# Patient Record
Sex: FEMALE | Race: WHITE | Employment: OTHER | URBAN - METROPOLITAN AREA
[De-identification: names, ages, dates, MRNs, and addresses within clinical notes are randomized per-mention and may not be internally consistent; named-entity substitution may affect disease eponyms.]

---

## 2018-07-03 ENCOUNTER — HOSPITAL ENCOUNTER (OUTPATIENT)
Dept: OTHER | Age: 73
Discharge: HOME | End: 2018-07-03
Attending: FAMILY MEDICINE | Admitting: FAMILY MEDICINE

## 2018-07-03 DIAGNOSIS — M25.511 RIGHT SHOULDER PAIN, UNSPECIFIED CHRONICITY: ICD-10-CM

## 2018-11-08 ENCOUNTER — HOSPITAL ENCOUNTER (EMERGENCY)
Age: 73
Discharge: HOME | End: 2018-11-08
Payer: MEDICARE

## 2018-11-08 VITALS
SYSTOLIC BLOOD PRESSURE: 148 MMHG | OXYGEN SATURATION: 91 % | TEMPERATURE: 99.1 F | BODY MASS INDEX: 50.61 KG/M2 | WEIGHT: 275 LBS | DIASTOLIC BLOOD PRESSURE: 108 MMHG | HEIGHT: 62 IN | HEART RATE: 74 BPM | RESPIRATION RATE: 14 BRPM

## 2018-11-08 DIAGNOSIS — N76.0 VAGINITIS AND VULVOVAGINITIS: ICD-10-CM

## 2018-11-08 DIAGNOSIS — N30.00 ACUTE CYSTITIS WITHOUT HEMATURIA: Primary | ICD-10-CM

## 2018-11-08 LAB
AMORPHOUS: ABNORMAL /HPF
BACTERIA: ABNORMAL /HPF
BILIRUBIN URINE: NEGATIVE
BLOOD, URINE: NEGATIVE
CASTS: ABNORMAL /LPF
CLARITY: CLEAR
COLOR: YELLOW
EPITHELIAL CELLS, UA: ABNORMAL /HPF
GLUCOSE URINE: NEGATIVE MG/DL
KETONES, URINE: NEGATIVE MG/DL
LEUKOCYTE ESTERASE, URINE: NEGATIVE
MICROSCOPIC EXAMINATION: YES
MUCUS: ABNORMAL /LPF
NITRITE, URINE: POSITIVE
PH UA: 7
PROTEIN UA: 30 MG/DL
RBC UA: ABNORMAL /HPF (ref 0–2)
RENAL EPITHELIAL, UA: ABNORMAL /HPF
SPECIFIC GRAVITY UA: 1.01
URINE REFLEX TO CULTURE: YES
URINE TYPE: ABNORMAL
UROBILINOGEN, URINE: 1 E.U./DL
WBC UA: ABNORMAL /HPF (ref 0–5)

## 2018-11-08 PROCEDURE — 99283 EMERGENCY DEPT VISIT LOW MDM: CPT

## 2018-11-08 PROCEDURE — 81001 URINALYSIS AUTO W/SCOPE: CPT

## 2018-11-08 PROCEDURE — 87086 URINE CULTURE/COLONY COUNT: CPT

## 2018-11-08 PROCEDURE — 6370000000 HC RX 637 (ALT 250 FOR IP): Performed by: PHYSICIAN ASSISTANT

## 2018-11-08 RX ORDER — ACETAMINOPHEN 500 MG
1000 TABLET ORAL ONCE
Status: COMPLETED | OUTPATIENT
Start: 2018-11-08 | End: 2018-11-08

## 2018-11-08 RX ORDER — CEFUROXIME AXETIL 250 MG/1
250 TABLET ORAL 2 TIMES DAILY
Qty: 20 TABLET | Refills: 0 | Status: SHIPPED | OUTPATIENT
Start: 2018-11-08 | End: 2018-11-18

## 2018-11-08 RX ORDER — FLUCONAZOLE 100 MG/1
100 TABLET ORAL DAILY
Qty: 7 TABLET | Refills: 0 | Status: SHIPPED | OUTPATIENT
Start: 2018-11-08 | End: 2018-11-15

## 2018-11-08 RX ADMIN — ACETAMINOPHEN 1000 MG: 500 TABLET ORAL at 18:59

## 2018-11-08 ASSESSMENT — PAIN DESCRIPTION - PAIN TYPE: TYPE: ACUTE PAIN

## 2018-11-08 ASSESSMENT — PAIN SCALES - GENERAL: PAINLEVEL_OUTOF10: 10

## 2018-11-08 ASSESSMENT — PAIN DESCRIPTION - LOCATION: LOCATION: VAGINA

## 2018-11-08 NOTE — ED PROVIDER NOTES
**[facility]  eMERGENCY dEPARTMENT eNCOUnter      Pt Name: Rani Arvizu  FGN:[id number]  [place] 1945  Date of evaluation: 11/8/2018  Provider: Merline Boss PA-C      Chief Complaint:    Chief Complaint   Patient presents with    Urinary Tract Infection     Pt c/o of burning and pressure with urination       Nursing Notes, Past Medical Hx, Past Surgical Hx, Social Hx, Allergies, and Family Hx were all reviewed and agreed with or any disagreements were addressed in the HPI.    HPI:  (Location, Duration, Timing, Severity,Quality, Assoc Sx, Context, Modifying factors)  This is a  71 y.o. female patient presenting with her brother-in-law and in wheelchair. States she has back pain in legs will give out at times. This is why she is a wheelchair. This is long-standing. Patient presenting today with complaint of discomfort with urination describes as a burning hot sensation. States that his progress over the past week or less. She does not indicate any vaginal discharge. She does state prior GYN cancer with total hysterectomy. No associated nausea, vomiting, fevers or chills. No flank pain. Does not recall last UTI. She indicates no chest pain or shortness of breath. Patient has known COPD without worsening of this time. She is a former smoker. Patient is nondiabetic. Last A1c May, 2018 was 6.4. Chart review does indicate a pulmonary nodule 6 mm lateral aspect of right upper lobe stable since 2001.     PastMedical/Surgical History:      Diagnosis Date    Arthritis     Asthma     COPD (chronic obstructive pulmonary disease) ([address]     Diabetes mellitus ([address]     Hyperlipidemia     Hypertension     Other disorders of kidney and ureter in diseases classified elsewhere     Thyroid disease          Procedure Laterality Date    CHOLECYSTECTOMY      ROTATOR CUFF REPAIR Left 2015    THYROID LOBECTOMY      XR [employer]

## 2018-11-10 LAB — URINE CULTURE, ROUTINE: NORMAL

## 2018-12-21 ENCOUNTER — APPOINTMENT (OUTPATIENT)
Dept: CT IMAGING | Age: 73
DRG: 871 | End: 2018-12-21
Payer: MEDICARE

## 2018-12-21 ENCOUNTER — APPOINTMENT (OUTPATIENT)
Dept: GENERAL RADIOLOGY | Age: 73
DRG: 871 | End: 2018-12-21
Payer: MEDICARE

## 2018-12-21 ENCOUNTER — HOSPITAL ENCOUNTER (INPATIENT)
Age: 73
LOS: 6 days | Discharge: NURSING, REHAB OR LONG-TERM CARE | DRG: 871 | End: 2018-12-28
Attending: EMERGENCY MEDICINE | Admitting: INTERNAL MEDICINE
Payer: MEDICARE

## 2018-12-21 DIAGNOSIS — J96.02 ACUTE RESPIRATORY FAILURE WITH HYPOXIA AND HYPERCAPNIA (HCC): Primary | ICD-10-CM

## 2018-12-21 DIAGNOSIS — J96.01 ACUTE RESPIRATORY FAILURE WITH HYPOXIA AND HYPERCAPNIA (HCC): Primary | ICD-10-CM

## 2018-12-21 DIAGNOSIS — N28.89 RIGHT KIDNEY MASS: ICD-10-CM

## 2018-12-21 DIAGNOSIS — N17.9 AKI (ACUTE KIDNEY INJURY) (HCC): ICD-10-CM

## 2018-12-21 DIAGNOSIS — R41.0 DELIRIUM: ICD-10-CM

## 2018-12-21 LAB
A/G RATIO: 1.1 (ref 1.1–2.2)
ALBUMIN SERPL-MCNC: 3.4 G/DL (ref 3.4–5)
ALP BLD-CCNC: 85 U/L (ref 40–129)
ALT SERPL-CCNC: 41 U/L (ref 10–40)
AMORPHOUS: ABNORMAL /HPF
ANION GAP SERPL CALCULATED.3IONS-SCNC: 17 MMOL/L (ref 3–16)
APTT: 28.3 SEC (ref 26–36)
AST SERPL-CCNC: 69 U/L (ref 15–37)
BACTERIA: ABNORMAL /HPF
BASE EXCESS ARTERIAL: -2.9 MMOL/L (ref -3–3)
BASOPHILS ABSOLUTE: 0 K/UL (ref 0–0.2)
BASOPHILS RELATIVE PERCENT: 0.1 %
BILIRUB SERPL-MCNC: 0.5 MG/DL (ref 0–1)
BILIRUBIN URINE: ABNORMAL
BLOOD, URINE: ABNORMAL
BUN BLDV-MCNC: 25 MG/DL (ref 7–20)
CALCIUM SERPL-MCNC: 9 MG/DL (ref 8.3–10.6)
CARBOXYHEMOGLOBIN ARTERIAL: 2.8 % (ref 0–1.5)
CASTS 2: ABNORMAL /LPF
CHLORIDE BLD-SCNC: 98 MMOL/L (ref 99–110)
CLARITY: ABNORMAL
CO2: 25 MMOL/L (ref 21–32)
COLOR: ABNORMAL
CREAT SERPL-MCNC: 1.2 MG/DL (ref 0.6–1.2)
EOSINOPHILS ABSOLUTE: 0 K/UL (ref 0–0.6)
EOSINOPHILS RELATIVE PERCENT: 0 %
EPITHELIAL CELLS, UA: ABNORMAL /HPF
GFR AFRICAN AMERICAN: 53
GFR NON-AFRICAN AMERICAN: 44
GLOBULIN: 3.1 G/DL
GLUCOSE BLD-MCNC: 246 MG/DL (ref 70–99)
GLUCOSE URINE: NEGATIVE MG/DL
HCO3 ARTERIAL: 26.1 MMOL/L (ref 21–29)
HCT VFR BLD CALC: 40.4 % (ref 36–48)
HEMOGLOBIN, ART, EXTENDED: 13 G/DL (ref 12–16)
HEMOGLOBIN: 12.8 G/DL (ref 12–16)
INR BLD: 1.11 (ref 0.86–1.14)
KETONES, URINE: NEGATIVE MG/DL
LACTIC ACID, SEPSIS: 4.4 MMOL/L (ref 0.4–1.9)
LEUKOCYTE ESTERASE, URINE: NEGATIVE
LIPASE: 52 U/L (ref 13–60)
LYMPHOCYTES ABSOLUTE: 0.5 K/UL (ref 1–5.1)
LYMPHOCYTES RELATIVE PERCENT: 3.6 %
MCH RBC QN AUTO: 30.3 PG (ref 26–34)
MCHC RBC AUTO-ENTMCNC: 31.6 G/DL (ref 31–36)
MCV RBC AUTO: 95.9 FL (ref 80–100)
METHEMOGLOBIN ARTERIAL: 0.3 %
MICROSCOPIC EXAMINATION: YES
MONOCYTES ABSOLUTE: 0.2 K/UL (ref 0–1.3)
MONOCYTES RELATIVE PERCENT: 1.7 %
NEUTROPHILS ABSOLUTE: 13 K/UL (ref 1.7–7.7)
NEUTROPHILS RELATIVE PERCENT: 94.6 %
NITRITE, URINE: NEGATIVE
O2 CONTENT ARTERIAL: 18 ML/DL
O2 SAT, ARTERIAL: 97 %
O2 THERAPY: ABNORMAL
PCO2 ARTERIAL: 65.7 MMHG (ref 35–45)
PDW BLD-RTO: 17 % (ref 12.4–15.4)
PH ARTERIAL: 7.22 (ref 7.35–7.45)
PH UA: 5
PLATELET # BLD: 237 K/UL (ref 135–450)
PMV BLD AUTO: 8.1 FL (ref 5–10.5)
PO2 ARTERIAL: 111.4 MMHG (ref 75–108)
POTASSIUM REFLEX MAGNESIUM: 4.5 MMOL/L (ref 3.5–5.1)
PROCALCITONIN: 0.06 NG/ML (ref 0–0.15)
PROTEIN UA: >=300 MG/DL
PROTHROMBIN TIME: 12.7 SEC (ref 9.8–13)
RBC # BLD: 4.21 M/UL (ref 4–5.2)
RBC UA: ABNORMAL /HPF (ref 0–2)
SODIUM BLD-SCNC: 140 MMOL/L (ref 136–145)
SPECIFIC GRAVITY UA: >=1.03
TCO2 ARTERIAL: 28.1 MMOL/L
TOTAL PROTEIN: 6.5 G/DL (ref 6.4–8.2)
URINE TYPE: ABNORMAL
UROBILINOGEN, URINE: 1 E.U./DL
WBC # BLD: 13.8 K/UL (ref 4–11)
WBC UA: ABNORMAL /HPF (ref 0–5)

## 2018-12-21 PROCEDURE — 96361 HYDRATE IV INFUSION ADD-ON: CPT

## 2018-12-21 PROCEDURE — 81001 URINALYSIS AUTO W/SCOPE: CPT

## 2018-12-21 PROCEDURE — 74177 CT ABD & PELVIS W/CONTRAST: CPT

## 2018-12-21 PROCEDURE — 85610 PROTHROMBIN TIME: CPT

## 2018-12-21 PROCEDURE — 99152 MOD SED SAME PHYS/QHP 5/>YRS: CPT

## 2018-12-21 PROCEDURE — 83605 ASSAY OF LACTIC ACID: CPT

## 2018-12-21 PROCEDURE — 84145 PROCALCITONIN (PCT): CPT

## 2018-12-21 PROCEDURE — 2500000003 HC RX 250 WO HCPCS

## 2018-12-21 PROCEDURE — 80053 COMPREHEN METABOLIC PANEL: CPT

## 2018-12-21 PROCEDURE — 36600 WITHDRAWAL OF ARTERIAL BLOOD: CPT

## 2018-12-21 PROCEDURE — 6360000002 HC RX W HCPCS: Performed by: EMERGENCY MEDICINE

## 2018-12-21 PROCEDURE — 87086 URINE CULTURE/COLONY COUNT: CPT

## 2018-12-21 PROCEDURE — 2500000003 HC RX 250 WO HCPCS: Performed by: EMERGENCY MEDICINE

## 2018-12-21 PROCEDURE — 99291 CRITICAL CARE FIRST HOUR: CPT

## 2018-12-21 PROCEDURE — 74018 RADEX ABDOMEN 1 VIEW: CPT

## 2018-12-21 PROCEDURE — 96365 THER/PROPH/DIAG IV INF INIT: CPT

## 2018-12-21 PROCEDURE — 87040 BLOOD CULTURE FOR BACTERIA: CPT

## 2018-12-21 PROCEDURE — 6360000004 HC RX CONTRAST MEDICATION: Performed by: EMERGENCY MEDICINE

## 2018-12-21 PROCEDURE — 71045 X-RAY EXAM CHEST 1 VIEW: CPT

## 2018-12-21 PROCEDURE — 85730 THROMBOPLASTIN TIME PARTIAL: CPT

## 2018-12-21 PROCEDURE — 70450 CT HEAD/BRAIN W/O DYE: CPT

## 2018-12-21 PROCEDURE — 96367 TX/PROPH/DG ADDL SEQ IV INF: CPT

## 2018-12-21 PROCEDURE — 5A1945Z RESPIRATORY VENTILATION, 24-96 CONSECUTIVE HOURS: ICD-10-PCS | Performed by: EMERGENCY MEDICINE

## 2018-12-21 PROCEDURE — 0BH17EZ INSERTION OF ENDOTRACHEAL AIRWAY INTO TRACHEA, VIA NATURAL OR ARTIFICIAL OPENING: ICD-10-PCS | Performed by: EMERGENCY MEDICINE

## 2018-12-21 PROCEDURE — 85025 COMPLETE CBC W/AUTO DIFF WBC: CPT

## 2018-12-21 PROCEDURE — 2580000003 HC RX 258: Performed by: EMERGENCY MEDICINE

## 2018-12-21 PROCEDURE — 80307 DRUG TEST PRSMV CHEM ANLYZR: CPT

## 2018-12-21 PROCEDURE — 4500000026 HC ED CRITICAL CARE PROCEDURE

## 2018-12-21 PROCEDURE — 94002 VENT MGMT INPAT INIT DAY: CPT

## 2018-12-21 PROCEDURE — 82803 BLOOD GASES ANY COMBINATION: CPT

## 2018-12-21 PROCEDURE — 83690 ASSAY OF LIPASE: CPT

## 2018-12-21 PROCEDURE — 93005 ELECTROCARDIOGRAM TRACING: CPT | Performed by: EMERGENCY MEDICINE

## 2018-12-21 RX ORDER — ROCURONIUM BROMIDE 10 MG/ML
0.6 INJECTION, SOLUTION INTRAVENOUS ONCE
Status: COMPLETED | OUTPATIENT
Start: 2018-12-21 | End: 2018-12-21

## 2018-12-21 RX ORDER — ETOMIDATE 2 MG/ML
20 INJECTION INTRAVENOUS ONCE
Status: COMPLETED | OUTPATIENT
Start: 2018-12-21 | End: 2018-12-21

## 2018-12-21 RX ORDER — 0.9 % SODIUM CHLORIDE 0.9 %
30 INTRAVENOUS SOLUTION INTRAVENOUS ONCE
Status: COMPLETED | OUTPATIENT
Start: 2018-12-21 | End: 2018-12-21

## 2018-12-21 RX ORDER — NALOXONE HYDROCHLORIDE 1 MG/ML
2 INJECTION INTRAMUSCULAR; INTRAVENOUS; SUBCUTANEOUS ONCE
Status: DISCONTINUED | OUTPATIENT
Start: 2018-12-21 | End: 2018-12-22

## 2018-12-21 RX ORDER — PROPOFOL 10 MG/ML
20 INJECTION, EMULSION INTRAVENOUS
Status: DISCONTINUED | OUTPATIENT
Start: 2018-12-21 | End: 2018-12-22

## 2018-12-21 RX ADMIN — PIPERACILLIN SODIUM AND TAZOBACTAM SODIUM 3.38 G: 3; .375 INJECTION, POWDER, LYOPHILIZED, FOR SOLUTION INTRAVENOUS at 22:20

## 2018-12-21 RX ADMIN — ROCURONIUM BROMIDE 75 MG: 100 INJECTION, SOLUTION INTRAVENOUS at 21:34

## 2018-12-21 RX ADMIN — SODIUM CHLORIDE 3741 ML: 9 INJECTION, SOLUTION INTRAVENOUS at 21:35

## 2018-12-21 RX ADMIN — IOPAMIDOL 75 ML: 755 INJECTION, SOLUTION INTRAVENOUS at 22:50

## 2018-12-21 RX ADMIN — PROPOFOL 20 MCG/KG/MIN: 10 INJECTION, EMULSION INTRAVENOUS at 21:33

## 2018-12-21 RX ADMIN — VANCOMYCIN HYDROCHLORIDE 1750 MG: 1 INJECTION, POWDER, LYOPHILIZED, FOR SOLUTION INTRAVENOUS at 23:35

## 2018-12-21 RX ADMIN — ETOMIDATE 20 MG: 2 INJECTION INTRAVENOUS at 21:34

## 2018-12-21 ASSESSMENT — PAIN SCALES - GENERAL: PAINLEVEL_OUTOF10: 0

## 2018-12-21 ASSESSMENT — PULMONARY FUNCTION TESTS: PIF_VALUE: 25

## 2018-12-22 ENCOUNTER — APPOINTMENT (OUTPATIENT)
Dept: GENERAL RADIOLOGY | Age: 73
DRG: 871 | End: 2018-12-22
Payer: MEDICARE

## 2018-12-22 PROBLEM — J96.01 ACUTE RESPIRATORY FAILURE WITH HYPOXIA AND HYPERCAPNIA (HCC): Status: ACTIVE | Noted: 2018-12-22

## 2018-12-22 PROBLEM — G93.40 ACUTE ENCEPHALOPATHY: Status: ACTIVE | Noted: 2018-12-22

## 2018-12-22 PROBLEM — J18.9 PNEUMONIA DUE TO INFECTIOUS ORGANISM: Status: ACTIVE | Noted: 2018-12-22

## 2018-12-22 PROBLEM — J96.02 ACUTE RESPIRATORY FAILURE WITH HYPOXIA AND HYPERCAPNIA (HCC): Status: ACTIVE | Noted: 2018-12-22

## 2018-12-22 LAB
A/G RATIO: 1 (ref 1.1–2.2)
ALBUMIN SERPL-MCNC: 2.9 G/DL (ref 3.4–5)
ALP BLD-CCNC: 74 U/L (ref 40–129)
ALT SERPL-CCNC: 38 U/L (ref 10–40)
AMPHETAMINE SCREEN, URINE: ABNORMAL
ANION GAP SERPL CALCULATED.3IONS-SCNC: 11 MMOL/L (ref 3–16)
AST SERPL-CCNC: 49 U/L (ref 15–37)
BARBITURATE SCREEN URINE: ABNORMAL
BASE EXCESS ARTERIAL: -0.9 MMOL/L (ref -3–3)
BASOPHILS ABSOLUTE: 0 K/UL (ref 0–0.2)
BASOPHILS RELATIVE PERCENT: 0.1 %
BENZODIAZEPINE SCREEN, URINE: ABNORMAL
BILIRUB SERPL-MCNC: 0.6 MG/DL (ref 0–1)
BUN BLDV-MCNC: 25 MG/DL (ref 7–20)
CALCIUM SERPL-MCNC: 8.1 MG/DL (ref 8.3–10.6)
CANNABINOID SCREEN URINE: ABNORMAL
CARBOXYHEMOGLOBIN ARTERIAL: 2.3 % (ref 0–1.5)
CHLORIDE BLD-SCNC: 105 MMOL/L (ref 99–110)
CO2: 24 MMOL/L (ref 21–32)
COCAINE METABOLITE SCREEN URINE: ABNORMAL
CREAT SERPL-MCNC: 0.9 MG/DL (ref 0.6–1.2)
EKG ATRIAL RATE: 89 BPM
EKG DIAGNOSIS: NORMAL
EKG P AXIS: 37 DEGREES
EKG P-R INTERVAL: 172 MS
EKG Q-T INTERVAL: 380 MS
EKG QRS DURATION: 88 MS
EKG QTC CALCULATION (BAZETT): 462 MS
EKG R AXIS: 17 DEGREES
EKG T AXIS: -19 DEGREES
EKG VENTRICULAR RATE: 89 BPM
EOSINOPHILS ABSOLUTE: 0 K/UL (ref 0–0.6)
EOSINOPHILS RELATIVE PERCENT: 0 %
GFR AFRICAN AMERICAN: >60
GFR NON-AFRICAN AMERICAN: >60
GLOBULIN: 2.9 G/DL
GLUCOSE BLD-MCNC: 119 MG/DL (ref 70–99)
GLUCOSE BLD-MCNC: 128 MG/DL (ref 70–99)
GLUCOSE BLD-MCNC: 143 MG/DL (ref 70–99)
GLUCOSE BLD-MCNC: 151 MG/DL (ref 70–99)
GLUCOSE BLD-MCNC: 156 MG/DL (ref 70–99)
GLUCOSE BLD-MCNC: 165 MG/DL (ref 70–99)
GLUCOSE BLD-MCNC: 166 MG/DL (ref 70–99)
HCO3 ARTERIAL: 24.7 MMOL/L (ref 21–29)
HCT VFR BLD CALC: 37.1 % (ref 36–48)
HEMOGLOBIN, ART, EXTENDED: 12 G/DL (ref 12–16)
HEMOGLOBIN: 11.8 G/DL (ref 12–16)
LACTIC ACID: 1.4 MMOL/L (ref 0.4–2)
LACTIC ACID: 2.1 MMOL/L (ref 0.4–2)
LACTIC ACID: 2.1 MMOL/L (ref 0.4–2)
LYMPHOCYTES ABSOLUTE: 0.6 K/UL (ref 1–5.1)
LYMPHOCYTES RELATIVE PERCENT: 4.7 %
Lab: ABNORMAL
MCH RBC QN AUTO: 30.4 PG (ref 26–34)
MCHC RBC AUTO-ENTMCNC: 31.8 G/DL (ref 31–36)
MCV RBC AUTO: 95.7 FL (ref 80–100)
METHADONE SCREEN, URINE: ABNORMAL
METHEMOGLOBIN ARTERIAL: 0.3 %
MONOCYTES ABSOLUTE: 0.8 K/UL (ref 0–1.3)
MONOCYTES RELATIVE PERCENT: 6.5 %
NEUTROPHILS ABSOLUTE: 10.7 K/UL (ref 1.7–7.7)
NEUTROPHILS RELATIVE PERCENT: 88.7 %
O2 CONTENT ARTERIAL: 16 ML/DL
O2 SAT, ARTERIAL: 95.8 %
O2 THERAPY: ABNORMAL
OPIATE SCREEN URINE: POSITIVE
OXYCODONE URINE: POSITIVE
PCO2 ARTERIAL: 44.9 MMHG (ref 35–45)
PDW BLD-RTO: 17.4 % (ref 12.4–15.4)
PERFORMED ON: ABNORMAL
PH ARTERIAL: 7.36 (ref 7.35–7.45)
PH UA: 5
PHENCYCLIDINE SCREEN URINE: ABNORMAL
PLATELET # BLD: 192 K/UL (ref 135–450)
PMV BLD AUTO: 8.2 FL (ref 5–10.5)
PO2 ARTERIAL: 83.1 MMHG (ref 75–108)
POTASSIUM REFLEX MAGNESIUM: 4.2 MMOL/L (ref 3.5–5.1)
PROPOXYPHENE SCREEN: ABNORMAL
RBC # BLD: 3.88 M/UL (ref 4–5.2)
SODIUM BLD-SCNC: 140 MMOL/L (ref 136–145)
TCO2 ARTERIAL: 26.1 MMOL/L
TOTAL PROTEIN: 5.8 G/DL (ref 6.4–8.2)
TROPONIN: <0.01 NG/ML
WBC # BLD: 12.1 K/UL (ref 4–11)

## 2018-12-22 PROCEDURE — 6360000002 HC RX W HCPCS: Performed by: INTERNAL MEDICINE

## 2018-12-22 PROCEDURE — 2500000003 HC RX 250 WO HCPCS: Performed by: INTERNAL MEDICINE

## 2018-12-22 PROCEDURE — S0028 INJECTION, FAMOTIDINE, 20 MG: HCPCS | Performed by: INTERNAL MEDICINE

## 2018-12-22 PROCEDURE — 6370000000 HC RX 637 (ALT 250 FOR IP): Performed by: INTERNAL MEDICINE

## 2018-12-22 PROCEDURE — 80053 COMPREHEN METABOLIC PANEL: CPT

## 2018-12-22 PROCEDURE — 94750 HC PULMONARY COMPLIANCE STUDY: CPT

## 2018-12-22 PROCEDURE — 2580000003 HC RX 258: Performed by: INTERNAL MEDICINE

## 2018-12-22 PROCEDURE — 83605 ASSAY OF LACTIC ACID: CPT

## 2018-12-22 PROCEDURE — 71045 X-RAY EXAM CHEST 1 VIEW: CPT

## 2018-12-22 PROCEDURE — 82803 BLOOD GASES ANY COMBINATION: CPT

## 2018-12-22 PROCEDURE — 93010 ELECTROCARDIOGRAM REPORT: CPT | Performed by: INTERNAL MEDICINE

## 2018-12-22 PROCEDURE — 93005 ELECTROCARDIOGRAM TRACING: CPT | Performed by: INTERNAL MEDICINE

## 2018-12-22 PROCEDURE — 94762 N-INVAS EAR/PLS OXIMTRY CONT: CPT

## 2018-12-22 PROCEDURE — 99291 CRITICAL CARE FIRST HOUR: CPT | Performed by: INTERNAL MEDICINE

## 2018-12-22 PROCEDURE — 94640 AIRWAY INHALATION TREATMENT: CPT

## 2018-12-22 PROCEDURE — 96366 THER/PROPH/DIAG IV INF ADDON: CPT

## 2018-12-22 PROCEDURE — 36415 COLL VENOUS BLD VENIPUNCTURE: CPT

## 2018-12-22 PROCEDURE — 2700000000 HC OXYGEN THERAPY PER DAY

## 2018-12-22 PROCEDURE — 94003 VENT MGMT INPAT SUBQ DAY: CPT

## 2018-12-22 PROCEDURE — 85025 COMPLETE CBC W/AUTO DIFF WBC: CPT

## 2018-12-22 PROCEDURE — 96361 HYDRATE IV INFUSION ADD-ON: CPT

## 2018-12-22 PROCEDURE — 2000000000 HC ICU R&B

## 2018-12-22 PROCEDURE — 94664 DEMO&/EVAL PT USE INHALER: CPT

## 2018-12-22 PROCEDURE — 84484 ASSAY OF TROPONIN QUANT: CPT

## 2018-12-22 PROCEDURE — 83036 HEMOGLOBIN GLYCOSYLATED A1C: CPT

## 2018-12-22 RX ORDER — ALBUTEROL SULFATE 90 UG/1
4 AEROSOL, METERED RESPIRATORY (INHALATION) EVERY 4 HOURS PRN
Status: DISCONTINUED | OUTPATIENT
Start: 2018-12-22 | End: 2018-12-22

## 2018-12-22 RX ORDER — NICOTINE POLACRILEX 4 MG
15 LOZENGE BUCCAL PRN
Status: DISCONTINUED | OUTPATIENT
Start: 2018-12-22 | End: 2018-12-28 | Stop reason: HOSPADM

## 2018-12-22 RX ORDER — PROPOFOL 10 MG/ML
10 INJECTION, EMULSION INTRAVENOUS CONTINUOUS
Status: DISCONTINUED | OUTPATIENT
Start: 2018-12-22 | End: 2018-12-23

## 2018-12-22 RX ORDER — TRAMADOL HYDROCHLORIDE 50 MG/1
50-100 TABLET ORAL EVERY 6 HOURS PRN
Status: ON HOLD | COMMUNITY
End: 2018-12-28 | Stop reason: HOSPADM

## 2018-12-22 RX ORDER — 0.9 % SODIUM CHLORIDE 0.9 %
500 INTRAVENOUS SOLUTION INTRAVENOUS ONCE
Status: COMPLETED | OUTPATIENT
Start: 2018-12-22 | End: 2018-12-22

## 2018-12-22 RX ORDER — ALBUTEROL SULFATE 90 UG/1
4 AEROSOL, METERED RESPIRATORY (INHALATION) EVERY 4 HOURS
Status: DISCONTINUED | OUTPATIENT
Start: 2018-12-22 | End: 2018-12-23

## 2018-12-22 RX ORDER — DEXTROSE MONOHYDRATE 50 MG/ML
100 INJECTION, SOLUTION INTRAVENOUS PRN
Status: DISCONTINUED | OUTPATIENT
Start: 2018-12-22 | End: 2018-12-28 | Stop reason: HOSPADM

## 2018-12-22 RX ORDER — MAGNESIUM SULFATE IN WATER 40 MG/ML
2 INJECTION, SOLUTION INTRAVENOUS ONCE
Status: COMPLETED | OUTPATIENT
Start: 2018-12-22 | End: 2018-12-22

## 2018-12-22 RX ORDER — DEXTROSE MONOHYDRATE 25 G/50ML
12.5 INJECTION, SOLUTION INTRAVENOUS PRN
Status: DISCONTINUED | OUTPATIENT
Start: 2018-12-22 | End: 2018-12-28 | Stop reason: HOSPADM

## 2018-12-22 RX ORDER — NYSTATIN 100000 U/G
10000 OINTMENT TOPICAL PRN
Status: ON HOLD | COMMUNITY
End: 2018-12-28 | Stop reason: HOSPADM

## 2018-12-22 RX ORDER — SODIUM CHLORIDE 0.9 % (FLUSH) 0.9 %
10 SYRINGE (ML) INJECTION PRN
Status: DISCONTINUED | OUTPATIENT
Start: 2018-12-22 | End: 2018-12-28 | Stop reason: HOSPADM

## 2018-12-22 RX ORDER — ACETAMINOPHEN 325 MG/1
650 TABLET ORAL EVERY 4 HOURS PRN
Status: DISCONTINUED | OUTPATIENT
Start: 2018-12-22 | End: 2018-12-28 | Stop reason: HOSPADM

## 2018-12-22 RX ORDER — FENTANYL CITRATE 50 UG/ML
25 INJECTION, SOLUTION INTRAMUSCULAR; INTRAVENOUS
Status: DISCONTINUED | OUTPATIENT
Start: 2018-12-22 | End: 2018-12-23

## 2018-12-22 RX ORDER — CHLORHEXIDINE GLUCONATE 0.12 MG/ML
15 RINSE ORAL 2 TIMES DAILY
Status: DISCONTINUED | OUTPATIENT
Start: 2018-12-22 | End: 2018-12-24

## 2018-12-22 RX ORDER — SODIUM CHLORIDE 0.9 % (FLUSH) 0.9 %
10 SYRINGE (ML) INJECTION EVERY 12 HOURS SCHEDULED
Status: DISCONTINUED | OUTPATIENT
Start: 2018-12-22 | End: 2018-12-28 | Stop reason: HOSPADM

## 2018-12-22 RX ORDER — OXYCODONE HYDROCHLORIDE AND ACETAMINOPHEN 5; 325 MG/1; MG/1
1-2 TABLET ORAL EVERY 6 HOURS PRN
Status: ON HOLD | COMMUNITY
End: 2018-12-28 | Stop reason: HOSPADM

## 2018-12-22 RX ORDER — MIDAZOLAM HYDROCHLORIDE 1 MG/ML
2 INJECTION INTRAMUSCULAR; INTRAVENOUS
Status: DISCONTINUED | OUTPATIENT
Start: 2018-12-22 | End: 2018-12-23

## 2018-12-22 RX ORDER — LEVOTHYROXINE SODIUM ANHYDROUS 100 UG/5ML
44 INJECTION, POWDER, LYOPHILIZED, FOR SOLUTION INTRAVENOUS
Status: DISCONTINUED | OUTPATIENT
Start: 2018-12-22 | End: 2018-12-24

## 2018-12-22 RX ADMIN — Medication 4 PUFF: at 11:09

## 2018-12-22 RX ADMIN — INSULIN LISPRO 1 UNITS: 100 INJECTION, SOLUTION INTRAVENOUS; SUBCUTANEOUS at 04:30

## 2018-12-22 RX ADMIN — FENTANYL CITRATE 25 MCG: 50 INJECTION, SOLUTION INTRAMUSCULAR; INTRAVENOUS at 22:15

## 2018-12-22 RX ADMIN — Medication 4 PUFF: at 03:08

## 2018-12-22 RX ADMIN — Medication 4 PUFF: at 22:46

## 2018-12-22 RX ADMIN — PROPOFOL 24 MCG/KG/MIN: 10 INJECTION, EMULSION INTRAVENOUS at 11:09

## 2018-12-22 RX ADMIN — Medication 4 PUFF: at 19:04

## 2018-12-22 RX ADMIN — FENTANYL CITRATE 25 MCG: 50 INJECTION, SOLUTION INTRAMUSCULAR; INTRAVENOUS at 03:17

## 2018-12-22 RX ADMIN — FAMOTIDINE 20 MG: 10 INJECTION, SOLUTION INTRAVENOUS at 09:43

## 2018-12-22 RX ADMIN — ENOXAPARIN SODIUM 40 MG: 40 INJECTION SUBCUTANEOUS at 09:43

## 2018-12-22 RX ADMIN — PROPOFOL 40 MCG/KG/MIN: 10 INJECTION, EMULSION INTRAVENOUS at 03:18

## 2018-12-22 RX ADMIN — FAMOTIDINE 20 MG: 10 INJECTION, SOLUTION INTRAVENOUS at 20:22

## 2018-12-22 RX ADMIN — MIDAZOLAM HYDROCHLORIDE 2 MG: 1 INJECTION, SOLUTION INTRAMUSCULAR; INTRAVENOUS at 09:15

## 2018-12-22 RX ADMIN — Medication 10 ML: at 20:22

## 2018-12-22 RX ADMIN — Medication 4 PUFF: at 15:17

## 2018-12-22 RX ADMIN — MIDAZOLAM HYDROCHLORIDE 2 MG: 1 INJECTION, SOLUTION INTRAMUSCULAR; INTRAVENOUS at 18:29

## 2018-12-22 RX ADMIN — PROPOFOL 20 MCG/KG/MIN: 10 INJECTION, EMULSION INTRAVENOUS at 20:28

## 2018-12-22 RX ADMIN — MAGNESIUM SULFATE HEPTAHYDRATE 2 G: 40 INJECTION, SOLUTION INTRAVENOUS at 11:08

## 2018-12-22 RX ADMIN — MIDAZOLAM HYDROCHLORIDE 2 MG: 1 INJECTION, SOLUTION INTRAMUSCULAR; INTRAVENOUS at 21:34

## 2018-12-22 RX ADMIN — FENTANYL CITRATE 25 MCG: 50 INJECTION, SOLUTION INTRAMUSCULAR; INTRAVENOUS at 06:32

## 2018-12-22 RX ADMIN — MUPIROCIN: 20 OINTMENT TOPICAL at 20:20

## 2018-12-22 RX ADMIN — SODIUM CHLORIDE 500 ML: 9 INJECTION, SOLUTION INTRAVENOUS at 04:38

## 2018-12-22 RX ADMIN — VANCOMYCIN HYDROCHLORIDE 1250 MG: 1 INJECTION, POWDER, LYOPHILIZED, FOR SOLUTION INTRAVENOUS at 22:33

## 2018-12-22 RX ADMIN — PIPERACILLIN SODIUM AND TAZOBACTAM SODIUM 3.38 G: 3; .375 INJECTION, POWDER, LYOPHILIZED, FOR SOLUTION INTRAVENOUS at 16:25

## 2018-12-22 RX ADMIN — PIPERACILLIN SODIUM AND TAZOBACTAM SODIUM 3.38 G: 3; .375 INJECTION, POWDER, LYOPHILIZED, FOR SOLUTION INTRAVENOUS at 09:43

## 2018-12-22 RX ADMIN — MUPIROCIN: 20 OINTMENT TOPICAL at 11:08

## 2018-12-22 RX ADMIN — FENTANYL CITRATE 25 MCG: 50 INJECTION, SOLUTION INTRAMUSCULAR; INTRAVENOUS at 09:15

## 2018-12-22 RX ADMIN — PROPOFOL 30 MCG/KG/MIN: 10 INJECTION, EMULSION INTRAVENOUS at 06:04

## 2018-12-22 RX ADMIN — PROPOFOL 22 MCG/KG/MIN: 10 INJECTION, EMULSION INTRAVENOUS at 16:25

## 2018-12-22 RX ADMIN — MIDAZOLAM HYDROCHLORIDE 2 MG: 1 INJECTION, SOLUTION INTRAMUSCULAR; INTRAVENOUS at 23:35

## 2018-12-22 RX ADMIN — Medication 4 PUFF: at 07:38

## 2018-12-22 RX ADMIN — CHLORHEXIDINE GLUCONATE 0.12% ORAL RINSE 15 ML: 1.2 LIQUID ORAL at 20:18

## 2018-12-22 ASSESSMENT — PULMONARY FUNCTION TESTS
PIF_VALUE: 33
PIF_VALUE: 22
PIF_VALUE: 22
PIF_VALUE: 23
PIF_VALUE: 23
PIF_VALUE: 24
PIF_VALUE: 23
PIF_VALUE: 22
PIF_VALUE: 24
PIF_VALUE: 24
PIF_VALUE: 23
PIF_VALUE: 23
PIF_VALUE: 25

## 2018-12-22 NOTE — PROGRESS NOTES
This note also relates to the following rows which could not be included:  FiO2  - Cannot attach notes to unvalidated device data       12/22/18 0743   Oxygen Therapy/Pulse Ox   O2 Device Ventilator   Treatment   Treatment Type MDI   $Treatment Type $Medication Delivery   Medications Albuterol

## 2018-12-22 NOTE — PROGRESS NOTES
Shift assessment is complete. Pt continues on the Vent with sedation. ET/OG intact. Left/right lungs are diminished with minimal ET secretions. Peripheral lines dry/intact. Bilateral lower non-pitting edema. Whelan intact and draining yellow/cloudy. Vital signs are stable, telemetry sinus rhythm/sinus jae. Bilateral soft wrist restraints continued for safety of airway.

## 2018-12-22 NOTE — PROGRESS NOTES
12/22/18 1113   Vent Information   Vt Ordered 420 mL   Rate Set 20 bmp   Peak Flow 60 L/min   Pressure Support 0 cmH20   FiO2  55 %   Sensitivity 3   PEEP/CPAP 5   I Time/ I Time % 0 s   Humidification Source Heated wire   Humidification Temp 36.9   Circuit Condensation Drained   Vent Patient Data   High Peep/I Pressure 0   Peak Inspiratory Pressure 23 cmH2O   Mean Airway Pressure 11 cmH20   Rate Measured 20 br/min   Vt Exhaled 457 mL   Minute Volume 9.28 Liters   I:E Ratio 1:2.90   Cough/Sputum   Sputum How Obtained Endotracheal   Cough Non-productive   Sputum Amount None   Spontaneous Breathing Trial (SBT) RT Doc   Pulse 51   SpO2 97 %   Breath Sounds   Right Upper Lobe Diminished   Right Middle Lobe Diminished   Right Lower Lobe Diminished   Left Upper Lobe Diminished   Left Lower Lobe Diminished   Additional Respiratory  Assessments   Resp 20   Position Semi-Acosta's   Alarm Settings   High Pressure Alarm 40 cmH2O   Low Minute Volume Alarm 3.5 L/min   High Respiratory Rate 40 br/min   Patient Observation   Observations 7.5 ETT @ 23 lip

## 2018-12-22 NOTE — ED PROVIDER NOTES
Specific Gravity, UA >=1.030 1.005 - 1.030    Blood, Urine MODERATE (A) Negative    pH, UA 5.0 5.0 - 8.0    Protein, UA >=300 (A) Negative mg/dL    Urobilinogen, Urine 1.0 <2.0 E.U./dL    Nitrite, Urine Negative Negative    Leukocyte Esterase, Urine Negative Negative    Microscopic Examination YES     Urine Type Not Specified    Protime-INR   Result Value Ref Range    Protime 12.7 9.8 - 13.0 sec    INR 1.11 0.86 - 1.14   APTT   Result Value Ref Range    aPTT 28.3 26.0 - 36.0 sec   Lipase   Result Value Ref Range    Lipase 52.0 13.0 - 60.0 U/L   Procalcitonin   Result Value Ref Range    Procalcitonin 0.06 0.00 - 0.15 ng/mL   Blood gas, arterial   Result Value Ref Range    pH, Arterial 7.217 (L) 7.350 - 7.450    pCO2, Arterial 65.7 (H) 35.0 - 45.0 mmHg    pO2, Arterial 111.4 (H) 75.0 - 108.0 mmHg    HCO3, Arterial 26.1 21.0 - 29.0 mmol/L    Base Excess, Arterial -2.9 -3.0 - 3.0 mmol/L    Hemoglobin, Art, Extended 13.0 12.0 - 16.0 g/dL    O2 Sat, Arterial 97.0 >92 %    Carboxyhgb, Arterial 2.8 (H) 0.0 - 1.5 %    Methemoglobin, Arterial 0.3 <1.5 %    TCO2, Arterial 28.1 Not Established mmol/L    O2 Content, Arterial 18 Not Established mL/dL    O2 Therapy See comment    Microscopic Urinalysis   Result Value Ref Range    CASTS 2 0-1 Coarse Gran (A) /LPF    WBC, UA 6-10 (A) 0 - 5 /HPF    RBC, UA 5-10 (A) 0 - 2 /HPF    Epi Cells 0-2 /HPF    Bacteria, UA 1+ (A) /HPF    Amorphous, UA 2+ (A) /HPF   Troponin   Result Value Ref Range    Troponin <0.01 <0.01 ng/mL   Drug screen multi urine   Result Value Ref Range    Amphetamine Screen, Urine Neg Negative <1000ng/mL    Barbiturate Screen, Ur Neg Negative <200 ng/mL    Benzodiazepine Screen, Urine Neg Negative <200 ng/mL    Cannabinoid Scrn, Ur Neg Negative <50 ng/mL    Cocaine Metabolite Screen, Urine Neg Negative <300 ng/mL    Opiate Scrn, Ur POSITIVE (A) Negative <300 ng/mL    PCP Screen, Urine Neg Negative <25 ng/mL    Methadone Screen, Urine Neg Negative <300 ng/mL

## 2018-12-22 NOTE — PROGRESS NOTES
12/22/18 1114   Oxygen Therapy/Pulse Ox   O2 Device Ventilator   FiO2  50 %   Resp 20   SpO2 97 %   Treatment   Treatment Type MDI   $Treatment Type $Medication Delivery   Medications Albuterol

## 2018-12-22 NOTE — CONSULTS
Pharmacy Note  Vancomycin Consult    Sara Rosales is a 68 y.o. female started on Vancomycin for respiratory failure; consult received from Dr. Delores Johnson to manage therapy. Also receiving the following antibiotics: zosyn. Patient Active Problem List   Diagnosis    Polycythemia, secondary    Leukocytosis    COPD (chronic obstructive pulmonary disease) ([address]    Diabetes mellitus ([facility]    Hyperlipidemia    Hypertension    Thyroid disease    Thyroid nodule    Pulmonary nodule, left    Acute respiratory failure with hypoxia and hypercapnia (HCC)       Allergies:  Aspirin; Celecoxib; Fexofenadine; Gabapentin; Niacin er; and Adhesive tape     Temp max:     Recent Labs      12/21/18 2207   BUN  25*       Recent Labs      12/21/18 2207   CREATININE  1.2       Recent Labs      12/21/18 2207   WBC  13.8*       No intake or output data in the 24 hours ending 12/22/18 0335    Culture Date      Source                       Results      Ht Readings from Last 1 Encounters:   12/21/18 5' 2\" (1.575 m)        Wt Readings from Last 1 Encounters:   12/22/18 289 lb (131.1 kg)         Body mass index is 52.86 kg/m². Estimated Creatinine Clearance: 55 mL/min (based on SCr of 1.2 mg/dL). Goal Trough Level: 15-20 mcg/mL    Assessment/Plan:  Patient received vancomycin 1750mg ivpb x1 dose in ER at 2335 on 12-21-18. Please start vancomycin 1250mg ivpb q24h at 2300 on 12-22-18. Please check vancomycin trough 12-24-18 at 2230. Thank you for the consult. Will continue to follow. [facility]. Ph.  12/22/2018 3:37 AM

## 2018-12-22 NOTE — PROGRESS NOTES
RESPIRATORY THERAPY ASSESSMENT    Name:  [address]  Record Number:  [id number]  Age: 68 y.o. Gender: female  : 1945  Today's Date:  2018  Room:  3007/3007-01    Assessment     Is the patient being admitted for a COPD or Asthma exacerbation? No   (If yes the patient will be seen every 4 hours for the first 24 hours and then reassessed)    Patient Admission Diagnosis      Allergies  Allergies   Allergen Reactions    Aspirin Other (See Comments)     \"ulcers\"    Celecoxib Other (See Comments)     RHINITIS    Fexofenadine Swelling    Gabapentin Other (See Comments)    Niacin Er      Other reaction(s): Flushing    Adhesive Tape Rash     Paper tape  Paper tape  Paper tape       Minimum Predicted Vital Capacity:     748          Actual Vital Capacity:      On vent          Pulmonary History:COPD  Home Oxygen Therapy:  unknown  Home Respiratory Therapy:Albuterol   Current Respiratory Therapy:  Albuterol prn  Treatment Type: MDI  Medications: Albuterol    Respiratory Severity Index(RSI)   Patients with orders for inhalation medications, oxygen, or any therapeutic treatment modality will be placed on Respiratory Protocol. They will be assessed with the first treatment and at least every 72 hours thereafter. The following severity scale will be used to determine frequency of treatment intervention.     Smoking History: Pulmonary Disease or Smoking History, Greater than 15 pack year = 2    Social History  Social History   Substance Use Topics    Smoking status: Former Smoker     Packs/day: 0.50    Smokeless tobacco: Never Used    Alcohol use No       Recent Surgical History: None = 0  Past Surgical History  Past Surgical History:   Procedure Laterality Date    CHOLECYSTECTOMY      ROTATOR CUFF REPAIR Left 2015    THYROID LOBECTOMY      XR [employer] TUNNEL BILAT Bilateral        Level of Consciousness: Comatose = 4    Level of Activity: Bedridden, unresponsive or quadriplegic = inspiratory hold  4. Bronchodilators will be administered via Hand Held Nebulizer TOI [facility]) to patients when ANY of the following criteria are met  a. Incognizant or uncooperative          b. Patients treated with HHN at Home        c. Unable to demonstrate proper use of MDI with spacer     d. RR > 30 bpm   5. Bronchodilators will be delivered via Metered Dose Inhaler (MDI), HHN, Aerogen to intubated patients on mechanical ventilation. 6. Inhalation medication orders will be delivered and/or substituted as outlined below. Aerosolized Medications Ordering and Administration Guidelines:    1. All Medications will be ordered by a physician, and their frequency and/or modality will be adjusted as defined by the patients Respiratory Severity Index (RSI) score. 2. If the patient does not have documented COPD, consider discontinuing anticholinergics when RSI is less than 9.  3. If the bronchospasm worsens (increased RSI), then the bronchodilator frequency can be increased to a maximum of every 4 hours. If greater than every 4 hours is required, the physician will be contacted. 4. If the bronchospasm improves, the frequency of the bronchodilator can be decreased, based on the patient's RSI, but not less than home treatment regimen frequency. 5. Bronchodilator(s) will be discontinued if patient has a RSI less than 9 and has received no scheduled or as needed treatment for 72  Hrs. Patients Ordered on a Mucolytic Agent:    1. Must always be administered with a bronchodilator. 2. Discontinue if patient experiences worsened bronchospasm, or secretions have lessened to the point that the patient is able to clear them with a cough. Anti-inflammatory and Combination Medications:    1. If the patient lacks prior history of lung disease, is not using inhaled anti-inflammatory medication at home, and lacks wheezing by examination or by history for at least 24 hours, contact physician for possible discontinuation.

## 2018-12-22 NOTE — PROGRESS NOTES
End of shift note. Pt continues on the VENT, calm/comfortable, sedated with propofol. All lines are intact. Vital signs are stable, telemetry sinus rhythm.

## 2018-12-23 ENCOUNTER — APPOINTMENT (OUTPATIENT)
Dept: GENERAL RADIOLOGY | Age: 73
DRG: 871 | End: 2018-12-23
Payer: MEDICARE

## 2018-12-23 LAB
ANION GAP SERPL CALCULATED.3IONS-SCNC: 9 MMOL/L (ref 3–16)
BASE EXCESS ARTERIAL: 2.8 MMOL/L (ref -3–3)
BASOPHILS ABSOLUTE: 0 K/UL (ref 0–0.2)
BASOPHILS RELATIVE PERCENT: 0.2 %
BUN BLDV-MCNC: 25 MG/DL (ref 7–20)
CALCIUM SERPL-MCNC: 8.9 MG/DL (ref 8.3–10.6)
CARBOXYHEMOGLOBIN ARTERIAL: 1.9 % (ref 0–1.5)
CHLORIDE BLD-SCNC: 104 MMOL/L (ref 99–110)
CO2: 28 MMOL/L (ref 21–32)
CREAT SERPL-MCNC: 1 MG/DL (ref 0.6–1.2)
EKG ATRIAL RATE: 61 BPM
EKG DIAGNOSIS: NORMAL
EKG P AXIS: 52 DEGREES
EKG P-R INTERVAL: 182 MS
EKG Q-T INTERVAL: 472 MS
EKG QRS DURATION: 86 MS
EKG QTC CALCULATION (BAZETT): 475 MS
EKG R AXIS: 4 DEGREES
EKG T AXIS: -30 DEGREES
EKG VENTRICULAR RATE: 61 BPM
EOSINOPHILS ABSOLUTE: 0.2 K/UL (ref 0–0.6)
EOSINOPHILS RELATIVE PERCENT: 1.4 %
GFR AFRICAN AMERICAN: >60
GFR NON-AFRICAN AMERICAN: 54
GLUCOSE BLD-MCNC: 108 MG/DL (ref 70–99)
GLUCOSE BLD-MCNC: 119 MG/DL (ref 70–99)
GLUCOSE BLD-MCNC: 121 MG/DL (ref 70–99)
GLUCOSE BLD-MCNC: 123 MG/DL (ref 70–99)
GLUCOSE BLD-MCNC: 128 MG/DL (ref 70–99)
GLUCOSE BLD-MCNC: 132 MG/DL (ref 70–99)
GLUCOSE BLD-MCNC: 160 MG/DL (ref 70–99)
HCO3 ARTERIAL: 27 MMOL/L (ref 21–29)
HCT VFR BLD CALC: 40.2 % (ref 36–48)
HEMOGLOBIN, ART, EXTENDED: 12.6 G/DL (ref 12–16)
HEMOGLOBIN: 12.8 G/DL (ref 12–16)
LYMPHOCYTES ABSOLUTE: 1.8 K/UL (ref 1–5.1)
LYMPHOCYTES RELATIVE PERCENT: 17.2 %
MCH RBC QN AUTO: 30.7 PG (ref 26–34)
MCHC RBC AUTO-ENTMCNC: 31.8 G/DL (ref 31–36)
MCV RBC AUTO: 96.3 FL (ref 80–100)
METHEMOGLOBIN ARTERIAL: 0.3 %
MONOCYTES ABSOLUTE: 1 K/UL (ref 0–1.3)
MONOCYTES RELATIVE PERCENT: 9.4 %
NEUTROPHILS ABSOLUTE: 7.7 K/UL (ref 1.7–7.7)
NEUTROPHILS RELATIVE PERCENT: 71.8 %
O2 CONTENT ARTERIAL: 18 ML/DL
O2 SAT, ARTERIAL: 98.2 %
O2 THERAPY: ABNORMAL
PCO2 ARTERIAL: 40.2 MMHG (ref 35–45)
PDW BLD-RTO: 18 % (ref 12.4–15.4)
PERFORMED ON: ABNORMAL
PH ARTERIAL: 7.45 (ref 7.35–7.45)
PLATELET # BLD: 196 K/UL (ref 135–450)
PMV BLD AUTO: 8.1 FL (ref 5–10.5)
PO2 ARTERIAL: 111.6 MMHG (ref 75–108)
POTASSIUM SERPL-SCNC: 3.8 MMOL/L (ref 3.5–5.1)
RBC # BLD: 4.18 M/UL (ref 4–5.2)
SODIUM BLD-SCNC: 141 MMOL/L (ref 136–145)
TCO2 ARTERIAL: 28.2 MMOL/L
URINE CULTURE, ROUTINE: NORMAL
WBC # BLD: 10.7 K/UL (ref 4–11)

## 2018-12-23 PROCEDURE — 36556 INSERT NON-TUNNEL CV CATH: CPT | Performed by: INTERNAL MEDICINE

## 2018-12-23 PROCEDURE — 6370000000 HC RX 637 (ALT 250 FOR IP): Performed by: INTERNAL MEDICINE

## 2018-12-23 PROCEDURE — 2700000000 HC OXYGEN THERAPY PER DAY

## 2018-12-23 PROCEDURE — S0028 INJECTION, FAMOTIDINE, 20 MG: HCPCS | Performed by: INTERNAL MEDICINE

## 2018-12-23 PROCEDURE — 2580000003 HC RX 258

## 2018-12-23 PROCEDURE — 94640 AIRWAY INHALATION TREATMENT: CPT

## 2018-12-23 PROCEDURE — 85025 COMPLETE CBC W/AUTO DIFF WBC: CPT

## 2018-12-23 PROCEDURE — 6360000002 HC RX W HCPCS: Performed by: INTERNAL MEDICINE

## 2018-12-23 PROCEDURE — 99291 CRITICAL CARE FIRST HOUR: CPT | Performed by: INTERNAL MEDICINE

## 2018-12-23 PROCEDURE — 99232 SBSQ HOSP IP/OBS MODERATE 35: CPT | Performed by: INTERNAL MEDICINE

## 2018-12-23 PROCEDURE — 2000000000 HC ICU R&B

## 2018-12-23 PROCEDURE — 05H433Z INSERTION OF INFUSION DEVICE INTO LEFT INNOMINATE VEIN, PERCUTANEOUS APPROACH: ICD-10-PCS | Performed by: INTERNAL MEDICINE

## 2018-12-23 PROCEDURE — 80048 BASIC METABOLIC PNL TOTAL CA: CPT

## 2018-12-23 PROCEDURE — 93010 ELECTROCARDIOGRAM REPORT: CPT | Performed by: INTERNAL MEDICINE

## 2018-12-23 PROCEDURE — 2580000003 HC RX 258: Performed by: INTERNAL MEDICINE

## 2018-12-23 PROCEDURE — 2500000003 HC RX 250 WO HCPCS: Performed by: INTERNAL MEDICINE

## 2018-12-23 PROCEDURE — 36600 WITHDRAWAL OF ARTERIAL BLOOD: CPT

## 2018-12-23 PROCEDURE — 82803 BLOOD GASES ANY COMBINATION: CPT

## 2018-12-23 PROCEDURE — 93005 ELECTROCARDIOGRAM TRACING: CPT | Performed by: INTERNAL MEDICINE

## 2018-12-23 PROCEDURE — 94762 N-INVAS EAR/PLS OXIMTRY CONT: CPT

## 2018-12-23 PROCEDURE — 71045 X-RAY EXAM CHEST 1 VIEW: CPT

## 2018-12-23 PROCEDURE — 36415 COLL VENOUS BLD VENIPUNCTURE: CPT

## 2018-12-23 PROCEDURE — 36556 INSERT NON-TUNNEL CV CATH: CPT

## 2018-12-23 RX ORDER — SODIUM CHLORIDE 9 MG/ML
INJECTION, SOLUTION INTRAVENOUS
Status: COMPLETED
Start: 2018-12-23 | End: 2018-12-23

## 2018-12-23 RX ORDER — ALBUTEROL SULFATE 90 UG/1
4 AEROSOL, METERED RESPIRATORY (INHALATION) 2 TIMES DAILY
Status: DISCONTINUED | OUTPATIENT
Start: 2018-12-23 | End: 2018-12-24

## 2018-12-23 RX ORDER — FUROSEMIDE 10 MG/ML
20 INJECTION INTRAMUSCULAR; INTRAVENOUS ONCE
Status: COMPLETED | OUTPATIENT
Start: 2018-12-23 | End: 2018-12-23

## 2018-12-23 RX ADMIN — Medication 10 ML: at 20:10

## 2018-12-23 RX ADMIN — ACETAMINOPHEN 650 MG: 325 TABLET, FILM COATED ORAL at 18:02

## 2018-12-23 RX ADMIN — MUPIROCIN: 20 OINTMENT TOPICAL at 08:23

## 2018-12-23 RX ADMIN — LEVOTHYROXINE SODIUM ANHYDROUS 44 MCG: 100 INJECTION, POWDER, LYOPHILIZED, FOR SOLUTION INTRAVENOUS at 06:37

## 2018-12-23 RX ADMIN — FAMOTIDINE 20 MG: 10 INJECTION, SOLUTION INTRAVENOUS at 08:31

## 2018-12-23 RX ADMIN — Medication 4 PUFF: at 07:36

## 2018-12-23 RX ADMIN — MIDAZOLAM HYDROCHLORIDE 2 MG: 1 INJECTION, SOLUTION INTRAMUSCULAR; INTRAVENOUS at 07:48

## 2018-12-23 RX ADMIN — PROPOFOL 23 MCG/KG/MIN: 10 INJECTION, EMULSION INTRAVENOUS at 03:50

## 2018-12-23 RX ADMIN — Medication 10 ML: at 08:23

## 2018-12-23 RX ADMIN — MUPIROCIN: 20 OINTMENT TOPICAL at 20:39

## 2018-12-23 RX ADMIN — Medication 4 PUFF: at 18:53

## 2018-12-23 RX ADMIN — FENTANYL CITRATE 25 MCG: 50 INJECTION, SOLUTION INTRAMUSCULAR; INTRAVENOUS at 08:05

## 2018-12-23 RX ADMIN — VANCOMYCIN HYDROCHLORIDE 1250 MG: 1 INJECTION, POWDER, LYOPHILIZED, FOR SOLUTION INTRAVENOUS at 23:17

## 2018-12-23 RX ADMIN — PIPERACILLIN SODIUM AND TAZOBACTAM SODIUM 3.38 G: 3; .375 INJECTION, POWDER, LYOPHILIZED, FOR SOLUTION INTRAVENOUS at 00:03

## 2018-12-23 RX ADMIN — ENOXAPARIN SODIUM 40 MG: 40 INJECTION SUBCUTANEOUS at 08:32

## 2018-12-23 RX ADMIN — PIPERACILLIN SODIUM AND TAZOBACTAM SODIUM 3.38 G: 3; .375 INJECTION, POWDER, LYOPHILIZED, FOR SOLUTION INTRAVENOUS at 08:31

## 2018-12-23 RX ADMIN — FENTANYL CITRATE 25 MCG: 50 INJECTION, SOLUTION INTRAMUSCULAR; INTRAVENOUS at 04:09

## 2018-12-23 RX ADMIN — MIDAZOLAM HYDROCHLORIDE 2 MG: 1 INJECTION, SOLUTION INTRAMUSCULAR; INTRAVENOUS at 05:10

## 2018-12-23 RX ADMIN — PIPERACILLIN SODIUM AND TAZOBACTAM SODIUM 3.38 G: 3; .375 INJECTION, POWDER, LYOPHILIZED, FOR SOLUTION INTRAVENOUS at 17:12

## 2018-12-23 RX ADMIN — FAMOTIDINE 20 MG: 10 INJECTION, SOLUTION INTRAVENOUS at 20:10

## 2018-12-23 RX ADMIN — FUROSEMIDE 20 MG: 10 INJECTION, SOLUTION INTRAMUSCULAR; INTRAVENOUS at 11:42

## 2018-12-23 RX ADMIN — FENTANYL CITRATE 25 MCG: 50 INJECTION, SOLUTION INTRAMUSCULAR; INTRAVENOUS at 05:51

## 2018-12-23 RX ADMIN — INSULIN LISPRO 1 UNITS: 100 INJECTION, SOLUTION INTRAVENOUS; SUBCUTANEOUS at 20:45

## 2018-12-23 RX ADMIN — SODIUM CHLORIDE 250 ML: 9 INJECTION, SOLUTION INTRAVENOUS at 17:12

## 2018-12-23 RX ADMIN — Medication 4 PUFF: at 03:06

## 2018-12-23 ASSESSMENT — PULMONARY FUNCTION TESTS
PIF_VALUE: 23
PIF_VALUE: 23
PIF_VALUE: 24
PIF_VALUE: 23
PIF_VALUE: 20
PIF_VALUE: 26
PIF_VALUE: 21
PIF_VALUE: 23
PIF_VALUE: 23

## 2018-12-23 ASSESSMENT — PAIN SCALES - GENERAL
PAINLEVEL_OUTOF10: 4
PAINLEVEL_OUTOF10: 0
PAINLEVEL_OUTOF10: 6

## 2018-12-23 ASSESSMENT — PAIN DESCRIPTION - DESCRIPTORS: DESCRIPTORS: SHARP

## 2018-12-23 ASSESSMENT — PAIN DESCRIPTION - LOCATION: LOCATION: LEG

## 2018-12-23 ASSESSMENT — PAIN DESCRIPTION - PAIN TYPE: TYPE: ACUTE PAIN

## 2018-12-23 ASSESSMENT — PAIN DESCRIPTION - PROGRESSION: CLINICAL_PROGRESSION: GRADUALLY WORSENING

## 2018-12-23 ASSESSMENT — PAIN DESCRIPTION - FREQUENCY: FREQUENCY: INTERMITTENT

## 2018-12-23 ASSESSMENT — PAIN DESCRIPTION - ORIENTATION: ORIENTATION: LEFT;RIGHT

## 2018-12-23 ASSESSMENT — PAIN DESCRIPTION - ONSET: ONSET: PROGRESSIVE

## 2018-12-23 NOTE — PROGRESS NOTES
12/21/18 2207  12/22/18   0359  12/23/18   0731   NA  140  140  141   K  4.5  4.2  3.8   CL  98*  105  104   CO2  25  24  28   BUN  25*  25*  25*   CREATININE  1.2  0.9  1.0     Recent Labs      12/22/18   0359  12/22/18   0952   TROPONINI  <0.01  <0.01       Coagulation:   Lab Results   Component Value Date    INR 1.11 12/21/2018    APTT 28.3 12/21/2018     Cardiac markers: Lab Results   Component Value Date    TROPONINI <0.01 12/22/2018         Lab Results   Component Value Date    ALT 38 12/22/2018    AST 49 (H) 12/22/2018    ALKPHOS 74 12/22/2018    BILITOT 0.6 12/22/2018       Lab Results   Component Value Date    INR 1.11 12/21/2018    INR 0.98 03/13/2016    PROTIME 12.7 12/21/2018    PROTIME 11.2 03/13/2016       Radiology    Chest xray       Cultures:       Assessment & Plan:      1. Acute respiratory failure with hypoxia and hypercapnia, likely related to polypharmacy,   Pt intubated in the ER. now off vent in 24 hrs . supplemental O2 to maintain SPO2 ? 92%, continuous pulse ox. Pulm c/s    2. Unresponsive episode, likely related to polypharmacy. Very limited information available at this time. Per Dr. Liu Lehcuga, EMS told him that per pt family at EMS arrival that pt underwent a gynecological procedure at [facility] 12/21  She was sedated for this procedure. It was also reported to Dr Liu Lechuga that pt had been given Tramadol, morphine, dilaudid   She is on chronic pain regimen. 3. CAP - cannot rule out aspiration - on   IV vanco and Zosyn for now. F/u cx. No need for pressors at this time. 4. Lactic acidosis, 4.4, repeats improved with IVF  5. Leukocytosis, 13.8, improved  6. Dehydration, BUN:Cr ratio 25:1.2,  IVF. 7. Hyperglycemia, 246, possibly reactive, unclear if pt has hx of DM, low dose SSI q4h for now. Chk a1c.  8. Hyperdense cystic lesion of R kidney, 7 cm, defer to DD for Uro or further evaluation when pt more stable. 9. Hypothyroid, converted LT4 to IV for now.    10. HLD, hold

## 2018-12-23 NOTE — PROGRESS NOTES
Care rounds completed with Dr. Franca Ham and multidisciplinary team. Reviewed labs, meds, VS, assessment, & plan of care for today. See dictated note and new orders for details.  Extubate and 1x dose of IVP lasix

## 2018-12-23 NOTE — PROGRESS NOTES
RESPIRATORY THERAPY ASSESSMENT    Name:  [address]  Record Number:  [id number]  Age: 68 y.o. Gender: female  : 1945  Today's Date:  2018  Room:  3007/3007-01    Assessment     Is the patient being admitted for a COPD or Asthma exacerbation? No   (If yes the patient will be seen every 4 hours for the first 24 hours and then reassessed)    Patient Admission Diagnosis      Allergies  Allergies   Allergen Reactions    Aspirin Other (See Comments)     \"ulcers\"    Celecoxib Other (See Comments)     RHINITIS    Fexofenadine Swelling    Gabapentin Other (See Comments)    Niacin Er      Other reaction(s): Flushing    Adhesive Tape Rash     Paper tape  Paper tape  Paper tape       Minimum Predicted Vital Capacity:     748          Actual Vital Capacity:      400          Pulmonary History:COPD  Home Oxygen Therapy:   none  Home Respiratory Therapy: Albuterol Q6 PRN   Current Respiratory Therapy:   Albuterol Q4  Treatment Type: MDI  Medications: Albuterol    Respiratory Severity Index(RSI)   Patients with orders for inhalation medications, oxygen, or any therapeutic treatment modality will be placed on Respiratory Protocol. They will be assessed with the first treatment and at least every 72 hours thereafter. The following severity scale will be used to determine frequency of treatment intervention.     Smoking History: Smoking History Less than 1ppd or less than 15 pack year = 1    Social History  Social History   Substance Use Topics    Smoking status: Former Smoker     Packs/day: 0.50    Smokeless tobacco: Never Used    Alcohol use No       Recent Surgical History: None = 0  Past Surgical History  Past Surgical History:   Procedure Laterality Date    CHOLECYSTECTOMY      ROTATOR CUFF REPAIR Left     THYROID LOBECTOMY      XR KNEE INC TUNNEL BILAT Bilateral        Level of Consciousness: Alert, Follows Commands but Disoriented = 1    Level of Activity: Walking with assistance = 1    Respiratory Pattern: Regular Pattern; RR 8-20 = 0    Breath Sounds: Diminshed bilaterally and/or crackles = 2    Sputum  Sputum Color: White, Tenacity: Thick, Sputum How Obtained: Endotracheal  Cough: Strong, spontaneous, non-productive = 0    Vital Signs   /68   Pulse 93   Temp 98.1 °F (36.7 °C) (Oral)   Resp (!) 31   Ht 5' 2\" (1.575 m)   Wt 285 lb 14.4 oz (129.7 kg)   SpO2 98%   BMI 52.29 kg/m²   SPO2 (COPD values may differ): 88-89% on room air or greater than 92% on FiO2 28- 35% = 2    Peak Flow (asthma only): not applicable = 0    RSI: 7-8 = BID and Q4HPRN (every four hours as needed) for dyspnea        Plan       Goals: medication delivery, mobilize retained secretions, volume expansion and improve oxygenation    Patient/caregiver was educated on the proper method of use for Respiratory Care Devices:  Yes      Level of patient/caregiver understanding able to:   [] Verbalize understanding   [] Demonstrate understanding       [] Teach back        [] Needs reinforcement       []  No available caregiver               []  Other:     Response to education:  Baljeet Gudino     Is patient being placed on Home Treatment Regimen? NA     Does the patient have everything they need prior to discharge? NA     Comments: chart reviewed and pt assessed    Plan of Care: pt to go on BID regiment    Electronically signed by Princess Renteria RCP on 12/23/2018 at 3:54 PM    Respiratory Protocol Guidelines     1. Assessment and treatment by Respiratory Therapy will be initiated for medication and therapeutic interventions upon initiation of aerosolized medication. 2. Physician will be contacted for respiratory rate (RR) greater than 35 breaths per minute. Therapy will be held for heart rate (HR) greater than 140 beats per minute, pending direction from physician. 3. Bronchodilators will be administered via Metered Dose Inhaler (MDI) with spacer when the following criteria are met:  a.  Alert and cooperative

## 2018-12-23 NOTE — PROGRESS NOTES
This note also relates to the following rows which could not be included:  Resp - Cannot attach notes to unvalidated device data  SpO2 - Cannot attach notes to unvalidated device data       12/23/18 0756   Oxygen Therapy/Pulse Ox   FiO2  50 %   Treatment   Treatment Type MDI   $Treatment Type $Medication Delivery   Medications Albuterol

## 2018-12-24 LAB
ANION GAP SERPL CALCULATED.3IONS-SCNC: 5 MMOL/L (ref 3–16)
BUN BLDV-MCNC: 21 MG/DL (ref 7–20)
CALCIUM SERPL-MCNC: 9.2 MG/DL (ref 8.3–10.6)
CHLORIDE BLD-SCNC: 104 MMOL/L (ref 99–110)
CO2: 32 MMOL/L (ref 21–32)
CREAT SERPL-MCNC: 0.9 MG/DL (ref 0.6–1.2)
EKG ATRIAL RATE: 122 BPM
EKG DIAGNOSIS: NORMAL
EKG P AXIS: 87 DEGREES
EKG P-R INTERVAL: 166 MS
EKG Q-T INTERVAL: 304 MS
EKG QRS DURATION: 90 MS
EKG QTC CALCULATION (BAZETT): 433 MS
EKG R AXIS: 6 DEGREES
EKG T AXIS: -29 DEGREES
EKG VENTRICULAR RATE: 122 BPM
ESTIMATED AVERAGE GLUCOSE: 131.2 MG/DL
GFR AFRICAN AMERICAN: >60
GFR NON-AFRICAN AMERICAN: >60
GLUCOSE BLD-MCNC: 101 MG/DL (ref 70–99)
GLUCOSE BLD-MCNC: 108 MG/DL (ref 70–99)
GLUCOSE BLD-MCNC: 113 MG/DL (ref 70–99)
GLUCOSE BLD-MCNC: 113 MG/DL (ref 70–99)
GLUCOSE BLD-MCNC: 143 MG/DL (ref 70–99)
GLUCOSE BLD-MCNC: 148 MG/DL (ref 70–99)
HBA1C MFR BLD: 6.2 %
MAGNESIUM: 1.7 MG/DL (ref 1.8–2.4)
PERFORMED ON: ABNORMAL
POTASSIUM SERPL-SCNC: 3.7 MMOL/L (ref 3.5–5.1)
SODIUM BLD-SCNC: 141 MMOL/L (ref 136–145)

## 2018-12-24 PROCEDURE — G8979 MOBILITY GOAL STATUS: HCPCS

## 2018-12-24 PROCEDURE — 2500000003 HC RX 250 WO HCPCS: Performed by: INTERNAL MEDICINE

## 2018-12-24 PROCEDURE — 6360000002 HC RX W HCPCS: Performed by: INTERNAL MEDICINE

## 2018-12-24 PROCEDURE — 2700000000 HC OXYGEN THERAPY PER DAY

## 2018-12-24 PROCEDURE — 97530 THERAPEUTIC ACTIVITIES: CPT

## 2018-12-24 PROCEDURE — 6370000000 HC RX 637 (ALT 250 FOR IP): Performed by: INTERNAL MEDICINE

## 2018-12-24 PROCEDURE — 99232 SBSQ HOSP IP/OBS MODERATE 35: CPT | Performed by: INTERNAL MEDICINE

## 2018-12-24 PROCEDURE — 94762 N-INVAS EAR/PLS OXIMTRY CONT: CPT

## 2018-12-24 PROCEDURE — 94640 AIRWAY INHALATION TREATMENT: CPT

## 2018-12-24 PROCEDURE — 97162 PT EVAL MOD COMPLEX 30 MIN: CPT

## 2018-12-24 PROCEDURE — 2580000003 HC RX 258: Performed by: INTERNAL MEDICINE

## 2018-12-24 PROCEDURE — 93010 ELECTROCARDIOGRAM REPORT: CPT | Performed by: INTERNAL MEDICINE

## 2018-12-24 PROCEDURE — G8978 MOBILITY CURRENT STATUS: HCPCS

## 2018-12-24 PROCEDURE — 99233 SBSQ HOSP IP/OBS HIGH 50: CPT | Performed by: INTERNAL MEDICINE

## 2018-12-24 PROCEDURE — 97166 OT EVAL MOD COMPLEX 45 MIN: CPT

## 2018-12-24 PROCEDURE — G8987 SELF CARE CURRENT STATUS: HCPCS

## 2018-12-24 PROCEDURE — 2060000000 HC ICU INTERMEDIATE R&B

## 2018-12-24 PROCEDURE — S0028 INJECTION, FAMOTIDINE, 20 MG: HCPCS | Performed by: INTERNAL MEDICINE

## 2018-12-24 PROCEDURE — 83735 ASSAY OF MAGNESIUM: CPT

## 2018-12-24 PROCEDURE — 80048 BASIC METABOLIC PNL TOTAL CA: CPT

## 2018-12-24 PROCEDURE — G8988 SELF CARE GOAL STATUS: HCPCS

## 2018-12-24 RX ORDER — NICOTINE POLACRILEX 4 MG
15 LOZENGE BUCCAL PRN
Status: DISCONTINUED | OUTPATIENT
Start: 2018-12-24 | End: 2018-12-24 | Stop reason: SDUPTHER

## 2018-12-24 RX ORDER — MAGNESIUM SULFATE IN WATER 40 MG/ML
2 INJECTION, SOLUTION INTRAVENOUS ONCE
Status: COMPLETED | OUTPATIENT
Start: 2018-12-24 | End: 2018-12-24

## 2018-12-24 RX ORDER — OXYBUTYNIN CHLORIDE 5 MG/1
10 TABLET, EXTENDED RELEASE ORAL NIGHTLY
Status: DISCONTINUED | OUTPATIENT
Start: 2018-12-24 | End: 2018-12-28 | Stop reason: HOSPADM

## 2018-12-24 RX ORDER — DEXTROSE MONOHYDRATE 50 MG/ML
100 INJECTION, SOLUTION INTRAVENOUS PRN
Status: DISCONTINUED | OUTPATIENT
Start: 2018-12-24 | End: 2018-12-24 | Stop reason: SDUPTHER

## 2018-12-24 RX ORDER — LOSARTAN POTASSIUM 25 MG/1
25 TABLET ORAL DAILY
Status: DISCONTINUED | OUTPATIENT
Start: 2018-12-24 | End: 2018-12-28 | Stop reason: HOSPADM

## 2018-12-24 RX ORDER — ROSUVASTATIN CALCIUM 10 MG/1
40 TABLET, COATED ORAL NIGHTLY
Status: DISCONTINUED | OUTPATIENT
Start: 2018-12-24 | End: 2018-12-28 | Stop reason: HOSPADM

## 2018-12-24 RX ORDER — DEXTROSE MONOHYDRATE 25 G/50ML
12.5 INJECTION, SOLUTION INTRAVENOUS PRN
Status: DISCONTINUED | OUTPATIENT
Start: 2018-12-24 | End: 2018-12-24 | Stop reason: SDUPTHER

## 2018-12-24 RX ORDER — LEVOTHYROXINE SODIUM 88 UG/1
88 TABLET ORAL DAILY
Status: DISCONTINUED | OUTPATIENT
Start: 2018-12-25 | End: 2018-12-28 | Stop reason: HOSPADM

## 2018-12-24 RX ORDER — MONTELUKAST SODIUM 10 MG/1
10 TABLET ORAL NIGHTLY
Status: DISCONTINUED | OUTPATIENT
Start: 2018-12-24 | End: 2018-12-28 | Stop reason: HOSPADM

## 2018-12-24 RX ADMIN — PIPERACILLIN SODIUM AND TAZOBACTAM SODIUM 3.38 G: 3; .375 INJECTION, POWDER, LYOPHILIZED, FOR SOLUTION INTRAVENOUS at 08:14

## 2018-12-24 RX ADMIN — MUPIROCIN: 20 OINTMENT TOPICAL at 08:14

## 2018-12-24 RX ADMIN — Medication 4 PUFF: at 06:51

## 2018-12-24 RX ADMIN — Medication 10 ML: at 20:54

## 2018-12-24 RX ADMIN — LEVOTHYROXINE SODIUM ANHYDROUS 44 MCG: 100 INJECTION, POWDER, LYOPHILIZED, FOR SOLUTION INTRAVENOUS at 05:30

## 2018-12-24 RX ADMIN — INSULIN LISPRO 1 UNITS: 100 INJECTION, SOLUTION INTRAVENOUS; SUBCUTANEOUS at 20:56

## 2018-12-24 RX ADMIN — Medication 10 ML: at 08:14

## 2018-12-24 RX ADMIN — Medication 400 MG: at 20:54

## 2018-12-24 RX ADMIN — FAMOTIDINE 20 MG: 10 INJECTION, SOLUTION INTRAVENOUS at 08:14

## 2018-12-24 RX ADMIN — OXYBUTYNIN CHLORIDE 10 MG: 5 TABLET, EXTENDED RELEASE ORAL at 20:54

## 2018-12-24 RX ADMIN — ROSUVASTATIN CALCIUM 40 MG: 10 TABLET, FILM COATED ORAL at 20:54

## 2018-12-24 RX ADMIN — PIPERACILLIN SODIUM AND TAZOBACTAM SODIUM 3.38 G: 3; .375 INJECTION, POWDER, LYOPHILIZED, FOR SOLUTION INTRAVENOUS at 16:05

## 2018-12-24 RX ADMIN — LOSARTAN POTASSIUM 25 MG: 25 TABLET, FILM COATED ORAL at 17:58

## 2018-12-24 RX ADMIN — ACETAMINOPHEN 650 MG: 325 TABLET, FILM COATED ORAL at 12:15

## 2018-12-24 RX ADMIN — INSULIN LISPRO 1 UNITS: 100 INJECTION, SOLUTION INTRAVENOUS; SUBCUTANEOUS at 12:17

## 2018-12-24 RX ADMIN — METFORMIN HYDROCHLORIDE 500 MG: 500 TABLET ORAL at 17:58

## 2018-12-24 RX ADMIN — MAGNESIUM SULFATE HEPTAHYDRATE 2 G: 40 INJECTION, SOLUTION INTRAVENOUS at 10:59

## 2018-12-24 RX ADMIN — ACETAMINOPHEN 650 MG: 325 TABLET, FILM COATED ORAL at 00:55

## 2018-12-24 RX ADMIN — MUPIROCIN: 20 OINTMENT TOPICAL at 20:55

## 2018-12-24 RX ADMIN — MONTELUKAST SODIUM 10 MG: 10 TABLET, COATED ORAL at 20:54

## 2018-12-24 RX ADMIN — PIPERACILLIN SODIUM AND TAZOBACTAM SODIUM 3.38 G: 3; .375 INJECTION, POWDER, LYOPHILIZED, FOR SOLUTION INTRAVENOUS at 00:55

## 2018-12-24 ASSESSMENT — PAIN DESCRIPTION - LOCATION: LOCATION: VAGINA

## 2018-12-24 ASSESSMENT — PAIN DESCRIPTION - PROGRESSION: CLINICAL_PROGRESSION: GRADUALLY WORSENING

## 2018-12-24 ASSESSMENT — PAIN SCALES - GENERAL
PAINLEVEL_OUTOF10: 5
PAINLEVEL_OUTOF10: 4
PAINLEVEL_OUTOF10: 0

## 2018-12-24 ASSESSMENT — PAIN DESCRIPTION - ONSET: ONSET: ON-GOING

## 2018-12-24 ASSESSMENT — PAIN DESCRIPTION - FREQUENCY: FREQUENCY: INTERMITTENT

## 2018-12-24 ASSESSMENT — PAIN DESCRIPTION - PAIN TYPE: TYPE: ACUTE PAIN

## 2018-12-24 ASSESSMENT — PAIN DESCRIPTION - DESCRIPTORS: DESCRIPTORS: SHARP

## 2018-12-24 NOTE — PLAN OF CARE
Problem: Safety:  Goal: Free from accidental physical injury  Free from accidental physical injury   Outcome: Ongoing      Problem: Daily Care:  Goal: Daily care needs are met  Daily care needs are met   Outcome: Ongoing      Problem: Skin Integrity:  Goal: Skin integrity will stabilize  Skin integrity will stabilize   Outcome: Ongoing      Problem: Risk for Impaired Skin Integrity  Goal: Tissue integrity - skin and mucous membranes  Structural intactness and normal physiological function of skin and  mucous membranes.    Outcome: Ongoing      Problem: Falls - Risk of:  Goal: Absence of physical injury  Absence of physical injury   Outcome: Ongoing

## 2018-12-24 NOTE — PROGRESS NOTES
CT ABDOMEN PELVIS W IV CONTRAST Additional Contrast? None   Final Result   No evidence for acute intra-abdominal or intrapelvic pathology. No bowel   obstruction or inflammation. No free intraperitoneal air or fluid. No   evidence for urinary obstruction. Colonic diverticulosis without evidence for acute diverticulitis. Hyperdense cystic lesion arising from the lower pole the right kidney,   measuring 7 cm. If not previously worked up, renal ultrasound recommended on   a nonemergent/outpatient basis. Bibasilar airspace opacities favor atelectasis/scarring. Infectious/inflammatory process is possible in the proper clinical setting. CT head without contrast   Final Result   No acute intracranial abnormality. Cultures:    Blood cultures-  No growth to date   Urine cultures- negative          Assessment & Plan:      1. Acute respiratory failure with hypoxia and hypercapnia, likely related to polypharmacy,  Aspiration pneumonia. -  Pt intubated in the ER. Extubated on 12/23/18.  - Seen by pulmonology  - stable off vent,  Oxygen saturation stable on 4 L.   - Continue supplemental O2 to maintain SPO2 ? 92%,  Wean oxygen as tolerated. 2. Sepsis  -   Present on admission -  With leukocytosis,  Lactic acidosis,  Tachycardia and tachypnea. Related to pneumonia. -  Sepsis resolved now. -  Treated with antibiotics as below. -  Did not need any pressors  -  Lactic acidosis- resolved  With IV fluids      3. Pneumonia-  Possible aspiration pneumonia  -  Treated with  IV vanco and Zosyn. Cultures negative. Off Vanco now. Continue IV Zosyn. 4.   Unresponsive episode, likely related to polypharmacy. -  Very limited information available at this time. Per Dr. Karina Power, EMS told him that per pt family at EMS arrival that pt underwent a gynecological procedure at [facility] 12/21  She was sedated for this procedure.   It was also reported to Dr Karina Power that pt had

## 2018-12-24 NOTE — PROGRESS NOTES
Inpatient Occupational Therapy  Evaluation and Treatment    Unit: ICU   Date:  12/24/2018  Patient Name:    Amy Grullon  Admitting diagnosis:  Acute respiratory failure with hypoxia and hypercapnia ([facility] [J96.01, J96.02]  Admit Date:  12/21/2018  Precautions/Restrictions/WB Status/ Lines/ Wounds/ Oxygen: 3L oxygen via NC, ICU monitoring  Treatment Time:  0845-6111  Treatment Number: 1    Patient Goals for Therapy:  \"get better\"    Discharge Recommendations: SNF  AM-PAC Score: 13    DME needs for discharge: continue to assess      Preadmission Environment:    Pt. Lives with sisters and brother in law  Home environment:   two story home. pt's bedroom and bathroom on the first floor  Steps to enter first floor:   2 with no railing  Steps to second floor:13, but pt does not go up anymore because there are no handrails  Bath: first floor - no shower (sponge bathes)  Equipment owned:4WRW,  wheelchair, cane, home O2 (4L most of the time), hospital bed, lift chair      Preadmission Status:  Pt. Not able to drive   Pt. Had assistance from family for meals (brought to her room)  Pt states IND with dressing and sponge bathing. Pt. Fully independent for transfers and gait and walked with 4WRW most of the time  \"I don't think so\" regarding recent falls    Pain:    Rating: did not rate  Location: back  Description: improved with repositioning in bed  Requesting Pain Med? No.      Cognition:    A&O to person, place \"hospital\", month-seemed slightly confused at times. Unclear about situation. Follows 1 step commands. Upper Extremity ROM:    WFL, pt able to perform all bed mobility, transfers, and gait without ROM limitation. Not formally assessed due to time constraint; patient preparing to eat lunch-able to open soda can independently. Upper Extremity Strength:    WFL, pt able to perform all bed mobility, transfers, and gait without strength limitation. Upper Extremity Sensation:    No apparent deficits.     Upper

## 2018-12-24 NOTE — PLAN OF CARE
Problem: Pain:  Goal: Control of acute pain  Control of acute pain   Outcome: Ongoing    Goal: Control of chronic pain  Control of chronic pain   Outcome: Ongoing      Problem: Safety:  Goal: Free from intentional harm  Free from intentional harm   Outcome: Ongoing      Problem: Daily Care:  Goal: Daily care needs are met  Daily care needs are met   Outcome: Ongoing      Problem: Skin Integrity:  Goal: Skin integrity will stabilize  Skin integrity will stabilize   Outcome: Ongoing      Problem: Risk for Impaired Skin Integrity  Goal: Tissue integrity - skin and mucous membranes  Structural intactness and normal physiological function of skin and  mucous membranes.    Outcome: Ongoing

## 2018-12-24 NOTE — PROGRESS NOTES
Patient alert and oriented at present, requests snack, meal given, no teeth noted, soft foods given, swallowing well, denies throat pain, able to feed self, will continue to monitor, ATB infusing.

## 2018-12-24 NOTE — CARE COORDINATION
Case Management Assessment  Initial Evaluation    Date/Time of Evaluation: 12/24/2018 11:53 AM  Assessment Completed by: Jeny Kaiser    Patient Name: Brii Wylie  YOB: 1945  Diagnosis: Acute respiratory failure with hypoxia and hypercapnia ([facility] [address] [J96.01, J96.02]  Date / Time: 12/21/2018  8:57 PM  Admission status/Date: 12/22 INPT  Chart Reviewed: Yes      Patient Interviewed: Yes   Family Interviewed:  No      Hospitalization in the last 30 days:  No    Contacts  :     Relationship to Patient:   Phone Number:    Alternate Contact:     Relationship to Patient:     Phone Number:      Current PCP - Kaykay Chen    Financial  Medicare    ADLS  Support Systems:  family  Transportation: family    Meal Preparation: family    Housing  Home Environment: Lives at home with 3 sisters and CHERELLE. Amb w/walker or cane. Steps: 2  Plans to Return to Present Housing: Yes  Other Identified Issues:     Abdielcm Gutierrez  Currently active with [address] : No     Passport/Waiver : No  Passport/Waiver Services:     Durable Medical Equipment   DME Provider: [facility]  Equipment: O2 4L/min, cane, RW, Grab Bars, HHN    Has Home O2 in place on admit:  Yes  Informed of need to bring portable home O2 tank on day of discharge for nursing to connect prior to leaving:   Yes  Verbalized agreement/Understanding:   Yes    Community Service Affiliation  Dialysis:  No  Outpatient PT/OT: No    Cancer Center: No     CHF Clinic: No     Pulmonary Rehab: No  Pain Clinic: No  Community Mental Health: No    Wound Clinic: No     Other:     DISCHARGE PLAN: Met with pt and brother in ICU with pt's permission. Information obtained from pt. Pt lives with 3 sisters and CHERELLE and plan is to return home. Noted pt went to [facility] for STR 5/16/18. Discussed with pt possible need for skilled HHC vs STR again depending on status at discharge. Pt verbalized understanding and agrees to consider. CM will follow pt. +eCOC initiated.

## 2018-12-24 NOTE — PROGRESS NOTES
Acute Care COPD Physical Therapy Evaluation and Treatment    Unit: ICU    Date:  12/24/2018  Patient Name:    Quan Franco  Admitting diagnosis:  Acute respiratory failure with hypoxia and hypercapnia ([facility] [address] [J96.01, J96.02]  Admit Date:  12/21/2018  Precautions/Restrictions/WB Status/ Lines/ Wounds/ Oxygen:  3L oxygen via NC, ICU monitoring  Treatment Time:  0311-0578  Treatment Number:  1     Discharge/Disposition Recommendations:  SNF  DME needs for discharge:  Defer to SNF  Other Therapy Recommendations/Needs: none noted    AM-PAC Mobility Score  AM-PAC Inpatient Mobility Raw Score : 11       Patient Goals for Therapy:   To get better     Preadmission Environment:    Pt. Lives with sisters and brother in law  Home environment: [de-identified] story home. pts bedroom and bathroom on the first floor  Steps to enter first floor:   2 with no railing  Steps to second floor:13, but pt does not go up anymore because there are no handrails  Bath: first floor - no shower (sponge bathes)  Equipment owned:4WRW,  wheelchair, cane, home O2 (4L most of the time), hospital bed, lift chair      Preadmission Status:  Pt. Not able to drive   Pt. Had assistance from family for meals (brought to her room)  Pt states IND with dressing and sponge bathing. Pt. Fully independent for transfers and gait and walked with 4WRW most of the time  \"I don't think so\" regarding recent falls     Pain    Rating:none given  Location:back  Pain Medicine Status: Denies need    Cognition    A&Ox4, patient appropriate and cooperative. Patient lying supine in bed with brother present. Follows 1 step and 2 step commands. Pursed Lip Breathing  Pt is independent with demonstration for PLB technique 90% of the time. Upper Extremity ROM/Strength  Please see OT evaluation. Lower Extremity ROM / Strength    AROM WFL     BLE strength:WFL assessed during sidestepping     Lower Extremity Sensation    No apparent deficits.     Lower Extremity Rehabilitation Potential   Good for goals listed above. Strengths for achieving goals include: PLOF, social support  Barriers to achieving goals include: none noted    Plan    To be seen 5x/week while in acute care setting for therapeutic exercises, bed mobility, transfers, progressive gait training, balance training, and family/patient education. Timed Code Treatment Minutes: 14 minutes  Total Treatment Time:   24minutes    Miller Bentley PT, DPT #[id number]    If patient discharges from this facility prior to next visit, this note will serve as the Discharge Summary.

## 2018-12-24 NOTE — PROGRESS NOTES
Pulmonary & Critical Care Medicine ICU Progress Note  CC:Acute respiratory failure with hypoxemia    Events of Last 24 hours: Patient extubated, CVC placed for poor IV access. Invasive Lines:   IV Line: LIJ TLC 12/23    EXAM:  /84   Pulse 80   Temp 97.8 °F (36.6 °C) (Oral)   Resp 26   Ht 5' 2\" (1.575 m)   Wt 289 lb 0.4 oz (131.1 kg)   SpO2 93%   BMI 52.86 kg/m²  on 4l NC    CVP:      Intake/Output Summary (Last 24 hours) at 12/24/18 0655  Last data filed at 12/24/18 7440   Gross per 24 hour   Intake              533 ml   Output             3200 ml   Net            -2667 ml     Gen: No distress. Normocephalic, atraumatic. Obese. Comfortable. Eyes: PERRL. No sclera icterus. No conjunctival injection. ENT: No discharge. Pharynx clear. Neck: Trachea midline. No obvious mass. Resp: No accessory muscle use. Few bilateral crackles. No wheezes. No rhonchi. No dullness on percussion. CV: Regular rate. Regular rhythm. No murmur or rub. +1 LUE edema. GI: Non-tender. Non-distended. No hernia. Skin: Warm and dry. No nodule on exposed extremities. Lymph: No cervical LAD. No supraclavicular LAD. M/S: No cyanosis. No joint deformity. No clubbing. Neuro: Awake and alert, moving extremities, CN grossly intact.       Medications:   albuterol sulfate HFA  4 puff Inhalation BID    levothyroxine  44 mcg Intravenous QAM AC    sodium chloride flush  10 mL Intravenous 2 times per day    chlorhexidine  15 mL Mouth/Throat BID    famotidine (PEPCID) injection  20 mg Intravenous BID    piperacillin-tazobactam  3.375 g Intravenous Q8H    vancomycin  15 mg/kg (Adjusted) Intravenous Q24H    insulin lispro  0-6 Units Subcutaneous Q4H    mupirocin   Nasal BID     PRN Meds:  sodium chloride flush, acetaminophen, glucose, dextrose, glucagon (rDNA), dextrose    Results:  CBC:   Recent Labs      12/21/18   2207  12/22/18   0359  12/23/18   0731   WBC  13.8*  12.1*  10.7   HGB  12.8  11.8*  12.8   HCT  40.4

## 2018-12-25 LAB
ANION GAP SERPL CALCULATED.3IONS-SCNC: 6 MMOL/L (ref 3–16)
BUN BLDV-MCNC: 15 MG/DL (ref 7–20)
CALCIUM SERPL-MCNC: 9.1 MG/DL (ref 8.3–10.6)
CHLORIDE BLD-SCNC: 104 MMOL/L (ref 99–110)
CO2: 34 MMOL/L (ref 21–32)
CREAT SERPL-MCNC: 0.7 MG/DL (ref 0.6–1.2)
GFR AFRICAN AMERICAN: >60
GFR NON-AFRICAN AMERICAN: >60
GLUCOSE BLD-MCNC: 112 MG/DL (ref 70–99)
GLUCOSE BLD-MCNC: 120 MG/DL (ref 70–99)
GLUCOSE BLD-MCNC: 123 MG/DL (ref 70–99)
GLUCOSE BLD-MCNC: 123 MG/DL (ref 70–99)
GLUCOSE BLD-MCNC: 126 MG/DL (ref 70–99)
HCT VFR BLD CALC: 35.7 % (ref 36–48)
HEMOGLOBIN: 11.5 G/DL (ref 12–16)
MAGNESIUM: 1.7 MG/DL (ref 1.8–2.4)
MCH RBC QN AUTO: 30.6 PG (ref 26–34)
MCHC RBC AUTO-ENTMCNC: 32.1 G/DL (ref 31–36)
MCV RBC AUTO: 95.1 FL (ref 80–100)
PDW BLD-RTO: 17.6 % (ref 12.4–15.4)
PERFORMED ON: ABNORMAL
PHOSPHORUS: 2.4 MG/DL (ref 2.5–4.9)
PLATELET # BLD: 180 K/UL (ref 135–450)
PMV BLD AUTO: 8.3 FL (ref 5–10.5)
POTASSIUM SERPL-SCNC: 3.4 MMOL/L (ref 3.5–5.1)
RBC # BLD: 3.75 M/UL (ref 4–5.2)
SODIUM BLD-SCNC: 144 MMOL/L (ref 136–145)
WBC # BLD: 9.2 K/UL (ref 4–11)

## 2018-12-25 PROCEDURE — 2500000003 HC RX 250 WO HCPCS: Performed by: PHYSICIAN ASSISTANT

## 2018-12-25 PROCEDURE — 6370000000 HC RX 637 (ALT 250 FOR IP): Performed by: INTERNAL MEDICINE

## 2018-12-25 PROCEDURE — 85027 COMPLETE CBC AUTOMATED: CPT

## 2018-12-25 PROCEDURE — 2580000003 HC RX 258: Performed by: INTERNAL MEDICINE

## 2018-12-25 PROCEDURE — 6360000002 HC RX W HCPCS: Performed by: PHYSICIAN ASSISTANT

## 2018-12-25 PROCEDURE — 80048 BASIC METABOLIC PNL TOTAL CA: CPT

## 2018-12-25 PROCEDURE — 84100 ASSAY OF PHOSPHORUS: CPT

## 2018-12-25 PROCEDURE — 83735 ASSAY OF MAGNESIUM: CPT

## 2018-12-25 PROCEDURE — 2060000000 HC ICU INTERMEDIATE R&B

## 2018-12-25 PROCEDURE — 2580000003 HC RX 258: Performed by: PHYSICIAN ASSISTANT

## 2018-12-25 PROCEDURE — 99232 SBSQ HOSP IP/OBS MODERATE 35: CPT | Performed by: PHYSICIAN ASSISTANT

## 2018-12-25 PROCEDURE — 99233 SBSQ HOSP IP/OBS HIGH 50: CPT | Performed by: INTERNAL MEDICINE

## 2018-12-25 PROCEDURE — 2700000000 HC OXYGEN THERAPY PER DAY

## 2018-12-25 PROCEDURE — 6360000002 HC RX W HCPCS: Performed by: INTERNAL MEDICINE

## 2018-12-25 PROCEDURE — 94762 N-INVAS EAR/PLS OXIMTRY CONT: CPT

## 2018-12-25 RX ORDER — FLUTICASONE PROPIONATE 50 MCG
1 SPRAY, SUSPENSION (ML) NASAL DAILY
Status: DISCONTINUED | OUTPATIENT
Start: 2018-12-25 | End: 2018-12-28 | Stop reason: HOSPADM

## 2018-12-25 RX ORDER — POTASSIUM CHLORIDE 29.8 MG/ML
20 INJECTION INTRAVENOUS PRN
Status: DISCONTINUED | OUTPATIENT
Start: 2018-12-25 | End: 2018-12-28 | Stop reason: HOSPADM

## 2018-12-25 RX ORDER — MAGNESIUM SULFATE 1 G/100ML
1 INJECTION INTRAVENOUS PRN
Status: DISCONTINUED | OUTPATIENT
Start: 2018-12-25 | End: 2018-12-28 | Stop reason: HOSPADM

## 2018-12-25 RX ADMIN — ROSUVASTATIN CALCIUM 40 MG: 10 TABLET, FILM COATED ORAL at 20:18

## 2018-12-25 RX ADMIN — LOSARTAN POTASSIUM 25 MG: 25 TABLET, FILM COATED ORAL at 09:48

## 2018-12-25 RX ADMIN — METFORMIN HYDROCHLORIDE 500 MG: 500 TABLET ORAL at 16:19

## 2018-12-25 RX ADMIN — Medication 400 MG: at 09:48

## 2018-12-25 RX ADMIN — PIPERACILLIN SODIUM AND TAZOBACTAM SODIUM 3.38 G: 3; .375 INJECTION, POWDER, LYOPHILIZED, FOR SOLUTION INTRAVENOUS at 09:48

## 2018-12-25 RX ADMIN — ACETAMINOPHEN 650 MG: 325 TABLET, FILM COATED ORAL at 09:59

## 2018-12-25 RX ADMIN — MUPIROCIN: 20 OINTMENT TOPICAL at 20:31

## 2018-12-25 RX ADMIN — MONTELUKAST SODIUM 10 MG: 10 TABLET, COATED ORAL at 20:19

## 2018-12-25 RX ADMIN — ACETAMINOPHEN 650 MG: 325 TABLET, FILM COATED ORAL at 00:15

## 2018-12-25 RX ADMIN — PIPERACILLIN SODIUM AND TAZOBACTAM SODIUM 3.38 G: 3; .375 INJECTION, POWDER, LYOPHILIZED, FOR SOLUTION INTRAVENOUS at 23:52

## 2018-12-25 RX ADMIN — Medication 10 ML: at 09:48

## 2018-12-25 RX ADMIN — POTASSIUM CHLORIDE 20 MEQ: 400 INJECTION, SOLUTION INTRAVENOUS at 15:18

## 2018-12-25 RX ADMIN — PIPERACILLIN SODIUM AND TAZOBACTAM SODIUM 3.38 G: 3; .375 INJECTION, POWDER, LYOPHILIZED, FOR SOLUTION INTRAVENOUS at 00:05

## 2018-12-25 RX ADMIN — FLUTICASONE PROPIONATE 1 SPRAY: 50 SPRAY, METERED NASAL at 11:37

## 2018-12-25 RX ADMIN — OXYBUTYNIN CHLORIDE 10 MG: 5 TABLET, EXTENDED RELEASE ORAL at 20:19

## 2018-12-25 RX ADMIN — MUPIROCIN: 20 OINTMENT TOPICAL at 09:54

## 2018-12-25 RX ADMIN — METFORMIN HYDROCHLORIDE 500 MG: 500 TABLET ORAL at 09:48

## 2018-12-25 RX ADMIN — SODIUM PHOSPHATE, MONOBASIC, MONOHYDRATE 10 MMOL: 276; 142 INJECTION, SOLUTION INTRAVENOUS at 13:22

## 2018-12-25 RX ADMIN — Medication 10 ML: at 20:27

## 2018-12-25 RX ADMIN — ACETAMINOPHEN 650 MG: 325 TABLET, FILM COATED ORAL at 23:57

## 2018-12-25 RX ADMIN — LEVOTHYROXINE SODIUM 88 MCG: 88 TABLET ORAL at 06:01

## 2018-12-25 RX ADMIN — POTASSIUM CHLORIDE 20 MEQ: 400 INJECTION, SOLUTION INTRAVENOUS at 16:19

## 2018-12-25 RX ADMIN — Medication 400 MG: at 20:19

## 2018-12-25 RX ADMIN — PIPERACILLIN SODIUM AND TAZOBACTAM SODIUM 3.38 G: 3; .375 INJECTION, POWDER, LYOPHILIZED, FOR SOLUTION INTRAVENOUS at 16:20

## 2018-12-25 ASSESSMENT — PAIN DESCRIPTION - DESCRIPTORS
DESCRIPTORS: ACHING
DESCRIPTORS: ACHING
DESCRIPTORS: ACHING;CONSTANT

## 2018-12-25 ASSESSMENT — PAIN DESCRIPTION - LOCATION
LOCATION: HEAD
LOCATION: GENERALIZED
LOCATION: GENERALIZED

## 2018-12-25 ASSESSMENT — PAIN DESCRIPTION - PROGRESSION
CLINICAL_PROGRESSION: GRADUALLY IMPROVING
CLINICAL_PROGRESSION: GRADUALLY WORSENING
CLINICAL_PROGRESSION: GRADUALLY WORSENING

## 2018-12-25 ASSESSMENT — PAIN SCALES - GENERAL
PAINLEVEL_OUTOF10: 0
PAINLEVEL_OUTOF10: 7
PAINLEVEL_OUTOF10: 3
PAINLEVEL_OUTOF10: 5
PAINLEVEL_OUTOF10: 0

## 2018-12-25 ASSESSMENT — PAIN DESCRIPTION - PAIN TYPE
TYPE: ACUTE PAIN

## 2018-12-25 ASSESSMENT — PAIN DESCRIPTION - ONSET
ONSET: GRADUAL
ONSET: ON-GOING
ONSET: GRADUAL

## 2018-12-25 ASSESSMENT — PAIN DESCRIPTION - FREQUENCY
FREQUENCY: INTERMITTENT

## 2018-12-25 ASSESSMENT — PAIN DESCRIPTION - ORIENTATION
ORIENTATION: MID
ORIENTATION: MID

## 2018-12-25 NOTE — PROGRESS NOTES
Helped patient call sister Sejal Clark on the telephone. Patient resting in bed. Call light in reach will monitor.

## 2018-12-26 ENCOUNTER — APPOINTMENT (OUTPATIENT)
Dept: ULTRASOUND IMAGING | Age: 73
DRG: 871 | End: 2018-12-26
Payer: MEDICARE

## 2018-12-26 LAB
ANION GAP SERPL CALCULATED.3IONS-SCNC: 6 MMOL/L (ref 3–16)
BLOOD CULTURE, ROUTINE: NORMAL
BUN BLDV-MCNC: 15 MG/DL (ref 7–20)
CALCIUM SERPL-MCNC: 9.3 MG/DL (ref 8.3–10.6)
CHLORIDE BLD-SCNC: 100 MMOL/L (ref 99–110)
CO2: 35 MMOL/L (ref 21–32)
CREAT SERPL-MCNC: 0.8 MG/DL (ref 0.6–1.2)
GFR AFRICAN AMERICAN: >60
GFR NON-AFRICAN AMERICAN: >60
GLUCOSE BLD-MCNC: 109 MG/DL (ref 70–99)
GLUCOSE BLD-MCNC: 111 MG/DL (ref 70–99)
GLUCOSE BLD-MCNC: 113 MG/DL (ref 70–99)
GLUCOSE BLD-MCNC: 123 MG/DL (ref 70–99)
GLUCOSE BLD-MCNC: 158 MG/DL (ref 70–99)
HCT VFR BLD CALC: 37.2 % (ref 36–48)
HEMOGLOBIN: 12.1 G/DL (ref 12–16)
MAGNESIUM: 1.4 MG/DL (ref 1.8–2.4)
MCH RBC QN AUTO: 31 PG (ref 26–34)
MCHC RBC AUTO-ENTMCNC: 32.6 G/DL (ref 31–36)
MCV RBC AUTO: 95 FL (ref 80–100)
PDW BLD-RTO: 17 % (ref 12.4–15.4)
PERFORMED ON: ABNORMAL
PHOSPHORUS: 2.7 MG/DL (ref 2.5–4.9)
PLATELET # BLD: 192 K/UL (ref 135–450)
PMV BLD AUTO: 8.1 FL (ref 5–10.5)
POTASSIUM SERPL-SCNC: 3.8 MMOL/L (ref 3.5–5.1)
RBC # BLD: 3.92 M/UL (ref 4–5.2)
SODIUM BLD-SCNC: 141 MMOL/L (ref 136–145)
WBC # BLD: 10 K/UL (ref 4–11)

## 2018-12-26 PROCEDURE — 81001 URINALYSIS AUTO W/SCOPE: CPT

## 2018-12-26 PROCEDURE — 2700000000 HC OXYGEN THERAPY PER DAY

## 2018-12-26 PROCEDURE — 99232 SBSQ HOSP IP/OBS MODERATE 35: CPT | Performed by: INTERNAL MEDICINE

## 2018-12-26 PROCEDURE — 80048 BASIC METABOLIC PNL TOTAL CA: CPT

## 2018-12-26 PROCEDURE — 6370000000 HC RX 637 (ALT 250 FOR IP): Performed by: INTERNAL MEDICINE

## 2018-12-26 PROCEDURE — 97535 SELF CARE MNGMENT TRAINING: CPT

## 2018-12-26 PROCEDURE — 83735 ASSAY OF MAGNESIUM: CPT

## 2018-12-26 PROCEDURE — 85027 COMPLETE CBC AUTOMATED: CPT

## 2018-12-26 PROCEDURE — 84100 ASSAY OF PHOSPHORUS: CPT

## 2018-12-26 PROCEDURE — 97116 GAIT TRAINING THERAPY: CPT

## 2018-12-26 PROCEDURE — 2060000000 HC ICU INTERMEDIATE R&B

## 2018-12-26 PROCEDURE — 97530 THERAPEUTIC ACTIVITIES: CPT

## 2018-12-26 PROCEDURE — 2580000003 HC RX 258: Performed by: INTERNAL MEDICINE

## 2018-12-26 PROCEDURE — 6360000002 HC RX W HCPCS: Performed by: PHYSICIAN ASSISTANT

## 2018-12-26 PROCEDURE — 6360000002 HC RX W HCPCS: Performed by: INTERNAL MEDICINE

## 2018-12-26 PROCEDURE — 2580000003 HC RX 258

## 2018-12-26 PROCEDURE — 76770 US EXAM ABDO BACK WALL COMP: CPT

## 2018-12-26 RX ORDER — FUROSEMIDE 40 MG/1
40 TABLET ORAL DAILY
Status: DISCONTINUED | OUTPATIENT
Start: 2018-12-26 | End: 2018-12-28 | Stop reason: HOSPADM

## 2018-12-26 RX ORDER — FENOFIBRATE 160 MG/1
160 TABLET ORAL DAILY
Status: DISCONTINUED | OUTPATIENT
Start: 2018-12-26 | End: 2018-12-28 | Stop reason: HOSPADM

## 2018-12-26 RX ORDER — SODIUM CHLORIDE 9 MG/ML
INJECTION, SOLUTION INTRAVENOUS
Status: COMPLETED
Start: 2018-12-26 | End: 2018-12-26

## 2018-12-26 RX ADMIN — FUROSEMIDE 40 MG: 40 TABLET ORAL at 17:24

## 2018-12-26 RX ADMIN — ACETAMINOPHEN 650 MG: 325 TABLET, FILM COATED ORAL at 04:53

## 2018-12-26 RX ADMIN — SODIUM CHLORIDE 250 ML: 9 INJECTION, SOLUTION INTRAVENOUS at 08:38

## 2018-12-26 RX ADMIN — PIPERACILLIN SODIUM AND TAZOBACTAM SODIUM 3.38 G: 3; .375 INJECTION, POWDER, LYOPHILIZED, FOR SOLUTION INTRAVENOUS at 16:29

## 2018-12-26 RX ADMIN — MAGNESIUM SULFATE HEPTAHYDRATE 1 G: 1 INJECTION, SOLUTION INTRAVENOUS at 11:04

## 2018-12-26 RX ADMIN — LEVOTHYROXINE SODIUM 88 MCG: 88 TABLET ORAL at 06:33

## 2018-12-26 RX ADMIN — PIPERACILLIN SODIUM AND TAZOBACTAM SODIUM 3.38 G: 3; .375 INJECTION, POWDER, LYOPHILIZED, FOR SOLUTION INTRAVENOUS at 08:39

## 2018-12-26 RX ADMIN — MONTELUKAST SODIUM 10 MG: 10 TABLET, COATED ORAL at 19:57

## 2018-12-26 RX ADMIN — FENOFIBRATE 160 MG: 160 TABLET ORAL at 17:24

## 2018-12-26 RX ADMIN — Medication 400 MG: at 19:57

## 2018-12-26 RX ADMIN — METFORMIN HYDROCHLORIDE 500 MG: 500 TABLET ORAL at 16:29

## 2018-12-26 RX ADMIN — METFORMIN HYDROCHLORIDE 500 MG: 500 TABLET ORAL at 08:40

## 2018-12-26 RX ADMIN — INSULIN LISPRO 1 UNITS: 100 INJECTION, SOLUTION INTRAVENOUS; SUBCUTANEOUS at 11:31

## 2018-12-26 RX ADMIN — MUPIROCIN: 20 OINTMENT TOPICAL at 20:06

## 2018-12-26 RX ADMIN — LOSARTAN POTASSIUM 25 MG: 25 TABLET, FILM COATED ORAL at 08:40

## 2018-12-26 RX ADMIN — Medication 10 ML: at 08:40

## 2018-12-26 RX ADMIN — MAGNESIUM SULFATE HEPTAHYDRATE 1 G: 1 INJECTION, SOLUTION INTRAVENOUS at 08:41

## 2018-12-26 RX ADMIN — Medication 10 ML: at 19:57

## 2018-12-26 RX ADMIN — ROSUVASTATIN CALCIUM 40 MG: 10 TABLET, FILM COATED ORAL at 19:57

## 2018-12-26 RX ADMIN — FLUTICASONE PROPIONATE 1 SPRAY: 50 SPRAY, METERED NASAL at 08:41

## 2018-12-26 RX ADMIN — Medication 400 MG: at 08:40

## 2018-12-26 RX ADMIN — OXYBUTYNIN CHLORIDE 10 MG: 5 TABLET, EXTENDED RELEASE ORAL at 19:57

## 2018-12-26 RX ADMIN — MUPIROCIN: 20 OINTMENT TOPICAL at 08:41

## 2018-12-26 RX ADMIN — ACETAMINOPHEN 650 MG: 325 TABLET, FILM COATED ORAL at 23:04

## 2018-12-26 ASSESSMENT — PAIN DESCRIPTION - LOCATION: LOCATION: VAGINA

## 2018-12-26 ASSESSMENT — PAIN DESCRIPTION - FREQUENCY: FREQUENCY: CONTINUOUS

## 2018-12-26 ASSESSMENT — PAIN SCALES - GENERAL
PAINLEVEL_OUTOF10: 9
PAINLEVEL_OUTOF10: 9
PAINLEVEL_OUTOF10: 10
PAINLEVEL_OUTOF10: 5

## 2018-12-26 ASSESSMENT — PAIN DESCRIPTION - PAIN TYPE: TYPE: ACUTE PAIN

## 2018-12-26 ASSESSMENT — PAIN DESCRIPTION - PROGRESSION: CLINICAL_PROGRESSION: GRADUALLY WORSENING

## 2018-12-26 ASSESSMENT — PAIN DESCRIPTION - ONSET: ONSET: ON-GOING

## 2018-12-26 ASSESSMENT — PAIN DESCRIPTION - DESCRIPTORS: DESCRIPTORS: ACHING;BURNING;CONSTANT;DISCOMFORT

## 2018-12-26 NOTE — PROGRESS NOTES
Patient resting quietly in bed. No s/s of distress noted. Shift assessment complete, see flow sheet. Mag 1.4 this AM, 1g of 2g started at this time. Denies needs. Call light in reach. Will monitor.

## 2018-12-26 NOTE — PROGRESS NOTES
Pt. Having pain in mid back. Gave PRN tylenol. No signs if distress noted. Patient satisfied. Will continue to monitor. Call light within reach.

## 2018-12-26 NOTE — PROGRESS NOTES
Inpatient Physical Therapy Daily Treatment Note    Unit: PCU   Date:  12/26/2018  Patient Name:    Morro Jane  Admitting diagnosis:  Acute respiratory failure with hypoxia and hypercapnia ([facility] [address] [J96.01, J96.02]  Admit Date:  12/21/2018  Precautions/Restrictions:  3L oxygen via NC, telemetry    Discharge Recommendations: SNF  DME needs at discharge: Defer to facility    AM-PAC Mobility Score   AM-PAC Inpatient Mobility Raw Score : 18       Treatment Time: 15:22-15:57  Treatment Number:  2    Subjective    Pt. Supine in bed with no family present. Pt. agreeable to therapy, stating she needs to use the toilet. Pain    Pt reported soreness in kerwin area (RN aware already and is monitoring; notified RN again of pt complaint); otherwise no other pain/discomfort reported. Bed Mobility   Supine to Sit: SBA from elevated HOB  Sit to Supine: DNT, pt returned to recliner chair  Rolling: DNT  Scooting in sitting: SBA at EOB    Transfer Training   Sit to stand: CGA at RW from EOB x 2 reps and BSC x 1 rep  Stand to sit: CGA at RW to BSC/bed/recliner  Bed to Chair: via stand pivot (x 3 reps) at Goal Zero    Gait Training   Patient ambulated with RW x total of 4' during 3 stand-pivot transfers thsi session. Therapeutic Exercise Deferred this session as pt was fatigued/de-sat with transfers. Balance  Static sitting: good at EOB and BSC, requiring SBA only  Dynamic sitting: good at EOB and BSC, requiring SBA only during pericare w/ toileting  Static standing: fair to good at  with CGA  Dynamic standing: fair to good at [employer] with CGA for pericare w/ toileting        Patient Education   Role of PT, PLB, POC, transfer training. Positioning Needs       Pt returned to recliner chair with all needs and call light in reach.     Activity Tolerance   At start of session with pt in supine at rest: 141/90 mmHg, 78 bpm    SpO2: ranged from 88% (with exertion/transfers) to 94% (with seated rest) on 3LO2 NC   HR: ranged from 101

## 2018-12-26 NOTE — PROGRESS NOTES
Physical Therapy/Occupational Therapy Attempt note    Attempt to see pt for therapy this PM, per RN in room pt going down to radiology. Will check back this PM or tomorrow as pt appropriate and therapist schedule allows. No Charge.     Abhilash Covarrubias, PT, DPT #[id number]  Fernanda Salas, OTR/L [id number]

## 2018-12-26 NOTE — PROGRESS NOTES
Shift assessment completed. Pt. In bed resting. Pt. Denies any SOB or discomfort at this time. VSS. Call light in reach. Will continue to monitor.

## 2018-12-27 LAB
AMORPHOUS: ABNORMAL /HPF
ANION GAP SERPL CALCULATED.3IONS-SCNC: 7 MMOL/L (ref 3–16)
BILIRUBIN URINE: NEGATIVE
BLOOD, URINE: ABNORMAL
BUN BLDV-MCNC: 15 MG/DL (ref 7–20)
CALCIUM SERPL-MCNC: 9.8 MG/DL (ref 8.3–10.6)
CHLORIDE BLD-SCNC: 97 MMOL/L (ref 99–110)
CLARITY: ABNORMAL
CO2: 42 MMOL/L (ref 21–32)
COLOR: YELLOW
CREAT SERPL-MCNC: 0.7 MG/DL (ref 0.6–1.2)
EPITHELIAL CELLS, UA: ABNORMAL /HPF
GFR AFRICAN AMERICAN: >60
GFR NON-AFRICAN AMERICAN: >60
GLUCOSE BLD-MCNC: 121 MG/DL (ref 70–99)
GLUCOSE BLD-MCNC: 129 MG/DL (ref 70–99)
GLUCOSE BLD-MCNC: 130 MG/DL (ref 70–99)
GLUCOSE BLD-MCNC: 143 MG/DL (ref 70–99)
GLUCOSE BLD-MCNC: 161 MG/DL (ref 70–99)
GLUCOSE URINE: NEGATIVE MG/DL
HCT VFR BLD CALC: 40.1 % (ref 36–48)
HEMOGLOBIN: 12.9 G/DL (ref 12–16)
KETONES, URINE: NEGATIVE MG/DL
LEUKOCYTE ESTERASE, URINE: NEGATIVE
MAGNESIUM: 1.4 MG/DL (ref 1.8–2.4)
MCH RBC QN AUTO: 30.2 PG (ref 26–34)
MCHC RBC AUTO-ENTMCNC: 32.1 G/DL (ref 31–36)
MCV RBC AUTO: 94.2 FL (ref 80–100)
MICROSCOPIC EXAMINATION: YES
NITRITE, URINE: NEGATIVE
PDW BLD-RTO: 16.9 % (ref 12.4–15.4)
PERFORMED ON: ABNORMAL
PH UA: 6
PHOSPHORUS: 3 MG/DL (ref 2.5–4.9)
PLATELET # BLD: 219 K/UL (ref 135–450)
PMV BLD AUTO: 8.3 FL (ref 5–10.5)
POTASSIUM SERPL-SCNC: 3.3 MMOL/L (ref 3.5–5.1)
PROTEIN UA: NEGATIVE MG/DL
RBC # BLD: 4.25 M/UL (ref 4–5.2)
RBC UA: ABNORMAL /HPF (ref 0–2)
SODIUM BLD-SCNC: 146 MMOL/L (ref 136–145)
SPECIFIC GRAVITY UA: <=1.005
URINE REFLEX TO CULTURE: ABNORMAL
URINE TYPE: ABNORMAL
UROBILINOGEN, URINE: 0.2 E.U./DL
WBC # BLD: 12.8 K/UL (ref 4–11)
WBC UA: ABNORMAL /HPF (ref 0–5)

## 2018-12-27 PROCEDURE — 99233 SBSQ HOSP IP/OBS HIGH 50: CPT | Performed by: INTERNAL MEDICINE

## 2018-12-27 PROCEDURE — 97530 THERAPEUTIC ACTIVITIES: CPT

## 2018-12-27 PROCEDURE — 2580000003 HC RX 258: Performed by: INTERNAL MEDICINE

## 2018-12-27 PROCEDURE — 6370000000 HC RX 637 (ALT 250 FOR IP): Performed by: INTERNAL MEDICINE

## 2018-12-27 PROCEDURE — 6360000002 HC RX W HCPCS: Performed by: INTERNAL MEDICINE

## 2018-12-27 PROCEDURE — 99232 SBSQ HOSP IP/OBS MODERATE 35: CPT | Performed by: INTERNAL MEDICINE

## 2018-12-27 PROCEDURE — 83735 ASSAY OF MAGNESIUM: CPT

## 2018-12-27 PROCEDURE — 2700000000 HC OXYGEN THERAPY PER DAY

## 2018-12-27 PROCEDURE — 84100 ASSAY OF PHOSPHORUS: CPT

## 2018-12-27 PROCEDURE — 2060000000 HC ICU INTERMEDIATE R&B

## 2018-12-27 PROCEDURE — 85027 COMPLETE CBC AUTOMATED: CPT

## 2018-12-27 PROCEDURE — 97110 THERAPEUTIC EXERCISES: CPT

## 2018-12-27 PROCEDURE — 94762 N-INVAS EAR/PLS OXIMTRY CONT: CPT

## 2018-12-27 PROCEDURE — 80048 BASIC METABOLIC PNL TOTAL CA: CPT

## 2018-12-27 PROCEDURE — 6360000002 HC RX W HCPCS: Performed by: PHYSICIAN ASSISTANT

## 2018-12-27 RX ORDER — POTASSIUM CHLORIDE 750 MG/1
40 TABLET, EXTENDED RELEASE ORAL DAILY
Status: DISCONTINUED | OUTPATIENT
Start: 2018-12-27 | End: 2018-12-28 | Stop reason: HOSPADM

## 2018-12-27 RX ADMIN — LEVOTHYROXINE SODIUM 88 MCG: 88 TABLET ORAL at 04:35

## 2018-12-27 RX ADMIN — Medication 400 MG: at 08:42

## 2018-12-27 RX ADMIN — MAGNESIUM SULFATE HEPTAHYDRATE 1 G: 1 INJECTION, SOLUTION INTRAVENOUS at 06:21

## 2018-12-27 RX ADMIN — ROSUVASTATIN CALCIUM 40 MG: 10 TABLET, FILM COATED ORAL at 21:59

## 2018-12-27 RX ADMIN — FLUTICASONE PROPIONATE 1 SPRAY: 50 SPRAY, METERED NASAL at 08:43

## 2018-12-27 RX ADMIN — OXYBUTYNIN CHLORIDE 10 MG: 5 TABLET, EXTENDED RELEASE ORAL at 21:59

## 2018-12-27 RX ADMIN — Medication 400 MG: at 21:59

## 2018-12-27 RX ADMIN — PIPERACILLIN SODIUM AND TAZOBACTAM SODIUM 3.38 G: 3; .375 INJECTION, POWDER, LYOPHILIZED, FOR SOLUTION INTRAVENOUS at 08:42

## 2018-12-27 RX ADMIN — INSULIN LISPRO 1 UNITS: 100 INJECTION, SOLUTION INTRAVENOUS; SUBCUTANEOUS at 07:50

## 2018-12-27 RX ADMIN — MAGNESIUM SULFATE HEPTAHYDRATE 1 G: 1 INJECTION, SOLUTION INTRAVENOUS at 07:48

## 2018-12-27 RX ADMIN — METFORMIN HYDROCHLORIDE 500 MG: 500 TABLET ORAL at 17:00

## 2018-12-27 RX ADMIN — FENOFIBRATE 160 MG: 160 TABLET ORAL at 08:42

## 2018-12-27 RX ADMIN — PIPERACILLIN SODIUM AND TAZOBACTAM SODIUM 3.38 G: 3; .375 INJECTION, POWDER, LYOPHILIZED, FOR SOLUTION INTRAVENOUS at 17:01

## 2018-12-27 RX ADMIN — Medication 10 ML: at 22:00

## 2018-12-27 RX ADMIN — MONTELUKAST SODIUM 10 MG: 10 TABLET, COATED ORAL at 21:59

## 2018-12-27 RX ADMIN — METFORMIN HYDROCHLORIDE 500 MG: 500 TABLET ORAL at 08:42

## 2018-12-27 RX ADMIN — POTASSIUM CHLORIDE 40 MEQ: 10 TABLET, EXTENDED RELEASE ORAL at 11:34

## 2018-12-27 RX ADMIN — LOSARTAN POTASSIUM 25 MG: 25 TABLET, FILM COATED ORAL at 08:42

## 2018-12-27 RX ADMIN — ACETAMINOPHEN 650 MG: 325 TABLET, FILM COATED ORAL at 22:12

## 2018-12-27 RX ADMIN — Medication 10 ML: at 08:43

## 2018-12-27 RX ADMIN — FUROSEMIDE 40 MG: 40 TABLET ORAL at 08:42

## 2018-12-27 RX ADMIN — PIPERACILLIN SODIUM AND TAZOBACTAM SODIUM 3.38 G: 3; .375 INJECTION, POWDER, LYOPHILIZED, FOR SOLUTION INTRAVENOUS at 00:13

## 2018-12-27 ASSESSMENT — PAIN SCALES - GENERAL: PAINLEVEL_OUTOF10: 2

## 2018-12-27 NOTE — PROGRESS NOTES
Occupational Therapy  Attempted treatment this afternoon. Patient recently finishing with PT treatment session. Patient in deep sleep upon therapist arrival. Difficult to maintain arousal due to fatigue. Patient requesting for therapist to return at another time. OT will follow up later this date as schedule allows.        Candace Montano, OTR/L #[id number]

## 2018-12-27 NOTE — PROGRESS NOTES
Inpatient Physical Therapy Daily Treatment Note    Unit: PCU   Date:  12/27/2018  Patient Name:    Tan Powell  Admitting diagnosis:  Acute respiratory failure with hypoxia and hypercapnia ([facility] [address] [J96.01, J96.02]  Admit Date:  12/21/2018  Precautions/Restrictions:  3L O2, telemetry monitoring, fall risk, bed/chair alarm    Discharge Recommendations: SNF  DME needs at discharge: Defer to facility    AM-PAC Mobility Score   AM-PAC Inpatient Mobility Raw Score : 18       Treatment Time: 3973-3120  Treatment Number:  3    Subjective    Pt. Supine in bed with no family present. Pt. agreeable to therapy treatment this afternoon with encouragement. Pt reports she feels fatigued this afternoon. Pain    Denies    Bed Mobility   Supine to Sit: SBA from elevated HOB  Sit to Supine: DNT, pt returned to recliner chair  Rolling: DNT  Scooting in sitting: SBA at EOB    Transfer Training   Sit to stand: CGA at RW from EOB - min VC for hand placement  Stand to sit: CGA at RW to chair  Bed to Chair: CGA with RW    Gait Training   Deferred due to lethargy this afternoon. Therapeutic Exercise   Ankle pumps: x 20  LAQ: x 15  Seated marches: x 15    Balance  Sitting: Good (-) at EOB  Standing: Fair with RW       Patient Education   Role of PT, PLB, POC, benefits of sitting up in chair    Positioning Needs       Pt reclined in chair with alarm activated and needs/call light within reach. Activity Tolerance   SpO2: >90% on 3L O2 throughout, mod MOSHER with transfer and sitting EOB. Pt able to recover with rest    Assessment   Patient limited this treatment due to fatigue with minimal mobility. Pt vitals stable throughout with mod MOSHER with bed mobility and transfers. Pt tolerated seated exercises fairly with rest breaks between sets. Continue to recommend SNF at discharge to safely progress IND with functional mobility. GOALS  To be met in 3 visits:  1).  Independent with PLB technique x10 reps- Not met, continue goal     To be met in 6 visits:  1). Supine to/from sit Min A Met from elevated HOB  2). Sit to/from stand SUPVN Not met, continue goal  3). Bed to chair SUPVN with LRAD Not met, continue goal  4). Pt will amb 50 ft SUPVN or less assist with LRAD Not met, continue goal  5). Tolerate B LE exercises 10 reps MET, continue goal for consistency  6). Up and Down 3 steps without use of handrail with Min A or less as is necessary for safe d/c home. Not met, continue goal    Plan   Continue with plan of care. Time Coded Treatment Minutes:   24 minutes    Total Treatment Time:   24 minutes    Rhianna Byrne PT, DPT #[id number]    If patient discharges from this facility prior to next visit, this note will serve as the Discharge Summary.

## 2018-12-27 NOTE — PROGRESS NOTES
acetaminophen, glucose, dextrose, glucagon (rDNA), dextrose    Results:  CBC:   Recent Labs      12/25/18   0600  12/26/18   0639  12/27/18   0435   WBC  9.2  10.0  12.8*   HGB  11.5*  12.1  12.9   HCT  35.7*  37.2  40.1   MCV  95.1  95.0  94.2   PLT  180  192  219     BMP:   Recent Labs      12/25/18   0600  12/26/18   0639  12/27/18   0435   NA  144  141  146*   K  3.4*  3.8  3.3*   CL  104  100  97*   CO2  34*  35*  42*   PHOS  2.4*  2.7  3.0   BUN  15  15  15   CREATININE  0.7  0.8  0.7     LIVER PROFILE:   No results for input(s): AST, ALT, LIPASE, BILIDIR, BILITOT, ALKPHOS in the last 72 hours. Invalid input(s): AMYLASE,  ALB  PT/INR:   No results for input(s): PROTIME, INR in the last 72 hours. APTT:   No results for input(s): APTT in the last 72 hours. UA:  Recent Labs      12/26/18   2315   COLORU  Yellow   PHUR  6.0   WBCUA  0-2   RBCUA  3-5*   CLARITYU  SL CLOUDY*   SPECGRAV  <=1.005   LEUKOCYTESUR  Negative   UROBILINOGEN  0.2   BILIRUBINUR  Negative   BLOODU  TRACE-INTACT*   GLUCOSEU  Negative   AMORPHOUS  2+*       Cultures:  Blood: ngtd    Films:  CXR 12/23 reviewed by me and it showed: CVC in innominate vein, no change in ASD    ASSESSMENT:  ·  Acute encephalopathy-May have been secondary to opiate ingestion  · HCAP  · Acute respiratory failure with hypoxemia  · Hypomagnesemia  · Pulmonary edema     PLAN:  · Supplemental oxygen to maintain SaO2 >92%; wean as tolerated   · Antibiotics to cover HCAP ( D6/7 zosyn), d/c'd vanc after 3 days  · PO diuretics  · Influenza and pneumovax  · PT/oT  · OOB to chair as able.

## 2018-12-27 NOTE — PROGRESS NOTES
with any clinical evidence of   increasing pulmonary edema. XR CHEST PORTABLE   Final Result   In this patient with mild vascular congestion, mild ground-glass   opacification at the left lung base has developed suggesting atelectasis or   asymmetric edema. XR CHEST PORTABLE   Final Result   Stable appearance of the chest over the past 24 hours demonstrating vascular   congestion without overt edema. XR CHEST PORTABLE   Final Result   Tip of the endotracheal tube is in the expected position, superimposed over   the mid trachea. XR ABDOMEN FOR NG/OG/NE TUBE PLACEMENT   Final Result   Both the endotracheal tube and nasogastric tube are in good position. CT ABDOMEN PELVIS W IV CONTRAST Additional Contrast? None   Final Result   No evidence for acute intra-abdominal or intrapelvic pathology. No bowel   obstruction or inflammation. No free intraperitoneal air or fluid. No   evidence for urinary obstruction. Colonic diverticulosis without evidence for acute diverticulitis. Hyperdense cystic lesion arising from the lower pole the right kidney,   measuring 7 cm. If not previously worked up, renal ultrasound recommended on   a nonemergent/outpatient basis. Bibasilar airspace opacities favor atelectasis/scarring. Infectious/inflammatory process is possible in the proper clinical setting. CT head without contrast   Final Result   No acute intracranial abnormality. Cultures:    Blood cultures-  No growth to date   Urine cultures- negative          Assessment & Plan:    Acute respiratory failure with hypoxia and hypercapnia  - likely related to polypharmacy,  Aspiration pneumonia. -  Pt intubated in the ER. Extubated on 12/23/18.  - Seen by pulmonology  - stable off vent,  Oxygen saturation stable on 3 L.   - Continue supplemental O2 to maintain SPO2 ? 92%,  Wean oxygen as tolerated.     Sepsis  -   Present on admission -  With leukocytosis,

## 2018-12-28 VITALS
SYSTOLIC BLOOD PRESSURE: 110 MMHG | BODY MASS INDEX: 50.57 KG/M2 | WEIGHT: 274.8 LBS | TEMPERATURE: 98.5 F | HEIGHT: 62 IN | HEART RATE: 89 BPM | OXYGEN SATURATION: 95 % | RESPIRATION RATE: 18 BRPM | DIASTOLIC BLOOD PRESSURE: 69 MMHG

## 2018-12-28 LAB
ANION GAP SERPL CALCULATED.3IONS-SCNC: 4 MMOL/L (ref 3–16)
BUN BLDV-MCNC: 17 MG/DL (ref 7–20)
CALCIUM SERPL-MCNC: 9.6 MG/DL (ref 8.3–10.6)
CHLORIDE BLD-SCNC: 91 MMOL/L (ref 99–110)
CO2: 43 MMOL/L (ref 21–32)
CREAT SERPL-MCNC: 0.9 MG/DL (ref 0.6–1.2)
GFR AFRICAN AMERICAN: >60
GFR NON-AFRICAN AMERICAN: >60
GLUCOSE BLD-MCNC: 115 MG/DL (ref 70–99)
GLUCOSE BLD-MCNC: 139 MG/DL (ref 70–99)
GLUCOSE BLD-MCNC: 184 MG/DL (ref 70–99)
HCT VFR BLD CALC: 38.6 % (ref 36–48)
HEMOGLOBIN: 12.4 G/DL (ref 12–16)
MAGNESIUM: 1.5 MG/DL (ref 1.8–2.4)
MCH RBC QN AUTO: 30.2 PG (ref 26–34)
MCHC RBC AUTO-ENTMCNC: 32.2 G/DL (ref 31–36)
MCV RBC AUTO: 93.9 FL (ref 80–100)
PDW BLD-RTO: 16.9 % (ref 12.4–15.4)
PERFORMED ON: ABNORMAL
PERFORMED ON: ABNORMAL
PHOSPHORUS: 3 MG/DL (ref 2.5–4.9)
PLATELET # BLD: 215 K/UL (ref 135–450)
PMV BLD AUTO: 8.5 FL (ref 5–10.5)
POTASSIUM SERPL-SCNC: 3.5 MMOL/L (ref 3.5–5.1)
RBC # BLD: 4.12 M/UL (ref 4–5.2)
SODIUM BLD-SCNC: 138 MMOL/L (ref 136–145)
WBC # BLD: 9.2 K/UL (ref 4–11)

## 2018-12-28 PROCEDURE — 99238 HOSP IP/OBS DSCHRG MGMT 30/<: CPT | Performed by: INTERNAL MEDICINE

## 2018-12-28 PROCEDURE — 2580000003 HC RX 258

## 2018-12-28 PROCEDURE — 6360000002 HC RX W HCPCS: Performed by: INTERNAL MEDICINE

## 2018-12-28 PROCEDURE — 6370000000 HC RX 637 (ALT 250 FOR IP): Performed by: INTERNAL MEDICINE

## 2018-12-28 PROCEDURE — 2580000003 HC RX 258: Performed by: INTERNAL MEDICINE

## 2018-12-28 PROCEDURE — 83735 ASSAY OF MAGNESIUM: CPT

## 2018-12-28 PROCEDURE — 36592 COLLECT BLOOD FROM PICC: CPT

## 2018-12-28 PROCEDURE — 85027 COMPLETE CBC AUTOMATED: CPT

## 2018-12-28 PROCEDURE — 80048 BASIC METABOLIC PNL TOTAL CA: CPT

## 2018-12-28 PROCEDURE — 2700000000 HC OXYGEN THERAPY PER DAY

## 2018-12-28 PROCEDURE — 94761 N-INVAS EAR/PLS OXIMETRY MLT: CPT

## 2018-12-28 PROCEDURE — 84100 ASSAY OF PHOSPHORUS: CPT

## 2018-12-28 PROCEDURE — 99232 SBSQ HOSP IP/OBS MODERATE 35: CPT | Performed by: INTERNAL MEDICINE

## 2018-12-28 RX ORDER — MAGNESIUM SULFATE IN WATER 40 MG/ML
4 INJECTION, SOLUTION INTRAVENOUS ONCE
Status: COMPLETED | OUTPATIENT
Start: 2018-12-28 | End: 2018-12-28

## 2018-12-28 RX ORDER — POTASSIUM CHLORIDE 20 MEQ/1
40 TABLET, EXTENDED RELEASE ORAL DAILY
DISCHARGE
Start: 2018-12-29

## 2018-12-28 RX ORDER — SODIUM CHLORIDE 9 MG/ML
INJECTION, SOLUTION INTRAVENOUS
Status: COMPLETED
Start: 2018-12-28 | End: 2018-12-28

## 2018-12-28 RX ADMIN — FLUTICASONE PROPIONATE 1 SPRAY: 50 SPRAY, METERED NASAL at 08:23

## 2018-12-28 RX ADMIN — FUROSEMIDE 40 MG: 40 TABLET ORAL at 08:24

## 2018-12-28 RX ADMIN — FENOFIBRATE 160 MG: 160 TABLET ORAL at 08:24

## 2018-12-28 RX ADMIN — LEVOTHYROXINE SODIUM 88 MCG: 88 TABLET ORAL at 06:23

## 2018-12-28 RX ADMIN — Medication 400 MG: at 08:24

## 2018-12-28 RX ADMIN — PIPERACILLIN SODIUM AND TAZOBACTAM SODIUM 3.38 G: 3; .375 INJECTION, POWDER, LYOPHILIZED, FOR SOLUTION INTRAVENOUS at 00:38

## 2018-12-28 RX ADMIN — SODIUM CHLORIDE 250 ML: 9 INJECTION, SOLUTION INTRAVENOUS at 00:37

## 2018-12-28 RX ADMIN — INSULIN LISPRO 1 UNITS: 100 INJECTION, SOLUTION INTRAVENOUS; SUBCUTANEOUS at 14:18

## 2018-12-28 RX ADMIN — PIPERACILLIN SODIUM AND TAZOBACTAM SODIUM 3.38 G: 3; .375 INJECTION, POWDER, LYOPHILIZED, FOR SOLUTION INTRAVENOUS at 08:23

## 2018-12-28 RX ADMIN — POTASSIUM CHLORIDE 40 MEQ: 10 TABLET, EXTENDED RELEASE ORAL at 08:24

## 2018-12-28 RX ADMIN — LOSARTAN POTASSIUM 25 MG: 25 TABLET, FILM COATED ORAL at 08:24

## 2018-12-28 RX ADMIN — METFORMIN HYDROCHLORIDE 500 MG: 500 TABLET ORAL at 08:24

## 2018-12-28 RX ADMIN — MAGNESIUM SULFATE IN WATER 4 G: 40 INJECTION, SOLUTION INTRAVENOUS at 11:05

## 2018-12-28 RX ADMIN — Medication 10 ML: at 08:24

## 2018-12-28 NOTE — PROGRESS NOTES
Triple lumen IJ removed per orders for discharge pt tolerated well, pressure dressing applied and held for 10 minutes.

## 2018-12-28 NOTE — DISCHARGE SUMMARY
Name:  Janina Kelley  Room:  /4913-12  MRN:    [id number]    Discharge Summary      This discharge summary is in conjunction with a complete physical exam done on the day of discharge. Discharging Physician: Dr. Katya Cassidy MD      Admit: 12/21/2018  Discharge:   12/28/2018    HPI taken from admission H&P:    The patient is a 68 y.o. female with PMH below, presents with MS change. No additional info available at this time at pt is intubated and sedated. No family at bedside. The following was written by Dr. Denice Gabriel earlier this evening. Info was reportedly obtained from EMS.    Jessie Hard a 68 y. o. female brought in by EMS after they were called secondary to a female unresponsive.  EMS reports that the family was concerned because she was somnolent.  When they arrived she was hypoxic into the 76s.  She apparently earlier today had a sedated genital exam along with endometrial biopsy.  She subsequently was given pain medication at home. Marybeth Mcdonnell has provided the patient with 6 mg of Narcan with minimal change in her mentation.  She required oxygen.  Blood sugar was apparently normal.  No family has arrived.  I am only able to obtain history from the limited chart at [facility] from earlier today, as well as EMS report.     Diagnoses this Admission and Hospital Course   Acute respiratory failure with hypoxia and hypercapnia  - likely related to polypharmacy,  Aspiration pneumonia. - Seen by pulmonology  -  Pt intubated in the ER. Extubated on 12/23/18  .- transferred from ICU to PCU on 12/24  - stable off vent  - Continue supplemental O2 to maintain SPO2 ? 92%,  Wean oxygen as tolerated  - Oxygen saturation stable on 4 L. DC to SNF     Sepsis--resolved  - Present on admission -  With leukocytosis,  Lactic acidosis,  Tachycardia and tachypnea. Related to pneumonia. -  Sepsis resolved now. -  Treated with antibiotics as below.   -  Did not need any pressors  -  Lactic acidosis- resolved with FLONASE     furosemide 20 MG tablet  Commonly known as:  LASIX     levothyroxine 88 MCG tablet  Commonly known as:  SYNTHROID     losartan 25 MG tablet  Commonly known as:  COZAAR     magnesium oxide 400 MG tablet  Commonly known as:  MAG-OX     metFORMIN 500 MG tablet  Commonly known as:  GLUCOPHAGE     montelukast 10 MG tablet  Commonly known as:  SINGULAIR     oxybutynin 10 MG extended release tablet  Commonly known as:  DITROPAN-XL     PROAIR  (90 Base) MCG/ACT inhaler  Generic drug:  albuterol sulfate HFA     rosuvastatin 40 MG tablet  Commonly known as:  CRESTOR     silver sulfADIAZINE 1 % cream  Commonly known as:  SILVADENE        STOP taking these medications    benzonatate 100 MG capsule  Commonly known as:  TESSALON     hydrocortisone 2.5 % cream     nystatin 290744 UNIT/GM ointment  Commonly known as:  MYCOSTATIN     OPTICHAMBER GUY Misc     oxyCODONE-acetaminophen 5-325 MG per tablet  Commonly known as:  PERCOCET     phosphorus 155-852-130 MG tablet  Commonly known as:  PHOSPHA 250 NEUTRAL     traMADol 50 MG tablet  Commonly known as:  ULTRAM           Where to Get Your Medications      Information about where to get these medications is not yet available    Ask your nurse or doctor about these medications  · potassium chloride 20 MEQ extended release tablet           Discharged in stable [facility] to [facility]     Follow Up:   Follow up with physician at [facility]         Navi Keita MD   12/28/18

## 2018-12-28 NOTE — PROGRESS NOTES
Pt vitals obtained and stable. OT in to assess pt, pt refusing therapy at this time. Marilu with wound care in to assess pt Left forearm recommends mepitel w/ antibiotic ointment w/ guaze dressing. Daily dressing changes, recommends barrier wipes to vaginal area. Will continue to monitor.

## 2018-12-28 NOTE — CONSULTS
[facility] Wound Ostomy Continence Nurse  Consult Note       NAME:  Yessy Adames  MEDICAL RECORD NUMBER:  [id number]  AGE: 68 y.o. GENDER: female  : 1945  TODAY'S DATE:  2018    Subjective   Reason for WOCN Evaluation and Assessment: Request to consult on patient for the Left forearm with a cluster of healing blisters. Reported by bedside nurse as unknown cause but, that they are most likely due to infiltrated IV site. Patient does not know cause. Also, reported a small white area in inner [address] [place] [address]. Patient has a history of Endometrial Ca with a Biopsy of the vaginal lesion done on 18 per Dr Macy Bassett at [facility]. Patient states that it is getting better and does not recall the Post-op treatment. Stated that her Brother-in-law took her to the appt and may know discharge instructions. Yessy Adames is a 68 y.o. female referred by:   [] Physician  [x] Nursing  [] Other:     Wound Identification:  Wound Type: diabetic and skin tear  Contributing Factors: diabetes, decreased mobility, obesity, decreased tissue oxygenation and Hx endometrial cancer    Wound History:   Current Wound Care Treatment: To left forearm: cleanse with NSS, Mepitel wide-meshed gauze to blistered area; apply PSO to outside of Mepited to allow ointment to go thru drsg and not disturb wounds; cover with dry gauze and Kerlix roll gauze. To perineal area - cleanse with 4-in-1 moisture barrier wipes each shift and after toileting.     Patient Goal of Care:  [x] Wound Healing  [] Odor Control  [] Palliative Care  [] Pain Control   [] Other:         PAST MEDICAL HISTORY        Diagnosis Date    JAQUELIN [facility]acute kidney [facility]) ([facility] [address]     Arthritis     Asthma     COPD (chronic obstructive pulmonary disease) ([facility] [address]     Diabetes mellitus ([facility] [address]     Hyperlipidemia     Hypertension     Other disorders of kidney and ureter in diseases classified elsewhere     Pneumonia due to infectious organism 2018   

## 2018-12-28 NOTE — PROGRESS NOTES
Pt discharged. Reviewed discharge instructions with patient. Deny further questions regarding instructions. Triple lumen IJ removed per discharge. Monitor removed and cleaned returned to nursing station. Pt leaving in stable condition via stretcher and squad. Pt denies further needs. Belongings sent with patient. Plan to transfer care.

## 2018-12-28 NOTE — PROGRESS NOTES
Shift assessment complete. Medications administered at this time. No s/s of distress. Pt expresses no further needs at this time. Call light in reach.

## 2018-12-28 NOTE — CARE COORDINATION
DISCHARGE ORDER  Date/Time 2018 10:54 AM  Completed by: Laney Knowles, Case Management    Patient Name: Amy Grullon    : 1945  Admitting Diagnosis: Acute respiratory failure with hypoxia and hypercapnia ([facility] [J96.01, J96.02]  Admit Date/Time: 2018  8:57 PM    Noted discharge order. Confirmed discharge plan with patient / family ( Sister Nga Lockhart): Yes   Discharge Plan: Order for dc noted. Spoke with pt who cont plan for STR at [facility]. Spoke with Kathy Solders from [facility] who states can accept today as pre cert completed. Chart reviewed and no other dc needs identified. Noted per nsg pt will more IV meds prior to dc. Spoke with Patsy Nichols at [facility] and changed  to 16:00. Pt, nsg and family aware of change. Pt family notified of change. Discharge orders and Continuity of Care faxed to facility: Yes  Hospital Exemption Notification System complete: Yes  Transportation arranged: Yes -  [address] @ 16:00. Patient / Family (sister Nga Lockhart) aware of  time: Yes   Nursing aware of  time: Yes  Receiving facility aware of  time: Yes  Pre-cert obtained?   Yes

## 2019-04-15 ENCOUNTER — HOSPITAL ENCOUNTER (INPATIENT)
Age: 74
LOS: 12 days | Discharge: NURSING, REHAB OR LONG-TERM CARE | DRG: 602 | End: 2019-04-27
Attending: EMERGENCY MEDICINE | Admitting: INTERNAL MEDICINE
Payer: MEDICARE

## 2019-04-15 ENCOUNTER — APPOINTMENT (OUTPATIENT)
Dept: GENERAL RADIOLOGY | Age: 74
DRG: 602 | End: 2019-04-15
Payer: MEDICARE

## 2019-04-15 DIAGNOSIS — J44.1 COPD EXACERBATION (HCC): Primary | ICD-10-CM

## 2019-04-15 DIAGNOSIS — M25.511 ACUTE PAIN OF RIGHT SHOULDER: ICD-10-CM

## 2019-04-15 DIAGNOSIS — L03.119 CELLULITIS OF LOWER EXTREMITY, UNSPECIFIED LATERALITY: ICD-10-CM

## 2019-04-15 PROBLEM — L03.90 CELLULITIS: Status: ACTIVE | Noted: 2019-04-15

## 2019-04-15 LAB
A/G RATIO: 1.2 (ref 1.1–2.2)
ALBUMIN SERPL-MCNC: 3.8 G/DL (ref 3.4–5)
ALP BLD-CCNC: 74 U/L (ref 40–129)
ALT SERPL-CCNC: 10 U/L (ref 10–40)
ANION GAP SERPL CALCULATED.3IONS-SCNC: 8 MMOL/L (ref 3–16)
AST SERPL-CCNC: 15 U/L (ref 15–37)
BASOPHILS ABSOLUTE: 0 K/UL (ref 0–0.2)
BASOPHILS RELATIVE PERCENT: 0.4 %
BILIRUB SERPL-MCNC: 0.4 MG/DL (ref 0–1)
BUN BLDV-MCNC: 33 MG/DL (ref 7–20)
CALCIUM SERPL-MCNC: 9.8 MG/DL (ref 8.3–10.6)
CHLORIDE BLD-SCNC: 98 MMOL/L (ref 99–110)
CO2: 35 MMOL/L (ref 21–32)
CREAT SERPL-MCNC: 1.1 MG/DL (ref 0.6–1.2)
EOSINOPHILS ABSOLUTE: 0.4 K/UL (ref 0–0.6)
EOSINOPHILS RELATIVE PERCENT: 4.7 %
GFR AFRICAN AMERICAN: 59
GFR NON-AFRICAN AMERICAN: 49
GLOBULIN: 3.1 G/DL
GLUCOSE BLD-MCNC: 271 MG/DL (ref 70–99)
GLUCOSE BLD-MCNC: 92 MG/DL (ref 70–99)
HCT VFR BLD CALC: 39.5 % (ref 36–48)
HEMOGLOBIN: 12.5 G/DL (ref 12–16)
LYMPHOCYTES ABSOLUTE: 1.4 K/UL (ref 1–5.1)
LYMPHOCYTES RELATIVE PERCENT: 15.4 %
MCH RBC QN AUTO: 27.2 PG (ref 26–34)
MCHC RBC AUTO-ENTMCNC: 31.7 G/DL (ref 31–36)
MCV RBC AUTO: 85.8 FL (ref 80–100)
MONOCYTES ABSOLUTE: 1.1 K/UL (ref 0–1.3)
MONOCYTES RELATIVE PERCENT: 12.4 %
NEUTROPHILS ABSOLUTE: 6 K/UL (ref 1.7–7.7)
NEUTROPHILS RELATIVE PERCENT: 67.1 %
PDW BLD-RTO: 18.4 % (ref 12.4–15.4)
PERFORMED ON: ABNORMAL
PLATELET # BLD: 217 K/UL (ref 135–450)
PMV BLD AUTO: 8.3 FL (ref 5–10.5)
POTASSIUM SERPL-SCNC: 5.8 MMOL/L (ref 3.5–5.1)
PRO-BNP: 2293 PG/ML (ref 0–124)
RBC # BLD: 4.61 M/UL (ref 4–5.2)
SODIUM BLD-SCNC: 141 MMOL/L (ref 136–145)
TOTAL PROTEIN: 6.9 G/DL (ref 6.4–8.2)
WBC # BLD: 8.9 K/UL (ref 4–11)

## 2019-04-15 PROCEDURE — 73030 X-RAY EXAM OF SHOULDER: CPT

## 2019-04-15 PROCEDURE — 80053 COMPREHEN METABOLIC PANEL: CPT

## 2019-04-15 PROCEDURE — 2700000000 HC OXYGEN THERAPY PER DAY

## 2019-04-15 PROCEDURE — 96375 TX/PRO/DX INJ NEW DRUG ADDON: CPT

## 2019-04-15 PROCEDURE — 2500000003 HC RX 250 WO HCPCS: Performed by: EMERGENCY MEDICINE

## 2019-04-15 PROCEDURE — 6360000002 HC RX W HCPCS: Performed by: INTERNAL MEDICINE

## 2019-04-15 PROCEDURE — 6370000000 HC RX 637 (ALT 250 FOR IP): Performed by: INTERNAL MEDICINE

## 2019-04-15 PROCEDURE — 2580000003 HC RX 258: Performed by: EMERGENCY MEDICINE

## 2019-04-15 PROCEDURE — 36415 COLL VENOUS BLD VENIPUNCTURE: CPT

## 2019-04-15 PROCEDURE — 94761 N-INVAS EAR/PLS OXIMETRY MLT: CPT

## 2019-04-15 PROCEDURE — 6360000002 HC RX W HCPCS: Performed by: EMERGENCY MEDICINE

## 2019-04-15 PROCEDURE — 94150 VITAL CAPACITY TEST: CPT

## 2019-04-15 PROCEDURE — 94760 N-INVAS EAR/PLS OXIMETRY 1: CPT

## 2019-04-15 PROCEDURE — 1200000000 HC SEMI PRIVATE

## 2019-04-15 PROCEDURE — 93005 ELECTROCARDIOGRAM TRACING: CPT | Performed by: EMERGENCY MEDICINE

## 2019-04-15 PROCEDURE — 83880 ASSAY OF NATRIURETIC PEPTIDE: CPT

## 2019-04-15 PROCEDURE — 96365 THER/PROPH/DIAG IV INF INIT: CPT

## 2019-04-15 PROCEDURE — 99285 EMERGENCY DEPT VISIT HI MDM: CPT

## 2019-04-15 PROCEDURE — 2580000003 HC RX 258: Performed by: INTERNAL MEDICINE

## 2019-04-15 PROCEDURE — 6370000000 HC RX 637 (ALT 250 FOR IP): Performed by: EMERGENCY MEDICINE

## 2019-04-15 PROCEDURE — 71045 X-RAY EXAM CHEST 1 VIEW: CPT

## 2019-04-15 PROCEDURE — 94640 AIRWAY INHALATION TREATMENT: CPT

## 2019-04-15 PROCEDURE — 85025 COMPLETE CBC W/AUTO DIFF WBC: CPT

## 2019-04-15 RX ORDER — SODIUM CHLORIDE 0.9 % (FLUSH) 0.9 %
10 SYRINGE (ML) INJECTION EVERY 12 HOURS SCHEDULED
Status: DISCONTINUED | OUTPATIENT
Start: 2019-04-15 | End: 2019-04-27 | Stop reason: HOSPADM

## 2019-04-15 RX ORDER — SODIUM CHLORIDE 0.9 % (FLUSH) 0.9 %
10 SYRINGE (ML) INJECTION PRN
Status: DISCONTINUED | OUTPATIENT
Start: 2019-04-15 | End: 2019-04-27 | Stop reason: HOSPADM

## 2019-04-15 RX ORDER — DEXTROSE MONOHYDRATE 25 G/50ML
12.5 INJECTION, SOLUTION INTRAVENOUS PRN
Status: DISCONTINUED | OUTPATIENT
Start: 2019-04-15 | End: 2019-04-27 | Stop reason: HOSPADM

## 2019-04-15 RX ORDER — ALBUTEROL SULFATE 2.5 MG/3ML
2.5 SOLUTION RESPIRATORY (INHALATION)
Status: DISCONTINUED | OUTPATIENT
Start: 2019-04-16 | End: 2019-04-22

## 2019-04-15 RX ORDER — HYDRALAZINE HYDROCHLORIDE 20 MG/ML
5 INJECTION INTRAMUSCULAR; INTRAVENOUS EVERY 4 HOURS PRN
Status: DISCONTINUED | OUTPATIENT
Start: 2019-04-15 | End: 2019-04-25

## 2019-04-15 RX ORDER — METHYLPREDNISOLONE SODIUM SUCCINATE 125 MG/2ML
125 INJECTION, POWDER, LYOPHILIZED, FOR SOLUTION INTRAMUSCULAR; INTRAVENOUS EVERY 6 HOURS
Status: DISCONTINUED | OUTPATIENT
Start: 2019-04-15 | End: 2019-04-15

## 2019-04-15 RX ORDER — FLUTICASONE PROPIONATE 50 MCG
2 SPRAY, SUSPENSION (ML) NASAL DAILY
Status: DISCONTINUED | OUTPATIENT
Start: 2019-04-16 | End: 2019-04-27 | Stop reason: HOSPADM

## 2019-04-15 RX ORDER — IPRATROPIUM BROMIDE AND ALBUTEROL SULFATE 2.5; .5 MG/3ML; MG/3ML
2 SOLUTION RESPIRATORY (INHALATION) ONCE
Status: COMPLETED | OUTPATIENT
Start: 2019-04-15 | End: 2019-04-15

## 2019-04-15 RX ORDER — FUROSEMIDE 10 MG/ML
40 INJECTION INTRAMUSCULAR; INTRAVENOUS ONCE
Status: COMPLETED | OUTPATIENT
Start: 2019-04-15 | End: 2019-04-15

## 2019-04-15 RX ORDER — SODIUM POLYSTYRENE SULFONATE 15 G/60ML
15 SUSPENSION ORAL; RECTAL ONCE
Status: COMPLETED | OUTPATIENT
Start: 2019-04-15 | End: 2019-04-15

## 2019-04-15 RX ORDER — LEVOTHYROXINE SODIUM 88 UG/1
88 TABLET ORAL DAILY
Status: DISCONTINUED | OUTPATIENT
Start: 2019-04-16 | End: 2019-04-27 | Stop reason: HOSPADM

## 2019-04-15 RX ORDER — NICOTINE POLACRILEX 4 MG
15 LOZENGE BUCCAL PRN
Status: DISCONTINUED | OUTPATIENT
Start: 2019-04-15 | End: 2019-04-27 | Stop reason: HOSPADM

## 2019-04-15 RX ORDER — FUROSEMIDE 40 MG/1
40 TABLET ORAL DAILY
Status: DISCONTINUED | OUTPATIENT
Start: 2019-04-16 | End: 2019-04-22

## 2019-04-15 RX ORDER — DEXTROSE MONOHYDRATE 50 MG/ML
100 INJECTION, SOLUTION INTRAVENOUS PRN
Status: DISCONTINUED | OUTPATIENT
Start: 2019-04-15 | End: 2019-04-27 | Stop reason: HOSPADM

## 2019-04-15 RX ORDER — OXYBUTYNIN CHLORIDE 5 MG/1
10 TABLET, EXTENDED RELEASE ORAL NIGHTLY
Status: DISCONTINUED | OUTPATIENT
Start: 2019-04-15 | End: 2019-04-27 | Stop reason: HOSPADM

## 2019-04-15 RX ORDER — ROSUVASTATIN CALCIUM 10 MG/1
40 TABLET, COATED ORAL EVERY EVENING
Status: DISCONTINUED | OUTPATIENT
Start: 2019-04-15 | End: 2019-04-27 | Stop reason: HOSPADM

## 2019-04-15 RX ORDER — ALBUTEROL SULFATE 90 UG/1
2 AEROSOL, METERED RESPIRATORY (INHALATION) EVERY 6 HOURS PRN
Status: DISCONTINUED | OUTPATIENT
Start: 2019-04-15 | End: 2019-04-22

## 2019-04-15 RX ORDER — MONTELUKAST SODIUM 10 MG/1
10 TABLET ORAL NIGHTLY
Status: DISCONTINUED | OUTPATIENT
Start: 2019-04-15 | End: 2019-04-27 | Stop reason: HOSPADM

## 2019-04-15 RX ORDER — TRAMADOL HYDROCHLORIDE 50 MG/1
25 TABLET ORAL EVERY 4 HOURS PRN
Status: DISCONTINUED | OUTPATIENT
Start: 2019-04-15 | End: 2019-04-17

## 2019-04-15 RX ORDER — ACETAMINOPHEN 325 MG/1
650 TABLET ORAL EVERY 6 HOURS PRN
Status: DISCONTINUED | OUTPATIENT
Start: 2019-04-15 | End: 2019-04-27 | Stop reason: HOSPADM

## 2019-04-15 RX ORDER — PHENAZOPYRIDINE HYDROCHLORIDE 100 MG/1
200 TABLET, FILM COATED ORAL
Status: COMPLETED | OUTPATIENT
Start: 2019-04-15 | End: 2019-04-18

## 2019-04-15 RX ADMIN — ROSUVASTATIN CALCIUM 40 MG: 10 TABLET, FILM COATED ORAL at 23:13

## 2019-04-15 RX ADMIN — PHENAZOPYRIDINE HYDROCHLORIDE 200 MG: 100 TABLET ORAL at 20:19

## 2019-04-15 RX ADMIN — TRAMADOL HYDROCHLORIDE: 50 TABLET, FILM COATED ORAL at 20:59

## 2019-04-15 RX ADMIN — INSULIN LISPRO 2 UNITS: 100 INJECTION, SOLUTION INTRAVENOUS; SUBCUTANEOUS at 23:11

## 2019-04-15 RX ADMIN — METHYLPREDNISOLONE SODIUM SUCCINATE 125 MG: 125 INJECTION, POWDER, FOR SOLUTION INTRAMUSCULAR; INTRAVENOUS at 17:00

## 2019-04-15 RX ADMIN — IPRATROPIUM BROMIDE AND ALBUTEROL SULFATE 2 AMPULE: .5; 3 SOLUTION RESPIRATORY (INHALATION) at 15:39

## 2019-04-15 RX ADMIN — FUROSEMIDE 40 MG: 10 INJECTION, SOLUTION INTRAMUSCULAR; INTRAVENOUS at 23:12

## 2019-04-15 RX ADMIN — SODIUM POLYSTYRENE SULFONATE 15 G: 15 SUSPENSION ORAL; RECTAL at 23:13

## 2019-04-15 RX ADMIN — ACETAMINOPHEN 650 MG: 325 TABLET ORAL at 23:13

## 2019-04-15 RX ADMIN — MONTELUKAST 10 MG: 10 TABLET, FILM COATED ORAL at 23:13

## 2019-04-15 RX ADMIN — OXYBUTYNIN 10 MG: 5 TABLET, FILM COATED, EXTENDED RELEASE ORAL at 23:13

## 2019-04-15 RX ADMIN — Medication 10 ML: at 23:14

## 2019-04-15 RX ADMIN — DEXTROSE MONOHYDRATE 600 MG: 50 INJECTION, SOLUTION INTRAVENOUS at 17:04

## 2019-04-15 RX ADMIN — ALBUTEROL SULFATE 2.5 MG: 2.5 SOLUTION RESPIRATORY (INHALATION) at 21:58

## 2019-04-15 ASSESSMENT — ENCOUNTER SYMPTOMS
COUGH: 0
DIARRHEA: 0
RHINORRHEA: 0
TROUBLE SWALLOWING: 0
CHEST TIGHTNESS: 0
WHEEZING: 1
STRIDOR: 0
VOMITING: 0
SORE THROAT: 0
SHORTNESS OF BREATH: 0
ABDOMINAL PAIN: 0

## 2019-04-15 ASSESSMENT — PAIN DESCRIPTION - ORIENTATION
ORIENTATION: LEFT

## 2019-04-15 ASSESSMENT — PAIN DESCRIPTION - LOCATION
LOCATION: FOOT
LOCATION: VAGINA
LOCATION: VAGINA;LEG
LOCATION: VAGINA;LEG

## 2019-04-15 ASSESSMENT — PAIN SCALES - GENERAL
PAINLEVEL_OUTOF10: 10

## 2019-04-15 ASSESSMENT — PAIN SCALES - WONG BAKER
WONGBAKER_NUMERICALRESPONSE: 10
WONGBAKER_NUMERICALRESPONSE: 10

## 2019-04-15 NOTE — ED NOTES
Attempted to flush IV L AC without success. Pain and swelling noted at site.        Chris Chiu, RN  04/15/19 [id number]

## 2019-04-15 NOTE — ED NOTES
Bed: 15  Expected date:   Expected time:   Means of arrival:   Comments:     Lorna Heredia RN  04/15/19 4242

## 2019-04-15 NOTE — ED PROVIDER NOTES
for arthralgias. Skin: Positive for wound. Negative for rash. Neurological: Negative for dizziness, weakness, light-headedness, numbness and headaches. Except as noted above the remainder of the review of systems was reviewedand negative.        PAST MEDICAL HISTORY     Past Medical History:   Diagnosis Date    [facility]     Arthritis     Asthma     COPD [facility]chronic obstructive pulmonary disease) [facility]     [facility]     Hyperlipidemia     Hypertension     Other disorders of kidney and ureter in diseases classified elsewhere     Pneumonia due to infectious organism 12/22/2018    Thyroid disease          SURGICAL HISTORY       Past Surgical History:   Procedure Laterality Date    CHOLECYSTECTOMY      ROTATOR CUFF REPAIR Left 2015    THYROID LOBECTOMY      XR KNEE INC TUNNEL BILAT Bilateral 2014         CURRENT MEDICATIONS       Previous Medications    ACETAMINOPHEN (TYLENOL) 325 MG TABLET    Take 650 mg by mouth every 6 hours as needed for Pain    DOCUSATE SODIUM (COLACE) 100 MG CAPSULE    Take 100 mg by mouth 2 times daily as needed     FENOFIBRATE 160 MG TABLET    Take 160 mg by mouth daily    FLUTICASONE (FLONASE) 50 MCG/ACT NASAL SPRAY    2 sprays by Nasal route daily    FUROSEMIDE (LASIX) 20 MG TABLET    Take 40 mg by mouth daily     LEVOTHYROXINE (SYNTHROID) 88 MCG TABLET        LOSARTAN (COZAAR) 25 MG TABLET    Take 25 mg by mouth daily    MAGNESIUM OXIDE (MAG-OX) 400 MG TABLET    Take 400 mg by mouth 2 times daily    METFORMIN (GLUCOPHAGE) 500 MG TABLET    Take 500 mg by mouth 2 times daily (with meals)    MONTELUKAST (SINGULAIR) 10 MG TABLET    Take 10 mg by mouth nightly    OXYBUTYNIN (DITROPAN-XL) 10 MG CR TABLET        POTASSIUM CHLORIDE (KLOR-CON M) 20 MEQ EXTENDED RELEASE TABLET    Take 2 tablets by mouth daily    PROAIR  (90 BASE) MCG/ACT INHALER    2 puffs every 6 hours as needed for Wheezing or Shortness of Breath     ROSUVASTATIN (CRESTOR) 40 MG TABLET    Take 40 mg by mouth every evening    SILVER SULFADIAZINE (SILVADENE) 1 % CREAM    Apply topically 3 times daily as needed Apply topically to kerwin area       ALLERGIES     Aspirin; Celecoxib;  Fexofenadine; Gabapentin; Niacin er; and Adhesive tape    FAMILY HISTORY       Family History   Problem Relation Age of Onset    Diabetes Mother     Diabetes Sister     Cancer Brother     Cancer Paternal Aunt           SOCIAL HISTORY       Social History     Socioeconomic History    Marital status: Single     Spouse name: None    Number of children: None    Years of education: None    Highest education level: None   Occupational History    None   Social Needs    Financial resource strain: None    Food insecurity:     Worry: None     Inability: None    Transportation needs:     Medical: None     Non-medical: None   Tobacco Use    Smoking status: Former Smoker     Packs/day: 0.50    Smokeless tobacco: Never Used   Substance and Sexual Activity    Alcohol use: No    Drug use: No    Sexual activity: None   Lifestyle    Physical activity:     Days per week: None     Minutes per session: None    Stress: None   Relationships    Social connections:     Talks on phone: None     Gets together: None     Attends Anabaptism service: None     Active member of club or organization: None     Attends meetings of clubs or organizations: None     Relationship status: None    Intimate partner violence:     Fear of current or ex partner: None     Emotionally abused: None     Physically abused: None     Forced sexual activity: None   Other Topics Concern    None   Social History Narrative    None       SCREENINGS             PHYSICAL EXAM    (up to 7 for level 4, 8 ormore for level 5)     ED Triage Vitals [04/15/19 1339]   BP Temp Temp Source Pulse Resp SpO2 Height Weight   129/88 98.7 °F (37.1 °C) Oral 84 20 96 % 5' 2\" (1.575 m) 272 lb (123.4 kg)       Physical Exam   Constitutional: She is oriented to person, place, and time. Vital signs are normal. She appears well-developed and well-nourished. She is cooperative. Non-toxic appearance. She does not have a sickly appearance. She does not appear ill. No distress. Sitting in bed comfortably, speaking in full sentences, following verbal commands appropriately. Not in acute distress     HENT:   Head: Normocephalic and atraumatic. Mouth/Throat: Oropharynx is clear and moist and mucous membranes are normal.   Eyes: Pupils are equal, round, and reactive to light. Conjunctivae and EOM are normal.   Neck: Normal range of motion. Neck supple. Cardiovascular: Normal rate, regular rhythm, normal heart sounds and intact distal pulses. Exam reveals no gallop and no friction rub. No murmur heard. 2+ pitting edema bilateral lower extremity    Pulmonary/Chest: Effort normal. No respiratory distress. She has no decreased breath sounds. She has wheezes. She has no rhonchi. She has no rales. Abdominal: Soft. Normal appearance and bowel sounds are normal. She exhibits no distension. There is no tenderness. There is no rebound and no guarding. Genitourinary:         Musculoskeletal: Normal range of motion. She exhibits no edema, tenderness or deformity. Neurological: She is alert and oriented to person, place, and time. GCS eye subscore is 4. GCS verbal subscore is 5. GCS motor subscore is 6. Skin: Skin is warm and dry. No rash noted. Erythema and warmth to bilateral LE       DIAGNOSTIC RESULTS     EKG: All EKG's are interpreted by the Emergency Department Physicianwho either signs or Co-signs this chart in the absence of a cardiologist.    The Ekg interpreted by me shows  normal sinus rhythm with a rate of 81  Axis is   Right axis deviation  QTc is  453  Intervals and Durations are unremarkable.       ST Segments: no acute change  No significant change from prior EKG dated 12/23/2018    RADIOLOGY:   Non-plain film images such as CT, Ultrasound and MRI are read by the radiologist. Plain radiographic images are visualized and preliminarily interpreted by the emergency physician with the below findings:      Interpretation per the Radiologist below, if available at the time of this note:    XR SHOULDER RIGHT (MIN 2 VIEWS)   Final Result   No acute abnormality of the right shoulder         XR CHEST PORTABLE   Final Result   Mild left basilar atelectasis or scarring. Otherwise, no acute   cardiopulmonary disease. ED BEDSIDE ULTRASOUND:   Performed by ED Physician - none    LABS:  Labs Reviewed   BRAIN NATRIURETIC PEPTIDE - Abnormal; Notable for the following components:       Result Value    Pro-BNP 2,293 (*)     All other components within normal limits    Narrative:     Performed at:  [address], [facility] [place] [facility]   Phone [phone]   CBC WITH AUTO DIFFERENTIAL - Abnormal; Notable for the following components:    RDW 18.4 (*)     All other components within normal limits    Narrative:     Performed at:  [address], [facility] [place] [facility]   Phone [phone]   COMPREHENSIVE METABOLIC PANEL - Abnormal; Notable for the following components:    Potassium 5.8 (*)     Chloride 98 (*)     CO2 35 (*)     BUN 33 (*)     GFR Non- 49 (*)     GFR  59 (*)     All other components within normal limits    Narrative:     Performed at:  [facility]) [address], [facility] [place] [facility]   Phone [phone]       All other labs were within normal range ornot returned as of this dictation.     EMERGENCY DEPARTMENT COURSE and DIFFERENTIAL DIAGNOSIS/MDM:   Vitals:    Vitals:    04/15/19 1452 04/15/19 1541 04/15/19 1650 04/15/19 1657   BP: 134/80      Pulse: 81   110   Resp: 20   24   Temp:       TempSrc:       SpO2: 98% 99% (!) 88% 92%   Weight:       Height:             MDM    ED DISPOSITION/PLAN   DISPOSITION        PATIENT REFERREDTO:  No follow-up provider specified.     DISCHARGE MEDICATIONS:  New Prescriptions    No medications on file          (Please note that portions of this note were completed with a voice recognition program.  Efforts were made to edit the dictations but occasionally wordsare mis-transcribed.)    Cady Mcmanus MD (electronically signed)  Attending Emergency Physician            Cady Mcmanus MD  04/15/19 8642

## 2019-04-16 LAB
ANION GAP SERPL CALCULATED.3IONS-SCNC: 10 MMOL/L (ref 3–16)
ANION GAP SERPL CALCULATED.3IONS-SCNC: 13 MMOL/L (ref 3–16)
BUN BLDV-MCNC: 28 MG/DL (ref 7–20)
BUN BLDV-MCNC: 31 MG/DL (ref 7–20)
CALCIUM SERPL-MCNC: 9.2 MG/DL (ref 8.3–10.6)
CALCIUM SERPL-MCNC: 9.8 MG/DL (ref 8.3–10.6)
CHLORIDE BLD-SCNC: 95 MMOL/L (ref 99–110)
CHLORIDE BLD-SCNC: 96 MMOL/L (ref 99–110)
CO2: 30 MMOL/L (ref 21–32)
CO2: 33 MMOL/L (ref 21–32)
CREAT SERPL-MCNC: 1 MG/DL (ref 0.6–1.2)
CREAT SERPL-MCNC: 1.1 MG/DL (ref 0.6–1.2)
GFR AFRICAN AMERICAN: 59
GFR AFRICAN AMERICAN: >60
GFR NON-AFRICAN AMERICAN: 49
GFR NON-AFRICAN AMERICAN: 54
GLUCOSE BLD-MCNC: 150 MG/DL (ref 70–99)
GLUCOSE BLD-MCNC: 158 MG/DL (ref 70–99)
GLUCOSE BLD-MCNC: 166 MG/DL (ref 70–99)
GLUCOSE BLD-MCNC: 168 MG/DL (ref 70–99)
GLUCOSE BLD-MCNC: 170 MG/DL (ref 70–99)
GLUCOSE BLD-MCNC: 291 MG/DL (ref 70–99)
HCT VFR BLD CALC: 40.1 % (ref 36–48)
HEMOGLOBIN: 12.7 G/DL (ref 12–16)
LV EF: 58 %
LVEF MODALITY: NORMAL
MCH RBC QN AUTO: 27.3 PG (ref 26–34)
MCHC RBC AUTO-ENTMCNC: 31.6 G/DL (ref 31–36)
MCV RBC AUTO: 86.4 FL (ref 80–100)
PDW BLD-RTO: 18.1 % (ref 12.4–15.4)
PERFORMED ON: ABNORMAL
PLATELET # BLD: 201 K/UL (ref 135–450)
PMV BLD AUTO: 8.3 FL (ref 5–10.5)
POTASSIUM REFLEX MAGNESIUM: 4.4 MMOL/L (ref 3.5–5.1)
POTASSIUM SERPL-SCNC: 4.9 MMOL/L (ref 3.5–5.1)
RBC # BLD: 4.64 M/UL (ref 4–5.2)
SODIUM BLD-SCNC: 138 MMOL/L (ref 136–145)
SODIUM BLD-SCNC: 139 MMOL/L (ref 136–145)
WBC # BLD: 6.9 K/UL (ref 4–11)

## 2019-04-16 PROCEDURE — 2500000003 HC RX 250 WO HCPCS: Performed by: INTERNAL MEDICINE

## 2019-04-16 PROCEDURE — 36415 COLL VENOUS BLD VENIPUNCTURE: CPT

## 2019-04-16 PROCEDURE — 80048 BASIC METABOLIC PNL TOTAL CA: CPT

## 2019-04-16 PROCEDURE — 94667 MNPJ CHEST WALL 1ST: CPT

## 2019-04-16 PROCEDURE — 94761 N-INVAS EAR/PLS OXIMETRY MLT: CPT

## 2019-04-16 PROCEDURE — 6360000002 HC RX W HCPCS: Performed by: INTERNAL MEDICINE

## 2019-04-16 PROCEDURE — 94640 AIRWAY INHALATION TREATMENT: CPT

## 2019-04-16 PROCEDURE — 94668 MNPJ CHEST WALL SBSQ: CPT

## 2019-04-16 PROCEDURE — 93970 EXTREMITY STUDY: CPT

## 2019-04-16 PROCEDURE — C8929 TTE W OR WO FOL WCON,DOPPLER: HCPCS

## 2019-04-16 PROCEDURE — 85027 COMPLETE CBC AUTOMATED: CPT

## 2019-04-16 PROCEDURE — 6370000000 HC RX 637 (ALT 250 FOR IP): Performed by: REGISTERED NURSE

## 2019-04-16 PROCEDURE — 2700000000 HC OXYGEN THERAPY PER DAY

## 2019-04-16 PROCEDURE — 1200000000 HC SEMI PRIVATE

## 2019-04-16 PROCEDURE — 2580000003 HC RX 258: Performed by: INTERNAL MEDICINE

## 2019-04-16 PROCEDURE — 6370000000 HC RX 637 (ALT 250 FOR IP): Performed by: INTERNAL MEDICINE

## 2019-04-16 PROCEDURE — 6360000004 HC RX CONTRAST MEDICATION: Performed by: REGISTERED NURSE

## 2019-04-16 PROCEDURE — 93005 ELECTROCARDIOGRAM TRACING: CPT | Performed by: REGISTERED NURSE

## 2019-04-16 RX ORDER — LACTOBACILLUS RHAMNOSUS GG 10B CELL
1 CAPSULE ORAL
Status: DISCONTINUED | OUTPATIENT
Start: 2019-04-16 | End: 2019-04-27 | Stop reason: HOSPADM

## 2019-04-16 RX ADMIN — ROSUVASTATIN CALCIUM 40 MG: 10 TABLET, FILM COATED ORAL at 17:13

## 2019-04-16 RX ADMIN — Medication 600 MG: at 08:37

## 2019-04-16 RX ADMIN — Medication 1 CAPSULE: at 10:59

## 2019-04-16 RX ADMIN — INSULIN LISPRO 1 UNITS: 100 INJECTION, SOLUTION INTRAVENOUS; SUBCUTANEOUS at 11:37

## 2019-04-16 RX ADMIN — OXYBUTYNIN 10 MG: 5 TABLET, FILM COATED, EXTENDED RELEASE ORAL at 20:57

## 2019-04-16 RX ADMIN — LEVOTHYROXINE SODIUM 88 MCG: 0.09 TABLET ORAL at 10:59

## 2019-04-16 RX ADMIN — TRAMADOL HYDROCHLORIDE 25 MG: 50 TABLET, FILM COATED ORAL at 01:49

## 2019-04-16 RX ADMIN — PERFLUTREN 2.2 MG: 6.52 INJECTION, SUSPENSION INTRAVENOUS at 14:58

## 2019-04-16 RX ADMIN — FUROSEMIDE 40 MG: 40 TABLET ORAL at 08:25

## 2019-04-16 RX ADMIN — ALBUTEROL SULFATE 2.5 MG: 2.5 SOLUTION RESPIRATORY (INHALATION) at 21:12

## 2019-04-16 RX ADMIN — PHENAZOPYRIDINE HYDROCHLORIDE 200 MG: 100 TABLET ORAL at 08:25

## 2019-04-16 RX ADMIN — INSULIN LISPRO 1 UNITS: 100 INJECTION, SOLUTION INTRAVENOUS; SUBCUTANEOUS at 20:58

## 2019-04-16 RX ADMIN — Medication 10 ML: at 21:00

## 2019-04-16 RX ADMIN — Medication 10 ML: at 08:26

## 2019-04-16 RX ADMIN — PHENAZOPYRIDINE HYDROCHLORIDE 200 MG: 100 TABLET ORAL at 17:15

## 2019-04-16 RX ADMIN — ENOXAPARIN SODIUM 40 MG: 40 INJECTION SUBCUTANEOUS at 08:25

## 2019-04-16 RX ADMIN — INSULIN LISPRO 1 UNITS: 100 INJECTION, SOLUTION INTRAVENOUS; SUBCUTANEOUS at 08:33

## 2019-04-16 RX ADMIN — Medication 600 MG: at 17:15

## 2019-04-16 RX ADMIN — PHENAZOPYRIDINE HYDROCHLORIDE 200 MG: 100 TABLET ORAL at 10:59

## 2019-04-16 RX ADMIN — ALBUTEROL SULFATE 2.5 MG: 2.5 SOLUTION RESPIRATORY (INHALATION) at 12:08

## 2019-04-16 RX ADMIN — INSULIN LISPRO 1 UNITS: 100 INJECTION, SOLUTION INTRAVENOUS; SUBCUTANEOUS at 17:41

## 2019-04-16 RX ADMIN — Medication 600 MG: at 01:44

## 2019-04-16 RX ADMIN — MONTELUKAST 10 MG: 10 TABLET, FILM COATED ORAL at 20:58

## 2019-04-16 RX ADMIN — ALBUTEROL SULFATE 2.5 MG: 2.5 SOLUTION RESPIRATORY (INHALATION) at 09:15

## 2019-04-16 ASSESSMENT — PAIN SCALES - GENERAL
PAINLEVEL_OUTOF10: 7
PAINLEVEL_OUTOF10: 7

## 2019-04-16 ASSESSMENT — PAIN DESCRIPTION - LOCATION
LOCATION: HEAD
LOCATION: OTHER (COMMENT)

## 2019-04-16 ASSESSMENT — PAIN DESCRIPTION - PAIN TYPE
TYPE: CHRONIC PAIN
TYPE: CHRONIC PAIN

## 2019-04-16 ASSESSMENT — PAIN DESCRIPTION - DESCRIPTORS: DESCRIPTORS: HEADACHE

## 2019-04-16 NOTE — PROGRESS NOTES
Patient admitted to room 359-1 from ED. Patient oriented to room, call light, bed rails, phone, lights and bathroom. Patient instructed about the schedule of the day including: vital sign frequency, lab draws, possible tests, frequency of MD and staff rounds, including RN/MD rounding together at bedside, daily weights, and I &O's. Patient instructed about prescribed diet, how to use 8MENU, and television. Bed alarm in place, patient aware of placement and reason. Telemetry box in place, patient aware of placement and reason. Bed locked, in lowest position, side rails up 2/4, call light within reach. Will continue to monitor.

## 2019-04-16 NOTE — PROGRESS NOTES
Neel Morrell has called twice stating that pts heartrate has gone up to 150's for a few seconds. doesn't sustain there. Pt has no complaints or discomforts. /66 HR around  bouncing back and forth.   FYI page sent to Alex Leaver NP

## 2019-04-16 NOTE — CONSULTS
Nutrition Education    Type and Reason for Visit: Consult, Patient Education    RD received consult for pt request to see RD, pt had questions about carb counting. Provided pt with handout on carb counting. Discussed sources of carb, carb choices, serving sizes, meal planning, and label reading. Pt accepted with understanding as did family at [facility]. Pt had no questions. Will continue to monitor per nutrition standards of care. · Verbally reviewed following information with Patient: carb counting  · Written educational materials provided. · Contact name and number provided. · Refer to Patient Education activity for more details.     Electronically signed by Abdi Kerr RD, LD on 4/16/19 at 12:50 PM    Contact Number: Office: [phone]; [address]: [phone]

## 2019-04-16 NOTE — CARE COORDINATION
CASE MANAGEMENT INITIAL ASSESSMENT      Reviewed chart and met with patient today, re: discharge needs  Explained Case Management role/services. Family present: none  Primary contact information:sister, Manuel Potter     Admit date/status: 4/15 Inpatient  Diagnosis: Cellulitis    Insurance: Medicare, Medicaid  Precert required for SNF -  N        3 night stay required - Y, would be met morning of 4/18    Living arrangements, Adls, care needs, prior to admission: Pt lives at home with three sisters and a brother in law, uses walker, has home O2 with portability through [employer] [place]. Pt informed writer that she and her sisters are in the process of getting a  and home care for skilled and nonskilled; however, pt does not know name of agency or people providing this services. Transportation: may need ambulance home     PT/OT recs: not ordered yet    Hospital Exemption Notification (HEN): would be needed for SNF    Barriers to discharge: none noted    Plan/comments: Pt unsure of discharge needs at this time. DCP will continue to follow and coordinate discharge arrangements. ECOC on chart for MD signature.   JANNET Garcia-RN

## 2019-04-16 NOTE — CARE COORDINATION
[facility]  Spoke with patient regarding homecare services. Demographics verified, and patient is agreeable to home care services. Will continue to follow patient for needs. I will fax all docs on DC to [facility]. ... However if patient DC's over the weekend please fax all docs [facility], [address], [place]ARGARETH, and H&P to [phone]. Patient is active with [facility], and will return with a NIELS. Patient states her PCP dropped her as a patient, and she quit her PCP. Ilda Duran MD was listed and following for home care. We will send referral under her, unless otherwise noted. I made contact with DR Lyn Patel office and they stated she is still and active patient and they are still following for home care.           Cornelio Valdez  Work mobile: [phone]  [facility] office: [phone]

## 2019-04-16 NOTE — PROGRESS NOTES
Pt alert and oriented. VSS. Assessment updated and charted. Rated left shin pain 7/10 due to just brushing it up against sheet per pt. Repositioned. Changed. Denies any other needs. Call light within reach. Will continue to monitor.

## 2019-04-16 NOTE — H&P
Hospital Medicine History & Physical      PCP: No primary care provider on file. Date of Admission: 4/15/2019    Date of Service: Pt seen/examined on 04/15/19 and Admitted to Inpatient with expected LOS greater than two midnights due to medical therapy. Chief Complaint:  L foot pain    History Of Present Illness:    68 Y F with a h/o COPD, HTN, HLD, and DM2 presented to the ED with increasing pain, erythema, and edema of her distal LLE surrounding two shallow ulcers which had been \"festering\" for the last week or so. Her legs have been quite edematous for the last few weeks, to the point that she has developed a few bullae which have ruptured and drained serous fluid multiple times. This has left her with sores on the dorsum on her L foot and on her mid L shin. The skin around the ruptured bullae rapidly turned a bright red and became tender over the last few days. She denies fevers or chills. Past Medical History:          Diagnosis Date    [facility]     Arthritis     Asthma     COPD [facility]chronic obstructive pulmonary disease) [facility]     Diabetes mellitus ([facility]     Hyperlipidemia     Hypertension     Other disorders of kidney and ureter in diseases classified elsewhere     Pneumonia due to infectious organism 12/22/2018    Thyroid disease        Past Surgical History:          Procedure Laterality Date    CHOLECYSTECTOMY      ROTATOR CUFF REPAIR Left 2015    THYROID LOBECTOMY      XR KNEE INC TUNNEL BILAT Bilateral 2014       Medications Prior to Admission:      Prior to Admission medications    Medication Sig Start Date End Date Taking?  Authorizing Provider   potassium chloride (KLOR-CON M) 20 MEQ extended release tablet Take 2 tablets by mouth daily 12/29/18   Juan Amos MD   metFORMIN (GLUCOPHAGE) 500 MG tablet Take 500 mg by mouth 2 times daily (with meals)    Historical Provider, MD   silver sulfADIAZINE (SILVADENE) 1 % cream Apply topically 3 PERFORMED:    /86   Pulse 90   Temp 98.5 °F (36.9 °C)   Resp 16   Ht 5' 2\" (1.575 m)   Wt 272 lb (123.4 kg)   SpO2 99%   BMI 49.75 kg/m²     General appearance:  Appears stated age and cooperative. HEENT:  Normal cephalic, atraumatic without obvious deformity. Pupils equal, round, and reactive to light. Extra ocular muscles intact. Conjunctivae/corneas clear. Neck: Supple, with full range of motion. No jugular venous distention. Trachea midline. Respiratory:  Normal respiratory effort. Clear to auscultation, bilaterally without Rales/Wheezes/Rhonchi. Cardiovascular:  Regular rate and rhythm with normal S1/S2 without murmurs, rubs or gallops. Abdomen: Soft, non-tender, non-distended with normal bowel sounds. Musculoskeletal:  No clubbing, cyanosis. 3+ BLE pitting edema with weeping bullae. Full range of motion without deformity. Skin: Skin color, texture, turgor normal.  Bright erythema of L foot and distal LLE at level of mid shin. No purulence. Neurologic:  Neurovascularly intact without any focal sensory/motor deficits. Cranial nerves: II-XII intact, grossly non-focal.  Psychiatric:  Alert and oriented, thought content appropriate, normal insight  Capillary Refill: Brisk,< 3 seconds   Peripheral Pulses: +2 palpable, equal bilaterally       Labs:     Recent Labs     04/15/19  1420   WBC 8.9   HGB 12.5   HCT 39.5        Recent Labs     04/15/19  1420      K 5.8*   CL 98*   CO2 35*   BUN 33*   CREATININE 1.1   CALCIUM 9.8     Recent Labs     04/15/19  1420   AST 15   ALT 10   BILITOT 0.4   ALKPHOS 74     No results for input(s): INR in the last 72 hours. No results for input(s): Floreen Pill in the last 72 hours.     Urinalysis:      Lab Results   Component Value Date    NITRU Negative 12/26/2018    WBCUA 0-2 12/26/2018    BACTERIA 1+ 12/21/2018    RBCUA 3-5 12/26/2018    BLOODU TRACE-INTACT 12/26/2018    SPECGRAV <=1.005 12/26/2018    GLUCOSEU Negative 12/26/2018 follow with gynecology clinic. Pelvic exam by ED physician was unrevealing.    - reordered UA      DVT Prophylaxis: enoxaparin  Diet: DIET CARB CONTROL;  Code Status: Full Code    PT/OT Eval Status: not indicated    Dispo - when she is ready to transition to oral abx and when she is adequately diuresed, perhaps 4/17. She lives at home. La Erazo MD    Thank you No primary care provider on file. for the opportunity to be involved in this patient's care. If you have any questions or concerns please feel free to contact me at [phone].

## 2019-04-16 NOTE — PROGRESS NOTES
RESPIRATORY THERAPY ASSESSMENT    Name:  [address]  Record Number:  [id number]  Age: 68 y.o. Gender: female  : 1945  Today's Date:  4/15/2019  Room:  3559/5097-52    Assessment     Is the patient being admitted for a COPD or Asthma exacerbation? Yes   (If yes the patient will be seen every 4 hours for the first 24 hours and then reassessed)    Patient Admission Diagnosis      Allergies  Allergies   Allergen Reactions    Aspirin Other (See Comments)     \"ulcers\"    Celecoxib Other (See Comments)     RHINITIS    Fexofenadine Swelling    Gabapentin Other (See Comments)    Niacin Er      Other reaction(s): Flushing    Adhesive Tape Rash     Paper tape  Paper tape  Paper tape  Paper tape       Minimum Predicted Vital Capacity:     752          Actual Vital Capacity:      1000              Pulmonary History:COPD  Home Oxygen Therapy:  4-4.5 liters/min via nasal cannula  Home Respiratory Therapy:Albuterol hhn qid, mdi prn  Current Respiratory Therapy:  Albuterol mdi prn  Treatment Type: HHN, IS  Medications: Albuterol    Respiratory Severity Index(RSI)   Patients with orders for inhalation medications, oxygen, or any therapeutic treatment modality will be placed on Respiratory Protocol. They will be assessed with the first treatment and at least every 72 hours thereafter. The following severity scale will be used to determine frequency of treatment intervention.     Smoking History: 3  Social History  Social History     Tobacco Use    Smoking status: Former Smoker     Packs/day: 0.50    Smokeless tobacco: Never Used   Substance Use Topics    Alcohol use: No    Drug use: No       Recent Surgical History: None = 0  Past Surgical History  Past Surgical History:   Procedure Laterality Date    CHOLECYSTECTOMY      ROTATOR CUFF REPAIR Left 2015    THYROID LOBECTOMY      XR KNEE INC TUNNEL BILAT Bilateral        Level of Consciousness: Alert, Oriented, and Cooperative = 0    Level of Activity: Walking with assistance = 1    Respiratory Pattern: Dyspnea with exertion;Irregular pattern;or RR less than 6 = 2    Breath Sounds: Diminshed bilaterally and/or crackles = 2    Sputum   ,  ,    Cough: Strong, spontaneous, non-productive = 0    Vital Signs   /86   Pulse 90   Temp 98.5 °F (36.9 °C)   Resp 16   Ht 5' 2\" (1.575 m)   Wt 272 lb (123.4 kg)   SpO2 95%   BMI 49.75 kg/m²   SPO2 (COPD values may differ): 86-87% on room air or greater than 92% on FiO2 35- 50% = 3    Peak Flow (asthma only): not applicable = 0    RSI: 95-65 = Q6H or QID and Q4HPRN for dyspnea        Plan       Goals: medication delivery    Patient/caregiver was educated on the proper method of use for Respiratory Care Devices:  Yes      Level of patient/caregiver understanding able to:   ? Verbalize understanding   ? Demonstrate understanding       ? Teach back        ? Needs reinforcement       ? No available caregiver               ? Other:     Response to education:  Excellent     Is patient being placed on Home Treatment Regimen? Yes     Does the patient have everything they need prior to discharge? NA     Comments: pt seen and chart reviewed    Plan of Care: albuterol hhn q4w/a-per pt request, albuterol mdi prn    Electronically signed by Stefan Patel RCP on 4/15/2019 at 10:00 PM    Respiratory Protocol Guidelines     1. Assessment and treatment by Respiratory Therapy will be initiated for medication and therapeutic interventions upon initiation of aerosolized medication. 2. Physician will be contacted for respiratory rate (RR) greater than 35 breaths per minute. Therapy will be held for heart rate (HR) greater than 140 beats per minute, pending direction from physician. 3. Bronchodilators will be administered via Metered Dose Inhaler (MDI) with spacer when the following criteria are met:  a.  Alert and cooperative     b. HR < 140 bpm  c. RR < 30 bpm                d. Can demonstrate a 2-3 second inspiratory hold  4. Bronchodilators will be administered via Hand Held Nebulizer TOI [facility]) to patients when ANY of the following criteria are met  a. Incognizant or uncooperative          b. Patients treated with HHN at Home        c. Unable to demonstrate proper use of MDI with spacer     d. RR > 30 bpm   5. Bronchodilators will be delivered via Metered Dose Inhaler (MDI), HHN, Aerogen to intubated patients on mechanical ventilation. 6. Inhalation medication orders will be delivered and/or substituted as outlined below. Aerosolized Medications Ordering and Administration Guidelines:    1. All Medications will be ordered by a physician, and their frequency and/or modality will be adjusted as defined by the patients Respiratory Severity Index (RSI) score. 2. If the patient does not have documented COPD, consider discontinuing anticholinergics when RSI is less than 9.  3. If the bronchospasm worsens (increased RSI), then the bronchodilator frequency can be increased to a maximum of every 4 hours. If greater than every 4 hours is required, the physician will be contacted. 4. If the bronchospasm improves, the frequency of the bronchodilator can be decreased, based on the patient's RSI, but not less than home treatment regimen frequency. 5. Bronchodilator(s) will be discontinued if patient has a RSI less than 9 and has received no scheduled or as needed treatment for 72  Hrs. Patients Ordered on a Mucolytic Agent:    1. Must always be administered with a bronchodilator. 2. Discontinue if patient experiences worsened bronchospasm, or secretions have lessened to the point that the patient is able to clear them with a cough. Anti-inflammatory and Combination Medications:    1. If the patient lacks prior history of lung disease, is not using inhaled anti-inflammatory medication at home, and lacks wheezing by examination or by history for at least 24 hours, contact physician for possible discontinuation.

## 2019-04-16 NOTE — PROGRESS NOTES
Hospitalist Progress Note      PCP: No primary care provider on file. Date of Admission: 4/15/2019    Chief Complaint: Left foot pain     Hospital Course:   68 Y F with a h/o COPD, HTN, HLD, and DM2 who presented to the ED with increasing pain, erythema, and edema of her distal LLE surrounding two shallow ulcers which had been \"festering\" for the last week or so. Her legs have been quite edematous for the last few weeks, to the point that she has developed a few bullae which have ruptured and drained serous fluid multiple times. This has left her with sores on the dorsum on her L foot and on her mid L shin. The skin around the ruptured bullae rapidly turned a bright red and became tender over the last few days. She denies fevers or chills. Subjective:   Pt is on baseline 4L NC. She denies dyspnea or chest pain. Afebrile. No N/V/D. Left leg redness/erythema is improving per pt.      Medications:  Reviewed    Infusion Medications    dextrose       Scheduled Medications    lactobacillus  1 capsule Oral Daily with breakfast    fluticasone  2 spray Nasal Daily    furosemide  40 mg Oral Daily    levothyroxine  88 mcg Oral Daily    montelukast  10 mg Oral Nightly    oxybutynin  10 mg Oral Nightly    rosuvastatin  40 mg Oral QPM    clindamycin (CLEOCIN) IV  600 mg Intravenous Q8H    phenazopyridine  200 mg Oral TID WC    sodium chloride flush  10 mL Intravenous 2 times per day    enoxaparin  40 mg Subcutaneous Daily    insulin lispro  0-6 Units Subcutaneous TID WC    insulin lispro  0-3 Units Subcutaneous Nightly    albuterol  2.5 mg Nebulization Q4H WA     PRN Meds: acetaminophen, albuterol sulfate HFA, traMADol, sodium chloride flush, magnesium hydroxide, prochlorperazine, glucose, dextrose, glucagon (rDNA), dextrose, hydrALAZINE      Intake/Output Summary (Last 24 hours) at 4/16/2019 1323  Last data filed at 4/16/2019 1309  Gross per 24 hour   Intake 470 ml   Output 1400 ml   Net -930 ml gynecologic surgery:  - Details unclear, continue to follow with gynecology clinic. Pelvic exam by ED physician was unrevealing.    - Reordered UA.       DVT Prophylaxis: Lovenox   Diet: DIET CARB CONTROL;  Code Status: Full Code    PT/OT Eval Status: Not indicated     Dispo - When she is ready to transition to oral abx and when she is adequately diuresed, perhaps 4/18. She lives at home.          CARLOS Christensen - CNP

## 2019-04-17 ENCOUNTER — APPOINTMENT (OUTPATIENT)
Dept: GENERAL RADIOLOGY | Age: 74
DRG: 602 | End: 2019-04-17
Payer: MEDICARE

## 2019-04-17 LAB
AMORPHOUS: ABNORMAL /HPF
BACTERIA: ABNORMAL /HPF
BILIRUBIN URINE: NEGATIVE
BLOOD, URINE: ABNORMAL
CLARITY: CLEAR
COLOR: ABNORMAL
EKG ATRIAL RATE: 105 BPM
EKG ATRIAL RATE: 81 BPM
EKG DIAGNOSIS: NORMAL
EKG DIAGNOSIS: NORMAL
EKG P AXIS: 27 DEGREES
EKG P AXIS: 61 DEGREES
EKG P-R INTERVAL: 174 MS
EKG P-R INTERVAL: 180 MS
EKG Q-T INTERVAL: 330 MS
EKG Q-T INTERVAL: 390 MS
EKG QRS DURATION: 100 MS
EKG QRS DURATION: 94 MS
EKG QTC CALCULATION (BAZETT): 436 MS
EKG QTC CALCULATION (BAZETT): 453 MS
EKG R AXIS: 120 DEGREES
EKG R AXIS: 71 DEGREES
EKG T AXIS: -1 DEGREES
EKG T AXIS: -14 DEGREES
EKG VENTRICULAR RATE: 105 BPM
EKG VENTRICULAR RATE: 81 BPM
EPITHELIAL CELLS, UA: ABNORMAL /HPF
GLUCOSE BLD-MCNC: 113 MG/DL (ref 70–99)
GLUCOSE BLD-MCNC: 152 MG/DL (ref 70–99)
GLUCOSE BLD-MCNC: 161 MG/DL (ref 70–99)
GLUCOSE BLD-MCNC: 190 MG/DL (ref 70–99)
GLUCOSE URINE: NEGATIVE MG/DL
KETONES, URINE: NEGATIVE MG/DL
LEUKOCYTE ESTERASE, URINE: ABNORMAL
MICROSCOPIC EXAMINATION: YES
MUCUS: ABNORMAL /LPF
NITRITE, URINE: POSITIVE
PERFORMED ON: ABNORMAL
PH UA: 6 (ref 5–8)
PROTEIN UA: ABNORMAL MG/DL
RBC UA: ABNORMAL /HPF (ref 0–2)
SPECIFIC GRAVITY UA: 1.01 (ref 1–1.03)
URINE REFLEX TO CULTURE: YES
URINE TYPE: ABNORMAL
UROBILINOGEN, URINE: 1 E.U./DL
WBC UA: ABNORMAL /HPF (ref 0–5)

## 2019-04-17 PROCEDURE — 87186 SC STD MICRODIL/AGAR DIL: CPT

## 2019-04-17 PROCEDURE — 6360000002 HC RX W HCPCS: Performed by: REGISTERED NURSE

## 2019-04-17 PROCEDURE — 86403 PARTICLE AGGLUT ANTBDY SCRN: CPT

## 2019-04-17 PROCEDURE — 94668 MNPJ CHEST WALL SBSQ: CPT

## 2019-04-17 PROCEDURE — 2580000003 HC RX 258: Performed by: REGISTERED NURSE

## 2019-04-17 PROCEDURE — 6370000000 HC RX 637 (ALT 250 FOR IP): Performed by: INTERNAL MEDICINE

## 2019-04-17 PROCEDURE — 93010 ELECTROCARDIOGRAM REPORT: CPT | Performed by: INTERNAL MEDICINE

## 2019-04-17 PROCEDURE — 94640 AIRWAY INHALATION TREATMENT: CPT

## 2019-04-17 PROCEDURE — 87205 SMEAR GRAM STAIN: CPT

## 2019-04-17 PROCEDURE — 6370000000 HC RX 637 (ALT 250 FOR IP): Performed by: REGISTERED NURSE

## 2019-04-17 PROCEDURE — 81001 URINALYSIS AUTO W/SCOPE: CPT

## 2019-04-17 PROCEDURE — 97535 SELF CARE MNGMENT TRAINING: CPT

## 2019-04-17 PROCEDURE — 6360000002 HC RX W HCPCS: Performed by: INTERNAL MEDICINE

## 2019-04-17 PROCEDURE — 2580000003 HC RX 258: Performed by: INTERNAL MEDICINE

## 2019-04-17 PROCEDURE — 99221 1ST HOSP IP/OBS SF/LOW 40: CPT | Performed by: INTERNAL MEDICINE

## 2019-04-17 PROCEDURE — 87086 URINE CULTURE/COLONY COUNT: CPT

## 2019-04-17 PROCEDURE — 2500000003 HC RX 250 WO HCPCS: Performed by: INTERNAL MEDICINE

## 2019-04-17 PROCEDURE — 2700000000 HC OXYGEN THERAPY PER DAY

## 2019-04-17 PROCEDURE — 1200000000 HC SEMI PRIVATE

## 2019-04-17 PROCEDURE — 94761 N-INVAS EAR/PLS OXIMETRY MLT: CPT

## 2019-04-17 PROCEDURE — 73630 X-RAY EXAM OF FOOT: CPT

## 2019-04-17 PROCEDURE — 97530 THERAPEUTIC ACTIVITIES: CPT

## 2019-04-17 PROCEDURE — 87077 CULTURE AEROBIC IDENTIFY: CPT

## 2019-04-17 PROCEDURE — 97166 OT EVAL MOD COMPLEX 45 MIN: CPT

## 2019-04-17 PROCEDURE — 87070 CULTURE OTHR SPECIMN AEROBIC: CPT

## 2019-04-17 PROCEDURE — 97110 THERAPEUTIC EXERCISES: CPT

## 2019-04-17 PROCEDURE — 97162 PT EVAL MOD COMPLEX 30 MIN: CPT

## 2019-04-17 RX ORDER — TRAMADOL HYDROCHLORIDE 50 MG/1
50 TABLET ORAL EVERY 4 HOURS PRN
Status: DISCONTINUED | OUTPATIENT
Start: 2019-04-17 | End: 2019-04-19

## 2019-04-17 RX ORDER — SODIUM CHLORIDE 9 MG/ML
INJECTION, SOLUTION INTRAVENOUS
Status: DISPENSED
Start: 2019-04-17 | End: 2019-04-18

## 2019-04-17 RX ADMIN — ALBUTEROL SULFATE 2.5 MG: 2.5 SOLUTION RESPIRATORY (INHALATION) at 16:30

## 2019-04-17 RX ADMIN — VANCOMYCIN HYDROCHLORIDE 1250 MG: 10 INJECTION, POWDER, LYOPHILIZED, FOR SOLUTION INTRAVENOUS at 20:47

## 2019-04-17 RX ADMIN — ALBUTEROL SULFATE 2.5 MG: 2.5 SOLUTION RESPIRATORY (INHALATION) at 08:52

## 2019-04-17 RX ADMIN — SODIUM CHLORIDE, PRESERVATIVE FREE 10 ML: 5 INJECTION INTRAVENOUS at 00:48

## 2019-04-17 RX ADMIN — TRAMADOL HYDROCHLORIDE 50 MG: 50 TABLET ORAL at 15:32

## 2019-04-17 RX ADMIN — INSULIN LISPRO 1 UNITS: 100 INJECTION, SOLUTION INTRAVENOUS; SUBCUTANEOUS at 16:29

## 2019-04-17 RX ADMIN — PHENAZOPYRIDINE HYDROCHLORIDE 200 MG: 100 TABLET ORAL at 12:33

## 2019-04-17 RX ADMIN — TRAMADOL HYDROCHLORIDE 25 MG: 50 TABLET, FILM COATED ORAL at 05:17

## 2019-04-17 RX ADMIN — FUROSEMIDE 40 MG: 40 TABLET ORAL at 09:40

## 2019-04-17 RX ADMIN — PHENAZOPYRIDINE HYDROCHLORIDE 200 MG: 100 TABLET ORAL at 09:40

## 2019-04-17 RX ADMIN — Medication 10 ML: at 09:45

## 2019-04-17 RX ADMIN — FLUTICASONE PROPIONATE 2 SPRAY: 50 SPRAY, METERED NASAL at 09:45

## 2019-04-17 RX ADMIN — PIPERACILLIN SODIUM,TAZOBACTAM SODIUM 3.38 G: 3; .375 INJECTION, POWDER, FOR SOLUTION INTRAVENOUS at 16:47

## 2019-04-17 RX ADMIN — Medication 600 MG: at 09:56

## 2019-04-17 RX ADMIN — TRAMADOL HYDROCHLORIDE 25 MG: 50 TABLET, FILM COATED ORAL at 00:48

## 2019-04-17 RX ADMIN — Medication 600 MG: at 00:03

## 2019-04-17 RX ADMIN — LEVOTHYROXINE SODIUM 88 MCG: 0.09 TABLET ORAL at 05:07

## 2019-04-17 RX ADMIN — TRAMADOL HYDROCHLORIDE 25 MG: 50 TABLET, FILM COATED ORAL at 10:13

## 2019-04-17 RX ADMIN — SODIUM CHLORIDE, PRESERVATIVE FREE 10 ML: 5 INJECTION INTRAVENOUS at 00:06

## 2019-04-17 RX ADMIN — ALBUTEROL SULFATE 2.5 MG: 2.5 SOLUTION RESPIRATORY (INHALATION) at 12:45

## 2019-04-17 RX ADMIN — FLUTICASONE PROPIONATE 2 SPRAY: 50 SPRAY, METERED NASAL at 00:53

## 2019-04-17 RX ADMIN — Medication 10 ML: at 20:47

## 2019-04-17 RX ADMIN — OXYBUTYNIN 10 MG: 5 TABLET, FILM COATED, EXTENDED RELEASE ORAL at 20:46

## 2019-04-17 RX ADMIN — ALBUTEROL SULFATE 2.5 MG: 2.5 SOLUTION RESPIRATORY (INHALATION) at 19:55

## 2019-04-17 RX ADMIN — MONTELUKAST 10 MG: 10 TABLET, FILM COATED ORAL at 20:46

## 2019-04-17 RX ADMIN — PHENAZOPYRIDINE HYDROCHLORIDE 200 MG: 100 TABLET ORAL at 16:50

## 2019-04-17 RX ADMIN — ENOXAPARIN SODIUM 40 MG: 40 INJECTION SUBCUTANEOUS at 09:44

## 2019-04-17 RX ADMIN — ROSUVASTATIN CALCIUM 40 MG: 10 TABLET, FILM COATED ORAL at 16:50

## 2019-04-17 RX ADMIN — INSULIN LISPRO 1 UNITS: 100 INJECTION, SOLUTION INTRAVENOUS; SUBCUTANEOUS at 12:27

## 2019-04-17 RX ADMIN — Medication 1 CAPSULE: at 09:40

## 2019-04-17 ASSESSMENT — PAIN SCALES - GENERAL
PAINLEVEL_OUTOF10: 10
PAINLEVEL_OUTOF10: 9
PAINLEVEL_OUTOF10: 10

## 2019-04-17 ASSESSMENT — PAIN DESCRIPTION - ONSET: ONSET: ON-GOING

## 2019-04-17 ASSESSMENT — PAIN DESCRIPTION - PROGRESSION: CLINICAL_PROGRESSION: NOT CHANGED

## 2019-04-17 ASSESSMENT — PAIN DESCRIPTION - PAIN TYPE: TYPE: CHRONIC PAIN

## 2019-04-17 ASSESSMENT — PAIN SCALES - WONG BAKER
WONGBAKER_NUMERICALRESPONSE: 10
WONGBAKER_NUMERICALRESPONSE: 10

## 2019-04-17 ASSESSMENT — PAIN DESCRIPTION - LOCATION: LOCATION: FOOT

## 2019-04-17 ASSESSMENT — PAIN DESCRIPTION - DESCRIPTORS: DESCRIPTORS: DISCOMFORT;ACHING

## 2019-04-17 ASSESSMENT — PAIN DESCRIPTION - ORIENTATION: ORIENTATION: LEFT;OTHER (COMMENT)

## 2019-04-17 ASSESSMENT — PAIN DESCRIPTION - FREQUENCY: FREQUENCY: INTERMITTENT

## 2019-04-17 ASSESSMENT — PAIN - FUNCTIONAL ASSESSMENT: PAIN_FUNCTIONAL_ASSESSMENT: PREVENTS OR INTERFERES SOME ACTIVE ACTIVITIES AND ADLS

## 2019-04-17 NOTE — CARE COORDINATION
CLINICAL PERFORMANCE NURSE SUMMARY    Name: Andrews Mayorga                                                                             : [id number]        /Age:  68 y.o.      / Sex: female                           PCP: No primary care provider on file. PCP phone: None           PCP fax: None    Admit date: 4/15/2019 Admission diagnosis: Cellulitis [L03.90]  Cellulitis [L03.90]    Risk Score:  23  30 Day Readmission: No    CPN Patient Interview:  CPN met with pt to discuss prior living arrangements and discharge plan. Pt stated that she lives in a tri-level home that has 2 TITA, no handrail. Once in the home pt stays on the main level. There is a bathroom, off of the patients bedroom with a bathtub. Pt stated that she sponge bathes. Pt stated that she uses a 2WW to ambulate to and from the bathroom independently. Pt is independent with ADL's. Pt stated that she has a walker, cane and a shower chair at home. Pt stated that she only uses the 2 wheeled walker. There is someone home  that could provide assistance if needed. Pt lives with 3 sisters and her CHERELLE. Pt stated that the family handles the homemaking. One sister handles patients medical issues and another sister handles patients finances. Pt stated that she has 2 pill boxes that she uses to handle her medications but that she may have one of her sisters take over. CPN discussed HHC, pt is open to receiving HHC. Pt stated that she was at [facility] but that she had to leave because she couldn't do the therapy and Medicare wouldn't pay for her to stay there. Pt stated until she gets her \"vaginal\" issues fixed that she will not be able to participate with therapies. Pt stated that she can do the therapy, that's not the problem. Pt stated that she feels like she has been \"cut or burned\" and it causes a lot of pain. Pt stated that she had a procedure done years ago and has visited the gynocologist for a follow up with no solution.  CPN verbalized of care to date  Continued collaboration with zeny care team members on appropriate next site of care       [facility]  [address] [facility]  [phone]

## 2019-04-17 NOTE — PROGRESS NOTES
Occupational Therapy   Occupational Therapy Initial Assessment and Treatment  Date: 2019   Patient Name: Sadie Salgado  MRN: [id number]     : 1945    Date of Service: 2019    Discharge Recommendations:  [address]  OT Equipment Recommendations  Equipment Needed: No  Other: defer to next level of care    Assessment   Performance deficits / Impairments: Decreased functional mobility ; Decreased balance;Decreased coordination;Decreased ADL status; Decreased high-level IADLs  Assessment: Pt agreeable to therapy. Pt currently requiring Min A x2 using a RW for stand pivot transfer to [facility]. Pt requires Mod A for completion of toileting hygiene. Pt limited by pain during this session. Continue skilled OT per POC. Prognosis: Good  Decision Making: Medium Complexity  Patient Education: role of OT, ADLs, functional transfers, safety  REQUIRES OT FOLLOW UP: Yes  Activity Tolerance  Activity Tolerance: Patient limited by pain  Activity Tolerance: Pt with pain in LLE throughout session, worsening when in dependent position while sitting EOB  Safety Devices  Safety Devices in place: Yes  Type of devices: Gait belt;Call light within reach; Bed alarm in place; Left in bed         Patient Diagnosis(es): The primary encounter diagnosis was COPD exacerbation ([facility] [address] Diagnoses of Acute pain of right shoulder and Cellulitis of lower extremity, unspecified laterality were also pertinent to this visit. has a past medical history of JAQUELIN (acute kidney injury) ([address] Arthritis, Asthma, COPD (chronic obstructive pulmonary disease) ([address] Diabetes mellitus ([address] Hyperlipidemia, Hypertension, Other disorders of kidney and ureter in diseases classified elsewhere, Pneumonia due to infectious organism, and Thyroid disease. has a past surgical history that includes Cholecystectomy; xr knee inc tunnel bilat (Bilateral, ); Rotator cuff repair (Left, ); and Thyroid lobectomy. Restrictions  Restrictions/Precautions  Restrictions/Precautions: General Precautions    Subjective   General  Chart Reviewed: Yes  Patient assessed for rehabilitation services?: Yes  Family / Caregiver Present: No  Diagnosis: cellulitis  General Comment  Comments: Per RN ok for treatment  Pain Assessment  Pain Assessment: 0-10  Pain Level: 10  Pain Location: Head;Foot;Vagina  Non-Pharmaceutical Pain Intervention(s): Repositioned; Therapeutic presence;Rest;Emotional support; Ambulation/Increased Activity;Relaxation techniques  Response to Pain Intervention: Patient Satisfied  Social/Functional History  Social/Functional History  Lives With: (3 sisters and CHERELLE)  Type of Home: House  Home Layout: Able to Live on Main level with bedroom/bathroom  Home Access: Stairs to enter without rails  Entrance Stairs - Number of Steps: 2  Bathroom Shower/Tub: (sponge bath)  Bathroom Toilet: Standard  Home Equipment: Rolling walker, Hospital bed, Oxygen, [address] Help From: Family  ADL Assistance: Independent  Homemaking Assistance: Needs assistance  Homemaking Responsibilities: No  Ambulation Assistance: Independent  Transfer Assistance: Independent  Active : No  Occupation: Retired  Type of occupation:   Leisure & Hobbies: art, reading     Objective   Vision: Impaired  Vision Exceptions: Wears glasses at all times  Hearing: Within functional limits    Orientation  Overall Orientation Status: Within Functional Limits     Balance  Sitting Balance: Stand by assistance  Standing Balance: Dependent/Total(min x2 using RW)  Standing Balance  Time: ~1 min x2  Activity: transferring to BSC; standing for pericare assistance  Functional Mobility  Functional - Mobility Device: Rolling Walker  Activity: Other(to BSC)  Assist Level: Dependent/Total(min x2)  Functional Mobility Comments: RW and gait belt  Toilet Transfers  Toilet - Technique: Stand pivot  Equipment Used: Standard bedside commode  Toilet Transfer: 2 Person assistance;Minimal assistance  Toilet Transfers Comments: using RW and gait belt  ADL  LE Dressing: Dependent/Total(donning sock)  Toileting: Moderate assistance(assist to manage clothing and complete pericare)  Tone RUE  RUE Tone: Normotonic  Tone LUE  LUE Tone: Normotonic  Coordination  Movements Are Fluid And Coordinated: Yes     Bed mobility  Supine to Sit: Supervision  Sit to Supine:  Moderate assistance(management of BLE )  Scooting: Supervision(toward EOB)  Transfers  Sit to stand: 2 Person assistance;Minimal assistance  Stand to sit: 2 Person assistance;Minimal assistance  Transfer Comments: using RW and gait belt     Cognition  Overall Cognitive Status: WFL     Sensation  Overall Sensation Status: WFL      LUE AROM (degrees)  LUE AROM : WFL  LUE General AROM: hx of L rotator cuff repair  RUE AROM (degrees)  RUE AROM : Exceptions  RUE General AROM: ~90 degrees shoulder flexion; pt reported thinking she needed R rotator cuff replaced as well  LUE Strength  Gross LUE Strength: WFL  RUE Strength  Gross RUE Strength: WFL     Plan   Plan  Times per week: 3-5x/wk  Current Treatment Recommendations: Strengthening, Endurance Training, Patient/Caregiver Education & Training, Self-Care / ADL, Equipment Evaluation, Education, & procurement, Safety Education & Training, Functional Mobility Training    AM-PAC Score     AM-[facility] Inpatient Daily Activity Raw Score: 16  AM-PAC Inpatient ADL T-Scale Score : 35.96  ADL Inpatient CMS 0-100% Score: 53.32  ADL Inpatient CMS G-Code Modifier : CK    Goals  Short term goals  Time Frame for Short term goals: within one week (7/24)  Short term goal 1: Pt will complete toilet transfer with supervision by 4/21  Short term goal 2: Pt will complete standing ADL task with supervision  Short term goal 3: Pt will complete BUE AROM ther ex without cues  Patient Goals   Patient goals : \"feel better\"     Therapy Time   Individual Concurrent Group Co-treatment Time In 1352         Time Out 1427         Minutes 35         Timed Code Treatment Minutes: 25 Minutes(10 min eval)       Kennedy Miles, OTR/L

## 2019-04-17 NOTE — PROGRESS NOTES
Assessment complete and charted. Pt denies needs currently. IV cleocin infusing. Call light within reach, will continue to monitor.

## 2019-04-17 NOTE — PROGRESS NOTES
Pt had 2 urine occurences, but purewick leaked. Royanne Done was replaced and now appears to be working.

## 2019-04-17 NOTE — PROGRESS NOTES
Hospitalist Progress Note      PCP: No primary care provider on file. Date of Admission: 4/15/2019    Chief Complaint: Left foot pain     Hospital Course:   68 Y F with a h/o COPD, HTN, HLD, and DM2 who presented to the ED with increasing pain, erythema, and edema of her distal LLE surrounding two shallow ulcers which had been \"festering\" for the last week or so. Her legs have been quite edematous for the last few weeks, to the point that she has developed a few bullae which have ruptured and drained serous fluid multiple times. This has left her with sores on the dorsum on her L foot and on her mid L shin. The skin around the ruptured bullae rapidly turned a bright red and became tender over the last few days. She denies fevers or chills. Subjective:   Pt is on baseline 4L NC. She denies dyspnea or chest pain. Afebrile. No N/V/D. Complains of left leg pain and vaginal discomfort.      Medications:  Reviewed    Infusion Medications    dextrose       Scheduled Medications    piperacillin-tazobactam  3.375 g Intravenous Q8H    lactobacillus  1 capsule Oral Daily with breakfast    fluticasone  2 spray Nasal Daily    furosemide  40 mg Oral Daily    levothyroxine  88 mcg Oral Daily    montelukast  10 mg Oral Nightly    oxybutynin  10 mg Oral Nightly    rosuvastatin  40 mg Oral QPM    phenazopyridine  200 mg Oral TID WC    sodium chloride flush  10 mL Intravenous 2 times per day    enoxaparin  40 mg Subcutaneous Daily    insulin lispro  0-6 Units Subcutaneous TID WC    insulin lispro  0-3 Units Subcutaneous Nightly    albuterol  2.5 mg Nebulization Q4H WA     PRN Meds: traMADol, acetaminophen, albuterol sulfate HFA, sodium chloride flush, magnesium hydroxide, prochlorperazine, glucose, dextrose, glucagon (rDNA), dextrose, hydrALAZINE      Intake/Output Summary (Last 24 hours) at 4/17/2019 1516  Last data filed at 4/17/2019 1235  Gross per 24 hour   Intake 720 ml   Output 1450 ml   Net -730 ml Physical Exam Performed:    /71   Pulse 90   Temp 97.7 °F (36.5 °C) (Oral)   Resp 18   Ht 5' 2\" (1.575 m)   Wt 290 lb 12.8 oz (131.9 kg)   SpO2 92%   BMI 53.19 kg/m²     General appearance:  Appears stated age and cooperative. HEENT:  Normal cephalic, atraumatic without obvious deformity. Pupils equal, round, and reactive to light. Extra ocular muscles intact. Conjunctivae/corneas clear. Neck: Supple, with full range of motion. No jugular venous distention. Trachea midline. Respiratory:  Normal respiratory effort. Clear to auscultation, bilaterally without Rales/Wheezes/Rhonchi. Cardiovascular:  Regular rate and rhythm with normal S1/S2 without murmurs, rubs or gallops. Abdomen: Soft, non-tender, non-distended with normal bowel sounds. Musculoskeletal:  No clubbing, cyanosis. 3+ BLE pitting edema with weeping bullae. Full range of motion without deformity. Skin: Skin color, texture, turgor normal.  Bright erythema of L foot and distal LLE at level of mid shin -- improved. Neurologic:  Neurovascularly intact without any focal sensory/motor deficits.  Cranial nerves: II-XII intact, grossly non-focal.  Psychiatric:  Alert and oriented, thought content appropriate, normal insight  Capillary Refill: Brisk,< 3 seconds   Peripheral Pulses: +2 palpable, equal bilaterally       Labs:   Recent Labs     04/15/19  1420 04/16/19  0541   WBC 8.9 6.9   HGB 12.5 12.7   HCT 39.5 40.1    201     Recent Labs     04/15/19  1420 04/15/19  2330 04/16/19  0541    138 139   K 5.8* 4.9 4.4   CL 98* 95* 96*   CO2 35* 30 33*   BUN 33* 31* 28*   CREATININE 1.1 1.1 1.0   CALCIUM 9.8 9.8 9.2     Recent Labs     04/15/19  1420   AST 15   ALT 10   BILITOT 0.4   ALKPHOS 74     Urinalysis:      Lab Results   Component Value Date    NITRU POSITIVE 04/17/2019    WBCUA 0-2 12/26/2018    BACTERIA 1+ 12/21/2018    RBCUA 3-5 12/26/2018    BLOODU TRACE-LYSED 04/17/2019    SPECGRAV 1.015 04/17/2019    GLUCOSEU Negative 04/17/2019       Radiology:  XR FOOT LEFT (MIN 3 VIEWS)   Final Result   No x-ray evidence of osteomyelitis. No acute osseous injury. Soft tissue swelling of the foot- nonspecific for edema or cellulitis. VL Extremity Venous Bilateral   Final Result      XR SHOULDER RIGHT (MIN 2 VIEWS)   Final Result   No acute abnormality of the right shoulder         XR CHEST PORTABLE   Final Result   Mild left basilar atelectasis or scarring. Otherwise, no acute   cardiopulmonary disease. Assessment/Plan:    Active Hospital Problems    Diagnosis Date Noted    Cellulitis [L03.90] 04/15/2019    Diabetes mellitus ([facility] [address] [E11.9]     Hyperlipidemia [E78.5]     Hypertension [I10]     Hypothyroidism [E03.9]     COPD (chronic obstructive pulmonary disease) ([facility] [address] [J44.9] 12/18/2014       LLE bacterial cellulitis, with underlying venous stasis dermatitis:  - Pt started on IV clindamycin on admission. Cellulitis initially improved but appears worse today 4/17 (erythema noticed outside margins that have been marked). DC clinda and changed to Vanc/Zosyn. Consult ID and Podiatry for further assistance. Continue probiotic. - Pt given furosemide IV upon admission, then resumed her 40 PO daily. Continue BLE elevation. When the wounds heal more in the future she should also wear compression stockings. She does not carry a diagnosis of CHF, ECHO obtained and shows normal LVEF of 55-60%, G1DD, cor pulmonale. BNP was elevated but CXR did not show edema. BLE venous dopplers are negative for DVT. - Consult wound care.      Hyperkalemia (resolved):  - Continue to hold potassium supplement and losartan. - Pt given furosemide, nebulizers, and kayexalate on admission.       Hypertension:  - Holding losartan as above. Hydralazine meanwhile.       Hyperlipidemia:  - Continue statin.     Diabetes mellitus type 2:  - Holding metformin while here. SSI meanwhile. A1c's have been controlled recently.    COPD, asthma, chronic hypoxic respiratory failure:  - Continue inhaled bronchodilators. Was given IV steroids in the ED, no further steroids needed unless there is more convincing evidence of exacerbation.   - Baseline 4LNC. She probably needs to turn it up to 5-6 L when ambulating.  - Has quit smoking.      Hypothyroidism:  - Clinically euthyroid. Continue home dose of levothyroxine.     Years of vaginal discomfort, which the patient says started after a gynecologic surgery:  - Details unclear, continue to follow with gynecology clinic. Pelvic exam by ED physician was unrevealing.    - Pt noted to have excoriation to perineum/labia possibly due to incontinence. Apply cream for now. UTI might be contributing to discomfort. Possible UTI:  - UA shows + nitrites and LE with 20-50 WBC and 4+ bacteria. Pt is already on IV abx.  F/u urine culture.        DVT Prophylaxis: Lovenox   Diet: DIET CARB CONTROL;  Code Status: Full Code    PT/OT Eval Status: Ordered     Dispo - 2-3 days       [address], APRN - CNP

## 2019-04-17 NOTE — CONSULTS
surrounding erythema. Neurologic:  Protective sensation is grossly diminished to light touch at the level of the toes, bilateral.  Vascular:  DP and PT pulses are palpable, bilateral.  CFT is brisk to all toes. Musculoskeletal:  Pain to palpation surrounding blister on foot. Labs:  CBC with Differential:    Lab Results   Component Value Date    WBC 6.9 04/16/2019    RBC 4.64 04/16/2019    RBC 4.92 12/18/2014    HGB 12.7 04/16/2019    HCT 40.1 04/16/2019     04/16/2019    MCV 86.4 04/16/2019    MCH 27.3 04/16/2019    MCHC 31.6 04/16/2019    RDW 18.1 04/16/2019    LYMPHOPCT 15.4 04/15/2019    LYMPHOPCT 33.0 12/18/2014    MONOPCT 12.4 04/15/2019    BASOPCT 0.4 04/15/2019    MONOSABS 1.1 04/15/2019    LYMPHSABS 1.4 04/15/2019    EOSABS 0.4 04/15/2019    BASOSABS 0.0 04/15/2019     CMP:    Lab Results   Component Value Date     04/16/2019    K 4.4 04/16/2019    CL 96 04/16/2019    CO2 33 04/16/2019    BUN 28 04/16/2019    CREATININE 1.0 04/16/2019    GFRAA >60 04/16/2019    AGRATIO 1.2 04/15/2019    LABGLOM 54 04/16/2019    GLUCOSE 158 04/16/2019    PROT 6.9 04/15/2019    LABALBU 3.8 04/15/2019    CALCIUM 9.2 04/16/2019    BILITOT 0.4 04/15/2019    ALKPHOS 74 04/15/2019    AST 15 04/15/2019    ALT 10 04/15/2019     PT/INR:    Lab Results   Component Value Date    PROTIME 12.7 12/21/2018    INR 1.11 12/21/2018     Warfarin PT/INR:  No components found for: PTPATWAR, PTINRWAR  HgBA1c:    Lab Results   Component Value Date    LABA1C 6.2 12/22/2018       Imaging:  Impression   No x-ray evidence of osteomyelitis.       No acute osseous injury.       Soft tissue swelling of the foot- nonspecific for edema or cellulitis. Impression/Recommendations: The patient is a pleasant 68 y.o. female with:  1) Bullous lesion left foot with secondary bacterial infection  -Abx per ID  -No evidence of deep space infection.  Will continue to monitor.   -Adaptic, 4x4, gauze, and light compression with ACE dressing placed  -bullous diabeticorum can present with atraumatic blisters that come and go as described by patient. Agree with ID  on primary dermatologic etiology with secondary infection as opposed to venous stasis ulcerations  -Wound was prepped with betadine, bulla was lanced and cultured. 2) DM II    Thank you for the opportunity to take part in this patient's care. Please call me with any questions.     Gaetano Hernández DPM  Office: [phone]  Cell: [phone]

## 2019-04-17 NOTE — PROGRESS NOTES
Pt up with PT/OT; pt repositioned in bed; purewick changed;  Sent doc message regarding pain;    Pt states she was taking lidocaine cream for pain, but doc d/c'd a while ago; Sent urine sample, labia and perineum are open with excoriation; and spot on labia is slightly red and swollen; LLE is getting much worse. Entire foot is red; blister is growing and is black now under blister;  Pt complaining of increased pain and requesting increased dose of ultram from 25 to 50;

## 2019-04-17 NOTE — CONSULTS
2.5 mg, 2.5 mg, Nebulization, Q4H WA      Allergies   Allergen Reactions    Aspirin Other (See Comments)     \"ulcers\"    Celecoxib Other (See Comments)     RHINITIS    Fexofenadine Swelling    Gabapentin Other (See Comments)    Niacin Er      Other reaction(s): Flushing    Adhesive Tape Rash     Paper tape  Paper tape  Paper tape  Paper tape        REVIEW OF SYSTEMS:    CONSTITUTIONAL:  Per HPI. No F/C/NS   EYES:  negative for eye discharge, acute visual disturbance and icterus  HEENT:  negative for acute hearing loss, tinnitus, ear drainage, sinus pressure, nasal congestion, epistaxis and snoring  RESPIRATORY:  No cough, increased shortness of breath, hemoptysis  CARDIOVASCULAR:  negative for chest pain, palpitations, exertional chest pressure/discomfort, syncope  GASTROINTESTINAL:  negative for nausea, vomiting, diarrhea, constipation, blood in stool and abdominal pain  GENITOURINARY:  negative for frequency, dysuria, urinary incontinence, decreased urine volume, and hematuria  HEMATOLOGIC/LYMPHATIC:  negative for easy bruising, bleeding and lymphadenopathy  ALLERGIC/IMMUNOLOGIC:  negative for recurrent infections, angioedema, anaphylaxis and drug reactions  ENDOCRINE:  negative for weight changes and diabetic symptoms including polyuria, polydipsia and polyphagia  MUSCULOSKELETAL:  negative for acute joint pain, joint swelling, decreased range of motion and muscle weakness  NEUROLOGICAL:  negative for headaches, slurred speech, unilateral weakness  PSYCHIATRIC/BEHAVIORAL: negative for hallucinations, behavioral problems, confusion and agitation.        Objective:   PHYSICAL EXAM:      VITALS:  /74   Pulse 98   Temp 98.2 °F (36.8 °C) (Oral)   Resp 18   Ht 5' 2\" (1.575 m)   Wt 290 lb 12.8 oz (131.9 kg)   SpO2 92%   BMI 53.19 kg/m²      24HR INTAKE/OUTPUT:      Intake/Output Summary (Last 24 hours) at 4/17/2019 1729  Last data filed at 4/17/2019 1656  Gross per 24 hour   Intake 980 ml   Output 1450 ml   Net -470 ml     CONSTITUTIONAL:  Awake, alert, cooperative, no apparent distress, and appears stated age. Morbidly obese   HEENT: NCAT, PERRL, EOMI. Sclera white, conjunctiva full. OP with moist mucosal membranes, no thrush, tongue protrudes midline  NECK:  Supple  LUNGS:  no increased work of breathing  ABDOMEN:  Soft, flat, NT   PSYCHIATRIC: Oriented to person place and time. No obvious depression or anxiety. MUSCULOSKELETAL:  basilio TKA without calor, erythema, effusion   SKIN:  Faint, diffuse erythema and calor RLE ankle to knee. ++Edema  Lesser erythema and calor LLLE  Non-blanching purpuric lesion anterior tibial area, nontender, tiny vesicles. Large, 3cm flaccid bullous lesion dorsum L foor with sharply demarcated medial border, slightly tender. NEUROLOGIC: nonfocal exam  ACCESS:  IV site ok       DATA:    Old records have been reviewed    CBC:  Recent Labs     04/15/19  1420 04/16/19  0541   WBC 8.9 6.9   RBC 4.61 4.64   HGB 12.5 12.7   HCT 39.5 40.1    201   MCV 85.8 86.4   MCH 27.2 27.3   MCHC 31.7 31.6   RDW 18.4* 18.1*      BMP:  Recent Labs     04/15/19  1420 04/15/19  2330 04/16/19  0541    138 139   K 5.8* 4.9 4.4   CL 98* 95* 96*   CO2 35* 30 33*   BUN 33* 31* 28*   CREATININE 1.1 1.1 1.0   CALCIUM 9.8 9.8 9.2   GLUCOSE 92 291* 158*        Radiology Review:  All pertinent images / reports were reviewed as a part of this visit. Xray L foot 4/17:  Impression   No x-ray evidence of osteomyelitis.       No acute osseous injury.       Soft tissue swelling of the foot- nonspecific for edema or cellulitis.      Basilio LE doppler no DVT     Assessment:     Patient Active Problem List   Diagnosis    Polycythemia, secondary    Leukocytosis    [facility]    Diabetes mellitus ([facility] [facility]    Hyperlipidemia    Hypertension    Hypothyroidism    Thyroid nodule    Pulmonary nodule, left    Acute respiratory failure with hypoxia and hypercapnia [facility] [facility]    Acute encephalopathy    Pneumonia due to infectious organism    JAQUELIN [facility]acute kidney injury) ([facility] [facility]    Aspiration pneumonia ([facility] [facility]    HCAP (healthcare-associated pneumonia)    Acute hypoxemic respiratory failure ([facility] [facility]    Pulmonary edema, acute ([facility] [address] [facility]    Cellulitis       Akosua Mitchell is a 66yoF who is evaluated for the following:    Multiple medical problems, including DM and obesity     Admitted for management of rash LLE with purpuric, bullous lesion dorsal foot and anterior tibial area  Erythema stan LE, R mor than L   She is not systemically ill    Etiology of the pedal lesion not clear. Could be traumatic, bullous pemphigoid, or some other primary dermatologic path. She lacks signs of systemic vasculitis. Doesn't look like typical venous stasis lesion. She has some cellulitis/secondary infection. Recs:  Continue IV abx. Will use cefepime in place of Zosyn   Monitor renal function closely, she has hx JAQUELIN   Will see the lesions evolve  Continue to elevate the legs  Check ESR, CRP   Consider biopsy if lesions fail to improve and if these is still ambiguity about origin     D/w RN, Dr. Goldy Berrios  We will follow     Miah Keene M.D. Thank you for the opportunity to participate in the care of your patient.     Please do not hesitate to contact me:   [phone] office  [phone] mobile

## 2019-04-17 NOTE — CONSULTS
Pharmacy to Dose Vancomycin    Dx: Cellulitis  Goal trough = > 10 mcg/mL  Pt wt = 82.8 kg (adjusted dosing wt)  Estimated Creatinine Clearance: 66 mL/min (based on SCr of 1 mg/dL). Start Vancomycin 1250 mg IVPB q12h tonight at 2100. Also starting zosyn today.   Vancomycin trough prior to 4th dose (4/19 0830)  Burnell Peabody, PharmD 04/17/19 3:29 PM

## 2019-04-17 NOTE — CONSULTS
[facility] Wound Ostomy Continence Nurse  Consult Note       NAME:  [address] RECORD NUMBER:  [id number]  AGE: 68 y.o. GENDER: female  : 1945  TODAY'S DATE:  2019    Subjective  The areas are painful. Reason for WOCN Evaluation and Assessment: left lower extremity wounds      Carlene Perez is a 68 y.o. female referred by:   [x] Physician  [x] Nursing  [] Other:     Wound Identification:  Wound Type: soft tissue infection left lower leg and foot. Contributing Factors: edema, venous stasis, diabetes and obesity    Wound History: Admitted with few week lower leg swelling. Has 2+ pitting edema bilateral lower extremity. Per bed side RN, left lower leg wounds have deteriorated since ealrier this am. Reports the blisters have increased in size and become more brown. Patient is on IV antibiotics Vanco and Zosyn. The podiatrist has been consulted and will be in for evaluation later today. Current Wound Care Treatment:  Open to air.     Patient Goal of Care:  [x] Wound Healing  [] Odor Control  [] Palliative Care  [] Pain Control   [] Other:          PAST MEDICAL HISTORY        Diagnosis Date    [facility] [facility]     Arthritis     Asthma     COPD (chronic obstructive pulmonary disease) ([facility] [facility]     Diabetes mellitus ([facility] [facility]     Hyperlipidemia     Hypertension     Other disorders of kidney and ureter in diseases classified elsewhere     Pneumonia due to infectious organism 2018    Thyroid disease        PAST SURGICAL HISTORY    Past Surgical History:   Procedure Laterality Date    CHOLECYSTECTOMY      ROTATOR CUFF REPAIR Left     THYROID LOBECTOMY      XR KNEE INC TUNNEL BILAT Bilateral        FAMILY HISTORY    Family History   Problem Relation Age of Onset    Diabetes Mother     Diabetes Sister     Cancer Brother     Cancer Paternal Aunt        SOCIAL HISTORY    Social History     Tobacco Use    Smoking status: Former Smoker     Packs/day: 0.50    Smokeless tobacco: Never Used   Substance Use Topics    Alcohol use: No    Drug use: No       ALLERGIES    Allergies   Allergen Reactions    Aspirin Other (See Comments)     \"ulcers\"    Celecoxib Other (See Comments)     RHINITIS    Fexofenadine Swelling    Gabapentin Other (See Comments)    Niacin Er      Other reaction(s): Flushing    Adhesive Tape Rash     Paper tape  Paper tape  Paper tape  Paper tape       MEDICATIONS    No current facility-administered medications on file prior to encounter. Current Outpatient Medications on File Prior to Encounter   Medication Sig Dispense Refill    potassium chloride (KLOR-CON M) 20 MEQ extended release tablet Take 2 tablets by mouth daily      metFORMIN (GLUCOPHAGE) 500 MG tablet Take 500 mg by mouth 2 times daily (with meals)      silver sulfADIAZINE (SILVADENE) 1 % cream Apply topically 3 times daily as needed Apply topically to kerwin area      rosuvastatin (CRESTOR) 40 MG tablet Take 40 mg by mouth every evening      montelukast (SINGULAIR) 10 MG tablet Take 10 mg by mouth nightly      magnesium oxide (MAG-OX) 400 MG tablet Take 400 mg by mouth 2 times daily      losartan (COZAAR) 25 MG tablet Take 25 mg by mouth daily      fluticasone (FLONASE) 50 MCG/ACT nasal spray 2 sprays by Nasal route daily      acetaminophen (TYLENOL) 325 MG tablet Take 650 mg by mouth every 6 hours as needed for Pain      fenofibrate 160 MG tablet Take 160 mg by mouth daily      furosemide (LASIX) 20 MG tablet Take 40 mg by mouth daily       PROAIR  (90 BASE) MCG/ACT inhaler 2 puffs every 6 hours as needed for Wheezing or Shortness of Breath   6    docusate sodium (COLACE) 100 MG capsule Take 100 mg by mouth 2 times daily as needed   4    levothyroxine (SYNTHROID) 88 MCG tablet       oxybutynin (DITROPAN-XL) 10 MG CR tablet   3       Objective  Lying in bed. Legs elevated on pillows.     /74   Pulse 98   Temp 98.2 °F (36.8 °C) (Oral)   Resp 18   Ht 5' 2\" (1.575 m)   Wt 290 lb 12.8 oz (131.9 kg)   SpO2 92%   BMI 53.19 kg/m²     LABS:  WBC:    Lab Results   Component Value Date    WBC 6.9 04/16/2019     H/H:    Lab Results   Component Value Date    HGB 12.7 04/16/2019    HCT 40.1 04/16/2019     PTT:    Lab Results   Component Value Date    APTT 28.3 12/21/2018   [APTT}  PT/INR:    Lab Results   Component Value Date    PROTIME 12.7 12/21/2018    INR 1.11 12/21/2018     HgBA1c:    Lab Results   Component Value Date    LABA1C 6.2 12/22/2018       Assessment  Both areas with raised blisters/bulla. Foot filled with tan.brown fluid. Yelena wound red. Photo taken and measured. Joe Risk Score: Joe Scale Score: 17    Patient Active Problem List   Diagnosis    Polycythemia, secondary    Leukocytosis    COPD (chronic obstructive pulmonary disease) ([address]    Diabetes mellitus ([address]    Hyperlipidemia    Hypertension    Hypothyroidism    Thyroid nodule    Pulmonary nodule, left    Acute respiratory failure with hypoxia and hypercapnia (HCC)    Acute encephalopathy    Pneumonia due to infectious organism    JAQUELIN (acute kidney injury) ([address]    Aspiration pneumonia ([address]    HCAP (healthcare-associated pneumonia)    Acute hypoxemic respiratory failure ([address]    Pulmonary edema, acute ([address]    Cellulitis       Measurements:  Wound 12/25/18 Arm Left; Lower cluster of 4 scabbed, dry blisters, and 1 open, moist blister to left forearm (Active)   Number of days: 113       Wound 04/17/19 Pretibial Left (Active)   Wound Image   4/17/2019  4:33 PM   Wound Other 4/17/2019  4:33 PM   Dressing/Treatment Open to air 4/17/2019  4:33 PM   Wound Length (cm) 6.5 cm 4/17/2019  4:33 PM   Wound Width (cm) 1.2 cm 4/17/2019  4:33 PM   Wound Depth (cm) 0 cm 4/17/2019  4:33 PM   Wound Surface Area (cm^2) 7.8 cm^2 4/17/2019  4:33 PM   Wound Volume (cm^3) 0 cm^3 4/17/2019  4:33 PM   Distance Tunneling (cm) 0 cm 4/17/2019  4:33 PM   Tunneling Position ___ O'Clock 0 4/17/2019  4:33 PM   Undermining Starts ___ O'Clock 0 4/17/2019  4:33 PM   Undermining Ends___ O'Clock 0 4/17/2019  4:33 PM   Undermining Maxium Distance (cm) 0 4/17/2019  4:33 PM   Wound Assessment Red; Other (Comment); Fluid filled blister 4/17/2019  4:33 PM   Drainage Amount None 4/17/2019  4:33 PM   Odor None 4/17/2019  4:33 PM   Margins Unattached edges; Defined edges 4/17/2019  4:33 PM   Yelena-wound Assessment Red 4/17/2019  4:33 PM   Red%Wound Bed 100 4/17/2019  4:33 PM   Culture Taken No 4/17/2019  4:33 PM   Number of days: 0      Left lower:           Wound 04/17/19 Foot Left; Anterior (Active)   Wound Image   4/17/2019  4:33 PM   Wound Other 4/17/2019  4:33 PM   Dressing/Treatment Open to air 4/17/2019  4:33 PM   Wound Length (cm) 2.8 cm 4/17/2019  4:33 PM   Wound Width (cm) 2.1 cm 4/17/2019  4:33 PM   Wound Depth (cm) 0 cm 4/17/2019  4:33 PM   Wound Surface Area (cm^2) 5.88 cm^2 4/17/2019  4:33 PM   Wound Volume (cm^3) 0 cm^3 4/17/2019  4:33 PM   Distance Tunneling (cm) 0 cm 4/17/2019  4:33 PM   Tunneling Position ___ O'Clock 0 4/17/2019  4:33 PM   Undermining Starts ___ O'Clock 0 4/17/2019  4:33 PM   Undermining Ends___ O'Clock 0 4/17/2019  4:33 PM   Undermining Maxium Distance (cm) 0 4/17/2019  4:33 PM   Wound Assessment Red;Brown;Fluid filled blister;Tan;Swelling 4/17/2019  4:33 PM   Drainage Amount None 4/17/2019  4:33 PM   Odor None 4/17/2019  4:33 PM   Margins Unattached edges; Defined edges 4/17/2019  4:33 PM   Yelena-wound Assessment Blanchable erythema;Red;Swelling 4/17/2019  4:33 PM   Red%Wound Bed 50 4/17/2019  4:33 PM   Other%Wound Bed brown/tan 50% 4/17/2019  4:33 PM   Culture Taken No 4/17/2019  4:33 PM   Number of days: 0     Left foot:           Response to treatment:  Well tolerated by patient.      Pain Assessment:  Severity:  4 / 10  Quality of pain: sharp, burning  Wound Pain Timing/Severity: intermittent  Premedicated: Yes    Plan   Plan of Care: Wound 04/17/19 Pretibial Left-Dressing/Treatment: Open to

## 2019-04-17 NOTE — PROGRESS NOTES
Physical Therapy    Facility/Department: [facility] B[facility] - MED SURG  Initial Assessment    NAME: Jamin Wei  : 1945  MRN: [id number]    Date of Service: 2019    Discharge Recommendations:  Subacute/Skilled Nursing Facility   PT Equipment Recommendations  Equipment Needed: No    Assessment   Body structures, Functions, Activity limitations: Decreased functional mobility ; Decreased balance; Increased Pain  Assessment: Pt functioning below baseline due to pain in L foot. Pt was able to transfer to sit EOB and SPT with RW to the bedside commode. Required assist for B LE to transfer back to supine. Limited mobility due to pain. Recommend SNF to improve mobility  Treatment Diagnosis: decreased mobility   Prognosis: Good  Decision Making: Medium Complexity  Patient Education: role of PT, DC recs  Barriers to Learning: none. Pt expressed understanding  REQUIRES PT FOLLOW UP: Yes  Activity Tolerance  Activity Tolerance: Patient limited by pain; Patient limited by endurance       Patient Diagnosis(es): The primary encounter diagnosis was COPD exacerbation ([address] Diagnoses of Acute pain of right shoulder and Cellulitis of lower extremity, unspecified laterality were also pertinent to this visit. has a past medical history of JAQUELIN (acute kidney injury) ([address] Arthritis, Asthma, COPD (chronic obstructive pulmonary disease) ([address] Diabetes mellitus ([address] Hyperlipidemia, Hypertension, Other disorders of kidney and ureter in diseases classified elsewhere, Pneumonia due to infectious organism, and Thyroid disease. has a past surgical history that includes Cholecystectomy; xr knee inc tunnel bilat (Bilateral, ); Rotator cuff repair (Left, ); and Thyroid lobectomy.     Restrictions  Restrictions/Precautions  Restrictions/Precautions: General Precautions  Position Activity Restriction  Other position/activity restrictions: purewick  Vision/Hearing  Vision: Impaired  Vision Exceptions: Wears glasses at all times  Hearing: Within functional limits     Subjective  General  Chart Reviewed: Yes  Patient assessed for rehabilitation services?: Yes  Response To Previous Treatment: Patient with no complaints from previous session. Family / Caregiver Present: No  Referral Date : 04/17/19  Diagnosis:  L foot pain  Follows Commands: Within Functional Limits  General Comment  Comments: cleared by nursing  Subjective  Subjective: pt resting in bed. C/o foot pain (10/10), headache (10/10 \"normal heachache). Pain Screening  Patient Currently in Pain: Yes          Orientation  Orientation  Overall Orientation Status: Within Normal Limits  Social/Functional History  Social/Functional History  Lives With: (3 sisters and CHERELLE)  Type of Home: House  Home Layout: Able to Live on Main level with bedroom/bathroom  Home Access: Stairs to enter without rails  Entrance Stairs - Number of Steps: 2  Bathroom Shower/Tub: (sponge bath)  Bathroom Toilet: Standard  Home Equipment: Rolling walker, Hospital bed, Oxygen, [address] Help From: Family  ADL Assistance: Independent  Homemaking Assistance: Needs assistance  Homemaking Responsibilities: No  Ambulation Assistance: Independent  Transfer Assistance: Independent  Active : No  Occupation: Retired  Type of occupation:   Leisure & Hobbies: art, reading  Cognition        Objective          PROM RLE (degrees)  RLE PROM: WFL  AROM RLE (degrees)  RLE AROM: WFL  PROM LLE (degrees)  LLE PROM: WFL  AROM LLE (degrees)  LLE AROM : WFL  Strength RLE  Strength RLE: WFL  Comment: not tested due to pain. Functional for weight bearing to SPT  Strength LLE  Strength LLE: WFL  Comment: not tested due to pain. Functional for weight bearing to SPT  Tone RLE  RLE Tone: Normotonic  Tone LLE  LLE Tone: Normotonic     Bed mobility  Supine to Sit: Supervision  Sit to Supine:  Moderate assistance  Transfers  Sit to Stand: Dependent/Total(min x 2)  Stand to sit: Contact guard assistance  Stand Pivot Transfers: Minimal Assistance  Ambulation  Ambulation?: No     Balance  Posture: Good  Sitting - Static: Good  Sitting - Dynamic: Good  Standing - Static: Fair;+  Standing - Dynamic: [address]  Times per week: 3-5  Current Treatment Recommendations: Strengthening, Balance Training, Functional Mobility Training, Endurance Training, Transfer Training, Gait Training  Safety Devices  Type of devices:  All fall risk precautions in place, Bed alarm in place, Call light within reach, Gait belt, Patient at risk for falls, Left in bed, Nurse notified  Restraints  Initially in place: No    G-Code       OutComes Score                                                  AM-PAC Score  AM-PAC Inpatient Mobility Raw Score : 12  AM-PAC Inpatient T-Scale Score : 35.33  Mobility Inpatient CMS 0-100% Score: 68.66  Mobility Inpatient CMS G-Code Modifier : CL          Goals  Short term goals  Time Frame for Short term goals: 4/25  Short term goal 1: Pt will transfer supine<>sit with supervision  Short term goal 2: Pt will ambulate x 20' with RW with SBA  Short term goal 3: Pt will complete B LE exercises to improve endurance  Patient Goals   Patient goals : to have less pain       Therapy Time   Individual Concurrent Group Co-treatment   Time In 1352         Time Out 1427         Minutes 35         Timed Code Treatment Minutes: 24 [facility], PT

## 2019-04-18 LAB
ANION GAP SERPL CALCULATED.3IONS-SCNC: 11 MMOL/L (ref 3–16)
BUN BLDV-MCNC: 33 MG/DL (ref 7–20)
C-REACTIVE PROTEIN: 7.9 MG/L (ref 0–5.1)
CALCIUM SERPL-MCNC: 9.4 MG/DL (ref 8.3–10.6)
CHLORIDE BLD-SCNC: 94 MMOL/L (ref 99–110)
CO2: 34 MMOL/L (ref 21–32)
CREAT SERPL-MCNC: 1.4 MG/DL (ref 0.6–1.2)
GFR AFRICAN AMERICAN: 45
GFR NON-AFRICAN AMERICAN: 37
GLUCOSE BLD-MCNC: 108 MG/DL (ref 70–99)
GLUCOSE BLD-MCNC: 123 MG/DL (ref 70–99)
GLUCOSE BLD-MCNC: 126 MG/DL (ref 70–99)
GLUCOSE BLD-MCNC: 127 MG/DL (ref 70–99)
GLUCOSE BLD-MCNC: 156 MG/DL (ref 70–99)
GLUCOSE BLD-MCNC: 174 MG/DL (ref 70–99)
GLUCOSE BLD-MCNC: 181 MG/DL (ref 70–99)
HCT VFR BLD CALC: 40.3 % (ref 36–48)
HEMOGLOBIN: 12.9 G/DL (ref 12–16)
MCH RBC QN AUTO: 27.6 PG (ref 26–34)
MCHC RBC AUTO-ENTMCNC: 32.1 G/DL (ref 31–36)
MCV RBC AUTO: 86 FL (ref 80–100)
PDW BLD-RTO: 18.3 % (ref 12.4–15.4)
PERFORMED ON: ABNORMAL
PLATELET # BLD: 211 K/UL (ref 135–450)
PMV BLD AUTO: 8.3 FL (ref 5–10.5)
POTASSIUM REFLEX MAGNESIUM: 4.2 MMOL/L (ref 3.5–5.1)
RBC # BLD: 4.68 M/UL (ref 4–5.2)
SEDIMENTATION RATE, ERYTHROCYTE: 29 MM/HR (ref 0–30)
SODIUM BLD-SCNC: 139 MMOL/L (ref 136–145)
WBC # BLD: 10.3 K/UL (ref 4–11)

## 2019-04-18 PROCEDURE — 1200000000 HC SEMI PRIVATE

## 2019-04-18 PROCEDURE — 6370000000 HC RX 637 (ALT 250 FOR IP): Performed by: REGISTERED NURSE

## 2019-04-18 PROCEDURE — 85652 RBC SED RATE AUTOMATED: CPT

## 2019-04-18 PROCEDURE — 36415 COLL VENOUS BLD VENIPUNCTURE: CPT

## 2019-04-18 PROCEDURE — 80048 BASIC METABOLIC PNL TOTAL CA: CPT

## 2019-04-18 PROCEDURE — 94761 N-INVAS EAR/PLS OXIMETRY MLT: CPT

## 2019-04-18 PROCEDURE — 6370000000 HC RX 637 (ALT 250 FOR IP): Performed by: INTERNAL MEDICINE

## 2019-04-18 PROCEDURE — 85027 COMPLETE CBC AUTOMATED: CPT

## 2019-04-18 PROCEDURE — 6360000002 HC RX W HCPCS: Performed by: INTERNAL MEDICINE

## 2019-04-18 PROCEDURE — 97530 THERAPEUTIC ACTIVITIES: CPT

## 2019-04-18 PROCEDURE — 99232 SBSQ HOSP IP/OBS MODERATE 35: CPT | Performed by: INTERNAL MEDICINE

## 2019-04-18 PROCEDURE — 94640 AIRWAY INHALATION TREATMENT: CPT

## 2019-04-18 PROCEDURE — 2580000003 HC RX 258: Performed by: INTERNAL MEDICINE

## 2019-04-18 PROCEDURE — 2580000003 HC RX 258: Performed by: REGISTERED NURSE

## 2019-04-18 PROCEDURE — 86140 C-REACTIVE PROTEIN: CPT

## 2019-04-18 PROCEDURE — 6360000002 HC RX W HCPCS: Performed by: REGISTERED NURSE

## 2019-04-18 PROCEDURE — 97535 SELF CARE MNGMENT TRAINING: CPT

## 2019-04-18 PROCEDURE — 94668 MNPJ CHEST WALL SBSQ: CPT

## 2019-04-18 PROCEDURE — 2700000000 HC OXYGEN THERAPY PER DAY

## 2019-04-18 RX ORDER — LINEZOLID 600 MG/1
600 TABLET, FILM COATED ORAL EVERY 12 HOURS SCHEDULED
Status: DISCONTINUED | OUTPATIENT
Start: 2019-04-18 | End: 2019-04-23

## 2019-04-18 RX ORDER — HYDROXYZINE HYDROCHLORIDE 10 MG/1
25 TABLET, FILM COATED ORAL 3 TIMES DAILY PRN
Status: DISCONTINUED | OUTPATIENT
Start: 2019-04-18 | End: 2019-04-27 | Stop reason: HOSPADM

## 2019-04-18 RX ORDER — OXYCODONE HYDROCHLORIDE AND ACETAMINOPHEN 5; 325 MG/1; MG/1
1 TABLET ORAL EVERY 4 HOURS PRN
Status: DISCONTINUED | OUTPATIENT
Start: 2019-04-18 | End: 2019-04-19 | Stop reason: SDUPTHER

## 2019-04-18 RX ADMIN — ROSUVASTATIN CALCIUM 40 MG: 10 TABLET, FILM COATED ORAL at 17:09

## 2019-04-18 RX ADMIN — INSULIN LISPRO 1 UNITS: 100 INJECTION, SOLUTION INTRAVENOUS; SUBCUTANEOUS at 23:18

## 2019-04-18 RX ADMIN — ENOXAPARIN SODIUM 40 MG: 40 INJECTION SUBCUTANEOUS at 08:43

## 2019-04-18 RX ADMIN — ALBUTEROL SULFATE 2.5 MG: 2.5 SOLUTION RESPIRATORY (INHALATION) at 07:33

## 2019-04-18 RX ADMIN — OXYBUTYNIN 10 MG: 5 TABLET, FILM COATED, EXTENDED RELEASE ORAL at 21:07

## 2019-04-18 RX ADMIN — FUROSEMIDE 40 MG: 40 TABLET ORAL at 08:43

## 2019-04-18 RX ADMIN — TRAMADOL HYDROCHLORIDE 50 MG: 50 TABLET ORAL at 08:43

## 2019-04-18 RX ADMIN — MONTELUKAST 10 MG: 10 TABLET, FILM COATED ORAL at 21:07

## 2019-04-18 RX ADMIN — Medication 1 CAPSULE: at 08:43

## 2019-04-18 RX ADMIN — LINEZOLID 600 MG: 600 TABLET, FILM COATED ORAL at 21:07

## 2019-04-18 RX ADMIN — LEVOTHYROXINE SODIUM 88 MCG: 0.09 TABLET ORAL at 06:09

## 2019-04-18 RX ADMIN — Medication 10 ML: at 08:43

## 2019-04-18 RX ADMIN — PIPERACILLIN SODIUM,TAZOBACTAM SODIUM 3.38 G: 3; .375 INJECTION, POWDER, FOR SOLUTION INTRAVENOUS at 00:09

## 2019-04-18 RX ADMIN — ALBUTEROL SULFATE 2.5 MG: 2.5 SOLUTION RESPIRATORY (INHALATION) at 19:16

## 2019-04-18 RX ADMIN — PIPERACILLIN SODIUM,TAZOBACTAM SODIUM 3.38 G: 3; .375 INJECTION, POWDER, FOR SOLUTION INTRAVENOUS at 08:17

## 2019-04-18 RX ADMIN — Medication 10 ML: at 22:03

## 2019-04-18 RX ADMIN — PIPERACILLIN SODIUM,TAZOBACTAM SODIUM 3.38 G: 3; .375 INJECTION, POWDER, FOR SOLUTION INTRAVENOUS at 23:18

## 2019-04-18 RX ADMIN — ALBUTEROL SULFATE 2.5 MG: 2.5 SOLUTION RESPIRATORY (INHALATION) at 15:25

## 2019-04-18 RX ADMIN — ALBUTEROL SULFATE 2.5 MG: 2.5 SOLUTION RESPIRATORY (INHALATION) at 11:36

## 2019-04-18 RX ADMIN — FLUTICASONE PROPIONATE 2 SPRAY: 50 SPRAY, METERED NASAL at 08:44

## 2019-04-18 RX ADMIN — PHENAZOPYRIDINE HYDROCHLORIDE 200 MG: 100 TABLET ORAL at 11:35

## 2019-04-18 RX ADMIN — TRAMADOL HYDROCHLORIDE 50 MG: 50 TABLET ORAL at 17:09

## 2019-04-18 RX ADMIN — PHENAZOPYRIDINE HYDROCHLORIDE 200 MG: 100 TABLET ORAL at 08:43

## 2019-04-18 RX ADMIN — PIPERACILLIN SODIUM,TAZOBACTAM SODIUM 3.38 G: 3; .375 INJECTION, POWDER, FOR SOLUTION INTRAVENOUS at 15:42

## 2019-04-18 RX ADMIN — OXYCODONE HYDROCHLORIDE AND ACETAMINOPHEN 1 TABLET: 5; 325 TABLET ORAL at 21:13

## 2019-04-18 ASSESSMENT — PAIN DESCRIPTION - PROGRESSION
CLINICAL_PROGRESSION: NOT CHANGED

## 2019-04-18 ASSESSMENT — PAIN SCALES - GENERAL
PAINLEVEL_OUTOF10: 10

## 2019-04-18 ASSESSMENT — PAIN - FUNCTIONAL ASSESSMENT: PAIN_FUNCTIONAL_ASSESSMENT: PREVENTS OR INTERFERES SOME ACTIVE ACTIVITIES AND ADLS

## 2019-04-18 NOTE — PROGRESS NOTES
Physical Therapy  Attempted to see pt this AM, pt refused stating she was having too much pain. Was unable to follow up this afternoon. Will follow up tomorrow.      Keerthi Carlin, PT, DPT

## 2019-04-18 NOTE — PROGRESS NOTES
04/18/2019       Hepatic Function Panel:   Lab Results   Component Value Date    ALKPHOS 74 04/15/2019    ALT 10 04/15/2019    AST 15 04/15/2019    PROT 6.9 04/15/2019    BILITOT 0.4 04/15/2019    LABALBU 3.8 04/15/2019     ESR 29       MICRO:  4/17 UC NGTD   Wound cx L foot NGTD, GS no organisms       IMAGING:  Echo 4/16:  Summary   Normal left ventricular systolic function with ejection fraction of 55-60%.  Septal bounce noted due to right ventricular volume overload.   Mild concentric left ventricular hypertrophy.   Grade I diastolic dysfunction with normal filling pressure.   Mild mitral and tricuspid regurgitation.   The right ventricle is moderately enlarged.   Right ventricular systolic function is moderately reduced .   S' prime velocity is measured at 7 cm/s.   Systolic pulmonic artery pressure (SPAP) is estimated at 55 mmHg consistent   with mild pulmonary hypertension (Right atrial pressure of 15 mmHg).   The right atrium is moderately dilated.       Assessment:     Patient Active Problem List    Diagnosis Date Noted    Thyroid nodule 03/14/2016     Priority: High     Class: Acute    Pulmonary nodule, left 03/14/2016     Priority: High     Class: Acute    Cellulitis 04/15/2019    HCAP (healthcare-associated pneumonia)     Acute hypoxemic respiratory failure (HCC)     Pulmonary edema, acute (HCC)     Aspiration pneumonia (HCC)     JAQUELIN (acute kidney injury) ([address]     Acute respiratory failure with hypoxia and hypercapnia ([address] 12/22/2018    Acute encephalopathy 12/22/2018    Pneumonia due to infectious organism 12/22/2018    Diabetes mellitus ([address]     Hyperlipidemia     Hypertension     Hypothyroidism     Polycythemia, secondary 12/18/2014    Leukocytosis 12/18/2014    COPD (chronic obstructive pulmonary disease) ([address] 12/18/2014     Multiple medical problems, including DM and obesity      Admitted for management of stan LE cellulitis and L foot  Wound culture taken, no growth so far

## 2019-04-18 NOTE — PROGRESS NOTES
Hospitalist Progress Note      PCP: No primary care provider on file. Date of Admission: 4/15/2019    Chief Complaint: Left foot pain     Hospital Course:     68 Y F with a h/o COPD, HTN, HLD, and DM2 who presented to the ED with increasing pain, erythema, and edema of her distal LLE surrounding two shallow ulcers which had been \"festering\" for the last week or so. Her legs have been quite edematous for the last few weeks, to the point that she has developed a few bullae which have ruptured and drained serous fluid multiple times. This has left her with sores on the dorsum on her L foot and on her mid L shin. The skin around the ruptured bullae rapidly turned a bright red and became tender over the last few days. She denies fevers or chills. Subjective:      She denies dyspnea or chest pain. Afebrile. No N/V/D. Complains of left leg pain and vaginal discomfort.      Medications:  Reviewed    Infusion Medications    dextrose       Scheduled Medications    piperacillin-tazobactam  3.375 g Intravenous Q8H    lactobacillus  1 capsule Oral Daily with breakfast    fluticasone  2 spray Nasal Daily    furosemide  40 mg Oral Daily    levothyroxine  88 mcg Oral Daily    montelukast  10 mg Oral Nightly    oxybutynin  10 mg Oral Nightly    rosuvastatin  40 mg Oral QPM    phenazopyridine  200 mg Oral TID WC    sodium chloride flush  10 mL Intravenous 2 times per day    enoxaparin  40 mg Subcutaneous Daily    insulin lispro  0-6 Units Subcutaneous TID WC    insulin lispro  0-3 Units Subcutaneous Nightly    albuterol  2.5 mg Nebulization Q4H WA     PRN Meds: traMADol, acetaminophen, albuterol sulfate HFA, sodium chloride flush, magnesium hydroxide, prochlorperazine, glucose, dextrose, glucagon (rDNA), dextrose, hydrALAZINE      Intake/Output Summary (Last 24 hours) at 4/18/2019 1106  Last data filed at 4/18/2019 1106  Gross per 24 hour   Intake 1400 ml   Output 0 ml   Net 1400 ml       Physical Exam Performed:    /80   Pulse 82   Temp 98.2 °F (36.8 °C) (Oral)   Resp 20   Ht 5' 2\" (1.575 m)   Wt 290 lb 12.8 oz (131.9 kg)   SpO2 91%   BMI 53.19 kg/m²     General appearance:  Appears stated age and cooperative. HEENT:  Normal cephalic, atraumatic without obvious deformity. Pupils equal, round, and reactive to light. Extra ocular muscles intact. Conjunctivae/corneas clear. Neck: Supple, with full range of motion. No jugular venous distention. Trachea midline. Respiratory:  Normal respiratory effort. Clear to auscultation, bilaterally without Rales/Wheezes/Rhonchi. Cardiovascular:  Regular rate and rhythm with normal S1/S2 without murmurs, rubs or gallops. Abdomen: Soft, non-tender, non-distended with normal bowel sounds. Musculoskeletal:  No clubbing, cyanosis. 3+ BLE pitting edema with weeping bullae. Full range of motion without deformity. Skin: Skin color, texture, turgor normal.  Bright erythema of L foot and distal LLE at level of mid shin -- improved. Neurologic:  Neurovascularly intact without any focal sensory/motor deficits.  Cranial nerves: II-XII intact, grossly non-focal.  Psychiatric:  Alert and oriented, thought content appropriate, normal insight  Capillary Refill: Brisk,< 3 seconds   Peripheral Pulses: +2 palpable, equal bilaterally       Labs:   Recent Labs     04/15/19  1420 04/16/19  0541 04/18/19  0627   WBC 8.9 6.9 10.3   HGB 12.5 12.7 12.9   HCT 39.5 40.1 40.3    201 211     Recent Labs     04/15/19  2330 04/16/19  0541 04/18/19  0627    139 139   K 4.9 4.4 4.2   CL 95* 96* 94*   CO2 30 33* 34*   BUN 31* 28* 33*   CREATININE 1.1 1.0 1.4*   CALCIUM 9.8 9.2 9.4     Recent Labs     04/15/19  1420   AST 15   ALT 10   BILITOT 0.4   ALKPHOS 74     Urinalysis:      Lab Results   Component Value Date    NITRU POSITIVE 04/17/2019    WBCUA 20-50 04/17/2019    BACTERIA 4+ 04/17/2019    RBCUA 0-2 04/17/2019    BLOODU TRACE-LYSED 04/17/2019    SPECGRAV 1.015 04/17/2019 recently.       COPD, asthma, chronic hypoxic respiratory failure:  - Continue inhaled bronchodilators. Was given IV steroids in the ED, no further steroids needed unless there is more convincing evidence of exacerbation.   - Baseline 4LNC. She probably needs to turn it up to 5-6 L when ambulating.  - Has quit smoking.      Hypothyroidism:  - Clinically euthyroid. Continue home dose of levothyroxine.     Years of vaginal discomfort, which the patient says started after a gynecologic surgery:  - Details unclear, continue to follow with gynecology clinic. Pelvic exam by ED physician was unrevealing.    - Pt noted to have excoriation to perineum/labia possibly due to incontinence. Apply cream for now. UTI might be contributing to discomfort. Possible UTI:  - UA shows + nitrites and LE with 20-50 WBC and 4+ bacteria. Pt is already on IV ABX - continued.  F/u urine culture.        DVT Prophylaxis: Lovenox   Diet: DIET CARB CONTROL;  Code Status: Full Code      PT/OT Eval Status: Ordered     Dispo - 2-3 days       Radha Hernández MD

## 2019-04-18 NOTE — CARE COORDINATION
RN SERENE met with the pt at the bedside to confirm that she is active with [facility] for home care services. Pt notified of SNF recs and is agreeable. She would like referral to [facility]. She states that she has been there before and would like to go back. Referral to [facility]. Awaiting response at this time.

## 2019-04-18 NOTE — PROGRESS NOTES
Martha Castorena is a 68 y.o. female patient seen this evening. Increasing pain to left foot. Denies N,V,F,C,D.     Current Facility-Administered Medications   Medication Dose Route Frequency Provider Last Rate Last Dose    hydrOXYzine (ATARAX) tablet 25 mg  25 mg Oral TID PRN Radha Hernández MD        oxyCODONE-acetaminophen (PERCOCET) 5-325 MG per tablet 1 tablet  1 tablet Oral Q4H PRN Radha Hernández MD        linezolid (ZYVOX) tablet 600 mg  600 mg Oral 2 times per day Tanisha Mtz MD        piperacillin-tazobactam (ZOSYN) 3.375 g in dextrose 5 % 50 mL IVPB extended infusion (mini-bag)  3.375 g Intravenous [address] APRN - CNP 12.5 mL/hr at 04/18/19 1542 3.375 g at 04/18/19 1542    traMADol (ULTRAM) tablet 50 mg  50 mg Oral Q4H PRN Attila Almazan APRN - CNP   50 mg at 04/18/19 1709    lactobacillus (CULTURELLE) capsule 1 capsule  1 capsule Oral Daily with breakfast Attila Economy, APRN - CNP   1 capsule at 04/18/19 0843    acetaminophen (TYLENOL) tablet 650 mg  650 mg Oral Q6H PRN Romana Leyland, MD   650 mg at 04/15/19 2313    fluticasone (FLONASE) 50 MCG/ACT nasal spray 2 spray  2 spray Nasal Daily Romana Leyland, MD   2 spray at 04/18/19 0844    furosemide (LASIX) tablet 40 mg  40 mg Oral Daily Romana Leyland, MD   40 mg at 04/18/19 0843    levothyroxine (SYNTHROID) tablet 88 mcg  88 mcg Oral Daily Romana Leyland, MD   88 mcg at 04/18/19 0609    montelukast (SINGULAIR) tablet 10 mg  10 mg Oral Nightly Romana Leyland, MD   10 mg at 04/17/19 2046    oxybutynin (DITROPAN-XL) extended release tablet 10 mg  10 mg Oral Nightly Romana Leyland, MD   10 mg at 04/17/19 2046    albuterol sulfate  (90 Base) MCG/ACT inhaler 2 puff  2 puff Inhalation Q6H PRN Romana Leyland, MD        rosuvastatin (CRESTOR) tablet 40 mg  40 mg Oral QPM Romana Leyland, MD   40 mg at 04/18/19 1709    sodium chloride flush 0.9 % injection 10 mL  10 mL Intravenous 2 times per day Radha Wooten Fabrice Jc MD   10 mL at 04/18/19 0843    sodium chloride flush 0.9 % injection 10 mL  10 mL Intravenous PRN Juventino Felipe MD   10 mL at 04/17/19 0048    magnesium hydroxide (MILK OF MAGNESIA) 400 MG/5ML suspension 30 mL  30 mL Oral Daily PRN Juventino Felipe MD        enoxaparin (LOVENOX) injection 40 mg  40 mg Subcutaneous Daily Juventino Felipe MD   40 mg at 04/18/19 0843    prochlorperazine (COMPAZINE) injection 10 mg  10 mg Intravenous Q6H PRN Juventino Felipe MD        glucose (GLUTOSE) 40 % oral gel 15 g  15 g Oral PRN Juventino Felipe MD        dextrose 50 % solution 12.5 g  12.5 g Intravenous PRN Juventino Felipe MD        glucagon (rDNA) injection 1 mg  1 mg Intramuscular PRN Juventino Felipe MD        dextrose 5 % solution  100 mL/hr Intravenous PRN Juventino Felipe MD        insulin lispro (HUMALOG) injection vial 0-6 Units  0-6 Units Subcutaneous TID WC Juventino Felipe MD   1 Units at 04/17/19 1629    insulin lispro (HUMALOG) injection vial 0-3 Units  0-3 Units Subcutaneous Nightly Juventino Felipe MD   1 Units at 04/16/19 2058    hydrALAZINE (APRESOLINE) injection 5 mg  5 mg Intravenous Q4H PRN Juventino Felipe MD        albuterol (PROVENTIL) nebulizer solution 2.5 mg  2.5 mg Nebulization Q4H WA Juventino Felipe MD   2.5 mg at 04/18/19 1525     Allergies   Allergen Reactions    Aspirin Other (See Comments)     \"ulcers\"    Celecoxib Other (See Comments)     RHINITIS    Fexofenadine Swelling    Gabapentin Other (See Comments)    Niacin Er      Other reaction(s): Flushing    Adhesive Tape Rash     Paper tape  Paper tape  Paper tape  Paper tape     Principal Problem:    Cellulitis  Active Problems:    COPD (chronic obstructive pulmonary disease) [facility]    Diabetes mellitus ([facility]    Hyperlipidemia    Hypertension    Hypothyroidism  Resolved Problems:    * No resolved hospital problems. *    Blood pressure 116/67, pulse 113, temperature 98.1 °F (36.7 °C), temperature source Oral, resp. rate 18, height 5' 2\" (1.575 m), weight 290 lb 12.8 oz (131.9 kg), SpO2 90 %. Subjective  Objective   Integument:  +2 pitting edema b/l LE. Right LE is warm with mild erythema. LLE has wound to dorsal foot 2.8cm x 2.1cm with intact blister. Increasing erythema surrounding dorsal blister. Increasing pain to palpation around blister. Erythema extends beyond marked area on toes. Anterior tibial dry blistering raised purpuric lesion. NO surrounding erythema. Neurologic:  Protective sensation is grossly diminished to light touch at the level of the toes, bilateral.  Vascular:  DP and PT pulses are palpable, bilateral.  CFT is brisk to all toes. Musculoskeletal:  Pain to palpation surrounding blister on foot. MRI: pending    Assessment & Plan  Impression/Recommendations: The patient is a pleasant 68 y.o. female with:  1) Bullous lesion left foot with secondary bacterial infection  -Abx per ID  -Increased erythema today with increasing pain. WBC  6.9->10.3.   -Ordered MRI to evaluate for fluid collection. 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 Agree with ID  on primary dermatologic etiology with secondary infection as opposed to venous stasis ulcerations    2) DM II     Thank you for the opportunity to take part in this patient's care.   Please call me with any questions.     Jacqueline Graff DPM  Office: [phone]  [facility]  4/18/2019

## 2019-04-18 NOTE — PROGRESS NOTES
and Thyroid disease. has a past surgical history that includes Cholecystectomy; xr knee inc tunnel bilat (Bilateral, 2014); Rotator cuff repair (Left, 2015); and Thyroid lobectomy. Restrictions  Restrictions/Precautions  Restrictions/Precautions: General Precautions  Position Activity Restriction  Other position/activity restrictions: purewick  Subjective   General  Chart Reviewed: Yes  Patient assessed for rehabilitation services?: Yes  Family / Caregiver Present: No  Diagnosis: cellulitis  General Comment  Comments: Per RN ok for treatment  Pain Assessment  Pain Assessment: 0-10  Pain Level: 10  Pain Location: Foot;Head;Vagina  Non-Pharmaceutical Pain Intervention(s): Repositioned; Emotional support; Therapeutic presence;Rest;Ambulation/Increased Activity;Relaxation techniques  Response to Pain Intervention: Patient Satisfied  Vital Signs  Patient Currently in Pain: Yes   Orientation  Orientation  Overall Orientation Status: Within Functional Limits  Objective    ADL  LE Dressing: Dependent/Total(donning sock)  Toileting: Moderate assistance(assist to manage clothing and complete pericare; RW and gait belt)     Balance  Sitting Balance: Stand by assistance  Standing Balance: Moderate assistance  Standing Balance  Time: ~1 min x2  Activity: transferring to BSC; standing for pericare assistance  Comment: pt unable to tolerate longer standing/sitting 2/2 pain   Functional Mobility  Functional - Mobility Device: Rolling Walker  Activity: Other(BSC transfer)  Assist Level: Moderate assistance  Functional Mobility Comments: RW and gait belt  Toilet Transfers  Toilet - Technique: Stand pivot  Equipment Used: Standard bedside commode  Toilet Transfer: Moderate assistance  Toilet Transfers Comments: using RW and gait belt  Bed mobility  Rolling to Left: Supervision  Rolling to Right: Supervision  Supine to Sit: Supervision  Sit to Supine:  Moderate assistance(management of BLE)  Scooting: Supervision(toward EOB)  Transfers  Sit to stand:  Moderate assistance  Stand to sit: Moderate assistance  Transfer Comments: using RW and gait belt     Cognition  Overall Cognitive Status: WFL     LUE AROM (degrees)  LUE AROM : WFL  LUE General AROM: hx of L rotator cuff repair  RUE AROM (degrees)  RUE AROM : Exceptions  RUE General AROM: ~90 degrees shoulder flexion; pt reported thinking she needed R rotator cuff replaced as well     Plan   Plan  Times per week: 3-5x/wk  Current Treatment Recommendations: Strengthening, Endurance Training, Patient/Caregiver Education & Training, Self-Care / ADL, Equipment Evaluation, Education, & procurement, Safety Education & Training, Functional Mobility Training    AM-PAC Score     AM-[facility] Inpatient Daily Activity Raw Score: 16  AM-PAC Inpatient ADL T-Scale Score : 35.96  ADL Inpatient CMS 0-100% Score: 53.32  ADL Inpatient CMS G-Code Modifier : CK    Goals  Short term goals  Time Frame for Short term goals: within one week (7/24)  Short term goal 1: Pt will complete toilet transfer with supervision by 4/21  Short term goal 2: Pt will complete standing ADL task with supervision  Short term goal 3: Pt will complete BUE AROM ther ex without cues  Patient Goals   Patient goals : \"feel better\"     Therapy Time   Individual Concurrent Group Co-treatment   Time In 1427         Time Out 1453         Minutes 26         Timed Code Treatment Minutes: 26 Minutes       KEITH Mercedes/L

## 2019-04-19 ENCOUNTER — APPOINTMENT (OUTPATIENT)
Dept: MRI IMAGING | Age: 74
DRG: 602 | End: 2019-04-19
Payer: MEDICARE

## 2019-04-19 LAB
ANION GAP SERPL CALCULATED.3IONS-SCNC: 8 MMOL/L (ref 3–16)
BUN BLDV-MCNC: 28 MG/DL (ref 7–20)
CALCIUM SERPL-MCNC: 8.9 MG/DL (ref 8.3–10.6)
CHLORIDE BLD-SCNC: 92 MMOL/L (ref 99–110)
CO2: 40 MMOL/L (ref 21–32)
CREAT SERPL-MCNC: 1.3 MG/DL (ref 0.6–1.2)
GFR AFRICAN AMERICAN: 49
GFR NON-AFRICAN AMERICAN: 40
GLUCOSE BLD-MCNC: 131 MG/DL (ref 70–99)
GLUCOSE BLD-MCNC: 165 MG/DL (ref 70–99)
GLUCOSE BLD-MCNC: 190 MG/DL (ref 70–99)
HCT VFR BLD CALC: 38.2 % (ref 36–48)
HEMOGLOBIN: 12.2 G/DL (ref 12–16)
MCH RBC QN AUTO: 27.6 PG (ref 26–34)
MCHC RBC AUTO-ENTMCNC: 32 G/DL (ref 31–36)
MCV RBC AUTO: 86.2 FL (ref 80–100)
ORGANISM: ABNORMAL
PDW BLD-RTO: 18.8 % (ref 12.4–15.4)
PERFORMED ON: ABNORMAL
PLATELET # BLD: 160 K/UL (ref 135–450)
PMV BLD AUTO: 8.1 FL (ref 5–10.5)
POTASSIUM REFLEX MAGNESIUM: 3.9 MMOL/L (ref 3.5–5.1)
RBC # BLD: 4.43 M/UL (ref 4–5.2)
SODIUM BLD-SCNC: 140 MMOL/L (ref 136–145)
URINE CULTURE, ROUTINE: ABNORMAL
URINE CULTURE, ROUTINE: ABNORMAL
WBC # BLD: 7.8 K/UL (ref 4–11)

## 2019-04-19 PROCEDURE — 6370000000 HC RX 637 (ALT 250 FOR IP): Performed by: REGISTERED NURSE

## 2019-04-19 PROCEDURE — 2580000003 HC RX 258: Performed by: REGISTERED NURSE

## 2019-04-19 PROCEDURE — 6360000002 HC RX W HCPCS: Performed by: INTERNAL MEDICINE

## 2019-04-19 PROCEDURE — 73718 MRI LOWER EXTREMITY W/O DYE: CPT

## 2019-04-19 PROCEDURE — 6370000000 HC RX 637 (ALT 250 FOR IP): Performed by: INTERNAL MEDICINE

## 2019-04-19 PROCEDURE — 94761 N-INVAS EAR/PLS OXIMETRY MLT: CPT

## 2019-04-19 PROCEDURE — 99232 SBSQ HOSP IP/OBS MODERATE 35: CPT | Performed by: INTERNAL MEDICINE

## 2019-04-19 PROCEDURE — 2700000000 HC OXYGEN THERAPY PER DAY

## 2019-04-19 PROCEDURE — 2580000003 HC RX 258: Performed by: INTERNAL MEDICINE

## 2019-04-19 PROCEDURE — 85027 COMPLETE CBC AUTOMATED: CPT

## 2019-04-19 PROCEDURE — 51702 INSERT TEMP BLADDER CATH: CPT

## 2019-04-19 PROCEDURE — 1200000000 HC SEMI PRIVATE

## 2019-04-19 PROCEDURE — 36415 COLL VENOUS BLD VENIPUNCTURE: CPT

## 2019-04-19 PROCEDURE — 97535 SELF CARE MNGMENT TRAINING: CPT

## 2019-04-19 PROCEDURE — 6360000002 HC RX W HCPCS: Performed by: REGISTERED NURSE

## 2019-04-19 PROCEDURE — 94640 AIRWAY INHALATION TREATMENT: CPT

## 2019-04-19 PROCEDURE — 6370000000 HC RX 637 (ALT 250 FOR IP)

## 2019-04-19 PROCEDURE — 80048 BASIC METABOLIC PNL TOTAL CA: CPT

## 2019-04-19 RX ORDER — OXYCODONE HYDROCHLORIDE AND ACETAMINOPHEN 5; 325 MG/1; MG/1
1 TABLET ORAL EVERY 4 HOURS PRN
Status: DISCONTINUED | OUTPATIENT
Start: 2019-04-19 | End: 2019-04-22

## 2019-04-19 RX ADMIN — ENOXAPARIN SODIUM 70 MG: 80 INJECTION SUBCUTANEOUS at 09:11

## 2019-04-19 RX ADMIN — Medication 10 ML: at 09:12

## 2019-04-19 RX ADMIN — MONTELUKAST 10 MG: 10 TABLET, FILM COATED ORAL at 21:32

## 2019-04-19 RX ADMIN — INSULIN LISPRO 1 UNITS: 100 INJECTION, SOLUTION INTRAVENOUS; SUBCUTANEOUS at 12:10

## 2019-04-19 RX ADMIN — Medication 1 CAPSULE: at 09:11

## 2019-04-19 RX ADMIN — OXYBUTYNIN 10 MG: 5 TABLET, FILM COATED, EXTENDED RELEASE ORAL at 21:31

## 2019-04-19 RX ADMIN — LINEZOLID 600 MG: 600 TABLET, FILM COATED ORAL at 09:11

## 2019-04-19 RX ADMIN — ROSUVASTATIN CALCIUM 40 MG: 10 TABLET, FILM COATED ORAL at 17:46

## 2019-04-19 RX ADMIN — FLUTICASONE PROPIONATE 2 SPRAY: 50 SPRAY, METERED NASAL at 09:11

## 2019-04-19 RX ADMIN — PROCHLORPERAZINE EDISYLATE 10 MG: 5 INJECTION INTRAMUSCULAR; INTRAVENOUS at 22:02

## 2019-04-19 RX ADMIN — LINEZOLID 600 MG: 600 TABLET, FILM COATED ORAL at 21:29

## 2019-04-19 RX ADMIN — ALBUTEROL SULFATE 2.5 MG: 2.5 SOLUTION RESPIRATORY (INHALATION) at 09:02

## 2019-04-19 RX ADMIN — OXYCODONE HYDROCHLORIDE AND ACETAMINOPHEN 1 TABLET: 5; 325 TABLET ORAL at 22:35

## 2019-04-19 RX ADMIN — OXYCODONE HYDROCHLORIDE AND ACETAMINOPHEN 1 TABLET: 5; 325 TABLET ORAL at 03:32

## 2019-04-19 RX ADMIN — FUROSEMIDE 40 MG: 40 TABLET ORAL at 09:11

## 2019-04-19 RX ADMIN — SALINE NASAL SPRAY: 1.5 SOLUTION NASAL at 18:04

## 2019-04-19 RX ADMIN — INSULIN LISPRO 1 UNITS: 100 INJECTION, SOLUTION INTRAVENOUS; SUBCUTANEOUS at 21:41

## 2019-04-19 RX ADMIN — ALBUTEROL SULFATE 2.5 MG: 2.5 SOLUTION RESPIRATORY (INHALATION) at 16:36

## 2019-04-19 RX ADMIN — PIPERACILLIN SODIUM,TAZOBACTAM SODIUM 3.38 G: 3; .375 INJECTION, POWDER, FOR SOLUTION INTRAVENOUS at 09:11

## 2019-04-19 RX ADMIN — ALBUTEROL SULFATE 2.5 MG: 2.5 SOLUTION RESPIRATORY (INHALATION) at 20:12

## 2019-04-19 RX ADMIN — OXYCODONE HYDROCHLORIDE AND ACETAMINOPHEN 1 TABLET: 5; 325 TABLET ORAL at 15:34

## 2019-04-19 RX ADMIN — LEVOTHYROXINE SODIUM 88 MCG: 0.09 TABLET ORAL at 09:11

## 2019-04-19 RX ADMIN — Medication 10 ML: at 21:32

## 2019-04-19 ASSESSMENT — PAIN - FUNCTIONAL ASSESSMENT
PAIN_FUNCTIONAL_ASSESSMENT: PREVENTS OR INTERFERES SOME ACTIVE ACTIVITIES AND ADLS
PAIN_FUNCTIONAL_ASSESSMENT: PREVENTS OR INTERFERES SOME ACTIVE ACTIVITIES AND ADLS

## 2019-04-19 ASSESSMENT — PAIN DESCRIPTION - PAIN TYPE
TYPE: CHRONIC PAIN

## 2019-04-19 ASSESSMENT — PAIN SCALES - GENERAL
PAINLEVEL_OUTOF10: 5
PAINLEVEL_OUTOF10: 8
PAINLEVEL_OUTOF10: 9
PAINLEVEL_OUTOF10: 4
PAINLEVEL_OUTOF10: 9
PAINLEVEL_OUTOF10: 8
PAINLEVEL_OUTOF10: 10

## 2019-04-19 ASSESSMENT — PAIN DESCRIPTION - LOCATION
LOCATION: BACK

## 2019-04-19 ASSESSMENT — PAIN DESCRIPTION - ORIENTATION
ORIENTATION: LOWER
ORIENTATION: LOWER

## 2019-04-19 ASSESSMENT — PAIN SCALES - WONG BAKER: WONGBAKER_NUMERICALRESPONSE: 6

## 2019-04-19 ASSESSMENT — PAIN DESCRIPTION - ONSET: ONSET: ON-GOING

## 2019-04-19 ASSESSMENT — PAIN DESCRIPTION - PROGRESSION
CLINICAL_PROGRESSION: NOT CHANGED

## 2019-04-19 ASSESSMENT — PAIN DESCRIPTION - FREQUENCY: FREQUENCY: INTERMITTENT

## 2019-04-19 ASSESSMENT — PAIN DESCRIPTION - DESCRIPTORS
DESCRIPTORS: SHOOTING
DESCRIPTORS: DISCOMFORT;SHOOTING

## 2019-04-19 NOTE — PROGRESS NOTES
Infectious Disease Follow up Notes    CC :  stan LE cellulitis and L foot wound      Antibiotics:   Zosyn 3.375 q8  lkinezolid    Admit Date:   4/15/2019  Hospital Day: 5    Subjective:   She remains afebrile   Says she feels weka today. Continued pain in the foot    Objective:     Patient Vitals for the past 8 hrs:   BP Temp Temp src Pulse Resp SpO2   04/19/19 1126 -- -- -- -- -- 91 %   04/19/19 1124 117/72 -- Oral 97 16 (!) 89 %   04/19/19 0904 116/67 98.3 °F (36.8 °C) Oral 92 16 95 %       EXAM:  General:  Alert, NAD    HEENT:  No scleral icterus. MMM    LUNGS:  Upper lobes clear  CV:  RRR  ABD:  Soft, NT, +BS    EXT: Intensity of the erythema dorsal L foot has diminished, +tender  Faint erythema stan LE, R>L.     Unchanged, non-blanching purpuric lesion anterior L tibia with slight expansion        LINE: IV site ok        Scheduled Meds:   enoxaparin  0.5 mg/kg Subcutaneous Daily    linezolid  600 mg Oral 2 times per day    lactobacillus  1 capsule Oral Daily with breakfast    fluticasone  2 spray Nasal Daily    furosemide  40 mg Oral Daily    levothyroxine  88 mcg Oral Daily    montelukast  10 mg Oral Nightly    oxybutynin  10 mg Oral Nightly    rosuvastatin  40 mg Oral QPM    sodium chloride flush  10 mL Intravenous 2 times per day    insulin lispro  0-6 Units Subcutaneous TID WC    insulin lispro  0-3 Units Subcutaneous Nightly    albuterol  2.5 mg Nebulization Q4H WA       Continuous Infusions:   dextrose            Data Review:    Lab Results   Component Value Date    WBC 7.8 04/19/2019    HGB 12.2 04/19/2019    HCT 38.2 04/19/2019    MCV 86.2 04/19/2019     04/19/2019     Lab Results   Component Value Date    CREATININE 1.3 (H) 04/19/2019    BUN 28 (H) 04/19/2019     04/19/2019    K 3.9 04/19/2019    CL 92 (L) 04/19/2019    CO2 40 (H) 04/19/2019       Hepatic Function Panel:   Lab Results   Component Value Date    ALKPHOS 74 04/15/2019    ALT 10 04/15/2019    AST 15 04/15/2019    PROT 6.9 04/15/2019    BILITOT 0.4 04/15/2019    LABALBU 3.8 04/15/2019     ESR 29       MICRO:  4/17 UC NGTD   Wound cx L foot MRSA , GS no organisms       IMAGING:  Echo 4/16:  Summary   Normal left ventricular systolic function with ejection fraction of 55-60%.  Septal bounce noted due to right ventricular volume overload.   Mild concentric left ventricular hypertrophy.   Grade I diastolic dysfunction with normal filling pressure.   Mild mitral and tricuspid regurgitation.   The right ventricle is moderately enlarged.   Right ventricular systolic function is moderately reduced .   S' prime velocity is measured at 7 cm/s.   Systolic pulmonic artery pressure (SPAP) is estimated at 55 mmHg consistent   with mild pulmonary hypertension (Right atrial pressure of 15 mmHg).   The right atrium is moderately dilated.       Assessment:     Patient Active Problem List    Diagnosis Date Noted    Thyroid nodule 03/14/2016     Priority: High     Class: Acute    Pulmonary nodule, left 03/14/2016     Priority: High     Class: Acute    Cellulitis 04/15/2019    HCAP (healthcare-associated pneumonia)     Acute hypoxemic respiratory failure (HCC)     Pulmonary edema, acute (HCC)     Aspiration pneumonia (HCC)     [facility]) [facility]     Acute respiratory failure with hypoxia and hypercapnia ([address] 12/22/2018    Acute encephalopathy 12/22/2018    Pneumonia due to infectious organism 12/22/2018    Diabetes mellitus ([address]     Hyperlipidemia     Hypertension     Hypothyroidism     Polycythemia, secondary 12/18/2014    Leukocytosis 12/18/2014    COPD (chronic obstructive pulmonary disease) ([facility] 12/18/2014     Multiple medical problems, including DM and obesity      Admitted for management of stan LE cellulitis and L foot  Wound culture L foot with MRSA     JAQUELIN - better     No abx allergies     Plan:   Continue linezolid, stop Zosyn  MRI ordered       Discussed with patient/family, all questions answered        Perfecto Saldaña MD  Phone: [phone]   Fax : [phone]

## 2019-04-19 NOTE — PROGRESS NOTES
Hospitalist Progress Note      PCP: No primary care provider on file. Date of Admission: 4/15/2019    Chief Complaint: Left foot pain     Hospital Course:     68 Y F with a h/o COPD, HTN, HLD, and DM2 who presented to the ED with increasing pain, erythema, and edema of her distal LLE surrounding two shallow ulcers which had been \"festering\" for the last week or so. Her legs have been quite edematous for the last few weeks, to the point that she has developed a few bullae which have ruptured and drained serous fluid multiple times. This has left her with sores on the dorsum on her L foot and on her mid L shin. The skin around the ruptured bullae rapidly turned a bright red and became tender over the last few days. She denies fevers or chills. Subjective:   Pt is on 4 LPM. Afebrile. VSS. No complaints of dyspnea or chest pain. No N/V/D. Reports left foot pain.      Medications:  Reviewed    Infusion Medications    dextrose       Scheduled Medications    enoxaparin  0.5 mg/kg Subcutaneous Daily    linezolid  600 mg Oral 2 times per day    piperacillin-tazobactam  3.375 g Intravenous Q8H    lactobacillus  1 capsule Oral Daily with breakfast    fluticasone  2 spray Nasal Daily    furosemide  40 mg Oral Daily    levothyroxine  88 mcg Oral Daily    montelukast  10 mg Oral Nightly    oxybutynin  10 mg Oral Nightly    rosuvastatin  40 mg Oral QPM    sodium chloride flush  10 mL Intravenous 2 times per day    insulin lispro  0-6 Units Subcutaneous TID WC    insulin lispro  0-3 Units Subcutaneous Nightly    albuterol  2.5 mg Nebulization Q4H WA     PRN Meds: oxyCODONE-acetaminophen, hydrOXYzine, acetaminophen, albuterol sulfate HFA, sodium chloride flush, magnesium hydroxide, prochlorperazine, glucose, dextrose, glucagon (rDNA), dextrose, hydrALAZINE      Intake/Output Summary (Last 24 hours) at 4/19/2019 1032  Last data filed at 4/19/2019 0530  Gross per 24 hour   Intake 1080 ml   Output 1200 ml Net -120 ml       Physical Exam Performed:    /67   Pulse 92   Temp 98.3 °F (36.8 °C) (Oral)   Resp 16   Ht 5' 2\" (1.575 m)   Wt 290 lb 12.8 oz (131.9 kg)   SpO2 95%   BMI 53.19 kg/m²     General appearance:  Appears stated age and cooperative. HEENT:  Normal cephalic, atraumatic without obvious deformity. Pupils equal, round, and reactive to light. Extra ocular muscles intact. Conjunctivae/corneas clear. Neck: Supple, with full range of motion. No jugular venous distention. Trachea midline. Respiratory:  Normal respiratory effort. Clear to auscultation, bilaterally without Rales/Wheezes/Rhonchi. Cardiovascular:  Regular rate and rhythm with normal S1/S2 without murmurs, rubs or gallops. Abdomen: Soft, non-tender, non-distended with normal bowel sounds. Musculoskeletal:  No clubbing, cyanosis. 2+ BLE pitting edema with weeping bullae. Full range of motion without deformity. Skin: Skin color, texture, turgor normal.  Bright erythema of L foot and distal LLE at level of mid shin -- improved. Neurologic:  Neurovascularly intact without any focal sensory/motor deficits.  Cranial nerves: II-XII intact, grossly non-focal.  Psychiatric:  Alert and oriented, thought content appropriate, normal insight  Capillary Refill: Brisk,< 3 seconds   Peripheral Pulses: +2 palpable, equal bilaterally       Labs:   Recent Labs     04/18/19  0627 04/19/19  0740   WBC 10.3 7.8   HGB 12.9 12.2   HCT 40.3 38.2    160     Recent Labs     04/18/19  0627 04/19/19  0740    140   K 4.2 3.9   CL 94* 92*   CO2 34* 40*   BUN 33* 28*   CREATININE 1.4* 1.3*   CALCIUM 9.4 8.9   Urinalysis:      Lab Results   Component Value Date    NITRU POSITIVE 04/17/2019    WBCUA 20-50 04/17/2019    BACTERIA 4+ 04/17/2019    RBCUA 0-2 04/17/2019    BLOODU TRACE-LYSED 04/17/2019    SPECGRAV 1.015 04/17/2019    GLUCOSEU Negative 04/17/2019       Radiology:  XR FOOT LEFT (MIN 3 VIEWS)   Final Result   No x-ray evidence of osteomyelitis. No acute osseous injury. Soft tissue swelling of the foot- nonspecific for edema or cellulitis. VL Extremity Venous Bilateral   Final Result      XR SHOULDER RIGHT (MIN 2 VIEWS)   Final Result   No acute abnormality of the right shoulder         XR CHEST PORTABLE   Final Result   Mild left basilar atelectasis or scarring. Otherwise, no acute   cardiopulmonary disease. MRI FOOT LEFT WO CONTRAST    (Results Pending)       Assessment/Plan:    Active Hospital Problems    Diagnosis Date Noted    Cellulitis [L03.90] 04/15/2019    Diabetes mellitus ([facility] [address] [E11.9]     Hyperlipidemia [E78.5]     Hypertension [I10]     Hypothyroidism [E03.9]     COPD (chronic obstructive pulmonary disease) ([facility] [address] [J44.9] 12/18/2014       LLE bacterial cellulitis:  - Pt started on IV clindamycin on admission. Cellulitis initially improved but appeared worse 4/17 (erythema noticed outside margins that have been marked). D/C'd clinda and changed to Vanc/Zosyn. ID and Podiatry consulted. Vanc changed to Zyvox per ID due to worsening renal fx. Wound culture is growing MRSA. IV abx management per ID.   - Pt given Furosemide IV upon admission, then resumed her 40 PO daily. Continue BLE elevation. When the wounds heal more in the future she should also wear compression stockings. She does not carry a diagnosis of CHF, ECHO obtained and shows normal LVEF of 55-60%, G1DD, cor pulmonale. BNP was elevated but CXR did not show edema. BLE venous dopplers are negative for DVT. - Wound care consulted. Continue local wound care to left foot bullous lesion.   - Xray of left foot negative for osteo. Plan for MRI of left today per Podiatry.      Hyperkalemia (resolved):  - Continue to hold potassium supplement and losartan. - Pt given furosemide, nebulizers, and kayexalate on admission.       Hypertension:  - Holding losartan as above.  Hydralazine meanwhile.       Hyperlipidemia:  - Continue statin.     Diabetes mellitus type 2:  - Holding metformin while here. SSI meanwhile. A1c's have been controlled recently.       COPD, asthma, chronic hypoxic respiratory failure:  - Continue inhaled bronchodilators. Was given IV steroids in the ED, no further steroids needed unless there is more convincing evidence of exacerbation.   - Baseline 4LNC. She probably needs to turn it up to 5-6 L when ambulating.  - Has quit smoking.      Hypothyroidism:  - Clinically euthyroid. Continue home dose of levothyroxine.     Years of vaginal discomfort, which the patient says started after a gynecologic surgery:  - Details unclear, continue to follow with gynecology clinic. Pelvic exam by ED physician was unrevealing.    - Pt noted to have excoriation to perineum/labia possibly due to incontinence. Apply cream for now. UTI might be contributing to discomfort. UTI:  - UA shows + nitrites and LE with 20-50 WBC and 4+ bacteria. Pt is already on IV ABX - continued.  Urine culture grew E. Coli.        DVT Prophylaxis: Lovenox   Diet: DIET CARB CONTROL;  Code Status: Full Code      PT/OT Eval Status: Ordered with recs for SNF    Dispo - 2-3 days       CARLOS Del Cid - CNP

## 2019-04-19 NOTE — PROGRESS NOTES
Jose Gao is a 68 y.o. female patient seen today resting comfortably. Still has pain to Left foot.     Current Facility-Administered Medications   Medication Dose Route Frequency Provider Last Rate Last Dose    sodium chloride (OCEAN, BABY AYR) 0.65 % nasal spray             oxyCODONE-acetaminophen (PERCOCET) 5-325 MG per tablet 1 tablet  1 tablet Oral Q4H PRN CARLOS Tanner - CNP   1 tablet at 04/19/19 1534    enoxaparin (LOVENOX) injection 70 mg  0.5 mg/kg Subcutaneous Daily Tiarra Garcia APRN - CNP   70 mg at 04/19/19 0911    hydrOXYzine (ATARAX) tablet 25 mg  25 mg Oral TID PRN Berna Marques MD        linezolid (ZYVOX) tablet 600 mg  600 mg Oral 2 times per day Grey Regalado MD   600 mg at 04/19/19 0911    lactobacillus (CULTURELLE) capsule 1 capsule  1 capsule Oral Daily with breakfast CARLOS Clancy - CNP   1 capsule at 04/19/19 0911    acetaminophen (TYLENOL) tablet 650 mg  650 mg Oral Q6H PRN Brigitte Ma MD   650 mg at 04/15/19 2313    fluticasone (FLONASE) 50 MCG/ACT nasal spray 2 spray  2 spray Nasal Daily Brigitte Ma MD   2 spray at 04/19/19 0911    furosemide (LASIX) tablet 40 mg  40 mg Oral Daily Brigitte Ma MD   40 mg at 04/19/19 0911    levothyroxine (SYNTHROID) tablet 88 mcg  88 mcg Oral Daily Brigitte Ma MD   88 mcg at 04/19/19 0911    montelukast (SINGULAIR) tablet 10 mg  10 mg Oral Nightly Brigitte Ma MD   10 mg at 04/18/19 2107    oxybutynin (DITROPAN-XL) extended release tablet 10 mg  10 mg Oral Nightly Brigitte Ma MD   10 mg at 04/18/19 2107    albuterol sulfate  (90 Base) MCG/ACT inhaler 2 puff  2 puff Inhalation Q6H PRN Brigitte Ma MD        rosuvastatin (CRESTOR) tablet 40 mg  40 mg Oral QPM Brigitte Ma MD   40 mg at 04/18/19 1709    sodium chloride flush 0.9 % injection 10 mL  10 mL Intravenous 2 times per day Brigitte Ma MD   10 mL at 04/19/19 0912    sodium chloride flush 0.9 % injection 10 mL  10 mL Intravenous PRN Faizan Khan MD   10 mL at 04/17/19 0048    magnesium hydroxide (MILK OF MAGNESIA) 400 MG/5ML suspension 30 mL  30 mL Oral Daily PRN Faizan Khan MD        prochlorperazine (COMPAZINE) injection 10 mg  10 mg Intravenous Q6H PRN Faizan Khan MD        glucose (GLUTOSE) 40 % oral gel 15 g  15 g Oral PRN Faizan Khan MD        dextrose 50 % solution 12.5 g  12.5 g Intravenous PRN Faizan Khan MD        glucagon (rDNA) injection 1 mg  1 mg Intramuscular PRN Faizan Khan MD        dextrose 5 % solution  100 mL/hr Intravenous PRN Faizan Khan MD        insulin lispro (HUMALOG) injection vial 0-6 Units  0-6 Units Subcutaneous TID WC Faizan Khan MD   1 Units at 04/19/19 1210    insulin lispro (HUMALOG) injection vial 0-3 Units  0-3 Units Subcutaneous Nightly Faizan Khan MD   1 Units at 04/18/19 2318    hydrALAZINE (APRESOLINE) injection 5 mg  5 mg Intravenous Q4H PRN Faizan Khan MD        albuterol (PROVENTIL) nebulizer solution 2.5 mg  2.5 mg Nebulization Q4H WA Faizan Khan MD   Stopped at 04/19/19 1253     Allergies   Allergen Reactions    Aspirin Other (See Comments)     \"ulcers\"    Celecoxib Other (See Comments)     RHINITIS    Fexofenadine Swelling    Gabapentin Other (See Comments)    Niacin Er      Other reaction(s): Flushing    Adhesive Tape Rash     Paper tape  Paper tape  Paper tape  Paper tape     Principal Problem:    Cellulitis  Active Problems:    COPD (chronic obstructive pulmonary disease) ([facility] [address]    Diabetes mellitus ([facility] [address]    Hyperlipidemia    Hypertension    Hypothyroidism  Resolved Problems:    * No resolved hospital problems. *    Blood pressure 117/77, pulse 91, temperature 99.8 °F (37.7 °C), temperature source Axillary, resp. rate 18, height 5' 2\" (1.575 m), weight 290 lb 12.8 oz (131.9 kg), SpO2 92 %. Subjective  Objective   Integument:  +2 pitting edema b/l LE. Right LE is warm with mild erythema.

## 2019-04-20 LAB
ANION GAP SERPL CALCULATED.3IONS-SCNC: 8 MMOL/L (ref 3–16)
BUN BLDV-MCNC: 25 MG/DL (ref 7–20)
CALCIUM SERPL-MCNC: 9.3 MG/DL (ref 8.3–10.6)
CHLORIDE BLD-SCNC: 90 MMOL/L (ref 99–110)
CO2: 41 MMOL/L (ref 21–32)
CREAT SERPL-MCNC: 1.2 MG/DL (ref 0.6–1.2)
GFR AFRICAN AMERICAN: 53
GFR NON-AFRICAN AMERICAN: 44
GLUCOSE BLD-MCNC: 124 MG/DL (ref 70–99)
GLUCOSE BLD-MCNC: 133 MG/DL (ref 70–99)
GLUCOSE BLD-MCNC: 154 MG/DL (ref 70–99)
GLUCOSE BLD-MCNC: 160 MG/DL (ref 70–99)
GLUCOSE BLD-MCNC: 198 MG/DL (ref 70–99)
GRAM STAIN RESULT: ABNORMAL
HCT VFR BLD CALC: 38.9 % (ref 36–48)
HEMOGLOBIN: 12.5 G/DL (ref 12–16)
MAGNESIUM: 1.4 MG/DL (ref 1.8–2.4)
MAGNESIUM: 2.2 MG/DL (ref 1.8–2.4)
MCH RBC QN AUTO: 27.5 PG (ref 26–34)
MCHC RBC AUTO-ENTMCNC: 32.2 G/DL (ref 31–36)
MCV RBC AUTO: 85.4 FL (ref 80–100)
ORGANISM: ABNORMAL
PDW BLD-RTO: 18.6 % (ref 12.4–15.4)
PERFORMED ON: ABNORMAL
PLATELET # BLD: 153 K/UL (ref 135–450)
PMV BLD AUTO: 8 FL (ref 5–10.5)
POTASSIUM REFLEX MAGNESIUM: 3.8 MMOL/L (ref 3.5–5.1)
RBC # BLD: 4.55 M/UL (ref 4–5.2)
SODIUM BLD-SCNC: 139 MMOL/L (ref 136–145)
WBC # BLD: 7.1 K/UL (ref 4–11)
WOUND/ABSCESS: ABNORMAL
WOUND/ABSCESS: ABNORMAL

## 2019-04-20 PROCEDURE — 2700000000 HC OXYGEN THERAPY PER DAY

## 2019-04-20 PROCEDURE — 6370000000 HC RX 637 (ALT 250 FOR IP): Performed by: INTERNAL MEDICINE

## 2019-04-20 PROCEDURE — 6360000002 HC RX W HCPCS: Performed by: INTERNAL MEDICINE

## 2019-04-20 PROCEDURE — 83735 ASSAY OF MAGNESIUM: CPT

## 2019-04-20 PROCEDURE — 6360000002 HC RX W HCPCS: Performed by: REGISTERED NURSE

## 2019-04-20 PROCEDURE — 94640 AIRWAY INHALATION TREATMENT: CPT

## 2019-04-20 PROCEDURE — 6370000000 HC RX 637 (ALT 250 FOR IP)

## 2019-04-20 PROCEDURE — 2580000003 HC RX 258: Performed by: INTERNAL MEDICINE

## 2019-04-20 PROCEDURE — 80048 BASIC METABOLIC PNL TOTAL CA: CPT

## 2019-04-20 PROCEDURE — 85027 COMPLETE CBC AUTOMATED: CPT

## 2019-04-20 PROCEDURE — 1200000000 HC SEMI PRIVATE

## 2019-04-20 PROCEDURE — 36415 COLL VENOUS BLD VENIPUNCTURE: CPT

## 2019-04-20 PROCEDURE — 94761 N-INVAS EAR/PLS OXIMETRY MLT: CPT

## 2019-04-20 PROCEDURE — 93005 ELECTROCARDIOGRAM TRACING: CPT | Performed by: INTERNAL MEDICINE

## 2019-04-20 PROCEDURE — 6370000000 HC RX 637 (ALT 250 FOR IP): Performed by: REGISTERED NURSE

## 2019-04-20 RX ORDER — MAGNESIUM SULFATE IN WATER 40 MG/ML
4 INJECTION, SOLUTION INTRAVENOUS ONCE
Status: COMPLETED | OUTPATIENT
Start: 2019-04-20 | End: 2019-04-20

## 2019-04-20 RX ADMIN — INSULIN LISPRO 1 UNITS: 100 INJECTION, SOLUTION INTRAVENOUS; SUBCUTANEOUS at 12:10

## 2019-04-20 RX ADMIN — INSULIN LISPRO 1 UNITS: 100 INJECTION, SOLUTION INTRAVENOUS; SUBCUTANEOUS at 09:12

## 2019-04-20 RX ADMIN — INSULIN LISPRO 1 UNITS: 100 INJECTION, SOLUTION INTRAVENOUS; SUBCUTANEOUS at 21:29

## 2019-04-20 RX ADMIN — ALBUTEROL SULFATE 2.5 MG: 2.5 SOLUTION RESPIRATORY (INHALATION) at 16:13

## 2019-04-20 RX ADMIN — OXYBUTYNIN 10 MG: 5 TABLET, FILM COATED, EXTENDED RELEASE ORAL at 21:29

## 2019-04-20 RX ADMIN — ROSUVASTATIN CALCIUM 40 MG: 10 TABLET, FILM COATED ORAL at 22:01

## 2019-04-20 RX ADMIN — LEVOTHYROXINE SODIUM 88 MCG: 0.09 TABLET ORAL at 06:14

## 2019-04-20 RX ADMIN — OXYCODONE HYDROCHLORIDE AND ACETAMINOPHEN 1 TABLET: 5; 325 TABLET ORAL at 12:10

## 2019-04-20 RX ADMIN — ENOXAPARIN SODIUM 70 MG: 80 INJECTION SUBCUTANEOUS at 09:11

## 2019-04-20 RX ADMIN — Medication: at 02:33

## 2019-04-20 RX ADMIN — MAGNESIUM SULFATE HEPTAHYDRATE 4 G: 40 INJECTION, SOLUTION INTRAVENOUS at 14:06

## 2019-04-20 RX ADMIN — Medication 10 ML: at 21:29

## 2019-04-20 RX ADMIN — Medication 1 CAPSULE: at 09:11

## 2019-04-20 RX ADMIN — LINEZOLID 600 MG: 600 TABLET, FILM COATED ORAL at 09:12

## 2019-04-20 RX ADMIN — LINEZOLID 600 MG: 600 TABLET, FILM COATED ORAL at 21:29

## 2019-04-20 RX ADMIN — FLUTICASONE PROPIONATE 2 SPRAY: 50 SPRAY, METERED NASAL at 09:12

## 2019-04-20 RX ADMIN — ALBUTEROL SULFATE 2.5 MG: 2.5 SOLUTION RESPIRATORY (INHALATION) at 19:32

## 2019-04-20 RX ADMIN — OXYCODONE HYDROCHLORIDE AND ACETAMINOPHEN 1 TABLET: 5; 325 TABLET ORAL at 23:58

## 2019-04-20 RX ADMIN — Medication 10 ML: at 09:12

## 2019-04-20 RX ADMIN — ALBUTEROL SULFATE 2.5 MG: 2.5 SOLUTION RESPIRATORY (INHALATION) at 11:28

## 2019-04-20 RX ADMIN — ACETAMINOPHEN 650 MG: 325 TABLET ORAL at 22:01

## 2019-04-20 RX ADMIN — OXYCODONE HYDROCHLORIDE AND ACETAMINOPHEN 1 TABLET: 5; 325 TABLET ORAL at 06:14

## 2019-04-20 RX ADMIN — MONTELUKAST 10 MG: 10 TABLET, FILM COATED ORAL at 21:29

## 2019-04-20 RX ADMIN — ALBUTEROL SULFATE 2.5 MG: 2.5 SOLUTION RESPIRATORY (INHALATION) at 08:04

## 2019-04-20 RX ADMIN — FUROSEMIDE 40 MG: 40 TABLET ORAL at 09:11

## 2019-04-20 ASSESSMENT — PAIN SCALES - GENERAL
PAINLEVEL_OUTOF10: 0
PAINLEVEL_OUTOF10: 0
PAINLEVEL_OUTOF10: 7
PAINLEVEL_OUTOF10: 0
PAINLEVEL_OUTOF10: 0
PAINLEVEL_OUTOF10: 4
PAINLEVEL_OUTOF10: 3
PAINLEVEL_OUTOF10: 4
PAINLEVEL_OUTOF10: 0
PAINLEVEL_OUTOF10: 7
PAINLEVEL_OUTOF10: 4
PAINLEVEL_OUTOF10: 0
PAINLEVEL_OUTOF10: 0

## 2019-04-20 ASSESSMENT — PAIN DESCRIPTION - LOCATION
LOCATION: BACK
LOCATION: BACK
LOCATION: FOOT;LEG

## 2019-04-20 ASSESSMENT — PAIN DESCRIPTION - ORIENTATION
ORIENTATION: LOWER
ORIENTATION: LEFT
ORIENTATION: LOWER

## 2019-04-20 ASSESSMENT — PAIN - FUNCTIONAL ASSESSMENT: PAIN_FUNCTIONAL_ASSESSMENT: PREVENTS OR INTERFERES SOME ACTIVE ACTIVITIES AND ADLS

## 2019-04-20 ASSESSMENT — PAIN DESCRIPTION - FREQUENCY: FREQUENCY: INTERMITTENT

## 2019-04-20 ASSESSMENT — PAIN SCALES - WONG BAKER: WONGBAKER_NUMERICALRESPONSE: 0

## 2019-04-20 ASSESSMENT — PAIN DESCRIPTION - PROGRESSION
CLINICAL_PROGRESSION: GRADUALLY IMPROVING
CLINICAL_PROGRESSION: GRADUALLY IMPROVING

## 2019-04-20 ASSESSMENT — PAIN DESCRIPTION - ONSET: ONSET: ON-GOING

## 2019-04-20 ASSESSMENT — PAIN DESCRIPTION - PAIN TYPE
TYPE: CHRONIC PAIN
TYPE: ACUTE PAIN
TYPE: CHRONIC PAIN

## 2019-04-20 ASSESSMENT — PAIN DESCRIPTION - DESCRIPTORS
DESCRIPTORS: DISCOMFORT
DESCRIPTORS: DISCOMFORT

## 2019-04-20 NOTE — PROGRESS NOTES
Irregular pulse ranging from 98 to 140 with exertion. Patient is on 4L of oxygen and stating 91 to 92 %. C/o pain in lower back and received PRN oxy and it was partially effective. Large BM this shift, uses BSC, unsteady gait, L foot wound wrapped, no drainage. Patient is resting in bed, all fall precautions in place, bed in lowest position, locked with alarms on, call light within reach, no falls this shift, will continue to monitor.

## 2019-04-20 NOTE — PROGRESS NOTES
Funmi Gimenez is a 68 y.o. female patient seen today states her pain has improved from a 10/10 to a 6/10.     Current Facility-Administered Medications   Medication Dose Route Frequency Provider Last Rate Last Dose    magnesium sulfate 4 g in 100 mL IVPB premix  4 g Intravenous Once [facility], APRN - CNP        oxyCODONE-acetaminophen (PERCOCET) 5-325 MG per tablet 1 tablet  1 tablet Oral Q4H PRN Tiarramonica Schilling APRN - CNP   1 tablet at 04/20/19 1210    enoxaparin (LOVENOX) injection 70 mg  0.5 mg/kg Subcutaneous Daily Tiarra Ashu Schilling APRN - CNP   70 mg at 04/20/19 2360    hydrOXYzine (ATARAX) tablet 25 mg  25 mg Oral TID PRN Andie Linton MD        linezolid (ZYVOX) tablet 600 mg  600 mg Oral 2 times per day Toney Hong MD   600 mg at 04/20/19 0912    lactobacillus (CULTURELLE) capsule 1 capsule  1 capsule Oral Daily with breakfast [facility], APRN - CNP   1 capsule at 04/20/19 0911    acetaminophen (TYLENOL) tablet 650 mg  650 mg Oral Q6H PRN Jose Figueroa MD   650 mg at 04/15/19 2313    fluticasone (FLONASE) 50 MCG/ACT nasal spray 2 spray  2 spray Nasal Daily Jose Figueroa MD   2 spray at 04/20/19 0912    furosemide (LASIX) tablet 40 mg  40 mg Oral Daily Jose Figueroa MD   40 mg at 04/20/19 0911    levothyroxine (SYNTHROID) tablet 88 mcg  88 mcg Oral Daily Jose Figueroa MD   88 mcg at 04/20/19 0614    montelukast (SINGULAIR) tablet 10 mg  10 mg Oral Nightly Jose Figueroa MD   10 mg at 04/19/19 2132    oxybutynin (DITROPAN-XL) extended release tablet 10 mg  10 mg Oral Nightly Jose Figueroa MD   10 mg at 04/19/19 2131    albuterol sulfate  (90 Base) MCG/ACT inhaler 2 puff  2 puff Inhalation Q6H PRN Jose Figueroa MD        rosuvastatin (CRESTOR) tablet 40 mg  40 mg Oral QPM Jose Figueroa MD   40 mg at 04/19/19 1746    sodium chloride flush 0.9 % injection 10 mL  10 mL Intravenous 2 times per day Jose Figueroa MD   10 mL at 04/20/19 0912    sodium chloride flush 0.9 % injection 10 mL  10 mL Intravenous PRN Hina Abdalla MD   10 mL at 04/17/19 0048    magnesium hydroxide (MILK OF MAGNESIA) 400 MG/5ML suspension 30 mL  30 mL Oral Daily PRN Hina Abdalla MD        prochlorperazine (COMPAZINE) injection 10 mg  10 mg Intravenous Q6H PRN Hina Abdalla MD   10 mg at 04/19/19 2202    glucose (GLUTOSE) 40 % oral gel 15 g  15 g Oral PRN Hina Abdalla MD        dextrose 50 % solution 12.5 g  12.5 g Intravenous PRN Hina Abdalla MD        glucagon (rDNA) injection 1 mg  1 mg Intramuscular PRN Hina Abdalla MD        dextrose 5 % solution  100 mL/hr Intravenous PRN Hina Abdalla MD        insulin lispro (HUMALOG) injection vial 0-6 Units  0-6 Units Subcutaneous TID WC Hina Abdalla MD   1 Units at 04/20/19 1210    insulin lispro (HUMALOG) injection vial 0-3 Units  0-3 Units Subcutaneous Nightly Hina Abdalla MD   1 Units at 04/19/19 2141    hydrALAZINE (APRESOLINE) injection 5 mg  5 mg Intravenous Q4H PRN Hina Abdalla MD        albuterol (PROVENTIL) nebulizer solution 2.5 mg  2.5 mg Nebulization Q4H WA Hina Abdalla MD   2.5 mg at 04/20/19 1128     Allergies   Allergen Reactions    Aspirin Other (See Comments)     \"ulcers\"    Celecoxib Other (See Comments)     RHINITIS    Fexofenadine Swelling    Gabapentin Other (See Comments)    Niacin Er      Other reaction(s): Flushing    Adhesive Tape Rash     Paper tape  Paper tape  Paper tape  Paper tape     Principal Problem:    Cellulitis  Active Problems:    COPD (chronic obstructive pulmonary disease) ([facility] [address]    Diabetes mellitus ([facility] [address]    Hyperlipidemia    Hypertension    Hypothyroidism  Resolved Problems:    * No resolved hospital problems. *    Blood pressure 119/73, pulse 114, temperature 98.4 °F (36.9 °C), temperature source Axillary, resp. rate 18, height 5' 2\" (1.575 m), weight 290 lb 12.8 oz (131.9 kg), SpO2 94 %.     Subjective  Objective Integument:  +2 pitting edema b/l LE. Right LE is warm with mild erythema. LLE has wound to dorsal foot 2.8cm x 2.1cm with intact blister. Improving erythema surrounding dorsal blister Improved pain to first interspace Anterior tibial dry blistering raised purpuric lesion. NO surrounding erythema. Neurologic:  Protective sensation is grossly diminished to light touch at the level of the toes, bilateral.  Vascular:  DP and PT pulses are palpable, bilateral.  CFT is brisk to all toes.    Musculoskeletal:  Pain to palpation surrounding blister on foot.     MRI: No deep abscess  CUlture: MRSA      Assessment & Plan  Impression/Recommendations:    The patient is a pleasant 73 y. o. female with:  1) Bullous lesion left foot with secondary bacterial infection  -Abx per ID  -Improved erythema today, with improved pain to 1st interspace.  -Adaptic, 4x4, gauze, and light compression with ACE dressing placed  -No need for surgical intervention at this point. Will continue to monitor.       2) DM II     Thank you for the opportunity to take part in this patient's care. [facility] call me with any questions.     Kayleen Mccartney DPM  Office: Carlton Bearden DPM  4/20/2019

## 2019-04-20 NOTE — PROGRESS NOTES
Hospitalist Progress Note      PCP: No primary care provider on file. Date of Admission: 4/15/2019    Chief Complaint: Left foot pain     Hospital Course:     68 Y F with a h/o COPD, HTN, HLD, and DM2 who presented to the ED with increasing pain, erythema, and edema of her distal LLE surrounding two shallow ulcers which had been \"festering\" for the last week or so. Her legs have been quite edematous for the last few weeks, to the point that she has developed a few bullae which have ruptured and drained serous fluid multiple times. This has left her with sores on the dorsum on her L foot and on her mid L shin. The skin around the ruptured bullae rapidly turned a bright red and became tender over the last few days. She denies fevers or chills. Subjective:   Pt is on 5 LPM. Afebrile. VSS. No complaints of dyspnea or chest pain. No N/V/D. Reports left foot pain.      Medications:  Reviewed    Infusion Medications    dextrose       Scheduled Medications    magnesium sulfate  4 g Intravenous Once    enoxaparin  0.5 mg/kg Subcutaneous Daily    linezolid  600 mg Oral 2 times per day    lactobacillus  1 capsule Oral Daily with breakfast    fluticasone  2 spray Nasal Daily    furosemide  40 mg Oral Daily    levothyroxine  88 mcg Oral Daily    montelukast  10 mg Oral Nightly    oxybutynin  10 mg Oral Nightly    rosuvastatin  40 mg Oral QPM    sodium chloride flush  10 mL Intravenous 2 times per day    insulin lispro  0-6 Units Subcutaneous TID WC    insulin lispro  0-3 Units Subcutaneous Nightly    albuterol  2.5 mg Nebulization Q4H WA     PRN Meds: oxyCODONE-acetaminophen, hydrOXYzine, acetaminophen, albuterol sulfate HFA, sodium chloride flush, magnesium hydroxide, prochlorperazine, glucose, dextrose, glucagon (rDNA), dextrose, hydrALAZINE      Intake/Output Summary (Last 24 hours) at 4/20/2019 1312  Last data filed at 4/20/2019 1201  Gross per 24 hour   Intake 170 ml   Output 3150 ml   Net -2980 ml       Physical Exam Performed:    /73   Pulse 114   Temp 98.4 °F (36.9 °C) (Axillary)   Resp 18   Ht 5' 2\" (1.575 m)   Wt 290 lb 12.8 oz (131.9 kg)   SpO2 94%   BMI 53.19 kg/m²     General appearance:  Appears stated age and cooperative. HEENT:  Normal cephalic, atraumatic without obvious deformity. Pupils equal, round, and reactive to light. Extra ocular muscles intact. Conjunctivae/corneas clear. Neck: Supple, with full range of motion. No jugular venous distention. Trachea midline. Respiratory:  Normal respiratory effort. Clear to auscultation, bilaterally without Rales/Wheezes/Rhonchi. Cardiovascular:  Regular rate and rhythm with normal S1/S2 without murmurs, rubs or gallops. Abdomen: Soft, non-tender, non-distended with normal bowel sounds. Musculoskeletal:  No clubbing, cyanosis. 1+ BLE pitting edema with weeping bullae. Full range of motion without deformity. Skin: Skin color, texture, turgor normal.  Bright erythema of L foot and distal LLE at level of mid shin -- improved. Neurologic:  Neurovascularly intact without any focal sensory/motor deficits.  Cranial nerves: II-XII intact, grossly non-focal.  Psychiatric:  Alert and oriented, thought content appropriate, normal insight  Capillary Refill: Brisk,< 3 seconds   Peripheral Pulses: +2 palpable, equal bilaterally       Labs:   Recent Labs     04/18/19  0627 04/19/19  0740 04/20/19  0550   WBC 10.3 7.8 7.1   HGB 12.9 12.2 12.5   HCT 40.3 38.2 38.9    160 153     Recent Labs     04/18/19  0627 04/19/19  0740 04/20/19  0550    140 139   K 4.2 3.9 3.8   CL 94* 92* 90*   CO2 34* 40* 41*   BUN 33* 28* 25*   CREATININE 1.4* 1.3* 1.2   CALCIUM 9.4 8.9 9.3   Urinalysis:      Lab Results   Component Value Date    NITRU POSITIVE 04/17/2019    WBCUA 20-50 04/17/2019    BACTERIA 4+ 04/17/2019    RBCUA 0-2 04/17/2019    BLOODU TRACE-LYSED 04/17/2019    SPECGRAV 1.015 04/17/2019    GLUCOSEU Negative 04/17/2019       Radiology:  MRI and no drainable fluid collection.     Hyperkalemia (resolved):  - Continue to hold potassium supplement and losartan. - Pt given furosemide, nebulizers, and kayexalate on admission.       Hypertension:  - Holding losartan as above. Hydralazine meanwhile. Pt is currently normotensive.      Hyperlipidemia:  - Continue statin.     Diabetes mellitus type 2:  - Holding metformin while here. SSI meanwhile. A1c's have been controlled recently.       COPD, asthma, chronic hypoxic respiratory failure:  - Continue inhaled bronchodilators. Was given IV steroids in the ED, no further steroids needed unless there is more convincing evidence of exacerbation.   - Baseline 4LNC. She probably needs to turn it up to 5-6 L when ambulating.  - Has quit smoking.      Hypothyroidism:  - Clinically euthyroid. Continue home dose of levothyroxine.     Years of vaginal discomfort, which the patient says started after a gynecologic surgery:  - Details unclear, continue to follow with gynecology clinic. Pelvic exam by ED physician was unrevealing.    - Pt noted to have excoriation to perineum/labia possibly due to incontinence. Apply cream for now. UTI might be contributing to discomfort. UTI:  - UA shows + nitrites and LE with 20-50 WBC and 4+ bacteria. Urine culture grew E. Coli. Pt received Zosyn.      Hypomagnesemia:  - Monitor and replete as needed.        DVT Prophylaxis: Lovenox   Diet: DIET CARB CONTROL;  Code Status: Full Code      PT/OT Eval Status: Ordered with recs for SNF    Dispo - 2-3 days       Brigitte Pinedo, APRN - CNP

## 2019-04-21 LAB
ANION GAP SERPL CALCULATED.3IONS-SCNC: 9 MMOL/L (ref 3–16)
BUN BLDV-MCNC: 28 MG/DL (ref 7–20)
CALCIUM SERPL-MCNC: 9 MG/DL (ref 8.3–10.6)
CHLORIDE BLD-SCNC: 89 MMOL/L (ref 99–110)
CO2: 38 MMOL/L (ref 21–32)
CREAT SERPL-MCNC: 1.3 MG/DL (ref 0.6–1.2)
EKG ATRIAL RATE: 106 BPM
EKG DIAGNOSIS: NORMAL
EKG P AXIS: 41 DEGREES
EKG P-R INTERVAL: 166 MS
EKG Q-T INTERVAL: 308 MS
EKG QRS DURATION: 102 MS
EKG QTC CALCULATION (BAZETT): 409 MS
EKG R AXIS: 89 DEGREES
EKG T AXIS: -25 DEGREES
EKG VENTRICULAR RATE: 106 BPM
GFR AFRICAN AMERICAN: 49
GFR NON-AFRICAN AMERICAN: 40
GLUCOSE BLD-MCNC: 103 MG/DL (ref 70–99)
GLUCOSE BLD-MCNC: 113 MG/DL (ref 70–99)
GLUCOSE BLD-MCNC: 120 MG/DL (ref 70–99)
GLUCOSE BLD-MCNC: 122 MG/DL (ref 70–99)
GLUCOSE BLD-MCNC: 128 MG/DL (ref 70–99)
GLUCOSE BLD-MCNC: 149 MG/DL (ref 70–99)
HCT VFR BLD CALC: 38.6 % (ref 36–48)
HEMOGLOBIN: 12.2 G/DL (ref 12–16)
MAGNESIUM: 2.1 MG/DL (ref 1.8–2.4)
MCH RBC QN AUTO: 27.1 PG (ref 26–34)
MCHC RBC AUTO-ENTMCNC: 31.7 G/DL (ref 31–36)
MCV RBC AUTO: 85.7 FL (ref 80–100)
PDW BLD-RTO: 19.1 % (ref 12.4–15.4)
PERFORMED ON: ABNORMAL
PLATELET # BLD: 140 K/UL (ref 135–450)
PMV BLD AUTO: 8.3 FL (ref 5–10.5)
POTASSIUM REFLEX MAGNESIUM: 3.7 MMOL/L (ref 3.5–5.1)
RBC # BLD: 4.5 M/UL (ref 4–5.2)
SODIUM BLD-SCNC: 136 MMOL/L (ref 136–145)
WBC # BLD: 5.9 K/UL (ref 4–11)

## 2019-04-21 PROCEDURE — 6360000002 HC RX W HCPCS: Performed by: REGISTERED NURSE

## 2019-04-21 PROCEDURE — 80048 BASIC METABOLIC PNL TOTAL CA: CPT

## 2019-04-21 PROCEDURE — 2700000000 HC OXYGEN THERAPY PER DAY

## 2019-04-21 PROCEDURE — 83735 ASSAY OF MAGNESIUM: CPT

## 2019-04-21 PROCEDURE — 6370000000 HC RX 637 (ALT 250 FOR IP): Performed by: REGISTERED NURSE

## 2019-04-21 PROCEDURE — 6360000002 HC RX W HCPCS: Performed by: INTERNAL MEDICINE

## 2019-04-21 PROCEDURE — 1200000000 HC SEMI PRIVATE

## 2019-04-21 PROCEDURE — 94761 N-INVAS EAR/PLS OXIMETRY MLT: CPT

## 2019-04-21 PROCEDURE — 94640 AIRWAY INHALATION TREATMENT: CPT

## 2019-04-21 PROCEDURE — 6370000000 HC RX 637 (ALT 250 FOR IP): Performed by: INTERNAL MEDICINE

## 2019-04-21 PROCEDURE — 36415 COLL VENOUS BLD VENIPUNCTURE: CPT

## 2019-04-21 PROCEDURE — 2580000003 HC RX 258: Performed by: INTERNAL MEDICINE

## 2019-04-21 PROCEDURE — 85027 COMPLETE CBC AUTOMATED: CPT

## 2019-04-21 PROCEDURE — 93010 ELECTROCARDIOGRAM REPORT: CPT | Performed by: INTERNAL MEDICINE

## 2019-04-21 RX ADMIN — LEVOTHYROXINE SODIUM 88 MCG: 0.09 TABLET ORAL at 06:07

## 2019-04-21 RX ADMIN — MONTELUKAST 10 MG: 10 TABLET, FILM COATED ORAL at 21:42

## 2019-04-21 RX ADMIN — ROSUVASTATIN CALCIUM 40 MG: 10 TABLET, FILM COATED ORAL at 16:46

## 2019-04-21 RX ADMIN — ALBUTEROL SULFATE 2.5 MG: 2.5 SOLUTION RESPIRATORY (INHALATION) at 11:36

## 2019-04-21 RX ADMIN — Medication 1 CAPSULE: at 08:46

## 2019-04-21 RX ADMIN — Medication 10 ML: at 08:46

## 2019-04-21 RX ADMIN — LINEZOLID 600 MG: 600 TABLET, FILM COATED ORAL at 21:42

## 2019-04-21 RX ADMIN — LINEZOLID 600 MG: 600 TABLET, FILM COATED ORAL at 08:46

## 2019-04-21 RX ADMIN — OXYBUTYNIN 10 MG: 5 TABLET, FILM COATED, EXTENDED RELEASE ORAL at 21:42

## 2019-04-21 RX ADMIN — ALBUTEROL SULFATE 2.5 MG: 2.5 SOLUTION RESPIRATORY (INHALATION) at 07:47

## 2019-04-21 RX ADMIN — FUROSEMIDE 40 MG: 40 TABLET ORAL at 08:46

## 2019-04-21 RX ADMIN — Medication 10 ML: at 21:42

## 2019-04-21 RX ADMIN — FLUTICASONE PROPIONATE 2 SPRAY: 50 SPRAY, METERED NASAL at 08:47

## 2019-04-21 RX ADMIN — OXYCODONE HYDROCHLORIDE AND ACETAMINOPHEN 1 TABLET: 5; 325 TABLET ORAL at 06:07

## 2019-04-21 RX ADMIN — ALBUTEROL SULFATE 2.5 MG: 2.5 SOLUTION RESPIRATORY (INHALATION) at 19:40

## 2019-04-21 RX ADMIN — ENOXAPARIN SODIUM 70 MG: 80 INJECTION SUBCUTANEOUS at 08:46

## 2019-04-21 ASSESSMENT — PAIN SCALES - GENERAL
PAINLEVEL_OUTOF10: 6
PAINLEVEL_OUTOF10: 9
PAINLEVEL_OUTOF10: 0
PAINLEVEL_OUTOF10: 4

## 2019-04-21 ASSESSMENT — PAIN DESCRIPTION - PAIN TYPE: TYPE: ACUTE PAIN

## 2019-04-21 ASSESSMENT — PAIN DESCRIPTION - LOCATION: LOCATION: FOOT;LEG

## 2019-04-21 ASSESSMENT — PAIN SCALES - WONG BAKER: WONGBAKER_NUMERICALRESPONSE: 0

## 2019-04-21 NOTE — PROGRESS NOTES
Hospitalist Progress Note      PCP: No primary care provider on file. Date of Admission: 4/15/2019    Chief Complaint: Left foot pain     Hospital Course:   68 Y F with a h/o COPD, HTN, HLD, and DM2 who presented to the ED with increasing pain, erythema, and edema of her distal LLE surrounding two shallow ulcers which had been \"festering\" for the last week or so. Her legs have been quite edematous for the last few weeks, to the point that she has developed a few bullae which have ruptured and drained serous fluid multiple times. This has left her with sores on the dorsum on her L foot and on her mid L shin. The skin around the ruptured bullae rapidly turned a bright red and became tender over the last few days. She denies fevers or chills. Subjective:   Pt is on 5 LPM. Afebrile. No dyspnea or chest pain. No N/V/D. Complains of left foot pain.      Medications:  Reviewed    Infusion Medications    dextrose       Scheduled Medications    enoxaparin  0.5 mg/kg Subcutaneous Daily    linezolid  600 mg Oral 2 times per day    lactobacillus  1 capsule Oral Daily with breakfast    fluticasone  2 spray Nasal Daily    furosemide  40 mg Oral Daily    levothyroxine  88 mcg Oral Daily    montelukast  10 mg Oral Nightly    oxybutynin  10 mg Oral Nightly    rosuvastatin  40 mg Oral QPM    sodium chloride flush  10 mL Intravenous 2 times per day    insulin lispro  0-6 Units Subcutaneous TID WC    insulin lispro  0-3 Units Subcutaneous Nightly    albuterol  2.5 mg Nebulization Q4H WA     PRN Meds: oxyCODONE-acetaminophen, hydrOXYzine, acetaminophen, albuterol sulfate HFA, sodium chloride flush, magnesium hydroxide, prochlorperazine, glucose, dextrose, glucagon (rDNA), dextrose, hydrALAZINE      Intake/Output Summary (Last 24 hours) at 4/21/2019 0940  Last data filed at 4/21/2019 0846  Gross per 24 hour   Intake 370 ml   Output 2125 ml   Net -1755 ml       Physical Exam Performed:    /63   Pulse 57 Temp 97.9 °F (36.6 °C) (Oral)   Resp 18   Ht 5' 2\" (1.575 m)   Wt 290 lb 12.8 oz (131.9 kg)   SpO2 91%   BMI 53.19 kg/m²     General appearance:  Elderly female who appears stated age and cooperative. No apparent distress. HEENT:  Normal cephalic, atraumatic without obvious deformity. Pupils equal, round, and reactive to light. Extra ocular muscles intact. Conjunctivae/corneas clear. On NC. Neck: Supple, with full range of motion. No jugular venous distention. Trachea midline. Respiratory:  Normal respiratory effort. Diminished bibasilar breath sounds. No wheezing. Cardiovascular:  Regular rate and rhythm with normal S1/S2 without murmurs, rubs or gallops. Abdomen: Soft, non-tender, non-distended with normal bowel sounds. Musculoskeletal:  No clubbing, cyanosis. BLE pitting edema. Skin: Skin color, texture, turgor normal. Left shin wound, no drainage. Left foot wound with surrounding erythema and drainage. Borders marked. Neurologic:  Neurovascularly intact without any focal sensory/motor deficits. Cranial nerves: II-XII intact, grossly non-focal.  Psychiatric:  Alert and oriented, thought content appropriate, normal insight  Capillary Refill: Brisk,< 3 seconds   Peripheral Pulses: +2 palpable, equal bilaterally       Labs:   Recent Labs     04/19/19  0740 04/20/19  0550 04/21/19  0622   WBC 7.8 7.1 5.9   HGB 12.2 12.5 12.2   HCT 38.2 38.9 38.6    153 140     Recent Labs     04/19/19  0740 04/20/19  0550 04/21/19  0623    139 136   K 3.9 3.8 3.7   CL 92* 90* 89*   CO2 40* 41* 38*   BUN 28* 25* 28*   CREATININE 1.3* 1.2 1.3*   CALCIUM 8.9 9.3 9.0   Urinalysis:      Lab Results   Component Value Date    NITRU POSITIVE 04/17/2019    WBCUA 20-50 04/17/2019    BACTERIA 4+ 04/17/2019    RBCUA 0-2 04/17/2019    BLOODU TRACE-LYSED 04/17/2019    SPECGRAV 1.015 04/17/2019    GLUCOSEU Negative 04/17/2019       Radiology:  MRI FOOT LEFT WO CONTRAST   Final Result   1.  No osteomyelitis or other acute osseous abnormality   2. Nonspecific dorsal mid and forefoot subcutaneous edema compatible with   cellulitis versus lymphedema. No drainable fluid collection or deep soft   tissue ulceration. XR FOOT LEFT (MIN 3 VIEWS)   Final Result   No x-ray evidence of osteomyelitis. No acute osseous injury. Soft tissue swelling of the foot- nonspecific for edema or cellulitis. VL Extremity Venous Bilateral   Final Result      XR SHOULDER RIGHT (MIN 2 VIEWS)   Final Result   No acute abnormality of the right shoulder         XR CHEST PORTABLE   Final Result   Mild left basilar atelectasis or scarring. Otherwise, no acute   cardiopulmonary disease. Assessment/Plan:    Active Hospital Problems    Diagnosis Date Noted    Cellulitis [L03.90] 04/15/2019    Diabetes mellitus ([facility] [address] [E11.9]     Hyperlipidemia [E78.5]     Hypertension [I10]     Hypothyroidism [E03.9]     COPD (ch[facility] obstructive pulmonary disease) ([facility]) [J44.9] 12/18/2014       LLE bacterial cellulitis with bullous lesion of left foot:  - Pt started on IV clindamycin on admission. Cellulitis initially improved but appeared worse 4/17 (erythema noticed outside margins that have been marked). Dc'd clinda and changed to Vanc/Zosyn. ID and Podiatry consulted. Vanc changed to Zyvox per ID due to worsening renal fx. Wound culture grew MRSA. Zosyn dc'd per ID on 4/19.  - Pt given Furosemide IV upon admission, then resumed her 40 PO daily. Continue BLE elevation. When the wounds heal more in the future she should also wear compression stockings. She did not carry a prior diagnosis of CHF. ECHO obtained and showed normal LVEF of 55-60%, G1DD with right heart findings. BNP was elevated but CXR did not show edema. BLE venous dopplers are negative for DVT. - Wound care consulted. Continue local wound care to left foot bullous lesion.   - Xray of left foot negative for osteo.  MRI negative for osteo and no drainable fluid collection noted.     Hyperkalemia POA (resolved):  - Continue to hold potassium supplement and losartan. - Pt given furosemide, nebulizers, and kayexalate on admission.       Hypertension:  - Holding losartan as above. Hydralazine meanwhile. Pt is currently normotensive.      Hyperlipidemia:  - Continue statin.     Diabetes mellitus type 2:  - Holding metformin while here. Low dose SSI meanwhile. A1c's have been controlled recently.       COPD, asthma, chronic hypoxic respiratory failure:  - Continue inhaled bronchodilators. Was given IV steroids in the ED, no further steroids needed unless there is more convincing evidence of exacerbation.   - Baseline 4LNC. She probably needs to turn it up to 5-6 L when ambulating.  - Has quit smoking.      Hypothyroidism:  - Clinically euthyroid. Continue home dose of levothyroxine.     Years of vaginal discomfort, which the patient says started after a gynecologic surgery:  - Details unclear, continue to follow with gynecology clinic. Pelvic exam by ED physician was unrevealing.    - Pt noted to have excoriation to perineum/labia possibly due to incontinence. Apply cream for now. UTI might have contributed to discomfort. UTI:  - UA shows + nitrites and LE with 20-50 WBC and 4+ bacteria. Urine culture grew E. Coli. Pt received 3 days of Zosyn.      Hypomagnesemia:  - Monitor and replete as needed.        DVT Prophylaxis: Lovenox   Diet: DIET CARB CONTROL;  Code Status: Full Code      PT/OT Eval Status: Ordered with recs for SNF    Dispo - ~ 2-3 days       CARLOS Del Cid - CNP

## 2019-04-21 NOTE — PROGRESS NOTES
04/17/19 0048    magnesium hydroxide (MILK OF MAGNESIA) 400 MG/5ML suspension 30 mL  30 mL Oral Daily PRN Florina Ngo MD        prochlorperazine (COMPAZINE) injection 10 mg  10 mg Intravenous Q6H PRN Florina Ngo MD   10 mg at 04/19/19 2202    glucose (GLUTOSE) 40 % oral gel 15 g  15 g Oral PRN Florina Ngo MD        dextrose 50 % solution 12.5 g  12.5 g Intravenous PRN Florina Ngo MD        glucagon (rDNA) injection 1 mg  1 mg Intramuscular PRN Florina Ngo MD        dextrose 5 % solution  100 mL/hr Intravenous PRN Florina Ngo MD        insulin lispro (HUMALOG) injection vial 0-6 Units  0-6 Units Subcutaneous TID WC Florina Ngo MD   1 Units at 04/20/19 1210    insulin lispro (HUMALOG) injection vial 0-3 Units  0-3 Units Subcutaneous Nightly Florina Ngo MD   1 Units at 04/20/19 2129    hydrALAZINE (APRESOLINE) injection 5 mg  5 mg Intravenous Q4H PRN Florina Ngo MD        albuterol (PROVENTIL) nebulizer solution 2.5 mg  2.5 mg Nebulization Q4H WA Florina Ngo MD   2.5 mg at 04/21/19 1136     Allergies   Allergen Reactions    Aspirin Other (See Comments)     \"ulcers\"    Celecoxib Other (See Comments)     RHINITIS    Fexofenadine Swelling    Gabapentin Other (See Comments)    Niacin Er      Other reaction(s): Flushing    Adhesive Tape Rash     Paper tape  Paper tape  Paper tape  Paper tape     Principal Problem:    Cellulitis  Active Problems:    COPD (chronic obstructive pulmonary disease) ([facility] [address]    Di[facility]    Hyperlipidemia    Hypertension    Hypothyroidism  Resolved Problems:    * No resolved hospital problems. *    Blood pressure 108/69, pulse 90, temperature 98.5 °F (36.9 °C), temperature source Oral, resp. rate 18, height 5' 2\" (1.575 m), weight 290 lb 12.8 oz (131.9 kg), SpO2 96 %. Subjective  Objective   Integument:  +2 pitting edema b/l LE. Right LE is warm with mild erythema.  LLE has wound to dorsal foot 2.8cm x 2.1cm with intact blister. Improving erythema surrounding dorsal blister Improved pain to first interspace Anterior tibial dry blistering raised purpuric lesion. NO surrounding erythema. Neurologic:  Protective sensation is grossly diminished to light touch at the level of the toes, bilateral.  Vascular:  DP and PT pulses are palpable, bilateral.  CFT is brisk to all toes.    Musculoskeletal:  Pain to palpation surrounding blister on foot.     MRI: No deep abscess  CUlture: MRSA    Assessment & Plan  Impression/Recommendations:    The patient is a pleasant 73 y. o. female with:  1) Bullous lesion left foot with secondary bacterial infection  -Abx per ID  -Continued improvement with erythema today, with improved pain to 1st interspace.  -Adaptic, 4x4, gauze, and light compression with ACE dressing placed  -No need for surgical intervention at this point. Will continue to monitor.       2) DM II     Thank you for the opportunity to take part in this patient's care. [facility] call me with any questions.     Brianna Ramsey DPM  [facility]  4/21/2019

## 2019-04-22 ENCOUNTER — APPOINTMENT (OUTPATIENT)
Dept: GENERAL RADIOLOGY | Age: 74
DRG: 602 | End: 2019-04-22
Payer: MEDICARE

## 2019-04-22 PROBLEM — E66.9 OBESITY: Status: ACTIVE | Noted: 2019-04-22

## 2019-04-22 PROBLEM — E66.01 MORBID OBESITY (HCC): Status: ACTIVE | Noted: 2019-04-22

## 2019-04-22 LAB
ANION GAP SERPL CALCULATED.3IONS-SCNC: 10 MMOL/L (ref 3–16)
BASE EXCESS ARTERIAL: 12.5 MMOL/L (ref -3–3)
BASE EXCESS ARTERIAL: 13.9 MMOL/L (ref -3–3)
BUN BLDV-MCNC: 33 MG/DL (ref 7–20)
CALCIUM SERPL-MCNC: 9 MG/DL (ref 8.3–10.6)
CARBOXYHEMOGLOBIN ARTERIAL: 1.2 % (ref 0–1.5)
CARBOXYHEMOGLOBIN ARTERIAL: 2 % (ref 0–1.5)
CHLORIDE BLD-SCNC: 87 MMOL/L (ref 99–110)
CO2: 39 MMOL/L (ref 21–32)
CREAT SERPL-MCNC: 1.5 MG/DL (ref 0.6–1.2)
GFR AFRICAN AMERICAN: 41
GFR NON-AFRICAN AMERICAN: 34
GLUCOSE BLD-MCNC: 105 MG/DL (ref 70–99)
GLUCOSE BLD-MCNC: 108 MG/DL (ref 70–99)
GLUCOSE BLD-MCNC: 141 MG/DL (ref 70–99)
GLUCOSE BLD-MCNC: 142 MG/DL (ref 70–99)
GLUCOSE BLD-MCNC: 164 MG/DL (ref 70–99)
HCO3 ARTERIAL: 42.7 MMOL/L (ref 21–29)
HCO3 ARTERIAL: 44.1 MMOL/L (ref 21–29)
HCT VFR BLD CALC: 37.3 % (ref 36–48)
HEMOGLOBIN, ART, EXTENDED: 12.7 G/DL (ref 12–16)
HEMOGLOBIN, ART, EXTENDED: 13 G/DL (ref 12–16)
HEMOGLOBIN: 12 G/DL (ref 12–16)
MCH RBC QN AUTO: 27.5 PG (ref 26–34)
MCHC RBC AUTO-ENTMCNC: 32.1 G/DL (ref 31–36)
MCV RBC AUTO: 85.6 FL (ref 80–100)
METHEMOGLOBIN ARTERIAL: 0.2 %
METHEMOGLOBIN ARTERIAL: 0.3 %
O2 CONTENT ARTERIAL: 15 ML/DL
O2 CONTENT ARTERIAL: 17 ML/DL
O2 SAT, ARTERIAL: 84 %
O2 SAT, ARTERIAL: 94.6 %
O2 THERAPY: ABNORMAL
O2 THERAPY: ABNORMAL
PCO2 ARTERIAL: 88.9 MMHG (ref 35–45)
PCO2 ARTERIAL: 90.5 MMHG (ref 35–45)
PDW BLD-RTO: 18.8 % (ref 12.4–15.4)
PERFORMED ON: ABNORMAL
PH ARTERIAL: 7.29 (ref 7.35–7.45)
PH ARTERIAL: 7.31 (ref 7.35–7.45)
PLATELET # BLD: 140 K/UL (ref 135–450)
PMV BLD AUTO: 8.1 FL (ref 5–10.5)
PO2 ARTERIAL: 51.9 MMHG (ref 75–108)
PO2 ARTERIAL: 84 MMHG (ref 75–108)
POTASSIUM REFLEX MAGNESIUM: 3.6 MMOL/L (ref 3.5–5.1)
RBC # BLD: 4.36 M/UL (ref 4–5.2)
SODIUM BLD-SCNC: 136 MMOL/L (ref 136–145)
TCO2 ARTERIAL: 45.5 MMOL/L
TCO2 ARTERIAL: 46.8 MMOL/L
WBC # BLD: 6 K/UL (ref 4–11)

## 2019-04-22 PROCEDURE — 36415 COLL VENOUS BLD VENIPUNCTURE: CPT

## 2019-04-22 PROCEDURE — 6370000000 HC RX 637 (ALT 250 FOR IP): Performed by: INTERNAL MEDICINE

## 2019-04-22 PROCEDURE — 2060000000 HC ICU INTERMEDIATE R&B

## 2019-04-22 PROCEDURE — 71045 X-RAY EXAM CHEST 1 VIEW: CPT

## 2019-04-22 PROCEDURE — 2580000003 HC RX 258: Performed by: INTERNAL MEDICINE

## 2019-04-22 PROCEDURE — 6360000002 HC RX W HCPCS: Performed by: INTERNAL MEDICINE

## 2019-04-22 PROCEDURE — 2700000000 HC OXYGEN THERAPY PER DAY

## 2019-04-22 PROCEDURE — 94660 CPAP INITIATION&MGMT: CPT

## 2019-04-22 PROCEDURE — 36600 WITHDRAWAL OF ARTERIAL BLOOD: CPT

## 2019-04-22 PROCEDURE — 99231 SBSQ HOSP IP/OBS SF/LOW 25: CPT | Performed by: INTERNAL MEDICINE

## 2019-04-22 PROCEDURE — 82803 BLOOD GASES ANY COMBINATION: CPT

## 2019-04-22 PROCEDURE — 99223 1ST HOSP IP/OBS HIGH 75: CPT | Performed by: INTERNAL MEDICINE

## 2019-04-22 PROCEDURE — 6360000002 HC RX W HCPCS: Performed by: REGISTERED NURSE

## 2019-04-22 PROCEDURE — 80048 BASIC METABOLIC PNL TOTAL CA: CPT

## 2019-04-22 PROCEDURE — 6370000000 HC RX 637 (ALT 250 FOR IP): Performed by: REGISTERED NURSE

## 2019-04-22 PROCEDURE — 85027 COMPLETE CBC AUTOMATED: CPT

## 2019-04-22 PROCEDURE — 94761 N-INVAS EAR/PLS OXIMETRY MLT: CPT

## 2019-04-22 PROCEDURE — 94640 AIRWAY INHALATION TREATMENT: CPT

## 2019-04-22 RX ORDER — IPRATROPIUM BROMIDE AND ALBUTEROL SULFATE 2.5; .5 MG/3ML; MG/3ML
1 SOLUTION RESPIRATORY (INHALATION)
Status: DISCONTINUED | OUTPATIENT
Start: 2019-04-23 | End: 2019-04-27 | Stop reason: HOSPADM

## 2019-04-22 RX ORDER — FUROSEMIDE 10 MG/ML
40 INJECTION INTRAMUSCULAR; INTRAVENOUS 2 TIMES DAILY
Status: DISCONTINUED | OUTPATIENT
Start: 2019-04-22 | End: 2019-04-24

## 2019-04-22 RX ADMIN — ALBUTEROL SULFATE 2.5 MG: 2.5 SOLUTION RESPIRATORY (INHALATION) at 16:34

## 2019-04-22 RX ADMIN — OXYCODONE HYDROCHLORIDE AND ACETAMINOPHEN 1 TABLET: 5; 325 TABLET ORAL at 00:31

## 2019-04-22 RX ADMIN — Medication 10 ML: at 22:10

## 2019-04-22 RX ADMIN — FUROSEMIDE 40 MG: 40 TABLET ORAL at 09:18

## 2019-04-22 RX ADMIN — ALBUTEROL SULFATE 2.5 MG: 2.5 SOLUTION RESPIRATORY (INHALATION) at 09:03

## 2019-04-22 RX ADMIN — LINEZOLID 600 MG: 600 TABLET, FILM COATED ORAL at 09:18

## 2019-04-22 RX ADMIN — ENOXAPARIN SODIUM 70 MG: 80 INJECTION SUBCUTANEOUS at 09:17

## 2019-04-22 RX ADMIN — OXYCODONE HYDROCHLORIDE AND ACETAMINOPHEN 1 TABLET: 5; 325 TABLET ORAL at 04:38

## 2019-04-22 RX ADMIN — Medication 10 ML: at 09:17

## 2019-04-22 RX ADMIN — INSULIN LISPRO 1 UNITS: 100 INJECTION, SOLUTION INTRAVENOUS; SUBCUTANEOUS at 12:19

## 2019-04-22 RX ADMIN — ALBUTEROL SULFATE 2.5 MG: 2.5 SOLUTION RESPIRATORY (INHALATION) at 12:39

## 2019-04-22 RX ADMIN — FUROSEMIDE 40 MG: 10 INJECTION, SOLUTION INTRAMUSCULAR; INTRAVENOUS at 22:11

## 2019-04-22 RX ADMIN — FLUTICASONE PROPIONATE 2 SPRAY: 50 SPRAY, METERED NASAL at 09:17

## 2019-04-22 RX ADMIN — Medication 1 CAPSULE: at 09:17

## 2019-04-22 RX ADMIN — LEVOTHYROXINE SODIUM 88 MCG: 0.09 TABLET ORAL at 04:38

## 2019-04-22 ASSESSMENT — PAIN SCALES - GENERAL
PAINLEVEL_OUTOF10: 4
PAINLEVEL_OUTOF10: 10

## 2019-04-22 NOTE — FLOWSHEET NOTE
Attempted to visit with Pt. Pt asleep. Left Spiritual Care note informing her of our availability for support.     Ana Lilia Freeman   Associate       04/22/19 1133   Encounter Summary   Services provided to: Patient not available   Referral/Consult From: Chet   Continue Visiting   (4/22 Pt asleep, left BC)

## 2019-04-22 NOTE — PROGRESS NOTES
Physical and Occupational Therapy: Hold today, upon attempt nursing assisting pt post fall in room.    [facility] PT, [facility]

## 2019-04-22 NOTE — PROGRESS NOTES
Arturo Ennis is a 68 y.o. female patient seen today. On bipap. Not responding to questioning.     Current Facility-Administered Medications   Medication Dose Route Frequency Provider Last Rate Last Dose    oxyCODONE-acetaminophen (PERCOCET) 5-325 MG per tablet 1 tablet  1 tablet Oral Q4H PRN Dawn Gwendlyn Goodell, APRN - CNP   1 tablet at 04/22/19 0438    enoxaparin (LOVENOX) injection 70 mg  0.5 mg/kg Subcutaneous Daily Dawn Gwendlyn Goodell, APRN - CNP   70 mg at 04/22/19 1292    hydrOXYzine (ATARAX) tablet 25 mg  25 mg Oral TID PRN Keshawn Kemp MD        linezolid (ZYVOX) tablet 600 mg  600 mg Oral 2 times per day Kenyatta Gonsalez MD   600 mg at 04/22/19 0911    lactobacillus (CULTURELLE) capsule 1 capsule  1 capsule Oral Daily with breakfast CARLOS Alves CNP   1 capsule at 04/22/19 2557    acetaminophen (TYLENOL) tablet 650 mg  650 mg Oral Q6H PRN Candace Pitt MD   650 mg at 04/20/19 2201    fluticasone (FLONASE) 50 MCG/ACT nasal spray 2 spray  2 spray Nasal Daily Candace Pitt MD   2 spray at 04/22/19 0917    furosemide (LASIX) tablet 40 mg  40 mg Oral Daily Candace Pitt MD   40 mg at 04/22/19 0918    levothyroxine (SYNTHROID) tablet 88 mcg  88 mcg Oral Daily Candace Pitt MD   88 mcg at 04/22/19 0438    montelukast (SINGULAIR) tablet 10 mg  10 mg Oral Nightly Candace Pitt MD   10 mg at 04/21/19 2142    oxybutynin (DITROPAN-XL) extended release tablet 10 mg  10 mg Oral Nightly Candace Pitt MD   10 mg at 04/21/19 2142    albuterol sulfate  (90 Base) MCG/ACT inhaler 2 puff  2 puff Inhalation Q6H PRN Candace Pitt MD        rosuvastatin (CRESTOR) tablet 40 mg  40 mg Oral QPM Candace Pitt MD   40 mg at 04/21/19 1646    sodium chloride flush 0.9 % injection 10 mL  10 mL Intravenous 2 times per day Candace Pitt MD   10 mL at 04/22/19 0917    sodium chloride flush 0.9 % injection 10 mL  10 mL Intravenous PRN Candace Pitt MD   10 mL at 04/17/19 0048    magnesium hydroxide (MILK OF MAGNESIA) 400 MG/5ML suspension 30 mL  30 mL Oral Daily PRN Kim Gray MD        prochlorperazine (COMPAZINE) injection 10 mg  10 mg Intravenous Q6H PRN Kim Gray MD   10 mg at 04/19/19 2202    glucose (GLUTOSE) 40 % oral gel 15 g  15 g Oral PRN Kim Gray MD        dextrose 50 % solution 12.5 g  12.5 g Intravenous PRN Kim Gray MD        glucagon (rDNA) injection 1 mg  1 mg Intramuscular PRN Kim Gray MD        dextrose 5 % solution  100 mL/hr Intravenous PRN Kim Gray MD        insulin lispro (HUMALOG) injection vial 0-6 Units  0-6 Units Subcutaneous TID WC Kim Gray MD   1 Units at 04/22/19 1219    insulin lispro (HUMALOG) injection vial 0-3 Units  0-3 Units Subcutaneous Nightly Kim Gray MD   1 Units at 04/20/19 2129    hydrALAZINE (APRESOLINE) injection 5 mg  5 mg Intravenous Q4H PRN Kim Gray MD        albuterol (PROVENTIL) nebulizer solution 2.5 mg  2.5 mg Nebulization Q4H WA Kim Gray MD   2.5 mg at 04/22/19 1634     Allergies   Allergen Reactions    Aspirin Other (See Comments)     \"ulcers\"    Celecoxib Other (See Comments)     RHINITIS    Fexofenadine Swelling    Gabapentin Other (See Comments)    Niacin Er      Other reaction(s): Flushing    Adhesive Tape Rash     Paper tape  Paper tape  Paper tape  Paper tape     Principal Problem:    Cellulitis  Active Problems:    COPD (chronic obstructive pulmonary disease) ([facility] [address]    Diabetes mellitus ([facility] [address]    Hyperlipidemia    Hypertension    Hypothyroidism    Obesity  Resolved Problems:    * No resolved hospital problems. *    Blood pressure 120/71, pulse 81, temperature 98.5 °F (36.9 °C), temperature source Axillary, resp. rate 22, height 5' 2\" (1.575 m), weight 290 lb 12.8 oz (131.9 kg), SpO2 94 %. Subjective  Objective   Integument:  +2 pitting edema b/l LE. Right LE is warm with mild erythema.  LLE has wound to dorsal foot 2.8cm x

## 2019-04-22 NOTE — PROGRESS NOTES
Hospitalist Progress Note      PCP: No primary care provider on file. Date of Admission: 4/15/2019    Chief Complaint: Left foot pain     Hospital Course:   68 Y F with a h/o COPD, HTN, HLD, and DM2 who presented to the ED with increasing pain, erythema, and edema of her distal LLE surrounding two shallow ulcers which had been \"festering\" for the last week or so. Her legs have been quite edematous for the last few weeks, to the point that she has developed a few bullae which have ruptured and drained serous fluid multiple times. This has left her with sores on the dorsum on her L foot and on her mid L shin. The skin around the ruptured bullae rapidly turned a bright red and became tender over the last few days. She denies fevers or chills.      Subjective:   Sleepy , arousable , c/o pain over the left leg     Medications:  Reviewed    Infusion Medications    dextrose       Scheduled Medications    enoxaparin  0.5 mg/kg Subcutaneous Daily    linezolid  600 mg Oral 2 times per day    lactobacillus  1 capsule Oral Daily with breakfast    fluticasone  2 spray Nasal Daily    furosemide  40 mg Oral Daily    levothyroxine  88 mcg Oral Daily    montelukast  10 mg Oral Nightly    oxybutynin  10 mg Oral Nightly    rosuvastatin  40 mg Oral QPM    sodium chloride flush  10 mL Intravenous 2 times per day    insulin lispro  0-6 Units Subcutaneous TID WC    insulin lispro  0-3 Units Subcutaneous Nightly    albuterol  2.5 mg Nebulization Q4H WA     PRN Meds: oxyCODONE-acetaminophen, hydrOXYzine, acetaminophen, albuterol sulfate HFA, sodium chloride flush, magnesium hydroxide, prochlorperazine, glucose, dextrose, glucagon (rDNA), dextrose, hydrALAZINE      Intake/Output Summary (Last 24 hours) at 4/22/2019 0709  Last data filed at 4/22/2019 0254  Gross per 24 hour   Intake 830 ml   Output 1400 ml   Net -570 ml       Physical Exam Performed:    /67   Pulse 87   Temp 98.1 °F (36.7 °C) (Oral)   Resp 18 Ht 5' 2\" (1.575 m)   Wt 290 lb 12.8 oz (131.9 kg)   SpO2 93%   BMI 53.19 kg/m²     General appearance:  Elderly female who appears stated age and  [de-identified] . No apparent distress. On 02   HEENT:  Normal cephalic, atraumatic without obvious deformity. Pupils equal, round, and reactive to light. Extra ocular muscles intact. Conjunctivae/corneas clear. On NC. Neck: Supple, with full range of motion. No jugular venous distention. Trachea midline. Respiratory:  Normal respiratory effort. Diminished bibasilar breath sounds. No wheezing. Cardiovascular:  Regular rate and rhythm with normal S1/S2 without murmurs, rubs or gallops. Abdomen: Soft, non-tender, non-distended with normal bowel sounds. Musculoskeletal:  No clubbing, cyanosis. BLE pitting edema. Skin: Skin color, texture, turgor normal. Left shin wound, no drainage. Left foot wound with surrounding erythema and drainage. Borders marked. Neurologic:  Neurovascularly intact without any focal sensory/motor deficits. Cranial nerves: II-XII intact, grossly non-focal.  Psychiatric:   sleepy   Capillary Refill: ++   Peripheral Pulses: +2 palpable, equal bilaterally       Labs:   Recent Labs     04/20/19  0550 04/21/19  0622 04/22/19  0608   WBC 7.1 5.9 6.0   HGB 12.5 12.2 12.0   HCT 38.9 38.6 37.3    140 140     Recent Labs     04/20/19  0550 04/21/19  0623 04/22/19  0608    136 136   K 3.8 3.7 3.6   CL 90* 89* 87*   CO2 41* 38* 39*   BUN 25* 28* 33*   CREATININE 1.2 1.3* 1.5*   CALCIUM 9.3 9.0 9.0   Urinalysis:      Lab Results   Component Value Date    NITRU POSITIVE 04/17/2019    WBCUA 20-50 04/17/2019    BACTERIA 4+ 04/17/2019    RBCUA 0-2 04/17/2019    BLOODU TRACE-LYSED 04/17/2019    SPECGRAV 1.015 04/17/2019    GLUCOSEU Negative 04/17/2019       Radiology:  MRI FOOT LEFT WO CONTRAST   Final Result   1. No osteomyelitis or other acute osseous abnormality   2.  Nonspecific dorsal mid and forefoot subcutaneous edema compatible with cellulitis versus lymphedema. No drainable fluid collection or deep soft   tissue ulceration. XR FOOT LEFT (MIN 3 VIEWS)   Final Result   No x-ray evidence of osteomyelitis. No acute osseous injury. Soft tissue swelling of the foot- nonspecific for edema or cellulitis. VL Extremity Venous Bilateral   Final Result      XR SHOULDER RIGHT (MIN 2 VIEWS)   Final Result   No acute abnormality of the right shoulder         XR CHEST PORTABLE   Final Result   Mild left basilar atelectasis or scarring. Otherwise, no acute   cardiopulmonary disease. Assessment/Plan:    Active Hospital Problems    Diagnosis Date Noted    Cellulitis [L03.90] 04/15/2019    [facility] [E11.9]     Hyperlipidemia [E78.5]     Hypertension [I10]     Hypothyroidism [E03.9]     COPD [facility] [J44.9] 12/18/2014       Encephalopathy -secondary to acute and chronic hypoxic and hypercapnic respiratory failure - BiPAP, breathing treatment, moved to C4, repeat ABG, pulmonology evaluation, chest x-ray      LLE bacterial cellulitis with bullous lesion of left foot:  - Pt started on IV clindamycin on admission. Cellulitis initially improved but appeared worse 4/17 (erythema noticed outside margins that have been marked). Dc'd clinda and changed to Vanc/Zosyn. ID and Podiatry consulted. Vanc changed to Zyvox per ID due to worsening renal fx. Wound culture grew MRSA. Zosyn dc'd per ID on 4/19.  - Pt given Furosemide IV upon admission, then resumed her 40 PO daily. Continue BLE elevation. When the wounds heal more in the future she should also wear compression stockings. She did not carry a prior diagnosis of CHF. ECHO obtained and showed normal LVEF of 55-60%, G1DD with right heart findings. BNP was elevated but CXR did not show edema. BLE venous dopplers are negative for DVT. - Wound care consulted.  Continue local wound care to left foot bullous lesion.   - Xray of

## 2019-04-22 NOTE — PROGRESS NOTES
Bedside report given to Adilson Anglin RN from C4. 4 eye skin assessment complete. Pt left the floor via bed with all belongings with staff. Pt transferred to C4 room 433.

## 2019-04-22 NOTE — PROGRESS NOTES
Infectious Disease Follow up Notes    CC :  stan LE cellulitis and L foot wound      Antibiotics:   linezolid  (Culturelle)    Admit Date:   4/15/2019  Hospital Day: 8    Subjective:   She remains afebrile   Reports pain in the L foot, no change. Coughing a little more, not expectorating much    Objective:     Patient Vitals for the past 8 hrs:   BP Temp Temp src Pulse Resp SpO2   04/22/19 0914 116/65 98.2 °F (36.8 °C) Oral 86 20 91 %   04/22/19 0904 -- -- -- -- 20 94 %   04/22/19 0430 111/67 98.1 °F (36.7 °C) Oral 87 18 93 %       EXAM:  General:  Lethargic but easily roused. NAD    HEENT:  No scleral icterus. MMM    LUNGS:  Non-labored breathing. Upper lobes clear  CV:  RRR  ABD:  Soft, NT, +BS    EXT: Decreased expanse of erythema dorsal L foot, petechial rash on the foot  Now minimal erythema stan LE, R>L.     Faded non-blanching purpuric lesion anterior L tibia with slight expansion        LINE: IV site ok        Scheduled Meds:   enoxaparin  0.5 mg/kg Subcutaneous Daily    linezolid  600 mg Oral 2 times per day    lactobacillus  1 capsule Oral Daily with breakfast    fluticasone  2 spray Nasal Daily    furosemide  40 mg Oral Daily    levothyroxine  88 mcg Oral Daily    montelukast  10 mg Oral Nightly    oxybutynin  10 mg Oral Nightly    rosuvastatin  40 mg Oral QPM    sodium chloride flush  10 mL Intravenous 2 times per day    insulin lispro  0-6 Units Subcutaneous TID WC    insulin lispro  0-3 Units Subcutaneous Nightly    albuterol  2.5 mg Nebulization Q4H WA       Continuous Infusions:   dextrose            Data Review:    Lab Results   Component Value Date    WBC 6.0 04/22/2019    HGB 12.0 04/22/2019    HCT 37.3 04/22/2019    MCV 85.6 04/22/2019     04/22/2019     Lab Results   Component Value Date    CREATININE 1.5 (H) 04/22/2019    BUN 33 (H) 04/22/2019     04/22/2019    K 3.6 04/22/2019    CL 87 (L) 04/22/2019    CO2 39 (H) 04/22/2019       Hepatic Function Panel:   Lab Results   Component Value Date    ALKPHOS 74 04/15/2019    ALT 10 04/15/2019    AST 15 04/15/2019    PROT 6.9 04/15/2019    BILITOT 0.4 04/15/2019    LABALBU 3.8 04/15/2019     ESR 29       MICRO:  4/17 UC E coli    Wound cx L foot MRSA , GS no organisms   Staph aureus mrsa (1)   Antibiotic Interpretation ELIAN Status    ceFAZolin Resistant >16 mcg/mL     clindamycin Resistant >2 mcg/mL     erythromycin Resistant >4 mcg/mL     oxacillin Resistant >2 mcg/mL     tetracycline Sensitive <=0.5 mcg/mL     trimethoprim-sulfamethoxazole Resistant >2/38 mcg/mL     vancomycin Sensitive 1 mcg/mL           IMAGING:  Echo 4/16:  Summary   Normal left ventricular systolic function with ejection fraction of 55-60%.  Septal bounce noted due to right ventricular volume overload.   Mild concentric left ventricular hypertrophy.   Grade I diastolic dysfunction with normal filling pressure.   Mild mitral and tricuspid regurgitation.   The right ventricle is moderately enlarged.   Right ventricular systolic function is moderately reduced .   S' prime velocity is measured at 7 cm/s.   Systolic pulmonic artery pressure (SPAP) is estimated at 55 mmHg consistent   with mild pulmonary hypertension (Right atrial pressure of 15 mmHg).   The right atrium is moderately dilated. MRI L foot 4/19:  Impression   1. No osteomyelitis or other acute osseous abnormality   2. Nonspecific dorsal mid and forefoot subcutaneous edema compatible with   cellulitis versus lymphedema.  No drainable fluid collection or deep soft   tissue ulceration.        Assessment:     Patient Active Problem List    Diagnosis Date Noted    Thyroid nodule 03/14/2016     Priority: High     Class: Acute    Pulmonary nodule, left 03/14/2016     Priority: High     Class: Acute    Cellulitis 04/15/2019    HCAP (healthcare-associated pneumonia)     Acute hypoxemic respiratory failure (HCC)     Pulmonary edema, acute (HCC)     [facility]     [facility]     Acute respiratory failure with hypoxia and hypercapnia ([facility] 12/22/2018    Acute encephalopathy 12/22/2018    Pneumonia due to infectious organism 12/22/2018    Diabetes mellitus ([facility]     Hyperlipidemia     Hypertension     Hypothyroidism     Polycythemia, secondary 12/18/2014    Leukocytosis 12/18/2014    COPD (chronic obstructive pulmonary disease) ([facility] 12/18/2014     Multiple medical problems, including DM and obesity      Admitted for management of stan LE cellulitis and L foot  Wound culture L foot with MRSA     Asymptomatic bacteriuria with E coli  No abx indicated     JAQUELIN - stable     Oxygen dependent COPD    No abx allergies     Plan:   Continue linezolid for now with plan to change to po doxy as it improves   Local wound care per Carlee Silver MD  Phone: [phone]   Fax : [phone]

## 2019-04-22 NOTE — FLOWSHEET NOTE
Pt transferred to room 433 from room 359. 4 eyed skin assessment completed upon arrival to floor. Pt with 3 small blister like red spots on R buttock. 4 eyed skin assessment completed with Estela Siegel.

## 2019-04-22 NOTE — PROGRESS NOTES
04/22/19 1244   Oxygen Therapy/Pulse Ox   O2 Therapy Oxygen   O2 Device Nasal cannula   O2 Flow Rate (L/min) 4 L/min   Resp 20   SpO2 94 %   Blood Gas  Performed? Yes   $ABG $Yes   Dejon's Test #1 Pos   Site #1 Left Radial   Site Prepped #1 Yes   Number of Attempts #1 1   Pressure Held #1 Yes   Complications #1 None   Post-procedure #1 Standard   Specimen Status #1 To lab   How Tolerated?  Tolerated well

## 2019-04-22 NOTE — PROGRESS NOTES
Podiatry lanced left foot bulla on 4/17/19 and following. Culture positive for MRSA. ID following. Wound care to sign off as MD following.

## 2019-04-22 NOTE — PROGRESS NOTES
04/22/19 1635   NIV Type   Equipment Type v60   Mode BIPAP   Mask Type Nasal mask   Mask Size Large   Bonnet size Large   Settings/Measurements   Comfort Level Good   Using Accessory Muscles No   IPAP 16 cmH20   EPAP 8 cmH2O   Rate Ordered 20   Resp 22   SpO2 98   FiO2  70 %   Vt Exhaled 436 mL   Mask Leak (lpm) 17 lpm   Skin Protection for O2 Device Yes

## 2019-04-22 NOTE — PROGRESS NOTES
Pt found down on floor. This RN and 4 other staff members assisted pt back to bed. No visible signs of injury. VS stable. Neuro check complete. RN manager, MD aware. Attempted to call pt's family, Sierra Kaplan at [phone], no answer. Will try again. Will monitor closely.

## 2019-04-22 NOTE — PROGRESS NOTES
Critical PC02 88.9. Secure message sent to MD.     [address] [facility] or Facility: ALEXANDER From: Kitty Nichols RE: Sara Zelaya 1945 RM: [id number] from [facility], critical PC02 88. 9. also pH 7.313 Need Callback: NEEDS CALLBACK B3 TELEMETRY    MD called this RN. See new orders. Pt to transfer to  for cont bipap.

## 2019-04-22 NOTE — PROGRESS NOTES
Nutrition Assessment (Low Risk)    Type and Reason for Visit: Initial(LOS assessment)    Nutrition Recommendations:   · Continue carb controlled diet  · Encourage protein intakes for wound healing  · Monitor po intakes, nutrition adequacy, weights, pertinent labs, BMs, wound healing     Nutrition Assessment:  Patient assessed for nutritional risk. Deemed to be at low risk at this time. Will continue to monitor for changes in status. Pt adequately nourished on admission AEB good appetite and stable weight PTA. Po intakes % per EMR. Pt at risk for decline d/t increased needs from presence of wounds. Encouraged protein intakes to promote wound healing. Pt voiced understanding. Will continue current diet. Malnutrition Assessment:  · Malnutrition Status:  At risk for malnutrition    Nutrition Risk Level   Risk Level: Low    Nutrition Diagnosis:   · Problem: Increased nutrient needs  · Etiology: Increased demand for energy/nutrients    Signs and symptoms: Presence of wounds    Nutrition Intervention:  Food and/or Delivery: Continue current diet  Nutrition Education/Counseling/Coordination of Care:  Continued Inpatient Monitoring, Education Completed      Electronically signed by Ashley Baldwin RD, ANSHUL on 4/22/19 at 11:16 AM    Contact Number: Office: [phone]; [address]: [id number]

## 2019-04-22 NOTE — CONSULTS
INPATIENT PULMONARY CRITICAL CARE CONSULT NOTE      Chief Complaint/Referring Provider:  Patient is being seen at the request of Dr. Rose Son for a consultation for hypercapnia with respiratory failure      Presenting HPI: Gavi Funk is a 71-year-old female with past history of COPD, diabetes mellitus, hypertension, hyperlipidemia, hypothyroidism, obesity, stable pulmonary nodule, secondary polycythemia. The patient has been seen by the pulmonary service at [facility] in December 2018 for acute respiratory failure with hypoxemia, possibly secondary to narcotic ingestion. She had undergone endometrial ablation and given Norco at discharge. She was intubated and placed on mechanical ventilation. She was treated with vancomycin and Zosyn. She had pulmonary edema. She was extubated the next day. I do not see a pulmonary consult in EMR. CT chest on 11/1/17 reported ILD with fibrotic changes, 2 noncalcified nodules that were stable. The patient was admitted through the ER with the blister on the dorsum of the left foot and a red area on the tibial area of the left leg. She was admitted and started on clindamycin, developed cellulitis of the left leg. Clindamycin was changed to Zosyn and vancomycin. ID was consulted. Zosyn was changed to Rocephin. She was also seen by podiatry who did not feel there was deep space infection. She was continued on oxygen at 4 LPM.  Steroids were discontinued as it was not felt she had COPD exacerbation. Antibiotics were further changed to linezolid as foot culture after lancing showed MRSA. She has felt to have asymptomatic bacteriuria with E. coli. Today the patient was evaluated by Dr. Manjula Hammond, felt to be very sleepy. She was not in respiratory distress. ABG was obtained and showed hypercapnia above baseline, she was transferred to C4 and placed on BiPAP. CXR was ordered. Review of systems was not available as the patient is lethargic and on BiPAP.        Patient Active Problem List    Diagnosis Date Noted    Thyroid nodule 03/14/2016     Priority: High     Class: Acute    Pulmonary nodule, left 03/14/2016     Priority: High     Class: Acute    Obesity 04/22/2019    Cellulitis 04/15/2019    HCAP (healthcare-associated pneumonia)     Acute hypoxemic respiratory failure (HCC)     Pulmonary edema, acute (HCC)     Aspiration pneumonia ([address]     [facility]) [facility] [facility]     Acute respiratory failure with hypoxia and hypercapnia ([address] 12/22/2018    Acute encephalopathy 12/22/2018    Pneumonia due to infectious organism 12/22/2018    Diabetes mellitus ([address]     Hyperlipidemia     Hypertension     Hypothyroidism     Polycythemia, secondary 12/18/2014    Leukocytosis 12/18/2014    COPD (chronic obstructive pulmonary disease) ([address] 12/18/2014       Past Medical History:   Diagnosis Date    JAQUELIN (acute kidney injury) ([address]     Arthritis     Asthma     COPD (chronic obstructive pulmonary disease) ([address]     Diabetes mellitus ([address]     Hyperlipidemia     Hypertension     MRSA (methicillin resistant staph aureus) culture positive 04/17/2019    lle wound    Other disorders of kidney and ureter in diseases classified elsewhere     Pneumonia due to infectious organism 12/22/2018    Thyroid disease         Past Surgical History:   Procedure Laterality Date    CHOLECYSTECTOMY      ROTATOR CUFF REPAIR Left 2015    THYROID LOBECTOMY      XR KNEE INC TUNNEL BILAT Bilateral 2014        Family History   Problem Relation Age of Onset    Diabetes Mother     Diabetes Sister     Cancer Brother     Cancer Paternal Aunt         Social History     Tobacco Use    Smoking status: Former Smoker     Packs/day: 0.50    Smokeless tobacco: Never Used   Substance Use Topics    Alcohol use: No        Allergies   Allergen Reactions    Aspirin Other (See Comments)     \"ulcers\"    Celecoxib Other (See Comments)     RHINITIS    Fexofenadine Swelling    Gabapentin Other (See Comments)    Niacin Er      Other reaction(s): Flushing    Adhesive Tape Rash     Paper tape  Paper tape  Paper tape  Paper tape       Physical Exam:  Blood pressure 120/71, pulse 81, temperature 98.5 °F (36.9 °C), temperature source Axillary, resp. rate 22, height 5' 2\" (1.575 m), weight 290 lb 12.8 oz (131.9 kg), SpO2 94 %.'   Constitutional:  Well built, obese, lethargic, on BiPAP. No acute distress. HENT:  Oropharynx is clear and dry, limited exam due to BiPAP mask. Eyes:  Conjunctivae are normal. Pupils equal, round, and reactive to light. No scleral icterus. Neck: Short and large neck, no JVD. No tracheal deviation present. No obvious thyroid mass. Cardiovascular: Normal rate, regular rhythm, normal heart sounds. No right ventricular heave. No lower extremity edema. Pulmonary/Chest: No wheezes, diminished air entry. No rales. No accessory muscle usage or stridor. Abdominal: Soft. Fatty, protuberant, no distension or tenderness. Musculoskeletal: No cyanosis. No clubbing. No obvious joint deformity. Left foot in dressing, area of erythema on left shin with margins marked. Scars of knee surgery bilaterally   Lymphadenopathy: No cervical or supraclavicular adenopathy. Skin: Skin is warm and dry. No rash or nodules on the exposed extremities. Psychiatric: Not arousable. Neurologic: Arouses with considerable stimulation, confused, nonconversant. PERRL. no obvious focal deficit, detailed exam not performed        Results:  CBC:   Recent Labs     04/20/19  0550 04/21/19  0622 04/22/19  0608   WBC 7.1 5.9 6.0   HGB 12.5 12.2 12.0   HCT 38.9 38.6 37.3   MCV 85.4 85.7 85.6    140 140     BMP:   Recent Labs     04/20/19  0550 04/21/19  0623 04/22/19  0608    136 136   K 3.8 3.7 3.6   CL 90* 89* 87*   CO2 41* 38* 39*   BUN 25* 28* 33*   CREATININE 1.2 1.3* 1.5*       Imaging:  I have reviewed radiology images personally.     XR CHEST PORTABLE   Final Result   Cardiomegaly with moderate interstitial pulmonary edema suggesting moderate   CHF. Interstitial pulmonary edema slightly increased since prior. MRI FOOT LEFT WO CONTRAST   Final Result   1. No osteomyelitis or other acute osseous abnormality   2. Nonspecific dorsal mid and forefoot subcutaneous edema compatible with   cellulitis versus lymphedema. No drainable fluid collection or deep soft   tissue ulceration. XR FOOT LEFT (MIN 3 VIEWS)   Final Result   No x-ray evidence of osteomyelitis. No acute osseous injury. Soft tissue swelling of the foot- nonspecific for edema or cellulitis. VL Extremity Venous Bilateral   Final Result      XR SHOULDER RIGHT (MIN 2 VIEWS)   Final Result   No acute abnormality of the right shoulder         XR CHEST PORTABLE   Final Result   Mild left basilar atelectasis or scarring. Otherwise, no acute   cardiopulmonary disease. Xr Shoulder Right (min 2 Views)    Result Date: 4/15/2019  EXAMINATION: 3 XRAY VIEWS OF THE RIGHT SHOULDER 4/15/2019 2:49 pm COMPARISON: None. HISTORY: ORDERING SYSTEM PROVIDED HISTORY: right shoulder pain TECHNOLOGIST PROVIDED HISTORY: Reason for exam:->right shoulder pain Ordering Physician Provided Reason for Exam: right shoulder pain Acuity: Unknown Type of Exam: Unknown FINDINGS: No evidence of fracture or dislocation. There narrowing of the subacromial space likely due to chronic rotator cuff abnormality. There is acromioclavicular osteoarthritis     No acute abnormality of the right shoulder     Xr Foot Left (min 3 Views)    Result Date: 4/17/2019  EXAMINATION: 3 XRAY VIEWS OF THE LEFT FOOT 4/17/2019 1:09 pm COMPARISON: None.  HISTORY: ORDERING SYSTEM PROVIDED HISTORY: wounds, cellulitis TECHNOLOGIST PROVIDED HISTORY: Reason for exam:->wounds, cellulitis Ordering Physician Provided Reason for Exam: wound on top of left foot Acuity: Acute Type of Exam: Initial FINDINGS: The 2nd through 5th MTP joints in extension and PIP joints in flexion. There is moderate osteoarthritis of the 1st metatarsophalangeal joint. Bunion formation is noted. There is no fracture or dislocation. Nonspecific soft tissue swelling is noted. No radiopaque foreign body is seen. No x-ray evidence of osteomyelitis. No acute osseous injury. Soft tissue swelling of the foot- nonspecific for edema or cellulitis. Xr Chest Portable    Result Date: 4/22/2019  EXAMINATION: SINGLE XRAY VIEW OF THE CHEST 4/22/2019 1:58 pm COMPARISON: 04/15/2019 HISTORY: ORDERING SYSTEM PROVIDED HISTORY: Acute hypoxic and hypercapnic respiratory failure TECHNOLOGIST PROVIDED HISTORY: Reason for exam:->Acute hypoxic and hypercapnic respiratory failure Ordering Physician Provided Reason for Exam: Acute hypoxic and hypercapnic respiratory failure Acuity: Acute Type of Exam: Initial FINDINGS: There is cardiomegaly. Cephalization of the pulmonary vasculature with perihilar fullness suggesting interstitial pulmonary edema. No pleural effusion or pneumothorax. Cardiomediastinal silhouette and bony thorax are unchanged. Cardiomegaly with moderate interstitial pulmonary edema suggesting moderate CHF. Interstitial pulmonary edema slightly increased since prior. Xr Chest Portable    Result Date: 4/15/2019  EXAMINATION: SINGLE XRAY VIEW OF THE CHEST 4/15/2019 2:28 pm COMPARISON: 12/23/2018 HISTORY: ORDERING SYSTEM PROVIDED HISTORY: copd TECHNOLOGIST PROVIDED HISTORY: Reason for exam:->copd Ordering Physician Provided Reason for Exam: copd Acuity: Unknown Type of Exam: Unknown FINDINGS: The patient is in lordotic position. There is stable prominence of the cardiac silhouette. Pulmonary vessels are normal in caliber. The lungs are underinflated. Right hemidiaphragm is chronically elevated. There is mild linear opacity in the left lung base. Mild left basilar atelectasis or scarring. Otherwise, no acute cardiopulmonary disease.      Mri Foot Left Wo Contrast    Result Date: 4/19/2019  EXAMINATION: MRI OF THE LEFT FOOT WITHOUT CONTRAST, 4/19/2019 6:50 pm TECHNIQUE: Multiplanar multisequence MRI of the left foot was performed without the administration of intravenous contrast. COMPARISON: Left foot radiographs 04/17/2018. HISTORY: ORDERING SYSTEM PROVIDED HISTORY: OSTEOMYELITIS, FOOT TECHNOLOGIST PROVIDED HISTORY: Ordering Physician Provided Reason for Exam: Mid dorsal Lt foot abcess Acuity: Acute Type of Exam: Initial Additional signs and symptoms: Pt moving all around. Coached on motion. Best images FINDINGS: LISFRANC JOINT:  Lisfranc ligament is visualized and is normal.  The alignment of the tarsal-metatarsal joint is anatomic. BONE MARROW:  Bone marrow is normal in signal without evidence of fracture, marrow contusion or marrow occupying lesion. GREATER AND LESSER MTP JOINTS: Mild 1st metatarsophalangeal degenerative changes. No joint effusion. The lesser metatarsophalangeal joints are unremarkable. SOFT TISSUES: Mild dorsal mid and forefoot subcutaneous edema. No drainable fluid collection. No deep soft tissue ulceration or sinus tract. TENDONS: The visualized tendons are intact without tenosynovitis. 1. No osteomyelitis or other acute osseous abnormality 2. Nonspecific dorsal mid and forefoot subcutaneous edema compatible with cellulitis versus lymphedema. No drainable fluid collection or deep soft tissue ulceration.      Vl Extremity Venous Bilateral    Result Date: 4/16/2019  Lower Extremities DVT Study  Demographics   Patient Name       JOSÉ Horan   Date of Study      04/16/2019         Gender              Female   Patient Number     [id number]         Date of Birth       1945   Visit Number       [id number]          Age                 68 year(s)   Accession Number   [id number]          Room Number         5795   Corporate ID       [id number]            Daniel Dumont RVT   Ordering Physician [address] +---------+----------+------------------------------------------------------+ Velocities are measured in cm/s ; Diameters are measured in mm Right Lower Extremities DVT Study Measurements Right 2D Measurements +------------------------+----------+---------------+----------+ ! Location                ! Visualized! Compressibility! Thrombosis! +------------------------+----------+---------------+----------+ ! Sapheno Femoral Junction! Yes       ! Yes            ! None      ! +------------------------+----------+---------------+----------+ ! GSV Thigh               ! Yes       ! Yes            ! None      ! +------------------------+----------+---------------+----------+ ! Common Femoral          !Yes       ! Yes            ! None      ! +------------------------+----------+---------------+----------+ ! Prox Femoral            !Yes       ! Yes            ! None      ! +------------------------+----------+---------------+----------+ ! Mid Femoral             !Yes       ! Yes            ! None      ! +------------------------+----------+---------------+----------+ ! Dist Femoral            !Yes       ! Yes            ! None      ! +------------------------+----------+---------------+----------+ ! Deep Femoral            !Yes       ! Yes            ! None      ! +------------------------+----------+---------------+----------+ ! Popliteal               !Yes       ! Yes            ! None      ! +------------------------+----------+---------------+----------+ ! GSV Below Knee          ! Yes       ! Yes            ! None      ! +------------------------+----------+---------------+----------+ ! Gastroc                 ! Yes       ! Yes            ! None      ! +------------------------+----------+---------------+----------+ ! PTV                     ! Partial   !Yes            ! None      ! +------------------------+----------+---------------+----------+ ! Watson                !Partial   !Yes            ! None      ! +------------------------+----------+---------------+----------+ ! Dist Femoral            !Yes       ! Yes            ! None      ! +------------------------+----------+---------------+----------+ ! Deep Femoral            !Yes       ! Yes            ! None      ! +------------------------+----------+---------------+----------+ ! Popliteal               !Yes       ! Yes            ! None      ! +------------------------+----------+---------------+----------+ ! GSV Below Knee          ! Yes       ! Yes            ! None      ! +------------------------+----------+---------------+----------+ ! Gastroc                 ! Yes       ! Yes            ! None      ! +------------------------+----------+---------------+----------+ ! PTV                     ! Yes       ! Yes            ! None      ! +------------------------+----------+---------------+----------+ ! Peroneal                !Yes       ! Yes            ! None      ! +------------------------+----------+---------------+----------+ ! GSV Calf                ! Yes       ! Yes            ! None      ! +------------------------+----------+---------------+----------+ ! SSV                     ! Yes       ! Yes            ! None      ! +------------------------+----------+---------------+----------+ Left Doppler Measurements +------------------------+------+------+------------+ ! Location                ! Signal!Reflux! Reflux (sec)! +------------------------+------+------+------------+ ! Sapheno Femoral Junction! Phasic! No    !            ! +------------------------+------+------+------------+ ! Common Femoral          !Phasic! No    !            ! +------------------------+------+------+------------+ ! Prox Femoral            !Phasic! No    !            ! +------------------------+------+------+------------+ ! Deep Femoral            !Phasic! No    !            ! +------------------------+------+------+------------+ ! Popliteal               !Phasic! No    !            ! +------------------------+------+------+------------+          Echocardiogram 4/15/19:  Normal left ventricular systolic function with ejection fraction of 55-60%.  Septal bounce noted due to right ventricular volume overload. Mild concentric left ventricular hypertrophy. Grade I diastolic dysfunction with normal filling pressure. Mild mitral and tricuspid regurgitation. The right ventricle is moderately enlarged. Right ventricular systolic function is moderately reduced. S' prime velocity is measured at 7 cm/s. Systolic pulmonic artery pressure (SPAP) is estimated at 55 mmHg consistent with mild pulmonary hypertension (Right atrial pressure of 15 mmHg). The right atrium is moderately dilated. PFT:  None in EMR      Assessment and plan:  Acute on chronic respiratory failure, hypoxemic and hypercarbic. Unclear from the history, but it appears she is on oxygen at 4 LPM at home. 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 She has had a similar presentation in December 2018. Discontinue narcotics for now. Repeat ABG. If she worsens, will need to be transferred to ICU as she remains a full code. COPD. No PFT in EMR. Agree with bronchodilator, will give her scheduled bronchodilator. ?  CHF. Chest x-ray suggests pulmonary edema, will give Lasix IV and repeat CXR in a.m. Elevated hemidiaphragm, ILD. She has mild ILD on prior CT chest  Cellulitis. Being followed by ID and podiatry, on Zyvox. DM, HTN, HLD, hypothyroidism, obesity. Per IM. DVT prophylaxis. On Lovenox. Have examined the patient, reviewed labs, images and medical records. My impression and recommendations are as above. Discussed with nursing, Dr. Bo Martin. No family at bedside. We will follow with you, thank you for the consultation.     Electronically signed by:  Levi Richmond MD    4/22/2019    7:21 PM.

## 2019-04-23 ENCOUNTER — APPOINTMENT (OUTPATIENT)
Dept: GENERAL RADIOLOGY | Age: 74
DRG: 602 | End: 2019-04-23
Payer: MEDICARE

## 2019-04-23 LAB
ANION GAP SERPL CALCULATED.3IONS-SCNC: 8 MMOL/L (ref 3–16)
BASE EXCESS ARTERIAL: 5.5 MMOL/L (ref -3–3)
BUN BLDV-MCNC: 45 MG/DL (ref 7–20)
CALCIUM SERPL-MCNC: 8.7 MG/DL (ref 8.3–10.6)
CARBOXYHEMOGLOBIN ARTERIAL: 1 % (ref 0–1.5)
CHLORIDE BLD-SCNC: 89 MMOL/L (ref 99–110)
CO2: 41 MMOL/L (ref 21–32)
CREAT SERPL-MCNC: 2 MG/DL (ref 0.6–1.2)
EKG ATRIAL RATE: 68 BPM
EKG DIAGNOSIS: NORMAL
EKG P AXIS: 42 DEGREES
EKG P-R INTERVAL: 180 MS
EKG Q-T INTERVAL: 378 MS
EKG QRS DURATION: 98 MS
EKG QTC CALCULATION (BAZETT): 430 MS
EKG R AXIS: 75 DEGREES
EKG T AXIS: -32 DEGREES
EKG VENTRICULAR RATE: 78 BPM
GFR AFRICAN AMERICAN: 30
GFR NON-AFRICAN AMERICAN: 24
GLUCOSE BLD-MCNC: 112 MG/DL (ref 70–99)
GLUCOSE BLD-MCNC: 112 MG/DL (ref 70–99)
GLUCOSE BLD-MCNC: 118 MG/DL (ref 70–99)
GLUCOSE BLD-MCNC: 97 MG/DL (ref 70–99)
GLUCOSE BLD-MCNC: 98 MG/DL (ref 70–99)
HCO3 ARTERIAL: 34.7 MMOL/L (ref 21–29)
HCT VFR BLD CALC: 37 % (ref 36–48)
HEMOGLOBIN, ART, EXTENDED: 12.8 G/DL (ref 12–16)
HEMOGLOBIN: 11.9 G/DL (ref 12–16)
MCH RBC QN AUTO: 27.3 PG (ref 26–34)
MCHC RBC AUTO-ENTMCNC: 32.2 G/DL (ref 31–36)
MCV RBC AUTO: 84.8 FL (ref 80–100)
METHEMOGLOBIN ARTERIAL: 0.3 %
O2 CONTENT ARTERIAL: 18 ML/DL
O2 SAT, ARTERIAL: 97.5 %
O2 THERAPY: ABNORMAL
PCO2 ARTERIAL: 76.3 MMHG (ref 35–45)
PDW BLD-RTO: 18.8 % (ref 12.4–15.4)
PERFORMED ON: ABNORMAL
PERFORMED ON: NORMAL
PH ARTERIAL: 7.28 (ref 7.35–7.45)
PLATELET # BLD: 120 K/UL (ref 135–450)
PMV BLD AUTO: 7.6 FL (ref 5–10.5)
PO2 ARTERIAL: 112.6 MMHG (ref 75–108)
POTASSIUM REFLEX MAGNESIUM: 3.6 MMOL/L (ref 3.5–5.1)
RBC # BLD: 4.36 M/UL (ref 4–5.2)
SODIUM BLD-SCNC: 138 MMOL/L (ref 136–145)
TCO2 ARTERIAL: 37.1 MMOL/L
TROPONIN: 0.02 NG/ML
WBC # BLD: 5.5 K/UL (ref 4–11)

## 2019-04-23 PROCEDURE — 2060000000 HC ICU INTERMEDIATE R&B

## 2019-04-23 PROCEDURE — 36600 WITHDRAWAL OF ARTERIAL BLOOD: CPT

## 2019-04-23 PROCEDURE — 93010 ELECTROCARDIOGRAM REPORT: CPT | Performed by: INTERNAL MEDICINE

## 2019-04-23 PROCEDURE — 99232 SBSQ HOSP IP/OBS MODERATE 35: CPT | Performed by: INTERNAL MEDICINE

## 2019-04-23 PROCEDURE — 6360000002 HC RX W HCPCS: Performed by: INTERNAL MEDICINE

## 2019-04-23 PROCEDURE — 85027 COMPLETE CBC AUTOMATED: CPT

## 2019-04-23 PROCEDURE — 93005 ELECTROCARDIOGRAM TRACING: CPT | Performed by: INTERNAL MEDICINE

## 2019-04-23 PROCEDURE — 36415 COLL VENOUS BLD VENIPUNCTURE: CPT

## 2019-04-23 PROCEDURE — 82803 BLOOD GASES ANY COMBINATION: CPT

## 2019-04-23 PROCEDURE — 2700000000 HC OXYGEN THERAPY PER DAY

## 2019-04-23 PROCEDURE — 94640 AIRWAY INHALATION TREATMENT: CPT

## 2019-04-23 PROCEDURE — 6370000000 HC RX 637 (ALT 250 FOR IP): Performed by: INTERNAL MEDICINE

## 2019-04-23 PROCEDURE — 6360000002 HC RX W HCPCS: Performed by: REGISTERED NURSE

## 2019-04-23 PROCEDURE — 2580000003 HC RX 258: Performed by: INTERNAL MEDICINE

## 2019-04-23 PROCEDURE — 94660 CPAP INITIATION&MGMT: CPT

## 2019-04-23 PROCEDURE — 84484 ASSAY OF TROPONIN QUANT: CPT

## 2019-04-23 PROCEDURE — 80048 BASIC METABOLIC PNL TOTAL CA: CPT

## 2019-04-23 PROCEDURE — 94761 N-INVAS EAR/PLS OXIMETRY MLT: CPT

## 2019-04-23 PROCEDURE — 71045 X-RAY EXAM CHEST 1 VIEW: CPT

## 2019-04-23 PROCEDURE — 99233 SBSQ HOSP IP/OBS HIGH 50: CPT | Performed by: INTERNAL MEDICINE

## 2019-04-23 RX ORDER — LINEZOLID 2 MG/ML
600 INJECTION, SOLUTION INTRAVENOUS EVERY 12 HOURS
Status: DISCONTINUED | OUTPATIENT
Start: 2019-04-23 | End: 2019-04-23

## 2019-04-23 RX ORDER — DOXYCYCLINE HYCLATE 100 MG
100 TABLET ORAL EVERY 12 HOURS SCHEDULED
Status: DISCONTINUED | OUTPATIENT
Start: 2019-04-23 | End: 2019-04-27 | Stop reason: HOSPADM

## 2019-04-23 RX ORDER — HEPARIN SODIUM 5000 [USP'U]/ML
7500 INJECTION, SOLUTION INTRAVENOUS; SUBCUTANEOUS EVERY 8 HOURS SCHEDULED
Status: DISCONTINUED | OUTPATIENT
Start: 2019-04-24 | End: 2019-04-27 | Stop reason: HOSPADM

## 2019-04-23 RX ORDER — TRAMADOL HYDROCHLORIDE 50 MG/1
100 TABLET ORAL EVERY 6 HOURS PRN
Status: DISCONTINUED | OUTPATIENT
Start: 2019-04-23 | End: 2019-04-27 | Stop reason: HOSPADM

## 2019-04-23 RX ORDER — TRAMADOL HYDROCHLORIDE 50 MG/1
50 TABLET ORAL EVERY 6 HOURS PRN
Status: DISCONTINUED | OUTPATIENT
Start: 2019-04-23 | End: 2019-04-27 | Stop reason: HOSPADM

## 2019-04-23 RX ADMIN — MONTELUKAST 10 MG: 10 TABLET, FILM COATED ORAL at 21:51

## 2019-04-23 RX ADMIN — FUROSEMIDE 40 MG: 10 INJECTION, SOLUTION INTRAMUSCULAR; INTRAVENOUS at 21:54

## 2019-04-23 RX ADMIN — DOXYCYCLINE HYCLATE 100 MG: 100 TABLET, COATED ORAL at 21:51

## 2019-04-23 RX ADMIN — Medication 10 ML: at 21:54

## 2019-04-23 RX ADMIN — ROSUVASTATIN CALCIUM 40 MG: 10 TABLET, FILM COATED ORAL at 21:54

## 2019-04-23 RX ADMIN — IPRATROPIUM BROMIDE AND ALBUTEROL SULFATE 1 AMPULE: .5; 3 SOLUTION RESPIRATORY (INHALATION) at 15:41

## 2019-04-23 RX ADMIN — FUROSEMIDE 40 MG: 10 INJECTION, SOLUTION INTRAMUSCULAR; INTRAVENOUS at 09:09

## 2019-04-23 RX ADMIN — IPRATROPIUM BROMIDE AND ALBUTEROL SULFATE 1 AMPULE: .5; 3 SOLUTION RESPIRATORY (INHALATION) at 08:16

## 2019-04-23 RX ADMIN — IPRATROPIUM BROMIDE AND ALBUTEROL SULFATE 1 AMPULE: .5; 3 SOLUTION RESPIRATORY (INHALATION) at 11:46

## 2019-04-23 RX ADMIN — ENOXAPARIN SODIUM 70 MG: 80 INJECTION SUBCUTANEOUS at 09:10

## 2019-04-23 RX ADMIN — IPRATROPIUM BROMIDE AND ALBUTEROL SULFATE 1 AMPULE: .5; 3 SOLUTION RESPIRATORY (INHALATION) at 20:55

## 2019-04-23 RX ADMIN — FLUTICASONE PROPIONATE 2 SPRAY: 50 SPRAY, METERED NASAL at 09:09

## 2019-04-23 RX ADMIN — LINEZOLID 600 MG: 600 INJECTION, SOLUTION INTRAVENOUS at 10:58

## 2019-04-23 RX ADMIN — IPRATROPIUM BROMIDE AND ALBUTEROL SULFATE 1 AMPULE: .5; 3 SOLUTION RESPIRATORY (INHALATION) at 00:07

## 2019-04-23 RX ADMIN — IPRATROPIUM BROMIDE AND ALBUTEROL SULFATE 1 AMPULE: .5; 3 SOLUTION RESPIRATORY (INHALATION) at 04:05

## 2019-04-23 RX ADMIN — OXYBUTYNIN 10 MG: 5 TABLET, FILM COATED, EXTENDED RELEASE ORAL at 21:51

## 2019-04-23 RX ADMIN — Medication 10 ML: at 09:09

## 2019-04-23 ASSESSMENT — PAIN SCALES - WONG BAKER
WONGBAKER_NUMERICALRESPONSE: 0
WONGBAKER_NUMERICALRESPONSE: 0

## 2019-04-23 ASSESSMENT — PAIN SCALES - GENERAL: PAINLEVEL_OUTOF10: 0

## 2019-04-23 ASSESSMENT — PAIN DESCRIPTION - PROGRESSION: CLINICAL_PROGRESSION: GRADUALLY IMPROVING

## 2019-04-23 NOTE — PROGRESS NOTES
Placed pt on AVAPS mode after results of ABG with increased CO2. Pt tolerating well. Pt has a slightly larger tidal volume.   Will continue to monitor    Spoke with Dr Vito Pepe about pt settings

## 2019-04-23 NOTE — PROGRESS NOTES
04/23/19 0003   NIV Type   Mode AVAPS   Mask Type Full face mask   Mask Size Large   Settings/Measurements   Comfort Level Good   Using Accessory Muscles No   IPAP 20 cmH20   EPAP 8 cmH2O   Vt Ordered 550 mL   Rate Ordered 20   Resp 24   SpO2 95   Vt Exhaled 480 mL   Mask Leak (lpm) 0 lpm   Skin Protection for O2 Device Yes   Breath Sounds   Right Upper Lobe Diminished   Right Middle Lobe Diminished   Right Lower Lobe Diminished   Left Upper Lobe Diminished   Left Lower Lobe Diminished   Alarm Settings   Alarms On Y   Press Low Alarm 6 cmH2O   High Pressure Alarm 25 cmH2O   Delay Alarm 20 sec(s)   Resp Rate Low Alarm 6   High Respiratory Rate 45 br/min

## 2019-04-23 NOTE — PROGRESS NOTES
04/23/19 0818   NIV Type   Equipment Type V60   Mode AVAPS   Mask Type Full face mask   Mask Size Large   Settings/Measurements   Comfort Level Good   Using Accessory Muscles No   Vt Ordered 550 mL   Rate Ordered 20   Resp 20   SpO2 98   FiO2  65 %   Vt Exhaled 518 mL   Mask Leak (lpm) 0 lpm   Skin Protection for O2 Device Yes

## 2019-04-23 NOTE — PROGRESS NOTES
04/23/19 1148   NIV Type   Equipment Type V60   Mode AVAPS   Mask Type Full face mask   Mask Size Large   Settings/Measurements   Comfort Level Good   Using Accessory Muscles No   Vt Ordered 550 mL   Rate Ordered 20   Resp 20   FiO2  55 %   I Time/ I Time % 0.95 s   Vt Exhaled 500 mL   Mask Leak (lpm) 25 lpm   Skin Protection for O2 Device Yes

## 2019-04-23 NOTE — CONSULTS
[facility]    [facility]              [phone]                   Plan :     Her CHF- is mainly on the right side, and may be intolerant to lasix  Also has been on multiple abx with hospitalization leading to ATN  BP as low as 96 systolic this am, also contributing to JAQUELIN  Will continue to monitor- continue  Current management  May need ICU transfer this admission and risk of needing dialysis-     Assessment :     Acute Kidney Injury  Cr peaked to  2  Was 1.1 on admission  0.9 on 12/18    UA- 20-50 wbc, and 4+ bacteriuria, Ecoli- got zosyn  And ceftriaxone  Has received vancomycin and zosyn this admission,  Now held- known to cause ATN  Also on iv lasix  BP lows    Acidosis  Has respiratory acidosis  Metabolic alkalosis due to compensation  And diuretics  PH 7.26 today    Hypotension  BP: (115)/(72) Pulse:  [79]   If bp goes down further may need to reduce meds      HFpEF  Has edema  Mostly RV failure- likely due to COPD  Echo: 4/19- IVC is dilated ( >2.1 cm) elevated RA pressure, RVSP is mod reduced, normal EF  Increased with ventriclar volume overload  On iv lasix bid      DM II  Cellulitis  encephalopathy      [facility] would like to thank Rose Son MD   for opportunity to serve this patient      Please call with questions at-   24 Hrs Answering service [phone] or  7 am- 5 pm via Perfect serve or cell phone  Dr.Sudhir Christi Richards          CC/reason for consult :     JAQUELIN     HPI :     Andrews Mayorga is a 68 y.o. female presented to   the hospital on 4/15/2019 with edema, and erythema  Of the legs. also associated with bulla in the heels. She  Is being treated with antibiotics for cellulitis. She is also noted to have COPD exacerbation for which  She is on oxygen, nebulizer, steroids. She is also on lasix iv for edema and sob  Her oxygen requirement is higher than her baseline.     Course is complicated by thrombocytopenia, thought  To be linezolid- now held  Needing on and off bipap    We are consulted because of rising creatinine         Interval History:     Cr going up    ROS:     Seen with- no family    positives in bold   Constitutional:  fever, chills, weakness, weight change, fatigue  Skin:  rash, pruritus, hair loss, bruising, dry skin, petechiae  Head, Face, Neck   headaches, swelling,  cervical adenopathy  Respiratory: shortness of breath, cough, or wheezing  Cardiovascular: chest pain, palpitations, dizzy, edema  Gastrointestinal: nausea, vomiting, diarrhea, constipation,belly pain    Yellow skin, blood in stool  Musculoskeletal:  back pain, muscle weakness, gait problems,       joint pain or swelling. Genitourinary:  dysuria, poor urine flow, flank pain, blood in urine  Neurologic:  vertigo, TIA'S, syncope, seizures, focal weakness  Psychosocial:  insomnia, anxiety, or depression. Additional positive findings:                          All other remaining systems are negative.         PMH/PSH/SH/Family History:     Past Medical History:   Diagnosis Date    [facility]QUELIN [facility] ([facility]     Arthritis     Asthma     [facility]     Diabetes mellitus ([facility]     Hyperlipidemia     Hypertension     MRSA (methicillin resistant staph aureus) culture positive 04/17/2019    left ft wound    Other disorders of kidney and ureter in diseases classified elsewhere     Pneumonia due to infectious organism 12/22/2018    Thyroid disease        Past Surgical History:   Procedure Laterality Date    CHOLECYSTECTOMY      ROTATOR CUFF REPAIR Left 2015    THYROID LOBECTOMY      XR KNEE INC TUNNEL BILAT Bilateral 2014       Social History     Socioeconomic History    Marital status: Single     Spouse name: Not on file    Number of children: Not on file    Years of education: Not on file    Highest education level: Not on file   Occupational History    Not on file   Social Needs    Financial resource strain: Not on file    Food insecurity:     Worry: Not on file     Inability: Not on file    Transportation needs:     Medical: Not on file     Non-medical: Not on file   Tobacco Use    Smoking status: Former Smoker     Packs/day: 0.50    Smokeless tobacco: Never Used   Substance and Sexual Activity    Alcohol use: No    Drug use: No    Sexual activity: Not on file   Lifestyle    Physical activity:     Days per week: Not on file     Minutes per session: Not on file    Stress: Not on file   Relationships    Social connections:     Talks on phone: Not on file     Gets together: Not on file     Attends [employer] service: Not on file     Active member of club or organization: Not on file     Attends meetings of clubs or organizations: Not on file     Relationship status: Not on file    Intimate partner violence:     Fear of current or ex partner: Not on file     Emotionally abused: Not on file     Physically abused: Not on file     Forced sexual activity: Not on file   Other Topics Concern    Not on file   Social History Narrative    Not on file           Problem Relation Age of Onset    Diabetes Mother     Diabetes Sister     Cancer Brother     Cancer Paternal Aunt           Medication:     Scheduled Meds:   [START ON 4/24/2019] heparin (porcine)  7,500 Units Subcutaneous 3 times per day    doxycycline hyclate  100 mg Oral 2 times per day    furosemide  40 mg Intravenous BID    ipratropium-albuterol  1 ampule Inhalation Q4H WA    lactobacillus  1 capsule Oral Daily with breakfast    fluticasone  2 spray Nasal Daily    levothyroxine  88 mcg Oral Daily    montelukast  10 mg Oral Nightly    oxybutynin  10 mg Oral Nightly    rosuvastatin  40 mg Oral QPM    sodium chloride flush  10 mL Intravenous 2 times per day    insulin lispro  0-6 Units Subcutaneous TID WC    insulin lispro  0-3 Units Subcutaneous Nightly     Continuous Infusions:   dextrose       PRN Meds:.hydrOXYzine, acetaminophen, sodium chloride flush, magnesium hydroxide, prochlorperazine, glucose, dextrose, glucagon (rDNA), dextrose, hydrALAZINE       Vitals :     Vitals:    04/23/19 1541   BP:    Pulse:    Resp: 20   Temp:    SpO2: 94%       I & O :       Intake/Output Summary (Last 24 hours) at 4/23/2019 1652  Last data filed at 4/23/2019 1400  Gross per 24 hour   Intake 0 ml   Output 1700 ml   Net -1700 ml        Physical Examination :     General appearance: Anxious- no, distressed- no, in good spirits- no    Obese,lying flat, lethargic  HEENT: Lips- normal, teeth- ok , oral mucosa- moist  Neck : Mass- no, appears symmetrical, JVD- not visible  Respiratory: Respiratory effort-  Labored, on oxygen  wheeze- no, crackles - no  Cardiovascular:  Ausculation- No M/R/G, Edema 2 + both legs  Abdomen: visible mass- no, distention- no, scar- no, tenderness- no                            hepatosplenomegaly-  no  Musculoskeletal:  clubbing no,cyanosis- no , digital ischemia- no                           muscle strength- grossly normal , tone - grossly normal  Skin: rashes- no , ulcers- no, induration- no, tightening - no  Psychiatric:  Judgement and insight- normal           AAO X 3     LABS:     Recent Labs     04/21/19  0622 04/22/19  0608 04/23/19  0439   WBC 5.9 6.0 5.5   HGB 12.2 12.0 11.9*   HCT 38.6 37.3 37.0    140 120*     Recent Labs     04/20/19 2228 04/21/19  0622 04/21/19  0623 04/22/19  0608 04/23/19  0440   NA  --   --  136 136 138   K  --   --  3.7 3.6 3.6   CL  --   --  89* 87* 89*   CO2  --   --  38* 39* 41*   BUN  --   --  28* 33* 45*   CREATININE  --   --  1.3* 1.5* 2.0*   GLUCOSE  --   --  120* 108* 98   MG 2.20 2.10  --   --   --

## 2019-04-23 NOTE — PROGRESS NOTES
INPATIENT PULMONARY CRITICAL CARE PROGRESS NOTE      Reason for visit: Acute on chronic respiratory failure, hypoxemic and hypercarbic, COPD, #CHF, elevated hemidiaphragm, ILD, cellulitis    SUBJECTIVE:  The patient remained on BiPAP, is awake and asking to come off BiPAP. She denies shortness of breath, cough or chest pain. She is asking for liquids. She remains afebrile and hemodynamically stable. She has maintained adequate oxygen saturation. Physical Exam:  Blood pressure 115/72, pulse 79, temperature 97.6 °F (36.4 °C), temperature source Axillary, resp. rate 20, height 5' 2\" (1.575 m), weight 288 lb 3.2 oz (130.7 kg), SpO2 94 %.'   Constitutional:  Well built, obese, awake and communicative, on BiPAP. No acute distress. HENT:  Oropharynx is clear and dry, limited exam due to BiPAP mask. Eyes:  Conjunctivae are normal. Pupils equal, round, and reactive to light. No scleral icterus. Neck: Short and large neck, no JVD. No obvious thyroid mass. Cardiovascular: Normal rate, regular rhythm, normal heart sounds. No lower extremity edema. Pulmonary/Chest: No wheezes, diminished air entry. No rales. No accessory muscle usage or stridor. Abdominal: Soft. Fatty, protuberant, no distension or tenderness. Musculoskeletal: No cyanosis. No clubbing. No obvious joint deformity. Left foot in dressing, area of erythema on left shin with margins marked. Scars of knee surgery bilaterally   Lymphadenopathy: Deferred  Skin: Skin is warm and dry. No rash or nodules on the exposed extremities. Psychiatric: Not arousable. Neurologic:  Awake and oriented, conversant, no obvious focal deficit. PERRL.               Results:  CBC:   Recent Labs     04/21/19  0622 04/22/19  0608 04/23/19  0439   WBC 5.9 6.0 5.5   HGB 12.2 12.0 11.9*   HCT 38.6 37.3 37.0   MCV 85.7 85.6 84.8    140 120*     BMP:   Recent Labs     04/21/19  0623 04/22/19  0608 04/23/19  0440    136 138   K 3.7 3.6 3.6   CL 89* 87* 89*   CO2 38* 39* 41*   BUN 28* 33* 45*   CREATININE 1.3* 1.5* 2.0*     LIVER PROFILE: No results for input(s): AST, ALT, LIPASE, BILIDIR, BILITOT, ALKPHOS in the last 72 hours. Invalid input(s): AMYLASE,  ALB  PT/INR: No results for input(s): PROTIME, INR in the last 72 hours. APTT: No results for input(s): APTT in the last 72 hours. UA:No results for input(s): NITRITE, COLORU, PHUR, LABCAST, WBCUA, RBCUA, MUCUS, TRICHOMONAS, YEAST, BACTERIA, CLARITYU, SPECGRAV, LEUKOCYTESUR, UROBILINOGEN, BILIRUBINUR, BLOODU, GLUCOSEU, AMORPHOUS in the last 72 hours. Invalid input(s): KETONESU    Imaging:  I have reviewed radiology images personally. XR CHEST PORTABLE   Final Result   Increasing pulmonary edema and left basilar atelectasis or pneumonia. XR CHEST PORTABLE   Final Result   Cardiomegaly with moderate interstitial pulmonary edema suggesting moderate   CHF. Interstitial pulmonary edema slightly increased since prior. MRI FOOT LEFT WO CONTRAST   Final Result   1. No osteomyelitis or other acute osseous abnormality   2. Nonspecific dorsal mid and forefoot subcutaneous edema compatible with   cellulitis versus lymphedema. No drainable fluid collection or deep soft   tissue ulceration. XR FOOT LEFT (MIN 3 VIEWS)   Final Result   No x-ray evidence of osteomyelitis. No acute osseous injury. Soft tissue swelling of the foot- nonspecific for edema or cellulitis. VL Extremity Venous Bilateral   Final Result      XR SHOULDER RIGHT (MIN 2 VIEWS)   Final Result   No acute abnormality of the right shoulder         XR CHEST PORTABLE   Final Result   Mild left basilar atelectasis or scarring. Otherwise, no acute   cardiopulmonary disease. Xr Shoulder Right (min 2 Views)    Result Date: 4/15/2019  EXAMINATION: 3 XRAY VIEWS OF THE RIGHT SHOULDER 4/15/2019 2:49 pm COMPARISON: None.  HISTORY: ORDERING SYSTEM PROVIDED HISTORY: right shoulder pain TECHNOLOGIST PROVIDED HISTORY: Reason for exam:->right shoulder pain Ordering Physician Provided Reason for Exam: right shoulder pain Acuity: Unknown Type of Exam: Unknown FINDINGS: No evidence of fracture or dislocation. There narrowing of the subacromial space likely due to chronic rotator cuff abnormality. There is acromioclavicular osteoarthritis     No acute abnormality of the right shoulder     Xr Foot Left (min 3 Views)    Result Date: 4/17/2019  EXAMINATION: 3 XRAY VIEWS OF THE LEFT FOOT 4/17/2019 1:09 pm COMPARISON: None. HISTORY: ORDERING SYSTEM PROVIDED HISTORY: wounds, cellulitis TECHNOLOGIST PROVIDED HISTORY: Reason for exam:->wounds, cellulitis Ordering Physician Provided Reason for Exam: wound on top of left foot Acuity: Acute Type of Exam: Initial FINDINGS: The 2nd through 5th MTP joints in extension and PIP joints in flexion. There is moderate osteoarthritis of the 1st metatarsophalangeal joint. Bunion formation is noted. There is no fracture or dislocation. Nonspecific soft tissue swelling is noted. No radiopaque foreign body is seen. No x-ray evidence of osteomyelitis. No acute osseous injury. Soft tissue swelling of the foot- nonspecific for edema or cellulitis. Xr Chest Portable    Result Date: 4/23/2019  EXAMINATION: SINGLE XRAY VIEW OF THE CHEST 4/23/2019 5:32 am COMPARISON: 04/22/2019 HISTORY: ORDERING SYSTEM PROVIDED HISTORY: ? CHF TECHNOLOGIST PROVIDED HISTORY: Reason for exam:->? CHF Ordering Physician Provided Reason for Exam: chf FINDINGS: Pulmonary edema has progressed. There is increasing left basilar airspace disease. Cardiomegaly is unchanged. There is no discernible pneumothorax. Increasing pulmonary edema and left basilar atelectasis or pneumonia.      Xr Chest Portable    Result Date: 4/22/2019  EXAMINATION: SINGLE XRAY VIEW OF THE CHEST 4/22/2019 1:58 pm COMPARISON: 04/15/2019 HISTORY: ORDERING SYSTEM PROVIDED HISTORY: Acute hypoxic and hypercapnic respiratory failure TECHNOLOGIST PROVIDED HISTORY: Reason for exam:->Acute hypoxic and hypercapnic respiratory failure Ordering Physician Provided Reason for Exam: Acute hypoxic and hypercapnic respiratory failure Acuity: Acute Type of Exam: Initial FINDINGS: There is cardiomegaly. Cephalization of the pulmonary vasculature with perihilar fullness suggesting interstitial pulmonary edema. No pleural effusion or pneumothorax. Cardiomediastinal silhouette and bony thorax are unchanged. Cardiomegaly with moderate interstitial pulmonary edema suggesting moderate CHF. Interstitial pulmonary edema slightly increased since prior. Xr Chest Portable    Result Date: 4/15/2019  EXAMINATION: SINGLE XRAY VIEW OF THE CHEST 4/15/2019 2:28 pm COMPARISON: 12/23/2018 HISTORY: ORDERING SYSTEM PROVIDED HISTORY: copd TECHNOLOGIST PROVIDED HISTORY: Reason for exam:->copd Ordering Physician Provided Reason for Exam: copd Acuity: Unknown Type of Exam: Unknown FINDINGS: The patient is in lordotic position. There is stable prominence of the cardiac silhouette. Pulmonary vessels are normal in caliber. The lungs are underinflated. Right hemidiaphragm is chronically elevated. There is mild linear opacity in the left lung base. Mild left basilar atelectasis or scarring. Otherwise, no acute cardiopulmonary disease. Mri Foot Left Wo Contrast    Result Date: 4/19/2019  EXAMINATION: MRI OF THE LEFT FOOT WITHOUT CONTRAST, 4/19/2019 6:50 pm TECHNIQUE: Multiplanar multisequence MRI of the left foot was performed without the administration of intravenous contrast. COMPARISON: Left foot radiographs 04/17/2018. HISTORY: ORDERING SYSTEM PROVIDED HISTORY: OSTEOMYELITIS, FOOT TECHNOLOGIST PROVIDED HISTORY: Ordering Physician Provided Reason for Exam: Mid dorsal Lt foot abcess Acuity: Acute Type of Exam: Initial Additional signs and symptoms: Pt moving all around. Coached on motion.  Best images FINDINGS: LISFRANC JOINT:  Lisfranc ligament is visualized and is normal. The alignment of the tarsal-metatarsal joint is anatomic. BONE MARROW:  Bone marrow is normal in signal without evidence of fracture, marrow contusion or marrow occupying lesion. GREATER AND LESSER MTP JOINTS: Mild 1st metatarsophalangeal degenerative changes. No joint effusion. The lesser metatarsophalangeal joints are unremarkable. SOFT TISSUES: Mild dorsal mid and forefoot subcutaneous edema. No drainable fluid collection. No deep soft tissue ulceration or sinus tract. TENDONS: The visualized tendons are intact without tenosynovitis. 1. No osteomyelitis or other acute osseous abnormality 2. Nonspecific dorsal mid and forefoot subcutaneous edema compatible with cellulitis versus lymphedema. No drainable fluid collection or deep soft tissue ulceration. Vl Extremity Venous Bilateral    Result Date: 4/16/2019  Lower Extremities DVT Study  Demographics   Patient Name       JOSÉ Tamayo   Date of Study      04/16/2019         Gender              Female   Patient Number     [id number]         Date of Birth       1945   Visit Number       [id number]          Age                 68 year(s)   Accession Number   [id number]          Room Number         8670   Corporate ID       [id number]            Sonographer         Julio Leone RVT   Ordering Physician Alfredo Moreno.,   Interpreting        [facility]                     CNP                Physician           Vascular                                                            Franki Garcia MD, [facility], [facility]  Procedure Type of Study:   Veins:Lower Extremities DVT Study, VASC EXTREMITY VENOUS DUPLEX BILATERAL. Vascular Sonographer Report  Additional Indications:swelling Impressions Right Impression 1. Technically limited exam due to body habitus. There is complete compressibility of all deep and superficial veins seen in the lower extremity.  2. There is normal spontaneous and phasic flow throughout the deep and superficial veins of the right lower extremity . Left Impression 1. Technically limited exam due to body habitus. There is complete compressibility of all deep and superficial veins seen in the lower extremity. 2. There is normal spontaneous and phasic flow throughout the deep and superficial veins of the left lower extremity . Conclusions   Summary   Within the limits of this exam, there is no evidence of deep or superficial  venous thrombosis in the lower extremities bilaterally. Signature   ------------------------------------------------------------------  Electronically signed by Ebony Wei MD, Doloris Neighbors  (Interpreting physician) on 04/16/2019 at 03:58 PM  ------------------------------------------------------------------  Patient Status:Routine. Study [facility] - Vascular Lab. Technical Quality:Limited visualization due to body habitus. Risk Factors History +---------+----------+------------------------------------------------------+ ! Diagnosis! Date      ! Comments                                              ! +---------+----------+------------------------------------------------------+ ! Other    !04/16/2019! Patient reports leg swelling like this is common for  ! !         !          !her. ! +---------+----------+------------------------------------------------------+ Velocities are measured in cm/s ; Diameters are measured in mm Right Lower Extremities DVT Study Measurements Right 2D Measurements +------------------------+----------+---------------+----------+ ! Location                ! Visualized! Compressibility! Thrombosis! +------------------------+----------+---------------+----------+ ! Sapheno Femoral Junction! Yes       ! Yes            ! None      ! +------------------------+----------+---------------+----------+ ! GSV Thigh               ! Yes       ! Yes            ! None      ! +------------------------+----------+---------------+----------+ ! Common Femoral          !Yes       ! Yes            ! None      ! +------------------------+----------+---------------+----------+ ! Prox Femoral            !Yes       ! Yes            ! None      ! +------------------------+----------+---------------+----------+ ! Mid Femoral             !Yes       ! Yes            ! None      ! +------------------------+----------+---------------+----------+ ! Dist Femoral            !Yes       ! Yes            ! None      ! +------------------------+----------+---------------+----------+ ! Deep Femoral            !Yes       ! Yes            ! None      ! +------------------------+----------+---------------+----------+ ! Popliteal               !Yes       ! Yes            ! None      ! +------------------------+----------+---------------+----------+ ! GSV Below Knee          ! Yes       ! Yes            ! None      ! +------------------------+----------+---------------+----------+ ! Gastroc                 ! Yes       ! Yes            ! None      ! +------------------------+----------+---------------+----------+ ! PTV                     ! Partial   !Yes            ! None      ! +------------------------+----------+---------------+----------+ ! Peroneal                !Partial   !Yes            ! None      ! +------------------------+----------+---------------+----------+ ! GSV Calf                ! Yes       ! Yes            ! None      ! +------------------------+----------+---------------+----------+ ! SSV                     ! Yes       ! Yes            ! None      ! +------------------------+----------+---------------+----------+ Right Doppler Measurements +------------------------+------+------+------------+ ! Location                ! Signal!Reflux! Reflux (sec)! +------------------------+------+------+------------+ ! Sapheno Femoral Junction! Phasic! No    !            ! +------------------------+------+------+------------+ ! Common Femoral +------------------------+----------+---------------+----------+ ! PTV                     ! Yes       ! Yes            ! None      ! +------------------------+----------+---------------+----------+ ! Peroneal                !Yes       ! Yes            ! None      ! +------------------------+----------+---------------+----------+ ! GSV Calf                ! Yes       ! Yes            ! None      ! +------------------------+----------+---------------+----------+ ! SSV                     ! Yes       ! Yes            ! None      ! +------------------------+----------+---------------+----------+ Left Doppler Measurements +------------------------+------+------+------------+ ! Location                ! Signal!Reflux! Reflux (sec)! +------------------------+------+------+------------+ ! Sapheno Femoral Junction! Phasic! No    !            ! +------------------------+------+------+------------+ ! Common Femoral          !Phasic! No    !            ! +------------------------+------+------+------------+ ! Prox Femoral            !Phasic! No    !            ! +------------------------+------+------+------------+ ! Deep Femoral            !Phasic! No    !            ! +------------------------+------+------+------------+ ! Popliteal               !Phasic! No    !            ! +------------------------+------+------+------------+      Assessment and plan:  Acute on chronic respiratory failure, hypoxemic and hypercarbic. Unclear from the history, but it appears she is on oxygen at 4 LPM at home. 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 She has had a similar presentation in December 2018. Discontinue narcotics for now. Repeat ABG with improved PCO2, will give breaks and allow her to take clear liquid diet, advance as tolerated. COPD. No PFT in EMR. Agree with bronchodilator, will give her scheduled bronchodilator. ?  CHF.   Chest x-ray suggests pulmonary edema, given Lasix IV yesterday, repeat CXR still suggests pulmonary edema. Remains on Lasix twice daily. Elevated hemidiaphragm, ILD. She has mild ILD on prior CT chest  Cellulitis. Being followed by ID and podiatry, on Zyvox. DM, HTN, HLD, hypothyroidism, obesity. Per IM. DVT prophylaxis. On Lovenox.       Discussed with patient, Dr. Prosper Leggett, nursing.         Electronically signed by:  Harry Zamudio MD    4/23/2019    6:36 PM.

## 2019-04-23 NOTE — PROGRESS NOTES
Hospitalist Progress Note      PCP: No primary care provider on file. Date of Admission: 4/15/2019    Chief Complaint: Left foot pain     Hospital Course:   68 Y F with a h/o COPD, HTN, HLD, and DM2 who presented to the ED with increasing pain, erythema, and edema of her distal LLE surrounding two shallow ulcers which had been \"festering\" for the last week or so. Her legs have been quite edematous for the last few weeks, to the point that she has developed a few bullae which have ruptured and drained serous fluid multiple times. This has left her with sores on the dorsum on her L foot and on her mid L shin. The skin around the ruptured bullae rapidly turned a bright red and became tender over the last few days. She denies fevers or chills. Subjective:    On bipap cont , awake and communicating     Medications:  Reviewed    Infusion Medications    dextrose       Scheduled Medications    furosemide  40 mg Intravenous BID    ipratropium-albuterol  1 ampule Inhalation Q4H WA    enoxaparin  0.5 mg/kg Subcutaneous Daily    linezolid  600 mg Oral 2 times per day    lactobacillus  1 capsule Oral Daily with breakfast    fluticasone  2 spray Nasal Daily    levothyroxine  88 mcg Oral Daily    montelukast  10 mg Oral Nightly    oxybutynin  10 mg Oral Nightly    rosuvastatin  40 mg Oral QPM    sodium chloride flush  10 mL Intravenous 2 times per day    insulin lispro  0-6 Units Subcutaneous TID WC    insulin lispro  0-3 Units Subcutaneous Nightly     PRN Meds: hydrOXYzine, acetaminophen, sodium chloride flush, magnesium hydroxide, prochlorperazine, glucose, dextrose, glucagon (rDNA), dextrose, hydrALAZINE      Intake/Output Summary (Last 24 hours) at 4/23/2019 0755  Last data filed at 4/23/2019 0445  Gross per 24 hour   Intake 250 ml   Output 750 ml   Net -500 ml       Physical Exam Performed:    BP 96/66   Pulse 78   Temp 98.4 °F (36.9 °C) (Axillary)   Resp 21   Ht 5' 2\" (1.575 m)   Wt 288 lb 3.2 oz (130.7 kg)   SpO2 94%   BMI 52.71 kg/m²     General appearance:   on bipap   HEENT:  Normal cephalic, atraumatic without obvious deformity. Pupils equal, round, and reactive to light. Extra ocular muscles intact. Conjunctivae/corneas clear. Neck: Supple, with full range of motion. No jugular venous distention. Trachea midline. Respiratory:  bipap. Diminished bibasilar breath sounds. No wheezing. Cardiovascular:  Regular rate and rhythm with normal S1/S2 without murmurs, rubs or gallops. Abdomen: Soft, non-tender, non-distended with normal bowel sounds. Obese   Musculoskeletal:  No clubbing, cyanosis. BLE pitting edema. Improved   Skin: Skin color, texture, turgor normal. Left shin wound, no drainage. Left foot wound with surrounding erythema and drainage. Borders marked. Neurologic:  Neurovascularly intact without any focal sensory/motor deficits. Cranial nerves: II-XII intact, grossly non-focal.  Psychiatric:   alert    Capillary Refill: ++   Peripheral Pulses: +2 palpable, equal bilaterally       Labs:   Recent Labs     04/21/19  0622 04/22/19  0608 04/23/19  0439   WBC 5.9 6.0 5.5   HGB 12.2 12.0 11.9*   HCT 38.6 37.3 37.0    140 120*     Recent Labs     04/21/19  0623 04/22/19  0608 04/23/19  0440    136 138   K 3.7 3.6 3.6   CL 89* 87* 89*   CO2 38* 39* 41*   BUN 28* 33* 45*   CREATININE 1.3* 1.5* 2.0*   CALCIUM 9.0 9.0 8.7   Urinalysis:      Lab Results   Component Value Date    NITRU POSITIVE 04/17/2019    WBCUA 20-50 04/17/2019    BACTERIA 4+ 04/17/2019    RBCUA 0-2 04/17/2019    BLOODU TRACE-LYSED 04/17/2019    SPECGRAV 1.015 04/17/2019    GLUCOSEU Negative 04/17/2019       Radiology:  XR CHEST PORTABLE   Final Result   Increasing pulmonary edema and left basilar atelectasis or pneumonia. XR CHEST PORTABLE   Final Result   Cardiomegaly with moderate interstitial pulmonary edema suggesting moderate   CHF. Interstitial pulmonary edema slightly increased since prior. MRI FOOT LEFT WO CONTRAST   Final Result   1. No osteomyelitis or other acute osseous abnormality   2. Nonspecific dorsal mid and forefoot subcutaneous edema compatible with   cellulitis versus lymphedema. No drainable fluid collection or deep soft   tissue ulceration. XR FOOT LEFT (MIN 3 VIEWS)   Final Result   No x-ray evidence of osteomyelitis. No acute osseous injury. Soft tissue swelling of the foot- nonspecific for edema or cellulitis. VL Extremity Venous Bilateral   Final Result      XR SHOULDER RIGHT (MIN 2 VIEWS)   Final Result   No acute abnormality of the right shoulder         XR CHEST PORTABLE   Final Result   Mild left basilar atelectasis or scarring. Otherwise, no acute   cardiopulmonary disease. Assessment/Plan:    Active Hospital Problems    Diagnosis Date Noted    Morbid obesity ([facility] [E66.01] 04/22/2019    Acute on chronic respiratory failure with hypoxia and hypercapnia (HCC) [J96.21, J96.22]     Interstitial lung disease ([facility] [J84.9]     Cellulitis [L03.90] 04/15/2019    Diabetes mellitus ([facility] [E11.9]     Hyperlipidemia [E78.5]     Hypertension [I10]     Hypothyroidism [E03.9]     COPD exacerbation ([facility] [J44.1] 12/18/2014       Encephalopathy -secondary to acute and chronic hypoxic and hypercapnic respiratory failure - BiPAP, breathing treatment,  , pulmonology evaluation appreciated , following , chest x-ray  Okay for clear liquid diet, not that much improvement on acidosis-most probably acute and chronic renal failure contributing as well      LLE bacterial cellulitis with bullous lesion of left foot:  - Pt started on IV clindamycin on admission. Cellulitis initially improved but appeared worse 4/17 (erythema noticed outside margins that have been marked). Dc'd clinda and changed to Vanc/Zosyn. ID and Podiatry consulted. Vanc changed to Zyvox per ID due to worsening renal fx. Wound culture grew MRSA. Zosyn dc'd per ID on 4/19.    Wound care consulted. Continue local wound care to left foot bullous lesion.   - Xray of left foot negative for osteo. MRI negative for osteo and no drainable fluid collection noted.    -Acute and chronic diastolic congestive heart failure   -Lasix 40 IV twice a day  -BLE venous dopplers are negative for DVT. Hyperkalemia POA (resolved):  - Continue to hold potassium supplement and losartan. - Pt given furosemide, nebulizers, and kayexalate on admission.       Hypertension:  - Holding losartan as above. Hydralazine meanwhile. Pt is currently normotensive.      Hyperlipidemia:  - Continue statin.     Diabetes mellitus type 2:  - Holding metformin while here. Low dose SSI meanwhile. A1c's have been controlled recently.       COPD, asthma, chronic hypoxic respiratory failure:  Currently acute and chronic hypoxic hypercapnic respiratory failure on BiPAP .      Hypothyroidism:  - Clinically euthyroid. Continue home dose of levothyroxine.     Years of vaginal discomfort, which the patient says started after a gynecologic surgery:  - Details unclear, continue to follow with gynecology clinic. Pelvic exam by ED physician was unrevealing.    - Pt noted to have excoriation to perineum/labia possibly due to incontinence. Apply cream for now. UTI might have contributed to discomfort. UTI:  - UA shows + nitrites and LE with 20-50 WBC and 4+ bacteria. Urine culture grew E. Coli. Pt received 3 days of Zosyn. Asymptomatic bacteriuria    Hypomagnesemia:  - Monitor and replete as needed.      Acute and chronic renal failure-most probably secondary to diuretics, cardiorenal syndrome-intake and output monitor BMP nephrology evaluation       DVT Prophylaxis: Heparin  Diet: DIET CARB CONTROL;  Code Status: Full Code      PT/OT Eval Status: Ordered with recs for SNF    Dispo - ~ 2-3 days       Edwar Garner MD

## 2019-04-23 NOTE — PROGRESS NOTES
04/22/19 2120   NIV Type   Equipment Type v60   Mode BIPAP   Mask Type Full face mask   Mask Size Large   Settings/Measurements   Comfort Level Good   Using Accessory Muscles No   IPAP 16 cmH20   EPAP 8 cmH2O   Rate Ordered 20   Resp 21   SpO2 95   FiO2  65 %   Vt Exhaled 410 mL   Mask Leak (lpm) 0 lpm   Skin Protection for O2 Device Yes   Breath Sounds   Right Upper Lobe Diminished   Right Middle Lobe Diminished   Right Lower Lobe Diminished   Left Upper Lobe Diminished   Left Lower Lobe Diminished   Alarm Settings   Alarms On Y   Press Low Alarm 6 cmH2O   High Pressure Alarm 25 cmH2O   Delay Alarm 20 sec(s)   Resp Rate Low Alarm 6   High Respiratory Rate 45 br/min

## 2019-04-23 NOTE — PROGRESS NOTES
Infectious Disease Follow up Notes    CC :  stan LE cellulitis and L foot wound      Antibiotics:   linezolid IV   (Culturelle)    Admit Date:   4/15/2019  Hospital Day: 9    Subjective:   Moved to higher level of care yesterday with hypercapnic respiratory failure for Bipap  Today seems back to baseline, currently on 6L NC  She reports back discomfort from the bed. No pain L foot/legs     Objective:     Patient Vitals for the past 8 hrs:   BP Temp Temp src Pulse Resp SpO2   04/23/19 1148 -- -- -- -- 20 --   04/23/19 1105 115/72 97.6 °F (36.4 °C) Axillary 79 21 94 %   04/23/19 0818 -- -- -- -- 20 --   04/23/19 0817 -- -- -- -- -- 97 %   04/23/19 0800 111/71 98.1 °F (36.7 °C) Axillary 78 23 97 %       EXAM:  General:  Alert, conversant. NAD. Weak    HEENT:  No scleral icterus. MMM    LUNGS:  Non-labored breathing.  Upper lobes clear  CV:  RRR  ABD:  Soft, NT, +BS    EXT: Fading erythema dorsal L foot, now minimal.     Faded non-blanching purpuric lesion anterior L tibia fading         LINE: IV site ok        Scheduled Meds:   linezolid  600 mg Intravenous Q12H    [START ON 4/24/2019] heparin (porcine)  7,500 Units Subcutaneous 3 times per day    furosemide  40 mg Intravenous BID    ipratropium-albuterol  1 ampule Inhalation Q4H WA    lactobacillus  1 capsule Oral Daily with breakfast    fluticasone  2 spray Nasal Daily    levothyroxine  88 mcg Oral Daily    montelukast  10 mg Oral Nightly    oxybutynin  10 mg Oral Nightly    rosuvastatin  40 mg Oral QPM    sodium chloride flush  10 mL Intravenous 2 times per day    insulin lispro  0-6 Units Subcutaneous TID WC    insulin lispro  0-3 Units Subcutaneous Nightly       Continuous Infusions:   dextrose            Data Review:    Lab Results   Component Value Date    WBC 5.5 04/23/2019    HGB 11.9 (L) 04/23/2019    HCT 37.0 04/23/2019    MCV 84.8 04/23/2019     (L) 04/23/2019     Lab Results   Component Value Date    CREATININE 2.0 (H) 04/23/2019    BUN 45 (H) 04/23/2019     04/23/2019    K 3.6 04/23/2019    CL 89 (L) 04/23/2019    CO2 41 (H) 04/23/2019       Hepatic Function Panel:   Lab Results   Component Value Date    ALKPHOS 74 04/15/2019    ALT 10 04/15/2019    AST 15 04/15/2019    PROT 6.9 04/15/2019    BILITOT 0.4 04/15/2019    LABALBU 3.8 04/15/2019     ESR 29       MICRO:  4/17 UC E coli    Wound cx L foot MRSA , GS no organisms   Staph aureus mrsa (1)   Antibiotic Interpretation ELIAN Status    ceFAZolin Resistant >16 mcg/mL     clindamycin Resistant >2 mcg/mL     erythromycin Resistant >4 mcg/mL     oxacillin Resistant >2 mcg/mL     tetracycline Sensitive <=0.5 mcg/mL     trimethoprim-sulfamethoxazole Resistant >2/38 mcg/mL     vancomycin Sensitive 1 mcg/mL           IMAGING:  Echo 4/16:  Summary   Normal left ventricular systolic function with ejection fraction of 55-60%.  Septal bounce noted due to right ventricular volume overload.   Mild concentric left ventricular hypertrophy.   Grade I diastolic dysfunction with normal filling pressure.   Mild mitral and tricuspid regurgitation.   The right ventricle is moderately enlarged.   Right ventricular systolic function is moderately reduced .   S' prime velocity is measured at 7 cm/s.   Systolic pulmonic artery pressure (SPAP) is estimated at 55 mmHg consistent   with mild pulmonary hypertension (Right atrial pressure of 15 mmHg).   The right atrium is moderately dilated. MRI L foot 4/19:  Impression   1. No osteomyelitis or other acute osseous abnormality   2. Nonspecific dorsal mid and forefoot subcutaneous edema compatible with   cellulitis versus lymphedema.  No drainable fluid collection or deep soft   tissue ulceration.        Assessment:     Patient Active Problem List    Diagnosis Date Noted    Thyroid nodule 03/14/2016     Priority: High     Class: Acute    Pulmonary nodule, left 03/14/2016 Priority: High     Class: Acute    Morbid obesity ([facility] 04/22/2019    Acute on chronic respiratory failure with hypoxia and hypercapnia (HCC)     Interstitial lung disease ([address]     Cellulitis 04/15/2019    HCAP (healthcare-associated pneumonia)     Acute hypoxemic respiratory failure (HCC)     Pulmonary edema, acute (HCC)     Aspiration pneumonia ([facility]     JAQUELIN (acute kidney injury) ([facility]     Acute respiratory failure with hypoxia and hypercapnia ([facility] 12/22/2018    Acute encephalopathy 12/22/2018    Pneumonia due to infectious organism 12/22/2018    Diabetes mellitus ([facility]     Hyperlipidemia     Hypertension     Hypothyroidism     Polycythemia, secondary 12/18/2014    Leukocytosis 12/18/2014    COPD exacerbation ([facility] 12/18/2014     Multiple medical problems, including DM and obesity      Admitted for management of stan LE cellulitis and L foot  Wound culture L foot with MRSA     Asymptomatic bacteriuria with E coli  No abx indicated     JAQUELIN - worse today      Oxygen dependent COPD    No abx allergies     Plan:   Note falling platelets, now 222  Soft tissue infection resolving     Stop linezolid  Give doxycycline for a few days more  Local wound care per DPM    Little more to add, please call with further questions         Clare Wilson MD  Phone: [phone]   Fax : [phone]

## 2019-04-23 NOTE — PROGRESS NOTES
Bedside report received from [facility]. Patient in bed with continuous bipap on. Patient has been pulling mask off, so avasys turned on. Notified to watch closely for patient to remove mask. Patient remains lethargic since rapid earlier today, is not redirectable, unable to follow directions. Dr Destiney Gray at bedside at shift change, ordered repeat ABG. Will watch for results. No signs of discomfort or distress. Bed is in lowest position, wheels locked, 2/2 side rails up. Bedside table and call light within reach. White board updated. Will continue to monitor patient. Ramesh Angelo RN    9:33 PM  Lab calling with critical result - pco2 is 90.5. RT at bedside to adjust bi-pap. MD notified. Ramesh Angelo RN    1:11 AM  Lab calling with critical result - pco2 is 76.3. RT at bedside. Patient remains on continuous bi-pap. Ramesh Angelo RN    5:29 AM  Patient resting comfortably in bed, more alert this morning.  Ramesh Angelo RN

## 2019-04-23 NOTE — CONSULTS
Thanks for consulting [facility] for the care of Stephani Quiñonez. Full consult will follow  Please call with questions at-  24 Hrs Answering service [phone]  Perfect serve, or cell phone 7 am - [address] [facility], [place]   [facility] nephrology  [facility]. [facility]

## 2019-04-24 LAB
ANION GAP SERPL CALCULATED.3IONS-SCNC: 10 MMOL/L (ref 3–16)
BUN BLDV-MCNC: 45 MG/DL (ref 7–20)
CALCIUM SERPL-MCNC: 8.7 MG/DL (ref 8.3–10.6)
CHLORIDE BLD-SCNC: 87 MMOL/L (ref 99–110)
CO2: 40 MMOL/L (ref 21–32)
CREAT SERPL-MCNC: 1.8 MG/DL (ref 0.6–1.2)
GFR AFRICAN AMERICAN: 33
GFR NON-AFRICAN AMERICAN: 28
GLUCOSE BLD-MCNC: 111 MG/DL (ref 70–99)
GLUCOSE BLD-MCNC: 135 MG/DL (ref 70–99)
GLUCOSE BLD-MCNC: 138 MG/DL (ref 70–99)
GLUCOSE BLD-MCNC: 141 MG/DL (ref 70–99)
GLUCOSE BLD-MCNC: 152 MG/DL (ref 70–99)
HCT VFR BLD CALC: 36.8 % (ref 36–48)
HEMOGLOBIN: 11.9 G/DL (ref 12–16)
MCH RBC QN AUTO: 27.3 PG (ref 26–34)
MCHC RBC AUTO-ENTMCNC: 32.3 G/DL (ref 31–36)
MCV RBC AUTO: 84.6 FL (ref 80–100)
PDW BLD-RTO: 18.7 % (ref 12.4–15.4)
PERFORMED ON: ABNORMAL
PLATELET # BLD: 106 K/UL (ref 135–450)
PMV BLD AUTO: 7.9 FL (ref 5–10.5)
POTASSIUM REFLEX MAGNESIUM: 3.6 MMOL/L (ref 3.5–5.1)
RBC # BLD: 4.34 M/UL (ref 4–5.2)
SODIUM BLD-SCNC: 137 MMOL/L (ref 136–145)
WBC # BLD: 6.1 K/UL (ref 4–11)

## 2019-04-24 PROCEDURE — 6360000002 HC RX W HCPCS: Performed by: INTERNAL MEDICINE

## 2019-04-24 PROCEDURE — 6370000000 HC RX 637 (ALT 250 FOR IP): Performed by: INTERNAL MEDICINE

## 2019-04-24 PROCEDURE — 6370000000 HC RX 637 (ALT 250 FOR IP): Performed by: REGISTERED NURSE

## 2019-04-24 PROCEDURE — 2060000000 HC ICU INTERMEDIATE R&B

## 2019-04-24 PROCEDURE — 6370000000 HC RX 637 (ALT 250 FOR IP): Performed by: NURSE PRACTITIONER

## 2019-04-24 PROCEDURE — 85027 COMPLETE CBC AUTOMATED: CPT

## 2019-04-24 PROCEDURE — 2700000000 HC OXYGEN THERAPY PER DAY

## 2019-04-24 PROCEDURE — 94660 CPAP INITIATION&MGMT: CPT

## 2019-04-24 PROCEDURE — 94761 N-INVAS EAR/PLS OXIMETRY MLT: CPT

## 2019-04-24 PROCEDURE — 99233 SBSQ HOSP IP/OBS HIGH 50: CPT | Performed by: INTERNAL MEDICINE

## 2019-04-24 PROCEDURE — 36415 COLL VENOUS BLD VENIPUNCTURE: CPT

## 2019-04-24 PROCEDURE — 97110 THERAPEUTIC EXERCISES: CPT

## 2019-04-24 PROCEDURE — 80048 BASIC METABOLIC PNL TOTAL CA: CPT

## 2019-04-24 PROCEDURE — 2580000003 HC RX 258: Performed by: INTERNAL MEDICINE

## 2019-04-24 PROCEDURE — 94640 AIRWAY INHALATION TREATMENT: CPT

## 2019-04-24 RX ORDER — FUROSEMIDE 10 MG/ML
40 INJECTION INTRAMUSCULAR; INTRAVENOUS DAILY
Status: DISCONTINUED | OUTPATIENT
Start: 2019-04-25 | End: 2019-04-25

## 2019-04-24 RX ADMIN — LEVOTHYROXINE SODIUM 88 MCG: 0.09 TABLET ORAL at 06:31

## 2019-04-24 RX ADMIN — OXYBUTYNIN 10 MG: 5 TABLET, FILM COATED, EXTENDED RELEASE ORAL at 21:59

## 2019-04-24 RX ADMIN — IPRATROPIUM BROMIDE AND ALBUTEROL SULFATE 1 AMPULE: .5; 3 SOLUTION RESPIRATORY (INHALATION) at 07:57

## 2019-04-24 RX ADMIN — HEPARIN SODIUM 7500 UNITS: 5000 INJECTION INTRAVENOUS; SUBCUTANEOUS at 14:20

## 2019-04-24 RX ADMIN — DOXYCYCLINE HYCLATE 100 MG: 100 TABLET, COATED ORAL at 09:55

## 2019-04-24 RX ADMIN — IPRATROPIUM BROMIDE AND ALBUTEROL SULFATE 1 AMPULE: .5; 3 SOLUTION RESPIRATORY (INHALATION) at 15:58

## 2019-04-24 RX ADMIN — Medication 10 ML: at 09:54

## 2019-04-24 RX ADMIN — DOXYCYCLINE HYCLATE 100 MG: 100 TABLET, COATED ORAL at 21:59

## 2019-04-24 RX ADMIN — TRAMADOL HYDROCHLORIDE 100 MG: 50 TABLET ORAL at 09:54

## 2019-04-24 RX ADMIN — Medication 1 CAPSULE: at 09:55

## 2019-04-24 RX ADMIN — IPRATROPIUM BROMIDE AND ALBUTEROL SULFATE 1 AMPULE: .5; 3 SOLUTION RESPIRATORY (INHALATION) at 11:59

## 2019-04-24 RX ADMIN — HEPARIN SODIUM 7500 UNITS: 5000 INJECTION INTRAVENOUS; SUBCUTANEOUS at 21:08

## 2019-04-24 RX ADMIN — MONTELUKAST 10 MG: 10 TABLET, FILM COATED ORAL at 21:59

## 2019-04-24 RX ADMIN — FLUTICASONE PROPIONATE 2 SPRAY: 50 SPRAY, METERED NASAL at 09:56

## 2019-04-24 RX ADMIN — HYDROXYZINE HYDROCHLORIDE 25 MG: 10 TABLET ORAL at 00:09

## 2019-04-24 RX ADMIN — IPRATROPIUM BROMIDE AND ALBUTEROL SULFATE 1 AMPULE: .5; 3 SOLUTION RESPIRATORY (INHALATION) at 20:02

## 2019-04-24 RX ADMIN — TRAMADOL HYDROCHLORIDE 100 MG: 50 TABLET ORAL at 00:09

## 2019-04-24 RX ADMIN — Medication 10 ML: at 21:19

## 2019-04-24 RX ADMIN — ROSUVASTATIN CALCIUM 40 MG: 10 TABLET, FILM COATED ORAL at 21:58

## 2019-04-24 RX ADMIN — FUROSEMIDE 40 MG: 10 INJECTION, SOLUTION INTRAMUSCULAR; INTRAVENOUS at 09:54

## 2019-04-24 RX ADMIN — HEPARIN SODIUM 7500 UNITS: 5000 INJECTION INTRAVENOUS; SUBCUTANEOUS at 06:31

## 2019-04-24 ASSESSMENT — PAIN DESCRIPTION - PAIN TYPE: TYPE: ACUTE PAIN

## 2019-04-24 ASSESSMENT — PAIN SCALES - GENERAL
PAINLEVEL_OUTOF10: 10
PAINLEVEL_OUTOF10: 0
PAINLEVEL_OUTOF10: 0
PAINLEVEL_OUTOF10: 7

## 2019-04-24 ASSESSMENT — PAIN DESCRIPTION - LOCATION: LOCATION: BACK;FOOT;GENERALIZED

## 2019-04-24 NOTE — PROGRESS NOTES
04/24/19 0339   NIV Type   Equipment Type v60   Mode AVAPS   Mask Type Full face mask   Mask Size Large   Settings/Measurements   Comfort Level Good   Using Accessory Muscles No   IPAP   (max p 20)   EPAP 6 cmH2O   Vt Ordered 550 mL   Rate Ordered 20   Resp 20   SpO2 97   FiO2  55 %   Vt Exhaled 528 mL   Mask Leak (lpm) 0 lpm   Skin Protection for O2 Device Yes   Patient Observation   Observations mepilex on nose   Alarm Settings   Alarms On Y   Press Low Alarm 6 cmH2O   High Pressure Alarm 25 cmH2O   Apnea (secs) 20 secs   Resp Rate Low Alarm 6   High Respiratory Rate 45 br/min

## 2019-04-24 NOTE — PROGRESS NOTES
INPATIENT PULMONARY CRITICAL CARE PROGRESS NOTE      Reason for visit: Acute on chronic respiratory failure, hypoxemic and hypercarbic, COPD, #CHF, elevated hemidiaphragm, ILD, cellulitis    SUBJECTIVE:  The patient is off BiPAP, on oxygen at 5 LPM high flow. Saturation in the low 90s. She is much more awake and oriented. Reportedly she wore her BiPAP overnight. She denies shortness of breath, has mild cough, denies chest pain. She remains afebrile and hemodynamically stable. Physical Exam:  Blood pressure 114/73, pulse 63, temperature 97.2 °F (36.2 °C), temperature source Axillary, resp. rate 20, height 5' 2\" (1.575 m), weight 290 lb (131.5 kg), SpO2 94 %.'   Constitutional:  Well built, obese, awake and communicative. No acute distress. HENT:  Oropharynx is clear and dry, class III airway  Eyes:  Conjunctivae are normal. Pupils equal, round, and reactive to light. No scleral icterus. Wearing glasses. Neck: Short and large neck, no JVD. No obvious thyroid mass. Cardiovascular: Normal rate, regular rhythm, normal heart sounds. No lower extremity edema. Pulmonary/Chest:  Right upper lobe wheezes, diminished air entry. No rales. No accessory muscle usage or stridor. Abdominal:  deferred. Musculoskeletal: No cyanosis. No clubbing. No obvious joint deformity. Left foot in dressing, area of erythema on left shin with margins marked. Scars of knee surgery bilaterally   Lymphadenopathy: Deferred  Skin: Skin is warm and dry. No rash or nodules on the exposed extremities. Psychiatric: Not arousable. Neurologic:  Awake and oriented, conversant, no obvious focal deficit. PERRL.         Results:  CBC:   Recent Labs     04/22/19  0608 04/23/19  0439 04/24/19  0438   WBC 6.0 5.5 6.1   HGB 12.0 11.9* 11.9*   HCT 37.3 37.0 36.8   MCV 85.6 84.8 84.6    120* 106*     BMP:   Recent Labs     04/22/19  0608 04/23/19  0440 04/24/19  0438    138 137   K 3.6 3.6 3.6   CL 87* 89* 87*   CO2 39* 41* 40*   BUN 33* 45* 45*   CREATININE 1.5* 2.0* 1.8*     LIVER PROFILE: No results for input(s): AST, ALT, LIPASE, BILIDIR, BILITOT, ALKPHOS in the last 72 hours. Invalid input(s): AMYLASE,  ALB  PT/INR: No results for input(s): PROTIME, INR in the last 72 hours. APTT: No results for input(s): APTT in the last 72 hours. UA:No results for input(s): NITRITE, COLORU, PHUR, LABCAST, WBCUA, RBCUA, MUCUS, TRICHOMONAS, YEAST, BACTERIA, CLARITYU, SPECGRAV, LEUKOCYTESUR, UROBILINOGEN, BILIRUBINUR, BLOODU, GLUCOSEU, AMORPHOUS in the last 72 hours. Invalid input(s): KETONESU    Imaging:  I have reviewed radiology images personally. XR CHEST PORTABLE   Final Result   Increasing pulmonary edema and left basilar atelectasis or pneumonia. XR CHEST PORTABLE   Final Result   Cardiomegaly with moderate interstitial pulmonary edema suggesting moderate   CHF. Interstitial pulmonary edema slightly increased since prior. MRI FOOT LEFT WO CONTRAST   Final Result   1. No osteomyelitis or other acute osseous abnormality   2. Nonspecific dorsal mid and forefoot subcutaneous edema compatible with   cellulitis versus lymphedema. No drainable fluid collection or deep soft   tissue ulceration. XR FOOT LEFT (MIN 3 VIEWS)   Final Result   No x-ray evidence of osteomyelitis. No acute osseous injury. Soft tissue swelling of the foot- nonspecific for edema or cellulitis. VL Extremity Venous Bilateral   Final Result      XR SHOULDER RIGHT (MIN 2 VIEWS)   Final Result   No acute abnormality of the right shoulder         XR CHEST PORTABLE   Final Result   Mild left basilar atelectasis or scarring. Otherwise, no acute   cardiopulmonary disease. Xr Shoulder Right (min 2 Views)    Result Date: 4/15/2019  EXAMINATION: 3 XRAY VIEWS OF THE RIGHT SHOULDER 4/15/2019 2:49 pm COMPARISON: None.  HISTORY: ORDERING SYSTEM PROVIDED HISTORY: right shoulder pain TECHNOLOGIST PROVIDED HISTORY: Reason for exam:->right shoulder pain Ordering Physician Provided Reason for Exam: right shoulder pain Acuity: Unknown Type of Exam: Unknown FINDINGS: No evidence of fracture or dislocation. There narrowing of the subacromial space likely due to chronic rotator cuff abnormality. There is acromioclavicular osteoarthritis     No acute abnormality of the right shoulder     Xr Foot Left (min 3 Views)    Result Date: 4/17/2019  EXAMINATION: 3 XRAY VIEWS OF THE LEFT FOOT 4/17/2019 1:09 pm COMPARISON: None. HISTORY: ORDERING SYSTEM PROVIDED HISTORY: wounds, cellulitis TECHNOLOGIST PROVIDED HISTORY: Reason for exam:->wounds, cellulitis Ordering Physician Provided Reason for Exam: wound on top of left foot Acuity: Acute Type of Exam: Initial FINDINGS: The 2nd through 5th MTP joints in extension and PIP joints in flexion. There is moderate osteoarthritis of the 1st metatarsophalangeal joint. Bunion formation is noted. There is no fracture or dislocation. Nonspecific soft tissue swelling is noted. No radiopaque foreign body is seen. No x-ray evidence of osteomyelitis. No acute osseous injury. Soft tissue swelling of the foot- nonspecific for edema or cellulitis. Xr Chest Portable    Result Date: 4/23/2019  EXAMINATION: SINGLE XRAY VIEW OF THE CHEST 4/23/2019 5:32 am COMPARISON: 04/22/2019 HISTORY: ORDERING SYSTEM PROVIDED HISTORY: ? CHF TECHNOLOGIST PROVIDED HISTORY: Reason for exam:->? CHF Ordering Physician Provided Reason for Exam: chf FINDINGS: Pulmonary edema has progressed. There is increasing left basilar airspace disease. Cardiomegaly is unchanged. There is no discernible pneumothorax. Increasing pulmonary edema and left basilar atelectasis or pneumonia.      Xr Chest Portable    Result Date: 4/22/2019  EXAMINATION: SINGLE XRAY VIEW OF THE CHEST 4/22/2019 1:58 pm COMPARISON: 04/15/2019 HISTORY: ORDERING SYSTEM PROVIDED HISTORY: Acute hypoxic and hypercapnic respiratory failure TECHNOLOGIST PROVIDED HISTORY: Reason for exam:->Acute hypoxic and hypercapnic respiratory failure Ordering Physician Provided Reason for Exam: Acute hypoxic and hypercapnic respiratory failure Acuity: Acute Type of Exam: Initial FINDINGS: There is cardiomegaly. Cephalization of the pulmonary vasculature with perihilar fullness suggesting interstitial pulmonary edema. No pleural effusion or pneumothorax. Cardiomediastinal silhouette and bony thorax are unchanged. Cardiomegaly with moderate interstitial pulmonary edema suggesting moderate CHF. Interstitial pulmonary edema slightly increased since prior. Xr Chest Portable    Result Date: 4/15/2019  EXAMINATION: SINGLE XRAY VIEW OF THE CHEST 4/15/2019 2:28 pm COMPARISON: 12/23/2018 HISTORY: ORDERING SYSTEM PROVIDED HISTORY: copd TECHNOLOGIST PROVIDED HISTORY: Reason for exam:->copd Ordering Physician Provided Reason for Exam: copd Acuity: Unknown Type of Exam: Unknown FINDINGS: The patient is in lordotic position. There is stable prominence of the cardiac silhouette. Pulmonary vessels are normal in caliber. The lungs are underinflated. Right hemidiaphragm is chronically elevated. There is mild linear opacity in the left lung base. Mild left basilar atelectasis or scarring. Otherwise, no acute cardiopulmonary disease. Mri Foot Left Wo Contrast    Result Date: 4/19/2019  EXAMINATION: MRI OF THE LEFT FOOT WITHOUT CONTRAST, 4/19/2019 6:50 pm TECHNIQUE: Multiplanar multisequence MRI of the left foot was performed without the administration of intravenous contrast. COMPARISON: Left foot radiographs 04/17/2018. HISTORY: ORDERING SYSTEM PROVIDED HISTORY: OSTEOMYELITIS, FOOT TECHNOLOGIST PROVIDED HISTORY: Ordering Physician Provided Reason for Exam: Mid dorsal Lt foot abcess Acuity: Acute Type of Exam: Initial Additional signs and symptoms: Pt moving all around. Coached on motion.  Best images FINDINGS: LISFRANC JOINT:  Lisfranc ligament is visualized and is normal.  The alignment of the tarsal-metatarsal joint is anatomic. BONE MARROW:  Bone marrow is normal in signal without evidence of fracture, marrow contusion or marrow occupying lesion. GREATER AND LESSER MTP JOINTS: Mild 1st metatarsophalangeal degenerative changes. No joint effusion. The lesser metatarsophalangeal joints are unremarkable. SOFT TISSUES: Mild dorsal mid and forefoot subcutaneous edema. No drainable fluid collection. No deep soft tissue ulceration or sinus tract. TENDONS: The visualized tendons are intact without tenosynovitis. 1. No osteomyelitis or other acute osseous abnormality 2. Nonspecific dorsal mid and forefoot subcutaneous edema compatible with cellulitis versus lymphedema. No drainable fluid collection or deep soft tissue ulceration. Vl Extremity Venous Bilateral    Result Date: 4/16/2019  Lower Extremities DVT Study  Demographics   Patient Name       JOSÉ Tamayo   Date of Study      04/16/2019         Gender              Female   Patient Number     [id number]         Date of Birth       1945   Visit Number       [id number]          Age                 68 year(s)   Accession Number   [id number]          Room Number         2116   Corporate ID       [id number]            Sonographer         Julio Leone RVT   Ordering Physician Alfredo Moreno.,   Interpreting        [facility]                     CNP                Physician           Vascular                                                            Franki Garcia MD, [facility] [place] [place]  Procedure Type of Study:   Veins:Lower Extremities DVT Study, VASC EXTREMITY VENOUS DUPLEX BILATERAL. Vascular Sonographer Report  Additional Indications:swelling Impressions Right Impression 1. Technically limited exam due to body habitus. There is complete compressibility of all deep and superficial veins seen in the lower extremity.  2. There is normal spontaneous and phasic flow throughout the deep and superficial veins of the right lower extremity . Left Impression 1. Technically limited exam due to body habitus. There is complete compressibility of all deep and superficial veins seen in the lower extremity. 2. There is normal spontaneous and phasic flow throughout the deep and superficial veins of the left lower extremity . Conclusions   Summary   Within the limits of this exam, there is no evidence of deep or superficial  venous thrombosis in the lower extremities bilaterally. Signature   ------------------------------------------------------------------  Electronically signed by Jenifer Walker MD, Sony Liang  (Interpreting physician) on 04/16/2019 at 03:58 PM  ------------------------------------------------------------------  Patient Status:Routine. Study [facility] - Vascular Lab. Technical Quality:Limited visualization due to body habitus. Risk Factors History +---------+----------+------------------------------------------------------+ ! Diagnosis! Date      ! Comments                                              ! +---------+----------+------------------------------------------------------+ ! Other    !04/16/2019! Patient reports leg swelling like this is common for  ! !         !          !her. ! +---------+----------+------------------------------------------------------+ Velocities are measured in cm/s ; Diameters are measured in mm Right Lower Extremities DVT Study Measurements Right 2D Measurements +------------------------+----------+---------------+----------+ ! Location                ! Visualized! Compressibility! Thrombosis! +------------------------+----------+---------------+----------+ ! Sapheno Femoral Junction! Yes       ! Yes            ! None      ! +------------------------+----------+---------------+----------+ ! GSV Thigh               ! Yes       ! Yes            ! None      ! +------------------------+----------+---------------+----------+ ! Common Femoral          !Yes       ! Yes            ! None      ! +------------------------+----------+---------------+----------+ ! Prox Femoral            !Yes       ! Yes            ! None      ! +------------------------+----------+---------------+----------+ ! Mid Femoral             !Yes       ! Yes            ! None      ! +------------------------+----------+---------------+----------+ ! Dist Femoral            !Yes       ! Yes            ! None      ! +------------------------+----------+---------------+----------+ ! Deep Femoral            !Yes       ! Yes            ! None      ! +------------------------+----------+---------------+----------+ ! Popliteal               !Yes       ! Yes            ! None      ! +------------------------+----------+---------------+----------+ ! GSV Below Knee          ! Yes       ! Yes            ! None      ! +------------------------+----------+---------------+----------+ ! Gastroc                 ! Yes       ! Yes            ! None      ! +------------------------+----------+---------------+----------+ ! PTV                     ! Partial   !Yes            ! None      ! +------------------------+----------+---------------+----------+ ! Peroneal                !Partial   !Yes            ! None      ! +------------------------+----------+---------------+----------+ ! GSV Calf                ! Yes       ! Yes            ! None      ! +------------------------+----------+---------------+----------+ ! SSV                     ! Yes       ! Yes            ! None      ! +------------------------+----------+---------------+----------+ Right Doppler Measurements +------------------------+------+------+------------+ ! Location                ! Signal!Reflux! Reflux (sec)! +------------------------+------+------+------------+ ! Sapheno Femoral Junction! Phasic! No    !            ! +------------------------+------+------+------------+ ! Common Femoral !Phasic! No    !            ! +------------------------+------+------+------------+ ! Prox Femoral            !Phasic! No    !            ! +------------------------+------+------+------------+ ! Deep Femoral            !Phasic! No    !            ! +------------------------+------+------+------------+ ! Popliteal               !Phasic! No    !            ! +------------------------+------+------+------------+ Left Lower Extremities DVT Study Measurements Left 2D Measurements +------------------------+----------+---------------+----------+ ! Location                ! Visualized! Compressibility! Thrombosis! +------------------------+----------+---------------+----------+ ! Sapheno Femoral Junction! Yes       ! Yes            ! None      ! +------------------------+----------+---------------+----------+ ! GSV Thigh               ! Yes       ! Yes            ! None      ! +------------------------+----------+---------------+----------+ ! Common Femoral          !Yes       ! Yes            ! None      ! +------------------------+----------+---------------+----------+ ! Prox Femoral            !Yes       ! Yes            ! None      ! +------------------------+----------+---------------+----------+ ! Mid Femoral             !Yes       ! Yes            ! None      ! +------------------------+----------+---------------+----------+ ! Dist Femoral            !Yes       ! Yes            ! None      ! +------------------------+----------+---------------+----------+ ! Deep Femoral            !Yes       ! Yes            ! None      ! +------------------------+----------+---------------+----------+ ! Popliteal               !Yes       ! Yes            ! None      ! +------------------------+----------+---------------+----------+ ! GSV Below Knee          ! Yes       ! Yes            ! None      ! +------------------------+----------+---------------+----------+ ! Gastroc                 ! Yes       ! Yes            ! None      ! +------------------------+----------+---------------+----------+ ! PTV                     ! Yes       ! Yes            ! None      ! +------------------------+----------+---------------+----------+ ! Peroneal                !Yes       ! Yes            ! None      ! +------------------------+----------+---------------+----------+ ! GSV Calf                ! Yes       ! Yes            ! None      ! +------------------------+----------+---------------+----------+ ! SSV                     ! Yes       ! Yes            ! None      ! +------------------------+----------+---------------+----------+ Left Doppler Measurements +------------------------+------+------+------------+ ! Location                ! Signal!Reflux! Reflux (sec)! +------------------------+------+------+------------+ ! Sapheno Femoral Junction! Phasic! No    !            ! +------------------------+------+------+------------+ ! Common Femoral          !Phasic! No    !            ! +------------------------+------+------+------------+ ! Prox Femoral            !Phasic! No    !            ! +------------------------+------+------+------------+ ! Deep Femoral            !Phasic! No    !            ! +------------------------+------+------+------------+ ! Popliteal               !Phasic! No    !            ! +------------------------+------+------+------------+      Assessment and plan:  Acute on chronic respiratory failure, hypoxemic and hypercarbic. Unclear from the history, but it appears she is on oxygen at 4 LPM at home. 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 She has had a similar presentation in December 2018. Discontinue narcotics for now. Repeat ABG with improved PCO2, keep off BiPAP if possible and repeat an ABG without BiPAP. COPD. No PFT in EMR. Agree with bronchodilator. ?  CHF.   Chest x-ray suggests pulmonary edema, given Lasix IV yesterday, repeat CXR still suggests pulmonary edema. Remains on Lasix once daily. Will repeat CXR tomorrow. Elevated hemidiaphragm, ILD. She has mild ILD on prior CT chest  Cellulitis. Being followed by ID and podiatry, on Zyvox. DM, HTN, HLD, hypothyroidism, obesity. Per IM. DVT prophylaxis. On Lovenox.       Discussed with patient, nursing.         Electronically signed by:  Mindi Minaya MD    4/24/2019    3:09 PM.

## 2019-04-24 NOTE — PROGRESS NOTES
04/24/19 0045   NIV Type   $NIV $Daily Charge   Equipment Type V60   Mode BIPAP   Mask Type Full face mask   Mask Size Large   Settings/Measurements   Comfort Level Good   Using Accessory Muscles No   IPAP 20 cmH20  (Max P 20)   EPAP 6 cmH2O   Vt Ordered 550 mL   Rate Ordered 20   Resp 24   SpO2 96   FiO2  55 %   Vt Exhaled 400 mL   Mask Leak (lpm) 0 lpm   Skin Protection for O2 Device Yes   Breath Sounds   Right Middle Lobe Diminished   Right Lower Lobe Diminished   Left Upper Lobe Diminished   Left Lower Lobe Diminished   Patient Observation   Observations mepilex on nose   Alarm Settings   Alarms On Y   Press Low Alarm 6 cmH2O   High Pressure Alarm 25 cmH2O   Apnea (secs) 20 secs   Resp Rate Low Alarm 6   High Respiratory Rate 45 br/min

## 2019-04-24 NOTE — PROGRESS NOTES
[facility] NEPHROLOG[facility]    [facility]              [phone]                   Plan : Tolerating diuretics  Cr coming down  Continue lasix, but decrease iv to once a day, since BP on low side  Higher BP will help renal recovery  If cr goes up, will need to modify the dose     Assessment :     Acute Kidney Injury  Cr peaked to  2- down to 1.8  Likely due to hemodynamic changes, and possible ATN from vancomycin/zosyn, though  Exposure was limited  Was 1.1 on admission  0.9 on 12/18    UA- 20-50 wbc, and 4+ bacteriuria, Ecoli- got zosyn  And ceftriaxone  Has received vancomycin and zosyn this admission,  Now held- known to cause ATN  Also on iv lasix  BP lows    Acidosis  Has respiratory acidosis  Metabolic alkalosis due to compensation  And diuretics       Hypotension  BP: (109-112)/(76) Pulse:  [64-91]   If bp goes down further may need to reduce meds      HFpEF  Has edema  Mostly RV failure- likely due to COPD  Echo: 4/19- IVC is dilated ( >2.1 cm) elevated RA pressure, RVSP is mod reduced, normal EF  Increased with ventriclar volume overload  On iv lasix bid      DM II  Cellulitis  encephalopathy      [facility] would like to thank Sina Kennedy MD   for opportunity to serve this patient      Please call with questions at-   24 Hrs Answering service [phone] or  7 am- 5 pm via Perfect serve or cell phone  Dr.Sudhir Morris Grier          CC/reason for consult :     JAQUELIN     HPI :     From consult note-   Brittany Echavarria is a 68 y.o. female presented to   the hospital on 4/15/2019 with edema, and erythema  Of the legs. also associated with bulla in the heels. She  Is being treated with antibiotics for cellulitis. She is also noted to have COPD exacerbation for which  She is on oxygen, nebulizer, steroids. She is also on lasix iv for edema and sob  Her oxygen requirement is higher than her baseline.     Course is complicated by thrombocytopenia, thought  To be linezolid- now held  Needing on and off bipap    We are consulted because of rising creatinine         Interval History:     Cr coming down  Was on Bipap last night   Now off and on 6 L oxygen via canula  Lethargic still    ROS:     Seen with- no family  SOB- present  Edema- present  Nausea/vomiting- none  Poor appetite- yes  Confusion- yes  Urinary complaints- no  Any other complaints- no  All other ROS are reviewed and are Negative     Medication:     Scheduled Meds:   heparin (porcine)  7,500 Units Subcutaneous 3 times per day    doxycycline hyclate  100 mg Oral 2 times per day    furosemide  40 mg Intravenous BID    ipratropium-albuterol  1 ampule Inhalation Q4H WA    lactobacillus  1 capsule Oral Daily with breakfast    fluticasone  2 spray Nasal Daily    levothyroxine  88 mcg Oral Daily    montelukast  10 mg Oral Nightly    oxybutynin  10 mg Oral Nightly    rosuvastatin  40 mg Oral QPM    sodium chloride flush  10 mL Intravenous 2 times per day    insulin lispro  0-6 Units Subcutaneous TID WC    insulin lispro  0-3 Units Subcutaneous Nightly     Continuous Infusions:   dextrose       PRN Meds:.traMADol **OR** traMADol, hydrOXYzine, acetaminophen, sodium chloride flush, magnesium hydroxide, prochlorperazine, glucose, dextrose, glucagon (rDNA), dextrose, hydrALAZINE       Vitals :     Vitals:    04/24/19 1027   BP: 112/76   Pulse: 64   Resp: 26   Temp:    SpO2:        I & O :       Intake/Output Summary (Last 24 hours) at 4/24/2019 1041  Last data filed at 4/24/2019 0630  Gross per 24 hour   Intake 480 ml   Output 2250 ml   Net -1770 ml        Physical Examination :     General appearance: Anxious- no, distressed- no, in good spirits- no    Obese,lying flat, lethargic  HEENT: Lips- normal, teeth- ok , oral mucosa- moist  Neck : Mass- no, appears symmetrical, JVD- not visible  Respiratory: Respiratory effort-  Labored, on oxygen  wheeze- no, crackles - no  Cardiovascular:  Ausculation- No M/R/G, Edema 2 + both legs  Abdomen: visible mass- no, distention- no, scar- no, tenderness- no                            hepatosplenomegaly-  no  Musculoskeletal:  clubbing no,cyanosis- no , digital ischemia- no                           muscle strength- grossly normal , tone - grossly normal  Skin: rashes- no , ulcers- no, induration- no, tightening - no  Psychiatric:  Judgement and insight- normal           AAO X 3     LABS:     Recent Labs     04/22/19  0608 04/23/19  0439 04/24/19  0438   WBC 6.0 5.5 6.1   HGB 12.0 11.9* 11.9*   HCT 37.3 37.0 36.8    120* 106*     Recent Labs     04/22/19  0608 04/23/19  0440 04/24/19  0438    138 137   K 3.6 3.6 3.6   CL 87* 89* 87*   CO2 39* 41* 40*   BUN 33* 45* 45*   CREATININE 1.5* 2.0* 1.8*   GLUCOSE 108* 98 141*

## 2019-04-24 NOTE — PROGRESS NOTES
Hospitalist Progress Note      PCP: No primary care provider on file. Date of Admission: 4/15/2019    Chief Complaint: Left foot pain     Hospital Course:   68 Y F with a h/o COPD, HTN, HLD, and DM2 who presented to the ED with increasing pain, erythema, and edema of her distal LLE surrounding two shallow ulcers which had been \"festering\" for the last week or so. Her legs have been quite edematous for the last few weeks, to the point that she has developed a few bullae which have ruptured and drained serous fluid multiple times. This has left her with sores on the dorsum on her L foot and on her mid L shin. The skin around the ruptured bullae rapidly turned a bright red and became tender over the last few days. She denies fevers or chills.      Subjective:    off BIPAP , awake and communicating     Medications:  Reviewed    Infusion Medications    dextrose       Scheduled Medications    heparin (porcine)  7,500 Units Subcutaneous 3 times per day    doxycycline hyclate  100 mg Oral 2 times per day    furosemide  40 mg Intravenous BID    ipratropium-albuterol  1 ampule Inhalation Q4H WA    lactobacillus  1 capsule Oral Daily with breakfast    fluticasone  2 spray Nasal Daily    levothyroxine  88 mcg Oral Daily    montelukast  10 mg Oral Nightly    oxybutynin  10 mg Oral Nightly    rosuvastatin  40 mg Oral QPM    sodium chloride flush  10 mL Intravenous 2 times per day    insulin lispro  0-6 Units Subcutaneous TID WC    insulin lispro  0-3 Units Subcutaneous Nightly     PRN Meds: traMADol **OR** traMADol, hydrOXYzine, acetaminophen, sodium chloride flush, magnesium hydroxide, prochlorperazine, glucose, dextrose, glucagon (rDNA), dextrose, hydrALAZINE      Intake/Output Summary (Last 24 hours) at 4/24/2019 0813  Last data filed at 4/24/2019 0630  Gross per 24 hour   Intake 480 ml   Output 2250 ml   Net -1770 ml       Physical Exam Performed:    /76   Pulse 64   Temp 97.6 °F (36.4 °C) (Axillary)   Resp 21   Ht 5' 2\" (1.575 m)   Wt 290 lb (131.5 kg)   SpO2 95%   BMI 53.04 kg/m²     General appearance:   on nasal cannula   HEENT:  Normal cephalic, atraumatic without obvious deformity. Pupils equal, round, and reactive to light. Extra ocular muscles intact. Conjunctivae/corneas clear. Neck: Supple, with full range of motion. No jugular venous distention. Trachea midline. Respiratory:  bipap. Diminished bibasilar breath sounds. No wheezing. Cardiovascular:  Regular rate and rhythm with normal S1/S2 without murmurs, rubs or gallops. Abdomen: Soft, non-tender, non-distended with normal bowel sounds. Obese   Musculoskeletal:  No clubbing, cyanosis. BLE pitting edema. Improved   Skin: Skin color, texture, turgor normal. Left shin wound, no drainage. Left foot wound with surrounding erythema and drainage. Tremendously improved  Neurologic:  Neurovascularly intact without any focal sensory/motor deficits. Cranial nerves: II-XII intact, grossly non-focal.  Psychiatric:   alert    Capillary Refill: ++   Peripheral Pulses: +2 palpable, equal bilaterally       Labs:   Recent Labs     04/22/19  0608 04/23/19  0439 04/24/19  0438   WBC 6.0 5.5 6.1   HGB 12.0 11.9* 11.9*   HCT 37.3 37.0 36.8    120* 106*     Recent Labs     04/22/19  0608 04/23/19  0440 04/24/19  0438    138 137   K 3.6 3.6 3.6   CL 87* 89* 87*   CO2 39* 41* 40*   BUN 33* 45* 45*   CREATININE 1.5* 2.0* 1.8*   CALCIUM 9.0 8.7 8.7   Urinalysis:      Lab Results   Component Value Date    NITRU POSITIVE 04/17/2019    WBCUA 20-50 04/17/2019    BACTERIA 4+ 04/17/2019    RBCUA 0-2 04/17/2019    BLOODU TRACE-LYSED 04/17/2019    SPECGRAV 1.015 04/17/2019    GLUCOSEU Negative 04/17/2019       Radiology:  XR CHEST PORTABLE   Final Result   Increasing pulmonary edema and left basilar atelectasis or pneumonia.          XR CHEST PORTABLE   Final Result   Cardiomegaly with moderate interstitial pulmonary edema suggesting moderate CHF.  Interstitial pulmonary edema slightly increased since prior. MRI FOOT LEFT WO CONTRAST   Final Result   1. No osteomyelitis or other acute osseous abnormality   2. Nonspecific dorsal mid and forefoot subcutaneous edema compatible with   cellulitis versus lymphedema. No drainable fluid collection or deep soft   tissue ulceration. XR FOOT LEFT (MIN 3 VIEWS)   Final Result   No x-ray evidence of osteomyelitis. No acute osseous injury. Soft tissue swelling of the foot- nonspecific for edema or cellulitis. VL Extremity Venous Bilateral   Final Result      XR SHOULDER RIGHT (MIN 2 VIEWS)   Final Result   No acute abnormality of the right shoulder         XR CHEST PORTABLE   Final Result   Mild left basilar atelectasis or scarring. Otherwise, no acute   cardiopulmonary disease. Assessment/Plan:    Active Hospital Problems    Diagnosis Date Noted    Morbid obesity ([facility] [E66.01] 04/22/2019    Acute on chronic respiratory failure with hypoxia and hypercapnia (HCC) [J96.21, J96.22]     Interstitial lung disease ([facility] [J84.9]     Cellulitis [L03.90] 04/15/2019    Diabetes mellitus ([facility] [E11.9]     Hyperlipidemia [E78.5]     Hypertension [I10]     Hypothyroidism [E03.9]     Chronic obstructive pulmonary disease ([facility] [J44.9] 12/18/2014       Encephalopathy -secondary to acute and chronic hypoxic and hypercapnic respiratory failure - BiPAP, breathing treatment,  , pulmonology evaluation appreciated , following , chest x-ray congestion  Improved-getting BiPAP daily at bedtime and oxygen nasal cannula daytime  PT eval    LLE bacterial cellulitis with bullous lesion of left foot: Improved  - Pt started on IV clindamycin on admission. Cellulitis initially improved but appeared worse 4/17 (erythema noticed outside margins that have been marked). Dc'd clinda and changed to Vanc/Zosyn. ID and Podiatry consulted. Vanc changed to Zyvox per ID due to worsening renal fx. Wound culture grew MRSA. Zosyn dc'd per ID on 4/19. Antibiotics was switched to doxycycline yesterday-4/23    Wound care consulted. Continue local wound care to left foot bullous lesion. Improved  - Xray of left foot negative for osteo. MRI negative for osteo and no drainable fluid collection noted.    -Acute and chronic diastolic congestive heart failure   -Lasix 40 IV twice a day  -BLE venous dopplers are negative for DVT. Hyperkalemia POA (resolved):  - Continue to hold potassium supplement and losartan. - Pt given furosemide, nebulizers, and kayexalate on admission.       Hypertension:  - Holding losartan as above. Hydralazine meanwhile. Pt is currently normotensive.      Hyperlipidemia:  - Continue statin.     Diabetes mellitus type 2:  - Holding metformin while here. Low dose SSI meanwhile. A1c's have been controlled recently.       COPD, asthma, chronic hypoxic respiratory failure:  Currently acute and chronic hypoxic hypercapnic respiratory failure on BiPAP .      Hypothyroidism:  - Clinically euthyroid. Continue home dose of levothyroxine.     Years of vaginal discomfort, which the patient says started after a gynecologic surgery:  - Details unclear, continue to follow with gynecology clinic. Pelvic exam by ED physician was unrevealing.    - Pt noted to have excoriation to perineum/labia possibly due to incontinence. Apply cream for now. UTI might have contributed to discomfort. UTI:  - UA shows + nitrites and LE with 20-50 WBC and 4+ bacteria. Urine culture grew E. Coli. Pt received 3 days of Zosyn. Asymptomatic bacteriuria    Hypomagnesemia:  - Monitor and replete as needed.      Acute and chronic renal failure-most probably secondary to diuretics, cardiorenal syndrome-intake and output monitor BMP nephrology evaluation appreciated and following, continue to hold nephrotoxic medications    Thrombocytopenia-monitor closely       DVT Prophylaxis: Heparin  Diet: DIET CARB CONTROL;  Code Status: Full Code      PT/OT Eval Status: Ordered with recs for SNF    Dispo - ~ 2-days       Paresh Cerna MD

## 2019-04-24 NOTE — PROGRESS NOTES
04/24/19 1657   NIV Type   Equipment Type v60   Mode AVAPS   Mask Type Full face mask   Mask Size Large   Settings/Measurements   Comfort Level Good   Using Accessory Muscles No   IPAP   (P Max 20)   EPAP 6 cmH2O   Vt Ordered 550 mL   Rate Ordered 20   Resp 25   SpO2 93   FiO2  55 %   I Time/ I Time % 0.95 s   Vt Exhaled 581 mL   Mask Leak (lpm) 0 lpm   Skin Protection for O2 Device Yes   Patient Transport   Time spent transporting 1-15   Breath Sounds   Right Upper Lobe Diminished   Right Middle Lobe Diminished   Right Lower Lobe Diminished   Left Upper Lobe Diminished   Left Lower Lobe Diminished   Alarm Settings   Alarms On Y   Press Low Alarm 6 cmH2O   High Pressure Alarm 25 cmH2O   Apnea (secs) 20 secs   Resp Rate Low Alarm 6   High Respiratory Rate 45 br/min   Oxygen Therapy/Pulse Ox   O2 Therapy Oxygen   O2 Device PAP (positive airway pressure)

## 2019-04-24 NOTE — PROGRESS NOTES
Physical Therapy  Facility/Department: [place] [address] [facility]  Daily Treatment Note  NAME: Jose Gao  : 1945  MRN: [id number]    Date of Service: 2019    Discharge Recommendations:  Subacute/Skilled Nursing Facility   PT Equipment Recommendations  Other: TBD per accepting facility    Patient Diagnosis(es): The primary encounter diagnosis was COPD exacerbation ([facility] Diagnoses of Acute pain of right shoulder and Cellulitis of lower extremity, unspecified laterality were also pertinent to this visit. has a past medical history of JAQUELIN (acute kidney injury) ([facility] Arthritis, Asthma, COPD (chronic obstructive pulmonary disease) ([facility] Diabetes mellitus ([facility] Hyperlipidemia, Hypertension, MRSA (methicillin resistant staph aureus) culture positive, Other disorders of kidney and ureter in diseases classified elsewhere, Pneumonia due to infectious organism, and Thyroid disease. has a past surgical history that includes Cholecystectomy; xr knee inc tunnel bilat (Bilateral, ); Rotator cuff repair (Left, ); and Thyroid lobectomy. Restrictions  Restrictions/Precautions  Restrictions/Precautions: General Precautions, Fall Risk  Position Activity Restriction  Other position/activity restrictions: up as tolerated, purewick catheter     Subjective   General  Chart Reviewed: Yes  Response To Previous Treatment: Patient with no complaints from previous session.   Family / Caregiver Present: No  Referring Practitioner: Gigi Hopper MD  Subjective  Subjective: Pt supine in bed upon arrival and agreeable to exercises in bed only this afternoon-- states \"If you want me to stand on this foot, I'm going to throw a fit\"  General Comment  Comments: RN cleared pt for PT  Pain Screening  Patient Currently in Pain: (pt denies pain initially; does c/o L foot with exercise - no number stated)  Vital Signs  Patient Currently in Pain: (pt denies pain initially; does c/o L foot with exercise - no number stated) Orientation  Orientation  Overall Orientation Status: Within Normal Limits     Objective   Bed mobility  Supine to Sit: Unable to assess(pt declines to complete this afternoon d/t fatigue- states \"if you want me to stand on my foot, I'm going to throw a fit\")               Exercises  Quad Sets: x 15 BLE  Heelslides: x 10 BLE through partial range d/t weakness  Gluteal Sets: x 15  Hip Abduction: x 10 BLE with min A  Knee Short Arc Quad: x 10 BLE  Ankle Pumps: x 15 BLE                        Assessment   Body structures, Functions, Activity limitations: Decreased functional mobility ; Decreased balance; Increased Pain  Assessment: Pt participated in LE supine exercise this date. Declined to attempt OOB activity this afternoon d/t fatigue and L foot pain. Will continue to progress functional mobility as appropriate. Treatment Diagnosis: decreased mobility   Prognosis: Good  Patient Education: safety, importance of mobility, LE exercise  REQUIRES PT FOLLOW UP: Yes  Activity Tolerance  Activity Tolerance: Patient Tolerated treatment well;Patient limited by endurance       Goals  Short term goals  Time Frame for Short term goals: 4/25  Short term goal 1: Pt will transfer supine<>sit with supervision  Short term goal 2: Pt will ambulate x 20' with RW with SBA  Short term goal 3: Pt will complete B LE exercises to improve endurance-- GOAL MET 4/24  Patient Goals   Patient goals : to have less pain    Plan    Plan  Times per week: 3-5  Current Treatment Recommendations: Strengthening, Balance Training, Functional Mobility Training, Endurance Training, Transfer Training, Gait Training  Safety Devices  Type of devices:  All fall risk precautions in place, Bed alarm in place, Call light within reach, Left in bed, Nurse notified  Restraints  Initially in place: No     Therapy Time   Individual Concurrent Group Co-treatment   Time In 1425         Time Out 1440         Minutes 15         Timed Code Treatment Minutes: 15 [address] [place], [place], DPT #[id number]

## 2019-04-24 NOTE — PROGRESS NOTES
Pt unavailable for bipap at this time, RN at bedside. Pt resting on 5LNC, O2 sat 91%. RT will continue to monitor.

## 2019-04-25 ENCOUNTER — APPOINTMENT (OUTPATIENT)
Dept: GENERAL RADIOLOGY | Age: 74
DRG: 602 | End: 2019-04-25
Payer: MEDICARE

## 2019-04-25 LAB
ANION GAP SERPL CALCULATED.3IONS-SCNC: 9 MMOL/L (ref 3–16)
BASE EXCESS ARTERIAL: 16.2 MMOL/L (ref -3–3)
BUN BLDV-MCNC: 46 MG/DL (ref 7–20)
CALCIUM SERPL-MCNC: 9.1 MG/DL (ref 8.3–10.6)
CARBOXYHEMOGLOBIN ARTERIAL: 1 % (ref 0–1.5)
CHLORIDE BLD-SCNC: 87 MMOL/L (ref 99–110)
CO2: 42 MMOL/L (ref 21–32)
CREAT SERPL-MCNC: 1.4 MG/DL (ref 0.6–1.2)
GFR AFRICAN AMERICAN: 45
GFR NON-AFRICAN AMERICAN: 37
GLUCOSE BLD-MCNC: 127 MG/DL (ref 70–99)
GLUCOSE BLD-MCNC: 130 MG/DL (ref 70–99)
GLUCOSE BLD-MCNC: 142 MG/DL (ref 70–99)
GLUCOSE BLD-MCNC: 163 MG/DL (ref 70–99)
GLUCOSE BLD-MCNC: 206 MG/DL (ref 70–99)
HCO3 ARTERIAL: 43.1 MMOL/L (ref 21–29)
HCT VFR BLD CALC: 36.8 % (ref 36–48)
HEMOGLOBIN, ART, EXTENDED: 12.5 G/DL (ref 12–16)
HEMOGLOBIN: 12.1 G/DL (ref 12–16)
MAGNESIUM: 1.5 MG/DL (ref 1.8–2.4)
MCH RBC QN AUTO: 27.5 PG (ref 26–34)
MCHC RBC AUTO-ENTMCNC: 32.9 G/DL (ref 31–36)
MCV RBC AUTO: 83.6 FL (ref 80–100)
METHEMOGLOBIN ARTERIAL: 0.3 %
O2 CONTENT ARTERIAL: 17 ML/DL
O2 SAT, ARTERIAL: 96.9 %
O2 THERAPY: ABNORMAL
PCO2 ARTERIAL: 63 MMHG (ref 35–45)
PDW BLD-RTO: 18.7 % (ref 12.4–15.4)
PERFORMED ON: ABNORMAL
PH ARTERIAL: 7.45 (ref 7.35–7.45)
PLATELET # BLD: 96 K/UL (ref 135–450)
PLATELET SLIDE REVIEW: ABNORMAL
PMV BLD AUTO: 7.7 FL (ref 5–10.5)
PO2 ARTERIAL: 100.1 MMHG (ref 75–108)
POTASSIUM REFLEX MAGNESIUM: 3.3 MMOL/L (ref 3.5–5.1)
RBC # BLD: 4.41 M/UL (ref 4–5.2)
SLIDE REVIEW: ABNORMAL
SODIUM BLD-SCNC: 138 MMOL/L (ref 136–145)
TCO2 ARTERIAL: 45 MMOL/L
WBC # BLD: 8.2 K/UL (ref 4–11)

## 2019-04-25 PROCEDURE — 94640 AIRWAY INHALATION TREATMENT: CPT

## 2019-04-25 PROCEDURE — 6360000002 HC RX W HCPCS: Performed by: INTERNAL MEDICINE

## 2019-04-25 PROCEDURE — 97110 THERAPEUTIC EXERCISES: CPT

## 2019-04-25 PROCEDURE — 2700000000 HC OXYGEN THERAPY PER DAY

## 2019-04-25 PROCEDURE — 99232 SBSQ HOSP IP/OBS MODERATE 35: CPT | Performed by: INTERNAL MEDICINE

## 2019-04-25 PROCEDURE — 6370000000 HC RX 637 (ALT 250 FOR IP): Performed by: INTERNAL MEDICINE

## 2019-04-25 PROCEDURE — 83735 ASSAY OF MAGNESIUM: CPT

## 2019-04-25 PROCEDURE — 85027 COMPLETE CBC AUTOMATED: CPT

## 2019-04-25 PROCEDURE — 2580000003 HC RX 258

## 2019-04-25 PROCEDURE — 2060000000 HC ICU INTERMEDIATE R&B

## 2019-04-25 PROCEDURE — 94668 MNPJ CHEST WALL SBSQ: CPT

## 2019-04-25 PROCEDURE — 6370000000 HC RX 637 (ALT 250 FOR IP): Performed by: NURSE PRACTITIONER

## 2019-04-25 PROCEDURE — 94660 CPAP INITIATION&MGMT: CPT

## 2019-04-25 PROCEDURE — 36415 COLL VENOUS BLD VENIPUNCTURE: CPT

## 2019-04-25 PROCEDURE — 2580000003 HC RX 258: Performed by: INTERNAL MEDICINE

## 2019-04-25 PROCEDURE — 97535 SELF CARE MNGMENT TRAINING: CPT

## 2019-04-25 PROCEDURE — 94761 N-INVAS EAR/PLS OXIMETRY MLT: CPT

## 2019-04-25 PROCEDURE — 71046 X-RAY EXAM CHEST 2 VIEWS: CPT

## 2019-04-25 PROCEDURE — 80048 BASIC METABOLIC PNL TOTAL CA: CPT

## 2019-04-25 PROCEDURE — 6370000000 HC RX 637 (ALT 250 FOR IP): Performed by: REGISTERED NURSE

## 2019-04-25 PROCEDURE — 97530 THERAPEUTIC ACTIVITIES: CPT

## 2019-04-25 PROCEDURE — 36600 WITHDRAWAL OF ARTERIAL BLOOD: CPT

## 2019-04-25 PROCEDURE — 82803 BLOOD GASES ANY COMBINATION: CPT

## 2019-04-25 RX ORDER — FUROSEMIDE 10 MG/ML
40 INJECTION INTRAMUSCULAR; INTRAVENOUS ONCE
Status: COMPLETED | OUTPATIENT
Start: 2019-04-25 | End: 2019-04-25

## 2019-04-25 RX ORDER — SODIUM CHLORIDE 9 MG/ML
INJECTION, SOLUTION INTRAVENOUS
Status: COMPLETED
Start: 2019-04-25 | End: 2019-04-25

## 2019-04-25 RX ORDER — MAGNESIUM SULFATE 1 G/100ML
1 INJECTION INTRAVENOUS ONCE
Status: COMPLETED | OUTPATIENT
Start: 2019-04-25 | End: 2019-04-25

## 2019-04-25 RX ADMIN — HEPARIN SODIUM 7500 UNITS: 5000 INJECTION INTRAVENOUS; SUBCUTANEOUS at 15:33

## 2019-04-25 RX ADMIN — POTASSIUM BICARBONATE 40 MEQ: 782 TABLET, EFFERVESCENT ORAL at 10:29

## 2019-04-25 RX ADMIN — Medication 1 CAPSULE: at 09:12

## 2019-04-25 RX ADMIN — LEVOTHYROXINE SODIUM 88 MCG: 0.09 TABLET ORAL at 06:03

## 2019-04-25 RX ADMIN — INSULIN LISPRO 2 UNITS: 100 INJECTION, SOLUTION INTRAVENOUS; SUBCUTANEOUS at 12:46

## 2019-04-25 RX ADMIN — FUROSEMIDE 40 MG: 10 INJECTION, SOLUTION INTRAMUSCULAR; INTRAVENOUS at 09:13

## 2019-04-25 RX ADMIN — IPRATROPIUM BROMIDE AND ALBUTEROL SULFATE 1 AMPULE: .5; 3 SOLUTION RESPIRATORY (INHALATION) at 20:26

## 2019-04-25 RX ADMIN — TRAMADOL HYDROCHLORIDE 50 MG: 50 TABLET ORAL at 15:28

## 2019-04-25 RX ADMIN — HEPARIN SODIUM 7500 UNITS: 5000 INJECTION INTRAVENOUS; SUBCUTANEOUS at 05:06

## 2019-04-25 RX ADMIN — OXYBUTYNIN 10 MG: 5 TABLET, FILM COATED, EXTENDED RELEASE ORAL at 20:53

## 2019-04-25 RX ADMIN — INSULIN LISPRO 1 UNITS: 100 INJECTION, SOLUTION INTRAVENOUS; SUBCUTANEOUS at 08:30

## 2019-04-25 RX ADMIN — Medication 10 ML: at 09:13

## 2019-04-25 RX ADMIN — INSULIN LISPRO 1 UNITS: 100 INJECTION, SOLUTION INTRAVENOUS; SUBCUTANEOUS at 17:50

## 2019-04-25 RX ADMIN — ROSUVASTATIN CALCIUM 40 MG: 10 TABLET, FILM COATED ORAL at 17:51

## 2019-04-25 RX ADMIN — DOXYCYCLINE HYCLATE 100 MG: 100 TABLET, COATED ORAL at 09:12

## 2019-04-25 RX ADMIN — MONTELUKAST 10 MG: 10 TABLET, FILM COATED ORAL at 20:53

## 2019-04-25 RX ADMIN — DOXYCYCLINE HYCLATE 100 MG: 100 TABLET, COATED ORAL at 20:54

## 2019-04-25 RX ADMIN — SODIUM CHLORIDE 250 ML: 9 INJECTION, SOLUTION INTRAVENOUS at 09:15

## 2019-04-25 RX ADMIN — IPRATROPIUM BROMIDE AND ALBUTEROL SULFATE 1 AMPULE: .5; 3 SOLUTION RESPIRATORY (INHALATION) at 11:41

## 2019-04-25 RX ADMIN — FUROSEMIDE 40 MG: 10 INJECTION, SOLUTION INTRAMUSCULAR; INTRAVENOUS at 15:35

## 2019-04-25 RX ADMIN — IPRATROPIUM BROMIDE AND ALBUTEROL SULFATE 1 AMPULE: .5; 3 SOLUTION RESPIRATORY (INHALATION) at 16:08

## 2019-04-25 RX ADMIN — MAGNESIUM SULFATE HEPTAHYDRATE 1 G: 1 INJECTION, SOLUTION INTRAVENOUS at 09:13

## 2019-04-25 RX ADMIN — HEPARIN SODIUM 7500 UNITS: 5000 INJECTION INTRAVENOUS; SUBCUTANEOUS at 20:53

## 2019-04-25 RX ADMIN — IPRATROPIUM BROMIDE AND ALBUTEROL SULFATE 1 AMPULE: .5; 3 SOLUTION RESPIRATORY (INHALATION) at 07:51

## 2019-04-25 RX ADMIN — FLUTICASONE PROPIONATE 2 SPRAY: 50 SPRAY, METERED NASAL at 09:15

## 2019-04-25 RX ADMIN — Medication 10 ML: at 20:58

## 2019-04-25 ASSESSMENT — PAIN SCALES - GENERAL
PAINLEVEL_OUTOF10: 0
PAINLEVEL_OUTOF10: 10
PAINLEVEL_OUTOF10: 0

## 2019-04-25 NOTE — PROGRESS NOTES
04/25/19 0343   NIV Type   Equipment Type V60   Mode AVAPS   Mask Type Full face mask   Mask Size Large   Settings/Measurements   Comfort Level Good   Using Accessory Muscles No   IPAP   (max P 20 min P 8)   EPAP 6 cmH2O   Vt Ordered 550 mL   Rate Ordered 20   Resp 24   SpO2 96   FiO2  55 %   I Time/ I Time % 0.95 s   Vt Exhaled 437 mL   Mask Leak (lpm) 0 lpm   Skin Protection for O2 Device Yes   Breath Sounds   Right Upper Lobe Crackles;Diminished   Right Middle Lobe Crackles;Diminished   Right Lower Lobe Diminished   Left Upper Lobe Diminished;Crackles   Left Lower Lobe Diminished   Patient Observation   Observations mepilex on nose   Alarm Settings   Alarms On Y   Press Low Alarm 6 cmH2O   High Pressure Alarm 25 cmH2O   Apnea (secs) 20 secs   Resp Rate Low Alarm 6   High Respiratory Rate 45 br/min

## 2019-04-25 NOTE — FLOWSHEET NOTE
04/25/19 0809   Assessment   Charting Type Shift assessment   Neurological   Neuro (WDL) X   Level of Consciousness 0   Orientation Level Oriented X4   Cognition Appropriate judgement; Appropriate safety awareness; Appropriate attention/concentration; Appropriate for developmental age; Follows commands   Alexandro Coma Scale   Eye Opening 4   Best Verbal Response 5   Best Motor Response 6   Alexandro Coma Scale Score 15   HEENT   HEENT (WDL) X   Right Eye Impaired vision  (glasses)   Left Eye Impaired vision  (glasses)   Teeth Dentures upper;Dentures lower   Respiratory   Respiratory (WDL) X   Respiratory Pattern Regular   Respiratory Depth Normal   Respiratory Quality/Effort Dyspnea with exertion   Chest Assessment Chest expansion symmetrical;Trachea midline   L Breath Sounds Diminished   R Breath Sounds Diminished   Breath Sounds   Right Upper Lobe Diminished   Right Middle Lobe Diminished   Right Lower Lobe Diminished   Left Upper Lobe Diminished   Left Lower Lobe Diminished   Cough/Sputum   Cough Congested;Non-productive   Sputum How Obtained Spontaneous cough   Sputum Amount None   Cardiac   Cardiac (WDL) X   Cardiac Regularity Regular   Heart Sounds S1, S2   Cardiac Rhythm NSR;SB;ST   Ectopy PAC   Ectopy Frequency Frequent   Cardiac Monitor   Telemetry Monitor On Yes   Telemetry Audible Yes   Telemetry Alarms Set Yes   Telemetry Monitor Alarm Parameters 50 - 120   Telemetry Box Number 69   Gastrointestinal   Abdominal (WDL) WDL   RUQ Bowel Sounds Active   LUQ Bowel Sounds Active   RLQ Bowel Sounds Active   LLQ Bowel Sounds Active   Peripheral Vascular   Peripheral Vascular (WDL) X   Edema Right lower extremity; Left lower extremity   RLE Edema +1   LLE Edema +1   Skin Color/Condition   Skin Color/Condition (WDL) X   Skin Integrity   Skin Integrity (WDL) X   Musculoskeletal   Musculoskeletal (WDL) X   RUE Weakness   LUE Weakness   RL Extremity Weakness   LL Extremity Weakness   Genitourinary   Genitourinary (WDL) X  (Whelan)   Urine Assessment   Incontinence No   Anus/Rectum   Anus/Rectum (WDL) WDL   Wound 04/17/19 Pretibial Left   Date First Assessed/Time First Assessed: 04/17/19 1631   Present on Hospital Admission: Yes  Primary Wound Type: Soft Tissue Necrosis  Location: Pretibial  Wound Location Orientation: Left   Dressing/Treatment Open to air   Wound Assessment Clean;Dry; Intact   Drainage Amount None   Odor None   Yelena-wound Assessment Dry;Clean   Wound 04/17/19 Foot Left; Anterior   Date First Assessed/Time First Assessed: 04/17/19 1631   Present on Hospital Admission: Yes  Wound Approximate Age at First Assessment (Weeks): 1 weeks  Primary Wound Type: Soft Tissue Necrosis  Location: Foot  Wound Location Orientation: Left; Anterior   Dressing Status Clean;Dry; Intact   Dressing/Treatment Xeroform   Wound Assessment ANNI  (dressing in place)   Urethral Catheter Non-latex 16 fr   Placement Date/Time: 04/19/19 1230   Catheter Type: Non-latex  Tube Size (fr): 16 fr  Catheter Balloon Size: 10 mL  Collection Container: Standard  Securement Method: Securing device (Describe)  Urine Returned: Yes   Catheter Indications Need for fluid management in critically ill patients in a critical care setting not able to be managed by other means such as BSC with hat, bedpan, urinal, condom catheter, or short term intermittent urethral catherization   Site Assessment No urethral drainage   Urine Color Karen   Urine Appearance Clear   Psychosocial   Psychosocial (WDL) X  (patient lethargic, on continuous bipap for high pco2)

## 2019-04-25 NOTE — PROGRESS NOTES
Physical Therapy  Facility/Department: [facility]  Daily Treatment Note  NAME: Claire Lan  : 1945  MRN: [id number]    Date of Service: 2019    Discharge Recommendations:  Subacute/Skilled Nursing Facility   PT Equipment Recommendations  Equipment Needed: No    Patient Diagnosis(es): The primary encounter diagnosis was COPD exacerbation ([address] Diagnoses of Acute pain of right shoulder and Cellulitis of lower extremity, unspecified laterality were also pertinent to this visit. has a past medical history of JAQUELIN (acute kidney injury) ([facility] [address] Arthritis, Asthma, COPD (chronic obstructive pulmonary disease) ([facility] Diabetes mellitus ([facility] Hyperlipidemia, Hypertension, MRSA (methicillin resistant staph aureus) culture positive, Other disorders of kidney and ureter in diseases classified elsewhere, Pneumonia due to infectious organism, and Thyroid disease. has a past surgical history that includes Cholecystectomy; xr knee inc tunnel bilat (Bilateral, ); Rotator cuff repair (Left, ); and Thyroid lobectomy. Restrictions  Restrictions/Precautions: General Precautions, Fall Risk  Position Activity Restriction  Other position/activity restrictions: up as tolerated, Whelan catheter  Subjective   Chart Reviewed: Yes  Response To Previous Treatment: Patient with no complaints from previous session.   Family / Caregiver Present: No  Referring Practitioner: Selene Perry MD  Subjective: Agrees to therapy, voices fear of falls  General Comment  Comments: RN cleared pt for PT, pt resting in bed on approach  Pain Screening  Patient Currently in Pain: Denies       Orientation  Overall Orientation Status: Within Normal Limits     Objective   Bed mobility  Supine to Sit: Moderate assistance(HOB elevated, moves in small increments, assist for LEs and trunk with extra time provided)  Transfers  Sit to Stand: Dependent/Total, min assist of 2 in STEDY from elevated  Stand to sit: Minimal Assistance   Bed to Chair: Dependent/Total(Stedy bed to chair, max move pad placed in chair in case nursing needs it to return pt to bed)  Ambulation  Ambulation?: No        Exercises  Quad Sets: x 15 BLE  Heelslides: x 10 BLE with assist  Gluteal Sets: x 15  Hip Abduction: x 10 BLE with min A  Ankle Pumps: x 15 BLE          Assessment   Body structures, Functions, Activity limitations: Decreased functional mobility ; Decreased balance; Increased Pain  Assessment: Pt voicing fear of falls, willing to try transfers in STEDY. Min assist of 2 to  STEDY from elevated bed. Participated in LE Ex supine.  Recommend skilled PT and SNF on discharge to address current deficits  Treatment Diagnosis: decreased mobility   Specific instructions for Next Treatment: ther ex, functional mob, progress to gait as able  Prognosis: Good  Patient Education: safety, importance of mobility, LE exercise, up with staff assist only  Barriers to Learning: pt voices understanding  REQUIRES PT FOLLOW UP: Yes  Activity Tolerance  Activity Tolerance: Patient Tolerated treatment well;Patient limited by endurance  Activity Tolerance: pt voices fear of falls limiting what activity she is willing to try today          AM-PAC Score  AM-PAC Inpatient Mobility Raw Score : 10  AM-PAC Inpatient T-Scale Score : 32.29  Mobility Inpatient CMS 0-100% Score: 76.75  Mobility Inpatient CMS G-Code Modifier : CL          Goals  Short term goals  Time Frame for Short term goals: 4/25  Short term goal 1: Pt will transfer supine<>sit with supervision  Short term goal 2: Pt will ambulate x 20' with RW with SBA  Short term goal 3: Pt will complete B LE exercises to improve endurance-- GOAL MET 4/24, ONGOING GOAL  Patient Goals   Patient goals : to have less pain    Plan    Times per week: 3-5x wk  Specific instructions for Next Treatment: ther ex, functional mob, progress to gait as able  Current Treatment Recommendations: Strengthening, Balance Training, Functional Mobility Training, Endurance Training, Transfer Training, Gait Training, Safety Education & Training  Safety Devices  Type of devices:  All fall risk precautions in place, Left in chair, Call light within reach, Chair alarm in place, Nurse notified, Gait belt, Patient at risk for falls  Restraints  Initially in place: No     Therapy Time   Individual Concurrent Group Co-treatment   Time In [address] [place] [address] Drive         Time Out [address]         Minutes [address] [place] [address]

## 2019-04-25 NOTE — CARE COORDINATION
Gilberto was asked by MD to speak with pt about discharge plans as they have changed. Pt indicates that she would like to go home at discharge. Pt brother Breanne Presume who can be reached at [phone] endorses the same and indicates that he takes care of pt. Gilberto notes that pt requires a lot of assistance and that returning home may not be feasible. Gilberto will continue to have this discussion about discharge plans with pt as able. [facility] can accept pt with no precert    And should pt choose to return home, Gilberto informed [facility] liaison. Pt also has home o2 with [facility]. Will follow.

## 2019-04-25 NOTE — PROGRESS NOTES
[facility]    [facility]              [phone]                   Plan :     Getting better  pH also normal  AMS getting better  K+ supplement  BP low- hold lasix  Reevaluate for need tomorrow again- may need to switch to oral     Assessment :     Acute Kidney Injury  Cr peaked to  2- down to 1.4  Likely due to hemodynamic changes, and possible ATN from vancomycin/zosyn, though  Exposure was limited  Was 1.1 on admission  0.9 on 12/18    UA- 20-50 wbc, and 4+ bacteriuria, Ecoli- got zosyn  And ceftriaxone  Has received vancomycin and zosyn this admission,  Now held- known to cause ATN-  Also on iv lasix( but getting better despite lasix and sob getting better)  BP lows    Acidosis  Has respiratory acidosis  Metabolic alkalosis due to compensation  And diuretics  Now pH is normal  On Bipap on nightly baseis       Hypotension  BP: ()/(61-71) Pulse:  []   BP low  Hold lasix      HFpEF  Has edema  Mostly RV failure- likely due to COPD  Echo: 4/19- IVC is dilated ( >2.1 cm) elevated RA pressure, RVSP is mod reduced, normal EF  Increased with ventriclar volume overload  On iv lasix bid      DM II  Cellulitis  encephalopathy      [facility] would like to thank Brooklyn Jordan MD   for opportunity to serve this patient      Please call with questions at-   24 Hrs Answering service [phone] or  7 am- 5 pm via Perfect serve or cell phone  Dr.Sudhir Mariela Monte          CC/reason for consult :     JAQUELIN     HPI :     From consult note-   Vernia Jeans is a 68 y.o. female presented to   the hospital on 4/15/2019 with edema, and erythema  Of the legs. also associated with bulla in the heels. She  Is being treated with antibiotics for cellulitis. She is also noted to have COPD exacerbation for which  She is on oxygen, nebulizer, steroids. She is also on lasix iv for edema and sob  Her oxygen requirement is higher than her baseline.     Course is complicated by thrombocytopenia, thought  To be linezolid- now held  Needing on and off bipap    We are consulted because of rising creatinine         Interval History:     Cr coming down  Was on Bipap last night   Now off and on 6 L oxygen via canula  Lethargic still    ROS:     Seen with- no family  SOB- present  Edema- present  Nausea/vomiting- none  Poor appetite- yes  Confusion- yes  Urinary complaints- no  Any other complaints- no  All other ROS are reviewed and are Negative     Medication:     Scheduled Meds:   magnesium sulfate  1 g Intravenous Once    furosemide  40 mg Intravenous Daily    heparin (porcine)  7,500 Units Subcutaneous 3 times per day    doxycycline hyclate  100 mg Oral 2 times per day    ipratropium-albuterol  1 ampule Inhalation Q4H WA    lactobacillus  1 capsule Oral Daily with breakfast    fluticasone  2 spray Nasal Daily    levothyroxine  88 mcg Oral Daily    montelukast  10 mg Oral Nightly    oxybutynin  10 mg Oral Nightly    rosuvastatin  40 mg Oral QPM    sodium chloride flush  10 mL Intravenous 2 times per day    insulin lispro  0-6 Units Subcutaneous TID WC    insulin lispro  0-3 Units Subcutaneous Nightly     Continuous Infusions:   dextrose       PRN Meds:.traMADol **OR** traMADol, hydrOXYzine, acetaminophen, sodium chloride flush, magnesium hydroxide, prochlorperazine, glucose, dextrose, glucagon (rDNA), dextrose, hydrALAZINE       Vitals :     Vitals:    04/25/19 0805   BP: 99/61   Pulse: 109   Resp: 20   Temp: 98.4 °F (36.9 °C)   SpO2: 96%       I & O :       Intake/Output Summary (Last 24 hours) at 4/25/2019 4453  Last data filed at 4/25/2019 0539  Gross per 24 hour   Intake 360 ml   Output 1725 ml   Net -1365 ml        Physical Examination :     General appearance: Anxious- no, distressed- no, in good spirits- no    Obese,lying flat, lethargic  HEENT: Lips- normal, teeth- ok , oral mucosa- moist  Neck : Mass- no, appears symmetrical, JVD- not visible  Respiratory: Respiratory effort-  Labored, on oxygen  wheeze- no, crackles - no  Cardiovascular:  Ausculation- No M/R/G, Edema 2 + both legs  Abdomen: visible mass- no, distention- no, scar- no, tenderness- no                            hepatosplenomegaly-  no  Musculoskeletal:  clubbing no,cyanosis- no , digital ischemia- no                           muscle strength- grossly normal , tone - grossly normal  Skin: rashes- no , ulcers- no, induration- no, tightening - no  Psychiatric:  Judgement and insight- normal           AAO X 3     LABS:     Recent Labs     04/23/19  0439 04/24/19  0438 04/25/19  0454   WBC 5.5 6.1 8.2   HGB 11.9* 11.9* 12.1   HCT 37.0 36.8 36.8   * 106* 96*     Recent Labs     04/23/19 0440 04/24/19 0438 04/25/19  0454    137 138   K 3.6 3.6 3.3*   CL 89* 87* 87*   CO2 41* 40* 42*   BUN 45* 45* 46*   CREATININE 2.0* 1.8* 1.4*   GLUCOSE 98 141* 127*   MG  --   --  1.50*

## 2019-04-25 NOTE — PROGRESS NOTES
Occupational Therapy  Facility/Department: [facility] C4 PCU  Daily Treatment Note  NAME: Yosi Gallardo  : 1945  MRN: [id number]    Date of Service: 2019    Discharge Recommendations:  Subacute/Skilled Nursing Facility     Assessment   Performance deficits / Impairments: Decreased functional mobility ; Decreased balance;Decreased coordination;Decreased ADL status; Decreased high-level IADLs;Decreased endurance;Decreased safe awareness;Decreased cognition  Assessment: OT continues to recommend SNF for skilled OT services to address ADL skills, mobility and UE function. Cont OT  Prognosis: Good  Patient Education: role of OT, ADLs, transfers, safety  REQUIRES OT FOLLOW UP: Yes  Activity Tolerance  Activity Tolerance: Patient limited by fatigue  Activity Tolerance: 8LO2  Safety Devices  Type of devices: Gait belt;Call light within reach; Left in chair;Chair alarm in place;Nurse notified       Patient Diagnosis(es): The primary encounter diagnosis was COPD exacerbation ([facility] [address] Diagnoses of Acute pain of right shoulder and Cellulitis of lower extremity, unspecified laterality were also pertinent to this visit. has a past medical history of JAQUELIN (acute kidney injury) ([address] Arthritis, Asthma, COPD (chronic obstructive pulmonary disease) ([address] Diabetes mellitus ([address] Hyperlipidemia, Hypertension, MRSA (methicillin resistant staph aureus) culture positive, Other disorders of kidney and ureter in diseases classified elsewhere, Pneumonia due to infectious organism, and Thyroid disease. has a past surgical history that includes Cholecystectomy; xr knee inc tunnel bilat (Bilateral, ); Rotator cuff repair (Left, ); and Thyroid lobectomy.     Restrictions  Restrictions/Precautions  Restrictions/Precautions: General Precautions, Fall Risk  Position Activity Restriction  Other position/activity restrictions: up as tolerated, Whelan catheter  Subjective   General  Chart Reviewed: Yes  Patient assessed for Inpatient CMS G-Code Modifier : CL    Goals  Short term goals  Time Frame for Short term goals: within one week   Short term goal 1: Pt will complete toilet transfer with supervision by 4/21 (ongoing to 5/2)  Short term goal 2: Pt will complete standing ADL task with supervision  Short term goal 3: Pt will complete BUE AROM ther ex without cues  Patient Goals   Patient goals : \"feel better\"     Therapy Time   Individual Concurrent Group Co-treatment   Time In 4662         Time Out 1225         Minutes 27         Timed Code Treatment Minutes: [address] [place] [address] OTR/L

## 2019-04-25 NOTE — PROGRESS NOTES
1. 8* 1.4*       Imaging:  I have reviewed radiology images personally. XR CHEST STANDARD (2 VW)   Final Result   Bilateral airspace disease and trace effusions again demonstrated, consistent   with edema. No new airspace disease identified in the interval.         XR CHEST PORTABLE   Final Result   Increasing pulmonary edema and left basilar atelectasis or pneumonia. XR CHEST PORTABLE   Final Result   Cardiomegaly with moderate interstitial pulmonary edema suggesting moderate   CHF. Interstitial pulmonary edema slightly increased since prior. MRI FOOT LEFT WO CONTRAST   Final Result   1. No osteomyelitis or other acute osseous abnormality   2. Nonspecific dorsal mid and forefoot subcutaneous edema compatible with   cellulitis versus lymphedema. No drainable fluid collection or deep soft   tissue ulceration. XR FOOT LEFT (MIN 3 VIEWS)   Final Result   No x-ray evidence of osteomyelitis. No acute osseous injury. Soft tissue swelling of the foot- nonspecific for edema or cellulitis. VL Extremity Venous Bilateral   Final Result      XR SHOULDER RIGHT (MIN 2 VIEWS)   Final Result   No acute abnormality of the right shoulder         XR CHEST PORTABLE   Final Result   Mild left basilar atelectasis or scarring. Otherwise, no acute   cardiopulmonary disease. Xr Shoulder Right (min 2 Views)    Result Date: 4/15/2019  EXAMINATION: 3 XRAY VIEWS OF THE RIGHT SHOULDER 4/15/2019 2:49 pm COMPARISON: None. HISTORY: ORDERING SYSTEM PROVIDED HISTORY: right shoulder pain TECHNOLOGIST PROVIDED HISTORY: Reason for exam:->right shoulder pain Ordering Physician Provided Reason for Exam: right shoulder pain Acuity: Unknown Type of Exam: Unknown FINDINGS: No evidence of fracture or dislocation. There narrowing of the subacromial space likely due to chronic rotator cuff abnormality.   There is acromioclavicular osteoarthritis     No acute abnormality of the right shoulder     Xr Foot Left (min 3 Views)    Result Date: 4/17/2019  EXAMINATION: 3 XRAY VIEWS OF THE LEFT FOOT 4/17/2019 1:09 pm COMPARISON: None. HISTORY: ORDERING SYSTEM PROVIDED HISTORY: wounds, cellulitis TECHNOLOGIST PROVIDED HISTORY: Reason for exam:->wounds, cellulitis Ordering Physician Provided Reason for Exam: wound on top of left foot Acuity: Acute Type of Exam: Initial FINDINGS: The 2nd through 5th MTP joints in extension and PIP joints in flexion. There is moderate osteoarthritis of the 1st metatarsophalangeal joint. Bunion formation is noted. There is no fracture or dislocation. Nonspecific soft tissue swelling is noted. No radiopaque foreign body is seen. No x-ray evidence of osteomyelitis. No acute osseous injury. Soft tissue swelling of the foot- nonspecific for edema or cellulitis. Xr Chest Portable    Result Date: 4/23/2019  EXAMINATION: SINGLE XRAY VIEW OF THE CHEST 4/23/2019 5:32 am COMPARISON: 04/22/2019 HISTORY: ORDERING SYSTEM PROVIDED HISTORY: ? CHF TECHNOLOGIST PROVIDED HISTORY: Reason for exam:->? CHF Ordering Physician Provided Reason for Exam: chf FINDINGS: Pulmonary edema has progressed. There is increasing left basilar airspace disease. Cardiomegaly is unchanged. There is no discernible pneumothorax. Increasing pulmonary edema and left basilar atelectasis or pneumonia. Xr Chest Portable    Result Date: 4/22/2019  EXAMINATION: SINGLE XRAY VIEW OF THE CHEST 4/22/2019 1:58 pm COMPARISON: 04/15/2019 HISTORY: ORDERING SYSTEM PROVIDED HISTORY: Acute hypoxic and hypercapnic respiratory failure TECHNOLOGIST PROVIDED HISTORY: Reason for exam:->Acute hypoxic and hypercapnic respiratory failure Ordering Physician Provided Reason for Exam: Acute hypoxic and hypercapnic respiratory failure Acuity: Acute Type of Exam: Initial FINDINGS: There is cardiomegaly. Cephalization of the pulmonary vasculature with perihilar fullness suggesting interstitial pulmonary edema.   No pleural effusion or pneumothorax. Cardiomediastinal silhouette and bony thorax are unchanged. Cardiomegaly with moderate interstitial pulmonary edema suggesting moderate CHF. Interstitial pulmonary edema slightly increased since prior. Xr Chest Portable    Result Date: 4/15/2019  EXAMINATION: SINGLE XRAY VIEW OF THE CHEST 4/15/2019 2:28 pm COMPARISON: 12/23/2018 HISTORY: ORDERING SYSTEM PROVIDED HISTORY: copd TECHNOLOGIST PROVIDED HISTORY: Reason for exam:->copd Ordering Physician Provided Reason for Exam: copd Acuity: Unknown Type of Exam: Unknown FINDINGS: The patient is in lordotic position. There is stable prominence of the cardiac silhouette. Pulmonary vessels are normal in caliber. The lungs are underinflated. Right hemidiaphragm is chronically elevated. There is mild linear opacity in the left lung base. Mild left basilar atelectasis or scarring. Otherwise, no acute cardiopulmonary disease. Mri Foot Left Wo Contrast    Result Date: 4/19/2019  EXAMINATION: MRI OF THE LEFT FOOT WITHOUT CONTRAST, 4/19/2019 6:50 pm TECHNIQUE: Multiplanar multisequence MRI of the left foot was performed without the administration of intravenous contrast. COMPARISON: Left foot radiographs 04/17/2018. HISTORY: ORDERING SYSTEM PROVIDED HISTORY: OSTEOMYELITIS, FOOT TECHNOLOGIST PROVIDED HISTORY: Ordering Physician Provided Reason for Exam: Mid dorsal Lt foot abcess Acuity: Acute Type of Exam: Initial Additional signs and symptoms: Pt moving all around. Coached on motion. Best images FINDINGS: LISFRANC JOINT:  Lisfranc ligament is visualized and is normal.  The alignment of the tarsal-metatarsal joint is anatomic. BONE MARROW:  Bone marrow is normal in signal without evidence of fracture, marrow contusion or marrow occupying lesion. GREATER AND LESSER MTP JOINTS: Mild 1st metatarsophalangeal degenerative changes. No joint effusion. The lesser metatarsophalangeal joints are unremarkable.  SOFT TISSUES: Mild dorsal mid and forefoot subcutaneous edema. No drainable fluid collection. No deep soft tissue ulceration or sinus tract. TENDONS: The visualized tendons are intact without tenosynovitis. 1. No osteomyelitis or other acute osseous abnormality 2. Nonspecific dorsal mid and forefoot subcutaneous edema compatible with cellulitis versus lymphedema. No drainable fluid collection or deep soft tissue ulceration. Vl Extremity Venous Bilateral    Result Date: 4/16/2019  Lower Extremities DVT Study  Demographics   Patient Name       JOSÉ Albert Syed   Date of Study      04/16/2019         Gender              Female   Patient Number     [id number]         Date of Birth       1945   Visit Number       [id number]          Age                 68 year(s)   Accession Number   [id number]          Room Number         0831   Corporate ID       [id number]            Sonographer         Jaciel Xie RVT   Ordering Physician Gordo Warner.,   Interpreting        Benny Martinez                     CNP                Physician           Vascular                                                            Dorothy Casanova MD, [facility], [facility]  Procedure Type of Study:   Veins:Lower Extremities DVT Study, VASC EXTREMITY VENOUS DUPLEX BILATERAL. Vascular Sonographer Report  Additional Indications:swelling Impressions Right Impression 1. Technically limited exam due to body habitus. There is complete compressibility of all deep and superficial veins seen in the lower extremity. 2. There is normal spontaneous and phasic flow throughout the deep and superficial veins of the right lower extremity . Left Impression 1. Technically limited exam due to body habitus. There is complete compressibility of all deep and superficial veins seen in the lower extremity. 2. There is normal spontaneous and phasic flow throughout the deep and superficial veins of the left lower extremity .  Conclusions   Summary Within the limits of this exam, there is no evidence of deep or superficial  venous thrombosis in the lower extremities bilaterally. Signature   ------------------------------------------------------------------  Electronically signed by Aurora Steward MD, Swetha Costa  (Interpreting physician) on 04/16/2019 at 03:58 PM  ------------------------------------------------------------------  Patient Status:Routine. Study [address] - Vascular Lab. Technical Quality:Limited visualization due to body habitus. Risk Factors History +---------+----------+------------------------------------------------------+ ! Diagnosis! Date      ! Comments                                              ! +---------+----------+------------------------------------------------------+ ! Other    !04/16/2019! Patient reports leg swelling like this is common for  ! !         !          !her. ! +---------+----------+------------------------------------------------------+ Velocities are measured in cm/s ; Diameters are measured in mm Right Lower Extremities DVT Study Measurements Right 2D Measurements +------------------------+----------+---------------+----------+ ! Location                ! Visualized! Compressibility! Thrombosis! +------------------------+----------+---------------+----------+ ! Sapheno Femoral Junction! Yes       ! Yes            ! None      ! +------------------------+----------+---------------+----------+ ! GSV Thigh               ! Yes       ! Yes            ! None      ! +------------------------+----------+---------------+----------+ ! Common Femoral          !Yes       ! Yes            ! None      ! +------------------------+----------+---------------+----------+ ! Prox Femoral            !Yes       ! Yes            ! None      ! +------------------------+----------+---------------+----------+ ! Mid Femoral             !Yes       ! Yes            ! None      ! +------------------------+------+------+------------+ Left Lower Extremities DVT Study Measurements Left 2D Measurements +------------------------+----------+---------------+----------+ ! Location                ! Visualized! Compressibility! Thrombosis! +------------------------+----------+---------------+----------+ ! Sapheno Femoral Junction! Yes       ! Yes            ! None      ! +------------------------+----------+---------------+----------+ ! GSV Thigh               ! Yes       ! Yes            ! None      ! +------------------------+----------+---------------+----------+ ! Common Femoral          !Yes       ! Yes            ! None      ! +------------------------+----------+---------------+----------+ ! Prox Femoral            !Yes       ! Yes            ! None      ! +------------------------+----------+---------------+----------+ ! Mid Femoral             !Yes       ! Yes            ! None      ! +------------------------+----------+---------------+----------+ ! Dist Femoral            !Yes       ! Yes            ! None      ! +------------------------+----------+---------------+----------+ ! Deep Femoral            !Yes       ! Yes            ! None      ! +------------------------+----------+---------------+----------+ ! Popliteal               !Yes       ! Yes            ! None      ! +------------------------+----------+---------------+----------+ ! GSV Below Knee          ! Yes       ! Yes            ! None      ! +------------------------+----------+---------------+----------+ ! Gastroc                 ! Yes       ! Yes            ! None      ! +------------------------+----------+---------------+----------+ ! PTV                     ! Yes       ! Yes            ! None      ! +------------------------+----------+---------------+----------+ ! Peroneal                !Yes       ! Yes            ! None      ! +------------------------+----------+---------------+----------+ ! GSV Calf                ! Yes       ! Yes            ! None      ! +------------------------+----------+---------------+----------+ ! SSV                     ! Yes       ! Yes            ! None      ! +------------------------+----------+---------------+----------+ Left Doppler Measurements +------------------------+------+------+------------+ ! Location                ! Signal!Reflux! Reflux (sec)! +------------------------+------+------+------------+ ! Sapheno Femoral Junction! Phasic! No    !            ! +------------------------+------+------+------------+ ! Common Femoral          !Phasic! No    !            ! +------------------------+------+------+------------+ ! Prox Femoral            !Phasic! No    !            ! +------------------------+------+------+------------+ ! Deep Femoral            !Phasic! No    !            ! +------------------------+------+------+------------+ ! Popliteal               !Phasic! No    !            ! +------------------------+------+------+------------+      Assessment and plan:  Acute on chronic respiratory failure, hypoxemic and hypercarbic. Unclear from the history, but it appears she is on oxygen at 4 LPM at home. 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 She has had a similar presentation in December 2018. Discontinue narcotics for now. Clinically improved, refuses to wear BiPAP, even during sleep  COPD. No PFT in EMR. Agree with bronchodilator. ?  CHF. Chest x-ray suggests pulmonary edema, given Lasix IV yesterday, repeat CXR still suggests pulmonary edema. Remains on Lasix once daily. Elevated hemidiaphragm, ILD. She has mild ILD on prior CT chest  Cellulitis. Being followed by ID and podiatry, on Zyvox. DM, HTN, HLD, hypothyroidism, obesity. Per IM. DVT prophylaxis. On Lovenox.       Discussed with patient, nursing.         Electronically signed by:  Yaakov Gil MD    4/25/2019    10:18 AM.

## 2019-04-25 NOTE — PROGRESS NOTES
Hospitalist Progress Note      PCP: No primary care provider on file. Date of Admission: 4/15/2019    Chief Complaint: Left foot pain     Hospital Course:   68 Y F with a h/o COPD, HTN, HLD, and DM2 who presented to the ED with increasing pain, erythema, and edema of her distal LLE surrounding two shallow ulcers which had been \"festering\" for the last week or so. Her legs have been quite edematous for the last few weeks, to the point that she has developed a few bullae which have ruptured and drained serous fluid multiple times. This has left her with sores on the dorsum on her L foot and on her mid L shin. The skin around the ruptured bullae rapidly turned a bright red and became tender over the last few days. She denies fevers or chills.      Subjective:    off BIPAP daytime and refusing nighttime , awake and communicating , on nasal cannula    Medications:  Reviewed    Infusion Medications    dextrose       Scheduled Medications    furosemide  40 mg Intravenous Daily    heparin (porcine)  7,500 Units Subcutaneous 3 times per day    doxycycline hyclate  100 mg Oral 2 times per day    ipratropium-albuterol  1 ampule Inhalation Q4H WA    lactobacillus  1 capsule Oral Daily with breakfast    fluticasone  2 spray Nasal Daily    levothyroxine  88 mcg Oral Daily    montelukast  10 mg Oral Nightly    oxybutynin  10 mg Oral Nightly    rosuvastatin  40 mg Oral QPM    sodium chloride flush  10 mL Intravenous 2 times per day    insulin lispro  0-6 Units Subcutaneous TID WC    insulin lispro  0-3 Units Subcutaneous Nightly     PRN Meds: traMADol **OR** traMADol, hydrOXYzine, acetaminophen, sodium chloride flush, magnesium hydroxide, prochlorperazine, glucose, dextrose, glucagon (rDNA), dextrose, hydrALAZINE      Intake/Output Summary (Last 24 hours) at 4/25/2019 0832  Last data filed at 4/25/2019 0539  Gross per 24 hour   Intake 360 ml   Output 1725 ml   Net -1365 ml       Physical Exam Performed:    BP 99/61   Pulse 109   Temp 98.4 °F (36.9 °C) (Oral)   Resp 20   Ht 5' 2\" (1.575 m)   Wt 284 lb 14.4 oz (129.2 kg)   SpO2 96%   BMI 52.11 kg/m²     General appearance:   on nasal cannula , looks tired  HEENT:  Normal cephalic, atraumatic without obvious deformity. Pupils equal, round, and reactive to light. Extra ocular muscles intact. Conjunctivae/corneas clear. Neck: Supple, with full range of motion. No jugular venous distention. Trachea midline. Respiratory:  bipap. Diminished bibasilar breath sounds. No wheezing. Has crackles  Cardiovascular:  Regular rate and rhythm with normal S1/S2 without murmurs, rubs or gallops. Abdomen: Soft, non-tender, non-distended with normal bowel sounds. Obese   Musculoskeletal:  No clubbing, cyanosis. BLE pitting edema. Improved   Skin: Skin color, texture, turgor normal. Left shin wound, no drainage. Left foot wound with surrounding erythema and drainage. Tremendously improved  Neurologic:  Neurovascularly intact without any focal sensory/motor deficits. Cranial nerves: II-XII intact, grossly non-focal.  Psychiatric:   alert    Capillary Refill: ++   Peripheral Pulses: +2 palpable, equal bilaterally       Labs:   Recent Labs     04/23/19  0439 04/24/19  0438 04/25/19  0454   WBC 5.5 6.1 8.2   HGB 11.9* 11.9* 12.1   HCT 37.0 36.8 36.8   * 106* 96*     Recent Labs     04/23/19  0440 04/24/19  0438 04/25/19  0454    137 138   K 3.6 3.6 3.3*   CL 89* 87* 87*   CO2 41* 40* 42*   BUN 45* 45* 46*   CREATININE 2.0* 1.8* 1.4*   CALCIUM 8.7 8.7 9.1   Urinalysis:      Lab Results   Component Value Date    NITRU POSITIVE 04/17/2019    WBCUA 20-50 04/17/2019    BACTERIA 4+ 04/17/2019    RBCUA 0-2 04/17/2019    BLOODU TRACE-LYSED 04/17/2019    SPECGRAV 1.015 04/17/2019    GLUCOSEU Negative 04/17/2019       Radiology:  XR CHEST STANDARD (2 VW)   Final Result   Bilateral airspace disease and trace effusions again demonstrated, consistent   with edema.   No new airspace admission. Cellulitis initially improved but appeared worse 4/17 (erythema noticed outside margins that have been marked). Dc'd clinda and changed to Vanc/Zosyn. ID and Podiatry consulted. Vanc changed to Zyvox per ID due to worsening renal fx. Wound culture grew MRSA. Zosyn dc'd per ID on 4/19. Antibiotics was switched to doxycycline yesterday-4/23    Wound care consulted. Continue local wound care to left foot bullous lesion. Improved  - Xray of left foot negative for osteo. MRI negative for osteo and no drainable fluid collection noted.    -Acute and chronic diastolic congestive heart failure   -Lasix 40 IV twice a day  -BLE venous dopplers are negative for DVT. Hyperkalemia POA (resolved):  - Continue to hold potassium supplement and losartan. - Pt given furosemide, nebulizers, and kayexalate on admission.       Hypertension:  - Holding losartan as above. Hydralazine meanwhile. Pt is currently normotensive.      Hyperlipidemia:  - Continue statin.     Diabetes mellitus type 2:  - Holding metformin while here. Low dose SSI meanwhile. A1c's have been controlled recently.       COPD, asthma, chronic hypoxic respiratory failure:  Currently acute and chronic hypoxic hypercapnic respiratory failure on BiPAP .      Hypothyroidism:  - Clinically euthyroid. Continue home dose of levothyroxine.     Years of vaginal discomfort, which the patient says started after a gynecologic surgery:  - Details unclear, continue to follow with gynecology clinic. Pelvic exam by ED physician was unrevealing.    - Pt noted to have excoriation to perineum/labia possibly due to incontinence. Apply cream for now. UTI might have contributed to discomfort. UTI:  - UA shows + nitrites and LE with 20-50 WBC and 4+ bacteria. Urine culture grew E. Coli. Pt received 3 days of Zosyn. Asymptomatic bacteriuria    Hypomagnesemia:  - Monitor and replete as needed.      Acute and chronic renal failure-most probably secondary to diuretics,

## 2019-04-26 PROBLEM — R29.818 SUSPECTED SLEEP APNEA: Status: ACTIVE | Noted: 2019-04-26

## 2019-04-26 PROBLEM — E66.01 MORBID OBESITY WITH BMI OF 50.0-59.9, ADULT (HCC): Status: ACTIVE | Noted: 2019-04-26

## 2019-04-26 PROBLEM — I51.89 DIASTOLIC DYSFUNCTION: Status: ACTIVE | Noted: 2019-04-26

## 2019-04-26 PROBLEM — I27.20 PULMONARY HTN (HCC): Status: ACTIVE | Noted: 2019-04-26

## 2019-04-26 LAB
ANION GAP SERPL CALCULATED.3IONS-SCNC: 10 MMOL/L (ref 3–16)
BUN BLDV-MCNC: 36 MG/DL (ref 7–20)
CALCIUM SERPL-MCNC: 9 MG/DL (ref 8.3–10.6)
CHLORIDE BLD-SCNC: 88 MMOL/L (ref 99–110)
CO2: 45 MMOL/L (ref 21–32)
CREAT SERPL-MCNC: 1.1 MG/DL (ref 0.6–1.2)
GFR AFRICAN AMERICAN: 59
GFR NON-AFRICAN AMERICAN: 49
GLUCOSE BLD-MCNC: 114 MG/DL (ref 70–99)
GLUCOSE BLD-MCNC: 114 MG/DL (ref 70–99)
GLUCOSE BLD-MCNC: 143 MG/DL (ref 70–99)
GLUCOSE BLD-MCNC: 179 MG/DL (ref 70–99)
GLUCOSE BLD-MCNC: 197 MG/DL (ref 70–99)
HCT VFR BLD CALC: 36.8 % (ref 36–48)
HEMOGLOBIN: 11.9 G/DL (ref 12–16)
MAGNESIUM: 1.5 MG/DL (ref 1.8–2.4)
MCH RBC QN AUTO: 27.4 PG (ref 26–34)
MCHC RBC AUTO-ENTMCNC: 32.4 G/DL (ref 31–36)
MCV RBC AUTO: 84.5 FL (ref 80–100)
PDW BLD-RTO: 19 % (ref 12.4–15.4)
PERFORMED ON: ABNORMAL
PLATELET # BLD: 92 K/UL (ref 135–450)
PMV BLD AUTO: 7.7 FL (ref 5–10.5)
POTASSIUM REFLEX MAGNESIUM: 3.1 MMOL/L (ref 3.5–5.1)
RBC # BLD: 4.35 M/UL (ref 4–5.2)
SODIUM BLD-SCNC: 143 MMOL/L (ref 136–145)
WBC # BLD: 7.6 K/UL (ref 4–11)

## 2019-04-26 PROCEDURE — 99233 SBSQ HOSP IP/OBS HIGH 50: CPT | Performed by: INTERNAL MEDICINE

## 2019-04-26 PROCEDURE — 6370000000 HC RX 637 (ALT 250 FOR IP): Performed by: INTERNAL MEDICINE

## 2019-04-26 PROCEDURE — 2060000000 HC ICU INTERMEDIATE R&B

## 2019-04-26 PROCEDURE — 6370000000 HC RX 637 (ALT 250 FOR IP): Performed by: REGISTERED NURSE

## 2019-04-26 PROCEDURE — 6360000002 HC RX W HCPCS: Performed by: INTERNAL MEDICINE

## 2019-04-26 PROCEDURE — 83735 ASSAY OF MAGNESIUM: CPT

## 2019-04-26 PROCEDURE — 85027 COMPLETE CBC AUTOMATED: CPT

## 2019-04-26 PROCEDURE — 6370000000 HC RX 637 (ALT 250 FOR IP): Performed by: NURSE PRACTITIONER

## 2019-04-26 PROCEDURE — 94761 N-INVAS EAR/PLS OXIMETRY MLT: CPT

## 2019-04-26 PROCEDURE — 36415 COLL VENOUS BLD VENIPUNCTURE: CPT

## 2019-04-26 PROCEDURE — 2580000003 HC RX 258: Performed by: INTERNAL MEDICINE

## 2019-04-26 PROCEDURE — 2700000000 HC OXYGEN THERAPY PER DAY

## 2019-04-26 PROCEDURE — 80048 BASIC METABOLIC PNL TOTAL CA: CPT

## 2019-04-26 PROCEDURE — 94640 AIRWAY INHALATION TREATMENT: CPT

## 2019-04-26 PROCEDURE — 94668 MNPJ CHEST WALL SBSQ: CPT

## 2019-04-26 RX ORDER — FUROSEMIDE 40 MG/1
40 TABLET ORAL DAILY
Status: DISCONTINUED | OUTPATIENT
Start: 2019-04-26 | End: 2019-04-27 | Stop reason: HOSPADM

## 2019-04-26 RX ORDER — MAGNESIUM SULFATE IN WATER 40 MG/ML
4 INJECTION, SOLUTION INTRAVENOUS ONCE
Status: COMPLETED | OUTPATIENT
Start: 2019-04-26 | End: 2019-04-26

## 2019-04-26 RX ORDER — FUROSEMIDE 10 MG/ML
40 INJECTION INTRAMUSCULAR; INTRAVENOUS DAILY
Status: DISCONTINUED | OUTPATIENT
Start: 2019-04-26 | End: 2019-04-26

## 2019-04-26 RX ORDER — POTASSIUM CHLORIDE 750 MG/1
40 TABLET, EXTENDED RELEASE ORAL ONCE
Status: COMPLETED | OUTPATIENT
Start: 2019-04-26 | End: 2019-04-26

## 2019-04-26 RX ADMIN — LEVOTHYROXINE SODIUM 88 MCG: 0.09 TABLET ORAL at 05:29

## 2019-04-26 RX ADMIN — MAGNESIUM SULFATE HEPTAHYDRATE 4 G: 40 INJECTION, SOLUTION INTRAVENOUS at 12:35

## 2019-04-26 RX ADMIN — MONTELUKAST 10 MG: 10 TABLET, FILM COATED ORAL at 21:38

## 2019-04-26 RX ADMIN — ROSUVASTATIN CALCIUM 40 MG: 10 TABLET, FILM COATED ORAL at 17:41

## 2019-04-26 RX ADMIN — MAGNESIUM GLUCONATE 500 MG ORAL TABLET 400 MG: 500 TABLET ORAL at 21:38

## 2019-04-26 RX ADMIN — Medication 1 CAPSULE: at 07:44

## 2019-04-26 RX ADMIN — INSULIN LISPRO 1 UNITS: 100 INJECTION, SOLUTION INTRAVENOUS; SUBCUTANEOUS at 17:43

## 2019-04-26 RX ADMIN — IPRATROPIUM BROMIDE AND ALBUTEROL SULFATE 1 AMPULE: .5; 3 SOLUTION RESPIRATORY (INHALATION) at 20:31

## 2019-04-26 RX ADMIN — INSULIN LISPRO 1 UNITS: 100 INJECTION, SOLUTION INTRAVENOUS; SUBCUTANEOUS at 21:39

## 2019-04-26 RX ADMIN — POTASSIUM CHLORIDE 40 MEQ: 10 TABLET, EXTENDED RELEASE ORAL at 14:43

## 2019-04-26 RX ADMIN — DOXYCYCLINE HYCLATE 100 MG: 100 TABLET, COATED ORAL at 21:38

## 2019-04-26 RX ADMIN — HEPARIN SODIUM 7500 UNITS: 5000 INJECTION INTRAVENOUS; SUBCUTANEOUS at 14:40

## 2019-04-26 RX ADMIN — TRAMADOL HYDROCHLORIDE 100 MG: 50 TABLET ORAL at 21:53

## 2019-04-26 RX ADMIN — MAGNESIUM GLUCONATE 500 MG ORAL TABLET 400 MG: 500 TABLET ORAL at 17:42

## 2019-04-26 RX ADMIN — HEPARIN SODIUM 7500 UNITS: 5000 INJECTION INTRAVENOUS; SUBCUTANEOUS at 21:38

## 2019-04-26 RX ADMIN — Medication 10 ML: at 21:47

## 2019-04-26 RX ADMIN — IPRATROPIUM BROMIDE AND ALBUTEROL SULFATE 1 AMPULE: .5; 3 SOLUTION RESPIRATORY (INHALATION) at 11:53

## 2019-04-26 RX ADMIN — IPRATROPIUM BROMIDE AND ALBUTEROL SULFATE 1 AMPULE: .5; 3 SOLUTION RESPIRATORY (INHALATION) at 15:59

## 2019-04-26 RX ADMIN — FUROSEMIDE 40 MG: 40 TABLET ORAL at 12:55

## 2019-04-26 RX ADMIN — POTASSIUM BICARBONATE 40 MEQ: 782 TABLET, EFFERVESCENT ORAL at 10:12

## 2019-04-26 RX ADMIN — FLUTICASONE PROPIONATE 2 SPRAY: 50 SPRAY, METERED NASAL at 08:55

## 2019-04-26 RX ADMIN — Medication 10 ML: at 08:54

## 2019-04-26 RX ADMIN — INSULIN LISPRO 1 UNITS: 100 INJECTION, SOLUTION INTRAVENOUS; SUBCUTANEOUS at 12:14

## 2019-04-26 RX ADMIN — IPRATROPIUM BROMIDE AND ALBUTEROL SULFATE 1 AMPULE: .5; 3 SOLUTION RESPIRATORY (INHALATION) at 07:51

## 2019-04-26 RX ADMIN — DOXYCYCLINE HYCLATE 100 MG: 100 TABLET, COATED ORAL at 08:54

## 2019-04-26 RX ADMIN — TRAMADOL HYDROCHLORIDE 50 MG: 50 TABLET ORAL at 15:42

## 2019-04-26 RX ADMIN — HEPARIN SODIUM 7500 UNITS: 5000 INJECTION INTRAVENOUS; SUBCUTANEOUS at 05:29

## 2019-04-26 RX ADMIN — OXYBUTYNIN 10 MG: 5 TABLET, FILM COATED, EXTENDED RELEASE ORAL at 21:38

## 2019-04-26 ASSESSMENT — PAIN SCALES - GENERAL
PAINLEVEL_OUTOF10: 0
PAINLEVEL_OUTOF10: 10
PAINLEVEL_OUTOF10: 0
PAINLEVEL_OUTOF10: 0
PAINLEVEL_OUTOF10: 7
PAINLEVEL_OUTOF10: 0
PAINLEVEL_OUTOF10: 0
PAINLEVEL_OUTOF10: 3

## 2019-04-26 NOTE — DISCHARGE INSTR - COC
Continuity of Care Form    Patient Name: Arturo Ennis   :    MRN:  [id number]    Admit date:  4/15/2019  Discharge date:  19    Code Status Order: Full Code   Advance Directives:   [address] [facility] Documentation     Date/Time Healthcare Directive Type of Healthcare Directive Copy in [address] Agent's Name Healthcare Agent's Phone Number    04/15/19 2254  No, patient does not have an advance directive for healthcare treatment -- -- -- -- --          Admitting Physician:  Candace Pitt MD  PCP: No primary care provider on file.     Discharging Nurse: Lydia ROBERTSON [facility] Unit/Room#: 3887/4291-69  Discharging Unit Phone Number: [phone]    Emergency Contact:   Extended Emergency Contact Information  Primary Emergency Contact: Lavonne Thapa [address] Phone: [phone]  Mobile Phone: [phone]  Relation: Brother/Sister  Secondary Emergency Contact: Dulcie Cowden  Address: [address] [place] [address],[address] Phone: [phone]  Relation: Brother/Sister    Past Surgical History:  Past Surgical History:   Procedure Laterality Date    CHOLECYSTECTOMY      ROTATOR CUFF REPAIR Left 2015    THYROID LOBECTOMY      XR KNEE INC TUNNEL BILAT Bilateral        Immunization History:   Immunization History   Administered Date(s) Administered    Pneumococcal 13-valent Conjugate (Sadaf Stair) 2016    Tdap (Boostrix, Adacel) 2008       Active Problems:  Patient Active Problem List   Diagnosis Code    Polycythemia, secondary D75.1    Leukocytosis D72.829    COPD exacerbation ([address] J44.1    Diabetes mellitus ([facility] E11.9    Hyperlipidemia E78.5    Hypertension I10    Hypothyroidism E03.9    Thyroid nodule E04.1    Pulmonary nodule, left R91.1    Acute respiratory failure with hypoxia and hypercapnia (HCC) J96.01, J96.02    Acute encephalopathy G93.40    Pneumonia due to infectious organism J18.9    JAQUELIN (acute kidney injury) ([facility] N17.9    Aspiration pneumonia ([facility]) J69.0    HCAP (healthcare-associated pneumonia) J18.9    Acute hypoxemic respiratory failure ([facility]) J96.01    Pulmonary edema, acute ([facility]) J81.0    Cellulitis L03.90    Morbid obesity ([facility]) E66.01    Acute on chronic respiratory failure with hypoxia and hypercapnia ([facility]) J96.21, J96.22    Interstitial lung disease ([facility]) J84.9       Isolation/Infection:   Isolation          Contact        Patient Infection Status     Infection Encounter Level? Onset Date Added Added By Resolved Resolved By Review Date    MRSA No 04/17/19 04/19/19 Wound Culture             Nurse Assessment:  Last Vital Signs: BP 98/71   Pulse 85   Temp 97.7 °F (36.5 °C) (Oral)   Resp 18   Ht 5' 2\" (1.575 m)   Wt 276 lb 1.6 oz (125.2 kg)   SpO2 (!) 88%   BMI 50.50 kg/m²     Last documented pain score (0-10 scale): Pain Level: 0  Last Weight:   Wt Readings from Last 1 Encounters:   04/26/19 276 lb 1.6 oz (125.2 kg)     Mental Status:  oriented and alert    IV Access:  - None    Nursing Mobility/ADLs:  Walking   Dependent  Transfer  Dependent  Bathing  Dependent  Dressing  Dependent  Toileting  Dependent  Feeding  Assisted  Med Admin  Assisted  Med Delivery   whole    Wound Care Documentation and Therapy:  Wound 12/25/18 Arm Left; Lower cluster of 4 scabbed, dry blisters, and 1 open, moist blister to left forearm (Active)   Number of days: 122       Wound 04/17/19 Pretibial Left (Active)   Wound Image   4/17/2019  4:33 PM   Wound Other 4/20/2019  9:13 PM   Dressing Status Other (Comment) 4/24/2019  3:56 PM   Dressing Changed Other (Comment) 4/24/2019  3:56 PM   Dressing/Treatment Open to air 4/26/2019  7:39 AM   Wound Length (cm) 6.5 cm 4/17/2019  4:33 PM   Wound Width (cm) 1.2 cm 4/17/2019  4:33 PM   Wound Depth (cm) 0 cm 4/17/2019  4:33 PM   Wound Surface Area (cm^2) 7.8 cm^2 4/17/2019  4:33 PM   Wound Volume (cm^3) 0 cm^3 4/17/2019  4:33 Elimination:  Continence:   · Bowel: No  · Bladder: No  Urinary Catheter: Removal Date 4/27   Colostomy/Ileostomy/Ileal Conduit: No       Date of Last BM: 4/26/19    Intake/Output Summary (Last 24 hours) at 4/26/2019 1253  Last data filed at 4/26/2019 1210  Gross per 24 hour   Intake 680 ml   Output 2725 ml   Net -2045 ml     I/O last 3 completed shifts: In: 600 [P.O.:600]  Out: Hernan Reeves 60 [Urine:2875]    Safety Concerns: At Risk for Falls    Impairments/Disabilities:      None    Nutrition Therapy:  Current Nutrition Therapy:   - Oral Diet:  Carb Control 4 carbs/meal (1800kcals/day)    Routes of Feeding: Oral  Liquids: Thin Liquids  Daily Fluid Restriction: no  Last Modified Barium Swallow with Video (Video Swallowing Test): not done    Treatments at the Time of Hospital Discharge:   Respiratory Treatments: ***  Oxygen Therapy:  is on oxygen at 6 L/min per nasal cannula.   Ventilator:    - BiPAP   IPAP: (max P 20 min P 8) 16/8, EPAP: 6 cmH2O only when sleeping    Rehab Therapies: Physical Therapy and Occupational Therapy  Weight Bearing Status/Restrictions: No weight bearing restirctions  Other Medical Equipment (for information only, NOT a DME order):  wheelchair and walker  Other Treatments: ***    Patient's personal belongings (please select all that are sent with patient):  Glasses    RN SIGNATURE:  Electronically signed by Bo Miramontes RN on 4/27/19 at 2:03 PM    CASE MANAGEMENT/SOCIAL WORK SECTION    Inpatient Status Date: ***    Readmission Risk Assessment Score:  Readmission Risk              Risk of Unplanned Readmission:        28           Discharging to Facility/ Agency   · Name: [address]  · Address:  · Phone:[phone]  · Fax:[phone]    Dialysis Facility (if applicable)   · Name:  · Address:  · Dialysis Schedule:  · Phone:  · Fax:    / signature: Electronically signed by Malcom Daniels RN on 4/27/19 at 12:24 PM    PHYSICIAN SECTION    Prognosis: Guarded    Condition at

## 2019-04-26 NOTE — CONSULTS
Inpatient consult to Palliative Care  Consult performed by: Sharon Maldonado RN  Consult ordered by: Martha Arredondo MD  Reason for consult: Code Status, [facility]            Palliative Care Initial Note  Palliative Care Admit date:   4/26/19    Advance Directives:  Pt isn't clear if she has executed AD's or not but did say \"my sister's, Geoff Moreno and Vishal" are who she would depend on for medical decision making if pt were unable. Pt currently has a full code status. In broaching discussion around her resuscitation wishes, pt denies ever giving thought to resuscitation and is unwilling to discuss now. Plan of care/goals: Attempted to have d/w pt around her current medical challenges though she wasn't amenable. She stated feeling \"I'm doing fine, my foot is healed and I'll go back to doing what I did before I came in here. \"   Pt also informed writer that she has \"no intention\" of being compliant w/ bipap. Writer's attempt at ACP discussion was not met w/ much receptivity by pt; eventually, she just closed her eyes and stopped participating in discussion. Reviewed chart and aware of the brother's willingness to care for pt in the home. However, pt requires the assist of 2 and it is uncertain if family can actually, physically, meet her safety and physical needs. Hospitalist saw pt during our mtg, reiterating the resp challenges, though pt isn't accepting of the physician's findings as she shared her thoughts w/ writer. Attempted to reach Jessie Conti and Geoff Moreno but n/a and no ability to leave a message. Social/Spiritual:  Pt reports having \"six\" siblings, \"but three brother's don't give a damn about me. \"  Explained the prudence of executing an AD, in particular a DPOA-HC, but pt wasn't receptive or willing to remain engaged in discussion. She closed her eyes and ended our mtg. Plan:   We could certainly assist pt in completing AD's while here if she is willing and if the intended proxy decision makers are in

## 2019-04-26 NOTE — PROGRESS NOTES
Hospitalist Progress Note      PCP: No primary care provider on file. Date of Admission: 4/15/2019    Chief Complaint: Left foot pain     Hospital Course:   68 Y F with a h/o COPD, HTN, HLD, and DM2 who presented to the ED with increasing pain, erythema, and edema of her distal LLE surrounding two shallow ulcers which had been \"festering\" for the last week or so. Her legs have been quite edematous for the last few weeks, to the point that she has developed a few bullae which have ruptured and drained serous fluid multiple times. This has left her with sores on the dorsum on her L foot and on her mid L shin. The skin around the ruptured bullae rapidly turned a bright red and became tender over the last few days. She denies fevers or chills.      Subjective:    off BIPAP daytime and refusing nighttime , awake and communicating , on nasal cannula    Medications:  Reviewed    Infusion Medications    dextrose       Scheduled Medications    heparin (porcine)  7,500 Units Subcutaneous 3 times per day    doxycycline hyclate  100 mg Oral 2 times per day    ipratropium-albuterol  1 ampule Inhalation Q4H WA    lactobacillus  1 capsule Oral Daily with breakfast    fluticasone  2 spray Nasal Daily    levothyroxine  88 mcg Oral Daily    montelukast  10 mg Oral Nightly    oxybutynin  10 mg Oral Nightly    rosuvastatin  40 mg Oral QPM    sodium chloride flush  10 mL Intravenous 2 times per day    insulin lispro  0-6 Units Subcutaneous TID WC    insulin lispro  0-3 Units Subcutaneous Nightly     PRN Meds: traMADol **OR** traMADol, hydrOXYzine, acetaminophen, sodium chloride flush, magnesium hydroxide, prochlorperazine, glucose, dextrose, glucagon (rDNA), dextrose      Intake/Output Summary (Last 24 hours) at 4/26/2019 0900  Last data filed at 4/26/2019 0845  Gross per 24 hour   Intake 720 ml   Output 2875 ml   Net -2155 ml       Physical Exam Performed:    /73   Pulse 79   Temp 97.6 °F (36.4 °C) (Oral) Resp 18   Ht 5' 2\" (1.575 m)   Wt 276 lb 1.6 oz (125.2 kg)   SpO2 95%   BMI 50.50 kg/m²     General appearance:   on nasal cannula , more alert  HEENT:  Normal cephalic, atraumatic without obvious deformity. Pupils equal, round, and reactive to light. Extra ocular muscles intact. Conjunctivae/corneas clear. Neck: Supple, with full range of motion. No jugular venous distention. Trachea midline. Respiratory:  bipap. Diminished bibasilar breath sounds. No wheezing. Has crackles  Cardiovascular:  Regular rate and rhythm with normal S1/S2 without murmurs, rubs or gallops. Abdomen: Soft, non-tender, non-distended with normal bowel sounds. Obese   Musculoskeletal:  No clubbing, cyanosis. BLE pitting edema. Improved   Skin: Skin color, texture, turgor normal. Left shin wound, no drainage. Left foot wound with surrounding erythema and drainage. Tremendously improved  Neurologic:  Neurovascularly intact without any focal sensory/motor deficits. Cranial nerves: II-XII intact, grossly non-focal.  Psychiatric:   alert    Capillary Refill: ++   Peripheral Pulses: +2 palpable, equal bilaterally       Labs:   Recent Labs     04/24/19  0438 04/25/19  0454 04/26/19  0442   WBC 6.1 8.2 7.6   HGB 11.9* 12.1 11.9*   HCT 36.8 36.8 36.8   * 96* 92*     Recent Labs     04/24/19  0438 04/25/19  0454 04/26/19  0442    138 143   K 3.6 3.3* 3.1*   CL 87* 87* 88*   CO2 40* 42* 45*   BUN 45* 46* 36*   CREATININE 1.8* 1.4* 1.1   CALCIUM 8.7 9.1 9.0   Urinalysis:      Lab Results   Component Value Date    NITRU POSITIVE 04/17/2019    WBCUA 20-50 04/17/2019    BACTERIA 4+ 04/17/2019    RBCUA 0-2 04/17/2019    BLOODU TRACE-LYSED 04/17/2019    SPECGRAV 1.015 04/17/2019    GLUCOSEU Negative 04/17/2019       Radiology:  XR CHEST STANDARD (2 VW)   Final Result   Bilateral airspace disease and trace effusions again demonstrated, consistent   with edema.   No new airspace disease identified in the interval.         XR CHEST clindamycin on admission. Cellulitis initially improved but appeared worse 4/17 (erythema noticed outside margins that have been marked). Dc'd clinda and changed to Vanc/Zosyn. ID and Podiatry consulted. Vanc changed to Zyvox per ID due to worsening renal fx. Wound culture grew MRSA. Zosyn dc'd per ID on 4/19. Antibiotics was switched to doxycycline -4/23 -advised to continue couple of more days, ID signed off   Wound care consulted. Continue local wound care to left foot bullous lesion. Improved/ resolved  - Xray of left foot negative for osteo. MRI negative for osteo and no drainable fluid collection noted.    -Acute and chronic diastolic congestive heart failure   -Lasix 40 IV twice a day- on hold secondary to acute kidney injury -resumed po  today  -BLE venous dopplers are negative for DVT. Hyperkalemia POA (resolved):  - Continue to hold potassium supplement and losartan. - Pt given furosemide, nebulizers, and kayexalate on admission.    -Currently hypokalemia-replace     Hypertension:  - Holding losartan as above. Hydralazine meanwhile. Pt is currently normotensive.      Hyperlipidemia:  - Continue statin.     Diabetes mellitus type 2:  - Holding metformin while here. Low dose SSI meanwhile. A1c's have been controlled recently.       COPD, asthma, chronic hypoxic respiratory failure:  Currently acute and chronic hypoxic hypercapnic respiratory failure on BiPAP .      Hypothyroidism:  - Clinically euthyroid. Continue home dose of levothyroxine.     Years of vaginal discomfort, which the patient says started after a gynecologic surgery:  - Details unclear, continue to follow with gynecology clinic. Pelvic exam by ED physician was unrevealing.    - Pt noted to have excoriation to perineum/labia possibly due to incontinence. Apply cream for now. UTI might have contributed to discomfort. UTI:  - UA shows + nitrites and LE with 20-50 WBC and 4+ bacteria. Urine culture grew E. Coli.  Pt received 3 days of Zosyn. Asymptomatic bacteriuria    Hypomagnesemia:  - Monitor and replete as needed.      Acute and chronic renal failure-most probably secondary to diuretics, cardiorenal syndrome-intake and output monitor BMP nephrology evaluation appreciated and following, improved -okay to resume by mouth Lasix c    Thrombocytopenia-monitor closely       DVT Prophylaxis: Heparin  Diet: DIET CARB CONTROL;  Code Status: Full Code      PT/OT Eval Status: Ordered with recs for SNF    Dispo -1 ~ 2-days       Betty Dimas MD

## 2019-04-26 NOTE — CARE COORDINATION
4/26/19 Pt has decided to go to [facility] on d/c for rehab.  [facility] has accepted and can take this pt when medically ready, no precert needed

## 2019-04-26 NOTE — PROGRESS NOTES
INPATIENT PULMONARY CRITICAL CARE PROGRESS NOTE      Reason for visit: Acute on chronic respiratory failure, hypoxemic and hypercarbic, COPD, #CHF, elevated hemidiaphragm, ILD, cellulitis    SUBJECTIVE:  Patient when seen this morning was not on any BiPAP, patient apparently had refused using a BiPAP and will not do so in spite of being told several times with the respiratory therapist and nursing overnight, patient was somewhat gasping for air along with that patient was lethargic when seen, patient was on 8 L of high flow oxygen with saturation of 94% when seen, patient's blood sugars have been fluctuating, patient is afebrile and hemodynamically maintained when seen this morning, patient was in normal sinus rhythm on the monitor, Patient had adequate urine output overnight with cumulative fluid balance of -12.6 L; no other pertinent review of system couldn't be obtained because of patient's clinical status  ,  Physical Exam:  Blood pressure 98/71, pulse 85, temperature 97.7 °F (36.5 °C), temperature source Oral, resp. rate 18, height 5' 2\" (1.575 m), weight 276 lb 1.6 oz (125.2 kg), SpO2 (!) 88 %.'   Constitutional:   patient is in respiratory distress when seen with increased work of breathing along with that patient was lethargic  HENT:   no facial asymmetry class III airway,  Eyes:  Conjunctivae are normal. Pupils equal, round, and reactive to light. No scleral icterus. Wearing glasses. Neck: Short and large neck, no JVD. Cardiovascular: Normal rate, regular rhythm, normal heart sounds. (+) lower extremity edema. Pulmonary/Chest:  Scattered wheezes, diminished air entry. No rales. No accessory muscle usage or stridor. Decreased breath sound intensity  Abdominal:  obese Musculoskeletal: No cyanosis. No clubbing. No obvious joint deformity. Left foot in dressing, area of erythema on left shin with margins marked. Lymphadenopathy: No palpable cervical adenopathy  Skin: Skin is warm and dry.  Dressing Lt leg with increased area of erythema with smoall localized eschar  Psychiatric: Not arousable. Neurologic:  lethargic when seen  . Results:  CBC:   Recent Labs     04/24/19 0438 04/25/19 0454 04/26/19 0442   WBC 6.1 8.2 7.6   HGB 11.9* 12.1 11.9*   HCT 36.8 36.8 36.8   MCV 84.6 83.6 84.5   * 96* 92*     BMP:   Recent Labs     04/24/19 0438 04/25/19 0454 04/26/19 0442    138 143   K 3.6 3.3* 3.1*   CL 87* 87* 88*   CO2 40* 42* 45*   BUN 45* 46* 36*   CREATININE 1.8* 1.4* 1.1       Imaging:  I have reviewed radiology images personally. XR CHEST STANDARD (2 VW)   Final Result   Bilateral airspace disease and trace effusions again demonstrated, consistent   with edema. No new airspace disease identified in the interval.         XR CHEST PORTABLE   Final Result   Increasing pulmonary edema and left basilar atelectasis or pneumonia. XR CHEST PORTABLE   Final Result   Cardiomegaly with moderate interstitial pulmonary edema suggesting moderate   CHF. Interstitial pulmonary edema slightly increased since prior. MRI FOOT LEFT WO CONTRAST   Final Result   1. No osteomyelitis or other acute osseous abnormality   2. Nonspecific dorsal mid and forefoot subcutaneous edema compatible with   cellulitis versus lymphedema. No drainable fluid collection or deep soft   tissue ulceration. XR FOOT LEFT (MIN 3 VIEWS)   Final Result   No x-ray evidence of osteomyelitis. No acute osseous injury. Soft tissue swelling of the foot- nonspecific for edema or cellulitis. VL Extremity Venous Bilateral   Final Result      XR SHOULDER RIGHT (MIN 2 VIEWS)   Final Result   No acute abnormality of the right shoulder         XR CHEST PORTABLE   Final Result   Mild left basilar atelectasis or scarring. Otherwise, no acute   cardiopulmonary disease. Results for Thomas Garza (MRN [id number]) as of 4/26/2019 15:39   Ref.  Range 4/25/2019 04:54 4/25/2019 07:42 4/25/2019 11:39 4/25/2019 16:53 4/25/2019 20:09 4/26/2019 04:42 4/26/2019 08:08 4/26/2019 12:10   Sodium Latest Ref Range: 136 - 145 mmol/L 138     143     Potassium Latest Ref Range: 3.5 - 5.1 mmol/L 3.3 (L)     3.1 (L)     Chloride Latest Ref Range: 99 - 110 mmol/L 87 (L)     88 (L)     CO2 Latest Ref Range: 21 - 32 mmol/L 42 (H)     45 (H)     BUN Latest Ref Range: 7 - 20 mg/dL 46 (H)     36 (H)     Creatinine Latest Ref Range: 0.6 - 1.2 mg/dL 1.4 (H)     1.1     Anion Gap Latest Ref Range: 3 - 16  9     10     GFR Non- Latest Ref Range: >60  37 (A)     49 (A)     GFR  Latest Ref Range: >60  45 (A)     59 (A)     Magnesium Latest Ref Range: 1.80 - 2.40 mg/dL 1.50 (L)     1.50 (L)     Glucose Latest Ref Range: 70 - 99 mg/dL 127 (H)     114 (H)     POC Glucose Latest Ref Range: 70 - 99 mg/dl  163 (H) 206 (H) 142 (H) 130 (H)  114 (H) 197 (H)   Calcium Latest Ref Range: 8.3 - 10.6 mg/dL 9.1     9.0       Results for Dory Bellamy (MRN [id number]) as of 4/26/2019 15:39   Ref.  Range 4/24/2019 04:38 4/25/2019 04:54 4/26/2019 04:42   WBC Latest Ref Range: 4.0 - 11.0 K/uL 6.1 8.2 7.6   RBC Latest Ref Range: 4.00 - 5.20 M/uL 4.34 4.41 4.35   Hemoglobin Quant Latest Ref Range: 12.0 - 16.0 g/dL 11.9 (L) 12.1 11.9 (L)   Hematocrit Latest Ref Range: 36.0 - 48.0 % 36.8 36.8 36.8   MCV Latest Ref Range: 80.0 - 100.0 fL 84.6 83.6 84.5   MCH Latest Ref Range: 26.0 - 34.0 pg 27.3 27.5 27.4   MCHC Latest Ref Range: 31.0 - 36.0 g/dL 32.3 32.9 32.4   MPV Latest Ref Range: 5.0 - 10.5 fL 7.9 7.7 7.7   RDW Latest Ref Range: 12.4 - 15.4 % 18.7 (H) 18.7 (H) 19.0 (H)   Platelet Count Latest Ref Range: 135 - 450 K/uL 106 (L) 96 (L) 92 (L)   SLIDE REVIEW Unknown  see below    PLATELET SLIDE REVIEW Unknown  Decreased      Value: Staph aureus MRSAAbnormal    *Additional information available - narrative   4/20/2019  6:43 AM - Lacey Cesar Incoming Lab Results From Soft (Epic Adt)     Specimen Information: Foot        Component Collected Lab   WOUND/ABSCESS 04/17/2019  6:30 PM [address] [facility]   Gram Stain Result 04/17/2019  6:30 PM [address] Lab   No WBCs or organisms seen    Organism Abnormal  04/17/2019  6:30 PM [address] [facility]   Staph aureus MRSA    WOUND/ABSCESS Abnormal  04/17/2019  6:30 PM [address] [facility]   Moderate growth   CONTACT PRECAUTIONS INDICATED   PBP2= POSITIVE    Testing Performed By     Lab - Abbreviation Name Director Address Valid Date Range   19-[facility] - Barry Key M.D., Ph.D. [address] [facility] [place] [address] 08/30/17 0817-Present   25-[facility]- [address]  [facility] [place]  [id number] 08/30/17 0807-Present   Narrative   Performed by: [facility]   ORDER#: [id number]                          ORDERED BY: Sally Finn  SOURCE: Foot                               COLLECTED:  04/17/19 18:30  ANTIBIOTICS AT [facility]. :                      RECEIVED :  04/17/19 20:25  CALL Eller Burkitt [phone],  Microbiology results called to and read back by Monet Ford RN,  04/19/2019 09:47, by Evan Kelly  Performed at:  [place] [address]   Phone [phone]   Susceptibility     Staph aureus mrsa (1)     Antibiotic Interpretation ELIAN Status    ceFAZolin Resistant >16 mcg/mL     clindamycin Resistant >2 mcg/mL     erythromycin Resistant >4 mcg/mL     oxacillin Resistant >2 mcg/mL     tetracycline Sensitive <=0.5 mcg/mL     trimethoprim-sulfamethoxazole Resistant >2/38 mcg/mL     vancomycin Sensitive 1 mcg/mL         Results for Laureano Medrano (MRN [id number]) as of 4/26/2019 15:39   Ref.  Range 12/23/2018 05:25 4/22/2019 12:48 4/22/2019 21:17 4/23/2019 00:47 4/25/2019 04:46   Hemoglobin, Art, Extended Latest Ref Range: 12.0 - 16.0 g/dL 12.6 13.0 12.7 12.8 12.5   pH, Arterial Latest Ref Range: 7.350 - 7.450  7.445 7.313 (L) 7.292 (L) 7.276 (L) 7.453 (H)   pCO2, Arterial Latest Ref Range: 35.0 - 45.0 mmHg 40.2 88.9 (HH) 90.5 (HH) 76.3 (HH) 63.0 (H)   pO2, Arterial Latest Ref Range: 75.0 - 108.0 mmHg 111.6 (H) 51.9 (L) 84.0 112.6 (H) 100.1   HCO3, Arterial Latest Ref Range: 21.0 - 29.0 mmol/L 27.0 44.1 (H) 42.7 (H) 34.7 (H) 43.1 (H)   TCO2 (calc), Art Latest Ref Range: Not Established mmol/L 28.2 46.8 45.5 37.1 45.0   Base Excess, Arterial Latest Ref Range: -3.0 - 3.0 mmol/L 2.8 13.9 (H) 12.5 (H) 5.5 (H) 16.2 (H)   O2 Sat, Arterial Latest Ref Range: >92 % 98.2 84.0 (L) 94.6 97.5 96.9   O2 Content, Arterial Latest Ref Range: Not Established mL/dL 18 15 17 18 17   Methemoglobin, Arterial Latest Ref Range: <1.5 % 0.3 0.2 0.3 0.3 0.3   Carboxyhgb, Arterial Latest Ref Range: 0.0 - 1.5 % 1.9 (H) 2.0 (H) 1.2 1.0 1.0     TWO VIEWS OF THE CHEST       4/25/2019 7:00 am       COMPARISON:   April 23, 2018, April 22, 2018       HISTORY:   ORDERING SYSTEM PROVIDED HISTORY: CHF   TECHNOLOGIST PROVIDED HISTORY:   Reason for exam:->CHF   Ordering Physician Provided Reason for Exam: CHF   Type of Exam: Subsequent/Follow-up       FINDINGS:   Cardiac silhouette enlargement and pulmonary arterial shadow enlargement are   again noted.  Perihilar and diffuse interstitial opacities are again   demonstrated with findings consistent with trace pleural effusions.  No focal   area of consolidation or pneumothorax.  Sequela of old granulomatous disease   again noted.  Calcified atheromatous plaque.  No acute osseous abnormality   identified.           Impression   Bilateral airspace disease and trace effusions again demonstrated, consistent   with edema.  No new airspace disease identified in the interval.     Summary   Normal left ventricular systolic function with ejection fraction of 55-60%.    Septal bounce noted due to right ventricular volume overload.   Mild concentric left ventricular hypertrophy.   Grade I diastolic dysfunction nephrology  Monitor I/o and BMP  Correct electrolytes on PRN basis   Cellulitis. Being followed by ID and podiatry, on PO doxy as per ID ;wound care as per protocaol  DM, HTN, HLD, hypothyroidism, obesity. Per IM. Morbid obesity with suspected sleep apnea-? PSG as an  Out patient ,only if compliance is better   DVT prophylaxis.   On heparin      Discussed with nursing and case management  Case d/w palliative care team         Electronically signed by:  Nima Barnett MD    4/26/2019    3:38 PM.

## 2019-04-26 NOTE — CARE COORDINATION
Palliative Care RN updated writer that the pt will be DC'ing to [facility] upon DC. Will update [facility] to following during rehab stay to arrange [address], if needed, upon DC. [facility] PTAHi Ruiz RN CTN with [facility] [address]  IQB:[phone]

## 2019-04-26 NOTE — PROGRESS NOTES
Raghavendra served [employer] @ 0237 4/26/19 re: palliative care consult. -[address] [facility]

## 2019-04-26 NOTE — PROGRESS NOTES
Patient called RN with complaint of pain on back. Patient described pain as \"pinching and burning\". Patient assessed by Yehuda Mims and new wounds were found on lower back, right side. Wounds appear to be blisters or boils and they were not present yesterday during assessment. Charge nurse notified. Charge nurse assessed and photographed for chart. Hospitalist also notified of new wound. Wound eval ordered.

## 2019-04-26 NOTE — PROGRESS NOTES
[facility]    [facility]              [phone]                   Plan :     Getting better  AMS getting better  K+ supplement  BP low- hold lasix  Creatinine is better       Assessment :     Acute Kidney Injury  Cr peaked to  2- down to 1.1  Likely due to hemodynamic changes, and possible ATN from vancomycin/zosyn, though  Exposure was limited  Was 1.1 on admission  0.9 on 12/18    UA- 20-50 wbc, and 4+ bacteriuria, Ecoli- got zosyn  And ceftriaxone  Has received vancomycin and zosyn this admission,  Now held- known to cause ATN-  Also on iv lasix( but getting better despite lasix and sob getting better)  BP lows    Acidosis  Has respiratory acidosis  Metabolic alkalosis due to compensation  And diuretics  Now pH is normal  On Bipap on nightly baseis       Hypotension  BP: (113-126)/(73-80) Pulse:  [79-82]   Hold lasix      HFpEF  Has edema  Mostly RV failure- likely due to COPD  Echo: 4/19- IVC is dilated ( >2.1 cm) elevated RA pressure, RVSP is mod reduced, normal EF  Increased with ventriclar volume overload        DM II  Cellulitis  encephalopathy      [facility] would like to thank Sid Bolaños MD   for opportunity to serve this patient      Please call with questions at-   24 Hrs Answering service [phone] or  7 am- 5 pm via Perfect serve or cell phone  Rasta Carlson          CC/reason for consult :     JAQUELIN     HPI :     From consult note-   Annette Chau is a 68 y.o. female presented to   the hospital on 4/15/2019 with edema, and erythema  Of the legs. also associated with bulla in the heels. She  Is being treated with antibiotics for cellulitis. She is also noted to have COPD exacerbation for which  She is on oxygen, nebulizer, steroids. She is also on lasix iv for edema and sob  Her oxygen requirement is higher than her baseline.     Course is complicated by thrombocytopenia, thought  To be linezolid- now held  Needing on and off bipap    We are consulted because of rising creatinine         Interval History:     Cr coming down  Urine is 2875 ml  BP is relatively low  On 6 L oxygen via canula  Lethargic still    ROS:     Seen with- no family  SOB- present  Edema- present  Nausea/vomiting- none  Poor appetite- yes  Confusion- yes  Urinary complaints- no  Any other complaints- no  All other ROS are reviewed and are Negative     Medication:     Scheduled Meds:   heparin (porcine)  7,500 Units Subcutaneous 3 times per day    doxycycline hyclate  100 mg Oral 2 times per day    ipratropium-albuterol  1 ampule Inhalation Q4H WA    lactobacillus  1 capsule Oral Daily with breakfast    fluticasone  2 spray Nasal Daily    levothyroxine  88 mcg Oral Daily    montelukast  10 mg Oral Nightly    oxybutynin  10 mg Oral Nightly    rosuvastatin  40 mg Oral QPM    sodium chloride flush  10 mL Intravenous 2 times per day    insulin lispro  0-6 Units Subcutaneous TID WC    insulin lispro  0-3 Units Subcutaneous Nightly     Continuous Infusions:   dextrose       PRN Meds:.traMADol **OR** traMADol, hydrOXYzine, acetaminophen, sodium chloride flush, magnesium hydroxide, prochlorperazine, glucose, dextrose, glucagon (rDNA), dextrose       Vitals :     Vitals:    04/26/19 0754   BP:    Pulse:    Resp: 18   Temp:    SpO2: 95%       I & O :       Intake/Output Summary (Last 24 hours) at 4/26/2019 0948  Last data filed at 4/26/2019 0845  Gross per 24 hour   Intake 720 ml   Output 2875 ml   Net -2155 ml        Physical Examination :     General appearance: Anxious- no, distressed- no, in good spirits- no    Obese,lying flat, lethargic  HEENT: Lips- normal, teeth- ok , oral mucosa- moist  Neck : Mass- no, appears symmetrical, JVD- not visible  Respiratory: Respiratory effort-  Labored, on oxygen  wheeze- no, crackles - no  Cardiovascular:  Ausculation- No M/R/G, Edema 2 + both legs  Abdomen: visible mass- no, distention- no, scar- no, tenderness- no

## 2019-04-27 VITALS
HEART RATE: 88 BPM | DIASTOLIC BLOOD PRESSURE: 64 MMHG | BODY MASS INDEX: 50.81 KG/M2 | RESPIRATION RATE: 18 BRPM | OXYGEN SATURATION: 97 % | WEIGHT: 276.1 LBS | TEMPERATURE: 97.8 F | SYSTOLIC BLOOD PRESSURE: 122 MMHG | HEIGHT: 62 IN

## 2019-04-27 LAB
ANION GAP SERPL CALCULATED.3IONS-SCNC: 8 MMOL/L (ref 3–16)
BUN BLDV-MCNC: 34 MG/DL (ref 7–20)
CALCIUM SERPL-MCNC: 9.2 MG/DL (ref 8.3–10.6)
CHLORIDE BLD-SCNC: 87 MMOL/L (ref 99–110)
CO2: 45 MMOL/L (ref 21–32)
CREAT SERPL-MCNC: 1.1 MG/DL (ref 0.6–1.2)
GFR AFRICAN AMERICAN: 59
GFR NON-AFRICAN AMERICAN: 49
GLUCOSE BLD-MCNC: 119 MG/DL (ref 70–99)
GLUCOSE BLD-MCNC: 126 MG/DL (ref 70–99)
GLUCOSE BLD-MCNC: 220 MG/DL (ref 70–99)
HCT VFR BLD CALC: 37.7 % (ref 36–48)
HEMOGLOBIN: 12.1 G/DL (ref 12–16)
MAGNESIUM: 1.7 MG/DL (ref 1.8–2.4)
MCH RBC QN AUTO: 27.4 PG (ref 26–34)
MCHC RBC AUTO-ENTMCNC: 32.1 G/DL (ref 31–36)
MCV RBC AUTO: 85.3 FL (ref 80–100)
PDW BLD-RTO: 19 % (ref 12.4–15.4)
PERFORMED ON: ABNORMAL
PERFORMED ON: ABNORMAL
PLATELET # BLD: 102 K/UL (ref 135–450)
PMV BLD AUTO: 7.6 FL (ref 5–10.5)
POTASSIUM REFLEX MAGNESIUM: 3.6 MMOL/L (ref 3.5–5.1)
RBC # BLD: 4.42 M/UL (ref 4–5.2)
SODIUM BLD-SCNC: 140 MMOL/L (ref 136–145)
WBC # BLD: 7.7 K/UL (ref 4–11)

## 2019-04-27 PROCEDURE — 94668 MNPJ CHEST WALL SBSQ: CPT

## 2019-04-27 PROCEDURE — 80048 BASIC METABOLIC PNL TOTAL CA: CPT

## 2019-04-27 PROCEDURE — 99232 SBSQ HOSP IP/OBS MODERATE 35: CPT | Performed by: INTERNAL MEDICINE

## 2019-04-27 PROCEDURE — 85027 COMPLETE CBC AUTOMATED: CPT

## 2019-04-27 PROCEDURE — 6370000000 HC RX 637 (ALT 250 FOR IP): Performed by: INTERNAL MEDICINE

## 2019-04-27 PROCEDURE — 6360000002 HC RX W HCPCS: Performed by: INTERNAL MEDICINE

## 2019-04-27 PROCEDURE — 94761 N-INVAS EAR/PLS OXIMETRY MLT: CPT

## 2019-04-27 PROCEDURE — 2580000003 HC RX 258: Performed by: INTERNAL MEDICINE

## 2019-04-27 PROCEDURE — 6370000000 HC RX 637 (ALT 250 FOR IP): Performed by: REGISTERED NURSE

## 2019-04-27 PROCEDURE — 83735 ASSAY OF MAGNESIUM: CPT

## 2019-04-27 PROCEDURE — 2700000000 HC OXYGEN THERAPY PER DAY

## 2019-04-27 PROCEDURE — 36415 COLL VENOUS BLD VENIPUNCTURE: CPT

## 2019-04-27 PROCEDURE — 94640 AIRWAY INHALATION TREATMENT: CPT

## 2019-04-27 RX ORDER — IPRATROPIUM BROMIDE AND ALBUTEROL SULFATE 2.5; .5 MG/3ML; MG/3ML
3 SOLUTION RESPIRATORY (INHALATION)
Qty: 360 ML | Refills: 0 | Status: SHIPPED | OUTPATIENT
Start: 2019-04-27

## 2019-04-27 RX ADMIN — MAGNESIUM GLUCONATE 500 MG ORAL TABLET 400 MG: 500 TABLET ORAL at 12:14

## 2019-04-27 RX ADMIN — IPRATROPIUM BROMIDE AND ALBUTEROL SULFATE 1 AMPULE: .5; 3 SOLUTION RESPIRATORY (INHALATION) at 11:31

## 2019-04-27 RX ADMIN — FUROSEMIDE 40 MG: 40 TABLET ORAL at 08:41

## 2019-04-27 RX ADMIN — LEVOTHYROXINE SODIUM 88 MCG: 0.09 TABLET ORAL at 06:46

## 2019-04-27 RX ADMIN — HEPARIN SODIUM 7500 UNITS: 5000 INJECTION INTRAVENOUS; SUBCUTANEOUS at 06:45

## 2019-04-27 RX ADMIN — FLUTICASONE PROPIONATE 2 SPRAY: 50 SPRAY, METERED NASAL at 08:41

## 2019-04-27 RX ADMIN — DOXYCYCLINE HYCLATE 100 MG: 100 TABLET, COATED ORAL at 08:41

## 2019-04-27 RX ADMIN — IPRATROPIUM BROMIDE AND ALBUTEROL SULFATE 1 AMPULE: .5; 3 SOLUTION RESPIRATORY (INHALATION) at 08:09

## 2019-04-27 RX ADMIN — Medication 1 CAPSULE: at 08:41

## 2019-04-27 RX ADMIN — MAGNESIUM GLUCONATE 500 MG ORAL TABLET 400 MG: 500 TABLET ORAL at 08:41

## 2019-04-27 RX ADMIN — INSULIN LISPRO 2 UNITS: 100 INJECTION, SOLUTION INTRAVENOUS; SUBCUTANEOUS at 11:38

## 2019-04-27 RX ADMIN — Medication 10 ML: at 08:41

## 2019-04-27 ASSESSMENT — PAIN SCALES - GENERAL
PAINLEVEL_OUTOF10: 0
PAINLEVEL_OUTOF10: 0

## 2019-04-27 NOTE — FLOWSHEET NOTE
catheter, or short term intermittent urethral catherization   Site Assessment No urethral drainage   Urine Color Karen   Urine Appearance Clear   Psychosocial   Psychosocial (WDL) WDL

## 2019-04-27 NOTE — PLAN OF CARE
Assess skin Q shift, oxygenation and breathing pattern management, safety and falls prevention in place
Assessment complete. VSS. Denies any pain or discomfort at this time. Call light within reach. Will continue to monitor.
Increase patients ADLs/functional status to baseline.
Pain management with Oxy, blood glucose management, fall risk prevention
Pain management, assess skin Q shift and as needed
Patient education ongoing
Patient is asleep at this time, c/o pain in LLE and was satisfied with repositioning. Puwick cath was removed due to c/o pain and friction in kerwin area. Patient was assisted to the [facility] and bedpan. Tolerating IV antibiotics appropriately, will continue to monitor.
Patient remains free from falls during this shift. Oriented to call light. Verbalizes understanding. Alert and oriented x4. Bedside table and call light within reach. Bed in lowest position, brakes locked. Nonskid footwear in place. Will continue to monitor. Bed alarm on and in place.
Patient's EF (Ejection Fraction) is greater than 40%    Patient has a past medical history of JAQUELIN (acute kidney injury) ([facility] [address] Arthritis, Asthma, COPD (chronic obstructive pulmonary disease) ([facility] [address] Diabetes mellitus ([facility] [address] Hyperlipidemia, Hypertension, Other disorders of kidney and ureter in diseases classified elsewhere, Pneumonia due to infectious organism, and Thyroid disease. Comorbidities reviewed and education provided. Patient and family's stated goal of care: increase activity tolerance and reduce lower extremity edema prior to discharge    Patient's current functional capacity:  Slight limitation of physical activity. Comfortable at rest. Ordinary physical activity results in fatigue, palpitation, dyspnea. Pt resting in bed at this time 4 L O2. Pt denies shortness of breath. Pt with pitting lower extremity edema.  Patient's weights and intake/output reviewed:    Patient Vitals for the past 96 hrs (Last 3 readings):   Weight   04/16/19 0451 290 lb 12.8 oz (131.9 kg)   04/15/19 2134 295 lb (133.8 kg)   04/15/19 1339 272 lb (123.4 kg)       Intake/Output Summary (Last 24 hours) at 4/18/2019 1730  Last data filed at 4/18/2019 1546  Gross per 24 hour   Intake 900 ml   Output 400 ml   Net 500 ml         >>For CHF and Comorbidity documentation on Education Time and Topics, please see Education Tab
Plan of care reviewed with pt.
Problem: Falls - Risk of:  Goal: Will remain free from falls  Description  Will remain free from falls  4/25/2019 2325 by Fish Perla RN  Note:   Pt will remain free of falls this shift. Bedside table and call light within reach, bed alarm in place. Pt instructed to call out when in need of assistance, verbalized understanding. Will continue to monitor.
Problem: Falls - Risk of:  Goal: Will remain free from falls  Description  Will remain free from falls  Note:   Pt will remain free of falls this shift. Bedside table and call light within reach, bed alarm in place. Pt instructed to call out when in need of assistance, verbalized understanding. Will continue to monitor.
Problem: Falls - Risk of:  Goal: Will remain free from falls  Description  Will remain free from falls  Outcome: Ongoing     Problem: Risk for Impaired Skin Integrity  Goal: Tissue integrity - skin and mucous membranes  Description  Structural intactness and normal physiological function of skin and  mucous membranes.   Outcome: Ongoing     Problem: Serum Glucose Level - Abnormal:  Goal: Ability to maintain appropriate glucose levels will improve  Description  Ability to maintain appropriate glucose levels will improve  Outcome: Ongoing     Problem: Infection:  Goal: Will remain free from infection  Description  Will remain free from infection  Outcome: Ongoing     Problem: Injury - Risk of, Physical Injury:  Goal: Will remain free from falls  Description  Will remain free from falls  Outcome: Ongoing
Problem: Pain:  Goal: Pain level will decrease  Description  Pain level will decrease  Outcome: Ongoing  Note:   Pt A/O. Pt calls out as needed for pain intervention. Rates pain appropriately on 1-10 pain scales. Administered Ultram per MD order. Pt reported relief. Will continue to monitor and administer interventions as ordered and requested.
Progress functional mobility
Pt c/o L foot/leg pain; PRN Percocet given Per [facility]
Pt educated on the importance of a carb control diet. Monitoring pt's blood glucose AC/HS. Sliding scale insulin given as ordered according to pt's blood glucose. Will continue to monitor.
Pt received pyridium and tramadol for pain in LLE and pain while urinating. States when she urinates \"the whole hospital will hear\" because the pain has been so severe. Given tylenol to help until pt able to take other PRNs per mar.
Pt refusing turns, educated on risk of skin breakdown with continued refusal. Pt also declines allowing RN to to skin assessment as she would like to sleep. Will reattempt in AM or with future agreeable turns. Call light within reach, will continue to monitor.
The patient will demonstrate an understanding of s/s & treatment of hyperglycemia, by verbalization,  with the goal of completion at discharge.
but improving slowly. Goal: Demonstrations of improved sensory functioning will increase  Description  Demonstrations of improved sensory functioning will increase  Outcome: Ongoing  Goal: Decrease in sensory misperception frequency  Description  Decrease in sensory misperception frequency  Outcome: Ongoing  Goal: Able to refrain from responding to false sensory perceptions  Description  Able to refrain from responding to false sensory perceptions  Outcome: Ongoing  Goal: Demonstrates accurate environmental perceptions  Description  Demonstrates accurate environmental perceptions  Outcome: Ongoing  Goal: Able to distinguish between reality-based and nonreality-based thinking  Description  Able to distinguish between reality-based and nonreality-based thinking  Outcome: Ongoing  Goal: Able to interrupt nonreality-based thinking  Description  Able to interrupt nonreality-based thinking  Outcome: Ongoing     Problem: Infection:  Goal: Will remain free from infection  Description  Will remain free from infection  Outcome: Ongoing     Problem: Safety:  Goal: Free from accidental physical injury  Description  Free from accidental physical injury  Outcome: Ongoing  Goal: Free from intentional harm  Description  Free from intentional harm  Outcome: Ongoing     Problem: Daily Care:  Goal: Daily care needs are met  Description  Daily care needs are met  Outcome: Ongoing     Problem: Skin Integrity:  Goal: Skin integrity will stabilize  Description  Skin integrity will stabilize  Outcome: Ongoing     Problem: Discharge Planning:  Goal: Patients continuum of care needs are met  Description  Patients continuum of care needs are met  Outcome: Ongoing     Problem: Injury - Risk of, Physical Injury:  Goal: Will remain free from falls  Description  Will remain free from falls  Outcome: Ongoing  Note:   Patient will remain free of falls this shift.  Patient is currently resting in bed with bed locked and in the lowest position, and
deterioration signs and symptoms  Description  Absence of continued neurological deterioration signs and symptoms  Outcome: Ongoing  Goal: Mental status will be restored to baseline  Description  Mental status will be restored to baseline  Outcome: Ongoing     Problem: Injury - Risk of, Physical Injury:  Goal: Will remain free from falls  Description  Will remain free from falls  4/27/2019 1146 by Catracho Ortega RN  Outcome: Ongoing  Note:   Patient has been free of falls this shift. Patient currently resting in bed with bed locked and in the lowest position. Patient's call light, belongings and bedside table are within reach. Patient is alert and oriented and uses call light appropriately. Will continue to monitor.    4/26/2019 2215 by Nathalie Acosta RN  Outcome: Ongoing  Goal: Absence of physical injury  Description  Absence of physical injury  Outcome: Ongoing     Problem: Mood - Altered:  Goal: Mood stable  Description  Mood stable  Outcome: Ongoing  Goal: Absence of abusive behavior  Description  Absence of abusive behavior  Outcome: Ongoing  Goal: Verbalizations of feeling emotionally comfortable while being cared for will increase  Description  Verbalizations of feeling emotionally comfortable while being cared for will increase  Outcome: Ongoing     Problem: Psychomotor Activity - Altered:  Goal: Absence of psychomotor disturbance signs and symptoms  Description  Absence of psychomotor disturbance signs and symptoms  Outcome: Ongoing     Problem: Sleep Pattern Disturbance:  Goal: Appears well-rested  Description  Appears well-rested  Outcome: Ongoing

## 2019-04-27 NOTE — PROGRESS NOTES
Hospitalist Progress Note      PCP: No primary care provider on file. Date of Admission: 4/15/2019    Chief Complaint: Left foot pain     Hospital Course:   68 Y F with a h/o COPD, HTN, HLD, and DM2 who presented to the ED with increasing pain, erythema, and edema of her distal LLE surrounding two shallow ulcers which had been \"festering\" for the last week or so. Her legs have been quite edematous for the last few weeks, to the point that she has developed a few bullae which have ruptured and drained serous fluid multiple times. This has left her with sores on the dorsum on her L foot and on her mid L shin. The skin around the ruptured bullae rapidly turned a bright red and became tender over the last few days. She denies fevers or chills.      Subjective:    off BIPAP daytime and refusing nighttime , awake and communicating , on nasal cannula    Medications:  Reviewed    Infusion Medications    dextrose       Scheduled Medications    furosemide  40 mg Oral Daily    magnesium oxide  400 mg Oral 4x Daily    heparin (porcine)  7,500 Units Subcutaneous 3 times per day    doxycycline hyclate  100 mg Oral 2 times per day    ipratropium-albuterol  1 ampule Inhalation Q4H WA    lactobacillus  1 capsule Oral Daily with breakfast    fluticasone  2 spray Nasal Daily    levothyroxine  88 mcg Oral Daily    montelukast  10 mg Oral Nightly    oxybutynin  10 mg Oral Nightly    rosuvastatin  40 mg Oral QPM    sodium chloride flush  10 mL Intravenous 2 times per day    insulin lispro  0-6 Units Subcutaneous TID WC    insulin lispro  0-3 Units Subcutaneous Nightly     PRN Meds: traMADol **OR** traMADol, hydrOXYzine, acetaminophen, sodium chloride flush, magnesium hydroxide, prochlorperazine, glucose, dextrose, glucagon (rDNA), dextrose      Intake/Output Summary (Last 24 hours) at 4/27/2019 0705  Last data filed at 4/26/2019 2310  Gross per 24 hour   Intake 740 ml   Output 1550 ml   Net -810 ml       Physical edema. No new airspace disease identified in the interval.         XR CHEST PORTABLE   Final Result   Increasing pulmonary edema and left basilar atelectasis or pneumonia. XR CHEST PORTABLE   Final Result   Cardiomegaly with moderate interstitial pulmonary edema suggesting moderate   CHF. Interstitial pulmonary edema slightly increased since prior. MRI FOOT LEFT WO CONTRAST   Final Result   1. No osteomyelitis or other acute osseous abnormality   2. Nonspecific dorsal mid and forefoot subcutaneous edema compatible with   cellulitis versus lymphedema. No drainable fluid collection or deep soft   tissue ulceration. XR FOOT LEFT (MIN 3 VIEWS)   Final Result   No x-ray evidence of osteomyelitis. No acute osseous injury. Soft tissue swelling of the foot- nonspecific for edema or cellulitis. VL Extremity Venous Bilateral   Final Result      XR SHOULDER RIGHT (MIN 2 VIEWS)   Final Result   No acute abnormality of the right shoulder         XR CHEST PORTABLE   Final Result   Mild left basilar atelectasis or scarring. Otherwise, no acute   cardiopulmonary disease.              Assessment/Plan:    Active Hospital Problems    Diagnosis Date Noted    Pulmonary nodule, left [R91.1] 03/14/2016     Priority: High     Class: Acute    Morbid obesity with BMI of 50.0-59.9, adult ([facility] [E66.01, Z68.43] 99/74/9104    Diastolic dysfunction [Q53.8] 04/26/2019    Pulmonary HTN ([facility] [I27.20] 04/26/2019    Suspected sleep apnea [R29.818] 04/26/2019    Acute on chronic respiratory failure with hypoxia and hypercapnia (HCC) [J96.21, J96.22]     Interstitial lung disease ([facility] [J84.9]     Cellulitis [L03.90] 04/15/2019    Diabetes mellitus ([facility] [E11.9]     Hyperlipidemia [E78.5]     Hypertension [I10]     Hypothyroidism [E03.9]     COPD exacerbation ([facility] [J44.1] 12/18/2014       Encephalopathy -secondary to acute and chronic hypoxic and hypercapnic respiratory failure -  S/p BiPAP, - improved   Continue breathing treatment,  , pulmonology evaluation appreciated , following , chest x-ray congestion   refusing BiPAP  PT eval appreciated, agreeable to go to skilled nursing facility  Palliative care input appreciated    LLE bacterial cellulitis with bullous lesion of left foot: Improved  - Pt started on IV clindamycin on admission. Cellulitis initially improved but appeared worse 4/17 (erythema noticed outside margins that have been marked). Dc'd clinda and changed to Vanc/Zosyn. ID and Podiatry consulted. Vanc changed to Zyvox per ID due to worsening renal fx. Wound culture grew MRSA. Zosyn dc'd per ID on 4/19. Antibiotics was switched to doxycycline -4/23 -advised to continue couple of more days, ID signed off   Wound care consulted. Continue local wound care to left foot bullous lesion. Improved/ resolved  - Xray of left foot negative for osteo. MRI negative for osteo and no drainable fluid collection noted.    -Acute and chronic diastolic congestive heart failure   -Lasix 40 IV twice a day- on hold secondary to acute kidney injury -resumed po  today  -BLE venous dopplers are negative for DVT. Hyperkalemia POA (resolved):  - Continue to hold potassium supplement and losartan. - Pt given furosemide, nebulizers, and kayexalate on admission.    -Currently hypokalemia-replace     Hypertension:  - Holding losartan as above. Hydralazine meanwhile. Pt is currently normotensive.      Hyperlipidemia:  - Continue statin.     Diabetes mellitus type 2:  - Holding metformin while here. Low dose SSI meanwhile. A1c's have been controlled recently.       COPD, asthma, chronic hypoxic respiratory failure:  Currently acute and chronic hypoxic hypercapnic respiratory failure on BiPAP .      Hypothyroidism:  - Clinically euthyroid.  Continue home dose of levothyroxine.     Years of vaginal discomfort, which the patient says started after a gynecologic surgery:  - Details unclear, continue to follow with gynecology clinic. Pelvic exam by ED physician was unrevealing.    - Pt noted to have excoriation to perineum/labia possibly due to incontinence. Apply cream for now. UTI might have contributed to discomfort. UTI:  - UA shows + nitrites and LE with 20-50 WBC and 4+ bacteria. Urine culture grew E. Coli. Pt received 3 days of Zosyn. Asymptomatic bacteriuria    Hypomagnesemia:  - Monitor and replete as needed.      Acute and chronic renal failure-most probably secondary to diuretics, cardiorenal syndrome-intake and output monitor BMP nephrology evaluation appreciated and following, improved -okay to resume by mouth Lasix c    Thrombocytopenia-monitor closely       DVT Prophylaxis: Heparin  Diet: DIET CARB CONTROL;  Code Status: Full Code      PT/OT Eval Status: Ordered with recs for SNF    Dispo -1 ~ 2-days       Yvette Sanchez MD

## 2019-04-27 NOTE — PROGRESS NOTES
04/27/19 0452   WBC 8.2 7.6 7.7   HGB 12.1 11.9* 12.1   HCT 36.8 36.8 37.7   MCV 83.6 84.5 85.3   PLT 96* 92* 102*     BMP:   Recent Labs     04/25/19 0454 04/26/19 0442 04/27/19 0452    143 140   K 3.3* 3.1* 3.6   CL 87* 88* 87*   CO2 42* 45* 45*   BUN 46* 36* 34*   CREATININE 1.4* 1.1 1.1       Imaging:  I have reviewed radiology images personally. XR CHEST STANDARD (2 VW)   Final Result   Bilateral airspace disease and trace effusions again demonstrated, consistent   with edema. No new airspace disease identified in the interval.         XR CHEST PORTABLE   Final Result   Increasing pulmonary edema and left basilar atelectasis or pneumonia. XR CHEST PORTABLE   Final Result   Cardiomegaly with moderate interstitial pulmonary edema suggesting moderate   CHF. Interstitial pulmonary edema slightly increased since prior. MRI FOOT LEFT WO CONTRAST   Final Result   1. No osteomyelitis or other acute osseous abnormality   2. Nonspecific dorsal mid and forefoot subcutaneous edema compatible with   cellulitis versus lymphedema. No drainable fluid collection or deep soft   tissue ulceration. XR FOOT LEFT (MIN 3 VIEWS)   Final Result   No x-ray evidence of osteomyelitis. No acute osseous injury. Soft tissue swelling of the foot- nonspecific for edema or cellulitis. VL Extremity Venous Bilateral   Final Result      XR SHOULDER RIGHT (MIN 2 VIEWS)   Final Result   No acute abnormality of the right shoulder         XR CHEST PORTABLE   Final Result   Mild left basilar atelectasis or scarring. Otherwise, no acute   cardiopulmonary disease. Results for Valentin Kaur (MRN [id number]) as of 4/27/2019 11:09   Ref.  Range 4/26/2019 04:42 4/26/2019 08:08 4/26/2019 12:10 4/26/2019 17:40 4/26/2019 20:04 4/27/2019 04:52   Sodium Latest Ref Range: 136 - 145 mmol/L 143     140   Potassium Latest Ref Range: 3.5 - 5.1 mmol/L 3.1 (L)     3.6   Chloride Latest Ref Range: 99 - 110 mmol/L 88 (L)     87 (L)   CO2 Latest Ref Range: 21 - 32 mmol/L 45 (H)     45 (H)   BUN Latest Ref Range: 7 - 20 mg/dL 36 (H)     34 (H)   Creatinine Latest Ref Range: 0.6 - 1.2 mg/dL 1.1     1.1   Anion Gap Latest Ref Range: 3 - 16  10     8   GFR Non- Latest Ref Range: >60  49 (A)     49 (A)   GFR  Latest Ref Range: >60  59 (A)     59 (A)   Magnesium Latest Ref Range: 1.80 - 2.40 mg/dL 1.50 (L)     1.70 (L)   Glucose Latest Ref Range: 70 - 99 mg/dL 114 (H)     126 (H)   POC Glucose Latest Ref Range: 70 - 99 mg/dl  114 (H) 197 (H) 143 (H) 179 (H)    Calcium Latest Ref Range: 8.3 - 10.6 mg/dL 9.0     9.2     Results for Nia Flores (MRN [id number]) as of 4/27/2019 11:09   Ref.  Range 4/24/2019 04:38 4/25/2019 04:54 4/26/2019 04:42 4/27/2019 04:52   WBC Latest Ref Range: 4.0 - 11.0 K/uL 6.1 8.2 7.6 7.7   RBC Latest Ref Range: 4.00 - 5.20 M/uL 4.34 4.41 4.35 4.42   Hemoglobin Quant Latest Ref Range: 12.0 - 16.0 g/dL 11.9 (L) 12.1 11.9 (L) 12.1   Hematocrit Latest Ref Range: 36.0 - 48.0 % 36.8 36.8 36.8 37.7   MCV Latest Ref Range: 80.0 - 100.0 fL 84.6 83.6 84.5 85.3   MCH Latest Ref Range: 26.0 - 34.0 pg 27.3 27.5 27.4 27.4   MCHC Latest Ref Range: 31.0 - 36.0 g/dL 32.3 32.9 32.4 32.1   MPV Latest Ref Range: 5.0 - 10.5 fL 7.9 7.7 7.7 7.6   RDW Latest Ref Range: 12.4 - 15.4 % 18.7 (H) 18.7 (H) 19.0 (H) 19.0 (H)   Platelet Count Latest Ref Range: 135 - 450 K/uL 106 (L) 96 (L) 92 (L) 102 (L)   SLIDE REVIEW Unknown  see below         Value: Staph aureus MRSAAbnormal    *Additional information available - narrative   4/20/2019  6:43 AM - Esteban Tom Incoming Lab Results From Soft (Epic Adt)     Specimen Information: Foot        Component Collected Lab   WOUND/ABSCESS 04/17/2019  6:30 PM [address] Lab   Gram Stain Result 04/17/2019  6:30 PM [address] Lab   No WBCs or organisms seen    Organism Abnormal  04/17/2019  6:30 PM  - [facility] Lab   Staph aureus MRSA WOUND/ABSCESS Abnormal  04/17/2019  6:30 PM [address] Lab   Moderate growth   CONTACT PRECAUTIONS INDICATED   PBP2= POSITIVE    Testing Performed By     Lab - Abbreviation Name Director Address Valid Date Range   19-[facility] - Lexus Joseph M.D., Ph.D. [address] Gonzales. [facility] [address] 08/30/17 0817-Present   25-[facility]- [address]  [facility] Davis Padilla M.D. [facility] [address] 08/30/17 0807-Present   Narrative   Performed by: [address] [facility]   ORDER#: [id number]                          ORDERED BY: Shana Morrell  SOURCE: Foot                               COLLECTED:  04/17/19 18:30  ANTIBIOTICS AT [facility]. :                      RECEIVED :  04/17/19 20:25  CALL iGsele Pan [phone],  Microbiology results called to and read back by Nasir Moulton RN,  04/19/2019 09:47, by Perla Hernandez  Performed at:  [address] [address]   Phone [phone]   Susceptibility     Staph aureus mrsa (1)     Antibiotic Interpretation ELIAN Status    ceFAZolin Resistant >16 mcg/mL     clindamycin Resistant >2 mcg/mL     erythromycin Resistant >4 mcg/mL     oxacillin Resistant >2 mcg/mL     tetracycline Sensitive <=0.5 mcg/mL     trimethoprim-sulfamethoxazole Resistant >2/38 mcg/mL     vancomycin Sensitive 1 mcg/mL         Results for Zari Colon (MRN [id number]) as of 4/26/2019 15:39   Ref.  Range 12/23/2018 05:25 4/22/2019 12:48 4/22/2019 21:17 4/23/2019 00:47 4/25/2019 04:46   Hemoglobin, Art, Extended Latest Ref Range: 12.0 - 16.0 g/dL 12.6 13.0 12.7 12.8 12.5   pH, Arterial Latest Ref Range: 7.350 - 7.450  7.445 7.313 (L) 7.292 (L) 7.276 (L) 7.453 (H)   pCO2, Arterial Latest Ref Range: 35.0 - 45.0 mmHg 40.2 88.9 (HH) 90.5 (HH) 76.3 (HH) 63.0 (H)   pO2, Arterial Latest Ref Range: 75.0 - 108.0 mmHg 111.6 (H) 51.9 (L) 84.0 112.6 (H) 100.1 HCO3, Arterial Latest Ref Range: 21.0 - 29.0 mmol/L 27.0 44.1 (H) 42.7 (H) 34.7 (H) 43.1 (H)   TCO2 (calc), Art Latest Ref Range: Not Established mmol/L 28.2 46.8 45.5 37.1 45.0   Base Excess, Arterial Latest Ref Range: -3.0 - 3.0 mmol/L 2.8 13.9 (H) 12.5 (H) 5.5 (H) 16.2 (H)   O2 Sat, Arterial Latest Ref Range: >92 % 98.2 84.0 (L) 94.6 97.5 96.9   O2 Content, Arterial Latest Ref Range: Not Established mL/dL 18 15 17 18 17   Methemoglobin, Arterial Latest Ref Range: <1.5 % 0.3 0.2 0.3 0.3 0.3   Carboxyhgb, Arterial Latest Ref Range: 0.0 - 1.5 % 1.9 (H) 2.0 (H) 1.2 1.0 1.0     TWO VIEWS OF THE CHEST       4/25/2019 7:00 am       COMPARISON:   April 23, 2018, April 22, 2018       HISTORY:   ORDERING SYSTEM PROVIDED HISTORY: CHF   TECHNOLOGIST PROVIDED HISTORY:   Reason for exam:->CHF   Ordering Physician Provided Reason for Exam: CHF   Type of Exam: Subsequent/Follow-up       FINDINGS:   Cardiac silhouette enlargement and pulmonary arterial shadow enlargement are   again noted.  Perihilar and diffuse interstitial opacities are again   demonstrated with findings consistent with trace pleural effusions.  No focal   area of consolidation or pneumothorax.  Sequela of old granulomatous disease   again noted.  Calcified atheromatous plaque.  No acute osseous abnormality   identified.           Impression   Bilateral airspace disease and trace effusions again demonstrated, consistent   with edema.  No new airspace disease identified in the interval.     Summary   Normal left ventricular systolic function with ejection fraction of 55-60%.    Septal bounce noted due to right ventricular volume overload.   Mild concentric left ventricular hypertrophy.   Grade I diastolic dysfunction with normal filling pressure.   Mild mitral and tricuspid regurgitation.   The right ventricle is moderately enlarged.   Right ventricular systolic function is moderately reduced .   S' prime velocity is measured at 7 cm/s.   Systolic pulmonic artery pressure (SPAP) is estimated at 55 mmHg consistent   with mild pulmonary hypertension (Right atrial pressure of 15 mmHg).   The right atrium is moderately dilated. Ct chest last year-  FINDINGS:   Chest:       Mediastinum: There are a few borderline prevascular, precarinal, right   peritracheal and right hilar lymph nodes, the largest of which measures 1.8   cm in diameter.  The central airways are patent.  The aorta, heart and   pericardium are unremarkable.       Lungs/pleura: There are is mild interlobular septal thickening scattered   throughout each lung.  There an indeterminate 6 mm pulmonary nodule within   the lateral aspect of the right upper lobe.  This is stable by report dating   back to 2001.  There is no pneumothorax, consolidation or effusion.       Soft Tissues/Bones: There is no fracture or osseous destruction.           Results for Nia Flores (MRN [id number]) as of 4/26/2019 15:39   Ref. Range 5/13/2018 05:15   TSH Latest Ref Range: 0.27 - 4.20 uIU/mL 2.22       Assessment and plan:  Acute on chronic respiratory failure, hypoxemic and hypercarbic. O2 supplementation to keep Sao2 between 90-94% ONLY-nursing communication to that effect sent  Pulmonary toilet  Patient needs to be on BIPAP at night/while taking a nap and on PRN basis but patient is refusing inspite the potential consequences-palliative care discussed with patient but she does not want to change her decision/code status   Will request palliative care consult for goals of care and code status ,if patient not agreeable with the treatment modalities and has potential for further decompensation   COPD. No PFT in EMR. Agree with bronchodilator. ?  CHF. Chest x-ray suggests pulmonary edema,  repeat CXR still suggests pulmonary edema. Remains on Lasix once daily. Consider more aggressive diuresis -being dictated by nephrology  Monitor I/o and BMP  Correct electrolytes on PRN basis   Cellulitis.   Being followed by ID and podiatry, on PO doxy as per ID ;wound care as per protocaol  DM, HTN, HLD, hypothyroidism, obesity. Per IM. Morbid obesity with suspected sleep apnea-? PSG as an  Out patient ,only if compliance is better   DVT prophylaxis. On heparin      Discussed with nursing and Dr Samantha Joiner    ? [facility] planning         Electronically signed by:  Angelika Courtney MD    4/27/2019    11:06 AM.

## 2019-04-27 NOTE — DISCHARGE SUMMARY
Hospital Medicine Discharge Summary    Patient ID: Sharlet Abts      Patient's PCP: No primary care provider on file. Admit Date: 4/15/2019     Discharge Date:  4/27/2019    Admitting Physician: Ajay Oswald MD     Discharge Physician: Ernie Kelley MD     Discharge Diagnoses: Active Hospital Problems    Diagnosis Date Noted    Pulmonary nodule, left [R91.1] 03/14/2016     Priority: High     Class: Acute    Morbid obesity with BMI of 50.0-59.9, adult ([facility] [E66.01, Z68.43] 36/78/1222    Diastolic dysfunction [I82.3] 04/26/2019    [facility] ([facility] [I27.20] 04/26/2019    Suspected sleep apnea [R29.818] 04/26/2019    Acute on chronic respiratory failure with hypoxia and hypercapnia (HCC) [J96.21, J96.22]     Interstitial lung disease ([facility] [J84.9]     Cellulitis [L03.90] 04/15/2019    Diabetes mellitus ([facility] [E11.9]     Hyperlipidemia [E78.5]     Hypertension [I10]     Hypothyroidism [E03.9]     COPD exacerbation ([facility] [J44.1] 12/18/2014       The patient was seen and examined on day of discharge and this discharge summary is in conjunction with any daily progress note from day of discharge. Hospital Course:     Encephalopathy -secondary to acute and chronic hypoxic and hypercapnic respiratory failure -  S/p BiPAP, - improved   Continue breathing treatment,  , pulmonology evaluation appreciated ,, chest x-ray congestion treated with IV Lasix and currently on by mouth  Advised on BiPAP overnight , patient is  refusing , currently on nasal cannula  PT eval appreciated, agreeable to go to skilled nursing facility  Palliative care input appreciated, doesn't want to change the code status, patient and family were made aware about the complications if she does not use BiPAP overnight including change in mental status respiratory failure and readmission.     LLE bacterial cellulitis with bullous lesion of left foot: Improved  - Pt started on IV clindamycin on admission.  Cellulitis initially improved but appeared worse 4/17 (erythema noticed outside margins that have been marked). Dc'd clinda and changed to Vanc/Zosyn. ID and Podiatry consulted. Vanc changed to Zyvox per ID due to worsening renal fx. Wound culture grew MRSA. Zosyn dc'd per ID on 4/19. Antibiotics was switched to doxycycline -4/23 -DC'd today, ID signed off   Wound care consulted. Continue local wound care to left foot bullous lesion. resolved  - Xray of left foot negative for osteo. MRI negative for osteo and no drainable fluid collection noted.     -Acute and chronic diastolic congestive heart failure   -Lasix 40 IV twice a day- o switch to by mouth daily  -BLE venous dopplers are negative for DVT.      Hyperkalemia POA (resolved):  - -Currently hypokalemia-replaced     Hypertension:  - Holding losartan as above. Hydralazine meanwhile. Pt is currently normotensive.      Hyperlipidemia:  - Continue statin.     Diabetes mellitus type 2:  - Holding metformin while here. Low dose SSI meanwhile. A1c's have been controlled recently.       COPD, asthma, chronic hypoxic respiratory failure:  Currently acute and chronic hypoxic hypercapnic respiratory failure on BiPAP .      Hypothyroidism:  - Clinically euthyroid. Continue home dose of levothyroxine.     Years of vaginal discomfort, which the patient says started after a gynecologic surgery:  - Details unclear, continue to follow with gynecology clinic. Pelvic exam by ED physician was unrevealing.    - Pt noted to have excoriation to perineum/labia possibly due to incontinence. Apply cream for now. UTI might have contributed to discomfort.     UTI:  - UA shows + nitrites and LE with 20-50 WBC and 4+ bacteria. Urine culture grew E. Coli. Pt received 3 days of Zosyn.    Asymptomatic bacteriuria     Hypomagnesemia:  - Monitor and replete as needed.      Acute and chronic renal failure-most probably secondary to diuretics, cardiorenal syndrome-intake and output monitor BMP nephrology disease and trace effusions again demonstrated, consistent   with edema. No new airspace disease identified in the interval.         XR CHEST PORTABLE   Final Result   Increasing pulmonary edema and left basilar atelectasis or pneumonia. XR CHEST PORTABLE   Final Result   Cardiomegaly with moderate interstitial pulmonary edema suggesting moderate   CHF. Interstitial pulmonary edema slightly increased since prior. MRI FOOT LEFT WO CONTRAST   Final Result   1. No osteomyelitis or other acute osseous abnormality   2. Nonspecific dorsal mid and forefoot subcutaneous edema compatible with   cellulitis versus lymphedema. No drainable fluid collection or deep soft   tissue ulceration. XR FOOT LEFT (MIN 3 VIEWS)   Final Result   No x-ray evidence of osteomyelitis. No acute osseous injury. Soft tissue swelling of the foot- nonspecific for edema or cellulitis. VL Extremity Venous Bilateral   Final Result      XR SHOULDER RIGHT (MIN 2 VIEWS)   Final Result   No acute abnormality of the right shoulder         XR CHEST PORTABLE   Final Result   Mild left basilar atelectasis or scarring. Otherwise, no acute   cardiopulmonary disease.                 Consults:     IP CONSULT TO HOSPITALIST  IP CONSULT TO DIETITIAN  IP CONSULT TO PODIATRY  IP CONSULT TO PHARMACY  IP CONSULT TO INFECTIOUS DISEASES  IP CONSULT TO PULMONOLOGY  IP CONSULT TO NEPHROLOGY  IP CONSULT TO SOCIAL WORK  IP CONSULT TO PALLIATIVE CARE  IP CONSULT TO SOCIAL WORK    Disposition:  SNF    Condition at Discharge: Stable    Discharge Instructions/Follow-up:  PCP/ pulmonology    Code Status:  Full Code     Activity: activity as tolerated    Diet: diabetic diet      Discharge Medications:     Current Discharge Medication List           Details   !! insulin lispro (HUMALOG) 100 UNIT/ML injection vial Inject 0-3 Units into the skin nightly  Qty: 1 vial, Refills: 3      ipratropium-albuterol (DUONEB) 0.5-2.5 (3) MG/3ML SOLN

## 2019-04-28 NOTE — CARE COORDINATION
CASE MANAGEMENT DISCHARGE SUMMARY      Discharge to: [facility]    Precertification completed: [facility] Exemption Notification (HENS) completed: yes    New Durable Medical Equipment ordered/agency: na    Transportation:    Medical Transport/ time: [facility]   Ambulance form completed: Yes    Notified:    Family: yes   Facility/Agency: [facility] [phone]   RN: yes   Phone number for report to facility: [phone]    Note: Discharging nurse to complete [facility], reconcile AVS, and place final copy with patient's discharge packet. RN to ensure that written prescriptions for  Level II medications are sent with patient to the facility as per protocol.

## 2019-08-10 ENCOUNTER — APPOINTMENT (OUTPATIENT)
Dept: GENERAL RADIOLOGY | Age: 74
DRG: 291 | End: 2019-08-10
Payer: MEDICARE

## 2019-08-10 ENCOUNTER — HOSPITAL ENCOUNTER (INPATIENT)
Age: 74
LOS: 9 days | Discharge: NURSING, REHAB OR LONG-TERM CARE | DRG: 291 | End: 2019-08-19
Attending: EMERGENCY MEDICINE | Admitting: INTERNAL MEDICINE
Payer: MEDICARE

## 2019-08-10 DIAGNOSIS — R29.6 FREQUENT FALLS: ICD-10-CM

## 2019-08-10 DIAGNOSIS — R60.0 LOWER EXTREMITY EDEMA: ICD-10-CM

## 2019-08-10 DIAGNOSIS — I50.33 ACUTE ON CHRONIC DIASTOLIC CONGESTIVE HEART FAILURE (HCC): Primary | ICD-10-CM

## 2019-08-10 DIAGNOSIS — R42 DIZZINESS: ICD-10-CM

## 2019-08-10 PROCEDURE — 84484 ASSAY OF TROPONIN QUANT: CPT

## 2019-08-10 PROCEDURE — 81001 URINALYSIS AUTO W/SCOPE: CPT

## 2019-08-10 PROCEDURE — 82803 BLOOD GASES ANY COMBINATION: CPT

## 2019-08-10 PROCEDURE — 87086 URINE CULTURE/COLONY COUNT: CPT

## 2019-08-10 PROCEDURE — 6370000000 HC RX 637 (ALT 250 FOR IP): Performed by: NURSE PRACTITIONER

## 2019-08-10 PROCEDURE — 93005 ELECTROCARDIOGRAM TRACING: CPT | Performed by: EMERGENCY MEDICINE

## 2019-08-10 PROCEDURE — 80053 COMPREHEN METABOLIC PANEL: CPT

## 2019-08-10 PROCEDURE — 94761 N-INVAS EAR/PLS OXIMETRY MLT: CPT

## 2019-08-10 PROCEDURE — 2060000000 HC ICU INTERMEDIATE R&B

## 2019-08-10 PROCEDURE — 71045 X-RAY EXAM CHEST 1 VIEW: CPT

## 2019-08-10 PROCEDURE — 83880 ASSAY OF NATRIURETIC PEPTIDE: CPT

## 2019-08-10 PROCEDURE — 99285 EMERGENCY DEPT VISIT HI MDM: CPT

## 2019-08-10 PROCEDURE — 85025 COMPLETE CBC W/AUTO DIFF WBC: CPT

## 2019-08-10 PROCEDURE — 96374 THER/PROPH/DIAG INJ IV PUSH: CPT

## 2019-08-10 PROCEDURE — 83605 ASSAY OF LACTIC ACID: CPT

## 2019-08-10 PROCEDURE — 87186 SC STD MICRODIL/AGAR DIL: CPT

## 2019-08-10 PROCEDURE — 2700000000 HC OXYGEN THERAPY PER DAY

## 2019-08-10 PROCEDURE — 6360000002 HC RX W HCPCS: Performed by: EMERGENCY MEDICINE

## 2019-08-10 PROCEDURE — 87077 CULTURE AEROBIC IDENTIFY: CPT

## 2019-08-10 RX ORDER — ONDANSETRON 2 MG/ML
4 INJECTION INTRAMUSCULAR; INTRAVENOUS EVERY 6 HOURS PRN
Status: DISCONTINUED | OUTPATIENT
Start: 2019-08-10 | End: 2019-08-19 | Stop reason: HOSPADM

## 2019-08-10 RX ORDER — ROSUVASTATIN CALCIUM 10 MG/1
40 TABLET, COATED ORAL EVERY EVENING
Status: DISCONTINUED | OUTPATIENT
Start: 2019-08-10 | End: 2019-08-19 | Stop reason: HOSPADM

## 2019-08-10 RX ORDER — BUSPIRONE HYDROCHLORIDE 5 MG/1
10 TABLET ORAL 3 TIMES DAILY
Status: DISCONTINUED | OUTPATIENT
Start: 2019-08-10 | End: 2019-08-19 | Stop reason: HOSPADM

## 2019-08-10 RX ORDER — NITROGLYCERIN 0.4 MG/1
0.4 TABLET SUBLINGUAL EVERY 5 MIN PRN
Status: DISCONTINUED | OUTPATIENT
Start: 2019-08-10 | End: 2019-08-19 | Stop reason: HOSPADM

## 2019-08-10 RX ORDER — SODIUM CHLORIDE 0.9 % (FLUSH) 0.9 %
10 SYRINGE (ML) INJECTION PRN
Status: DISCONTINUED | OUTPATIENT
Start: 2019-08-10 | End: 2019-08-19 | Stop reason: HOSPADM

## 2019-08-10 RX ORDER — MONTELUKAST SODIUM 10 MG/1
10 TABLET ORAL NIGHTLY
Status: DISCONTINUED | OUTPATIENT
Start: 2019-08-10 | End: 2019-08-19 | Stop reason: HOSPADM

## 2019-08-10 RX ORDER — FUROSEMIDE 10 MG/ML
40 INJECTION INTRAMUSCULAR; INTRAVENOUS 2 TIMES DAILY
Status: DISCONTINUED | OUTPATIENT
Start: 2019-08-10 | End: 2019-08-12

## 2019-08-10 RX ORDER — FUROSEMIDE 10 MG/ML
40 INJECTION INTRAMUSCULAR; INTRAVENOUS ONCE
Status: COMPLETED | OUTPATIENT
Start: 2019-08-10 | End: 2019-08-10

## 2019-08-10 RX ORDER — ALBUTEROL SULFATE 90 UG/1
2 AEROSOL, METERED RESPIRATORY (INHALATION) EVERY 6 HOURS PRN
Status: DISCONTINUED | OUTPATIENT
Start: 2019-08-10 | End: 2019-08-11

## 2019-08-10 RX ORDER — LEVOTHYROXINE SODIUM 88 UG/1
88 TABLET ORAL DAILY
Status: DISCONTINUED | OUTPATIENT
Start: 2019-08-11 | End: 2019-08-19 | Stop reason: HOSPADM

## 2019-08-10 RX ORDER — ACETAMINOPHEN 325 MG/1
650 TABLET ORAL EVERY 6 HOURS PRN
Status: DISCONTINUED | OUTPATIENT
Start: 2019-08-10 | End: 2019-08-19 | Stop reason: HOSPADM

## 2019-08-10 RX ORDER — BUSPIRONE HYDROCHLORIDE 10 MG/1
10 TABLET ORAL 3 TIMES DAILY
COMMUNITY

## 2019-08-10 RX ORDER — OXYBUTYNIN CHLORIDE 5 MG/1
10 TABLET, EXTENDED RELEASE ORAL NIGHTLY
Status: DISCONTINUED | OUTPATIENT
Start: 2019-08-10 | End: 2019-08-19 | Stop reason: HOSPADM

## 2019-08-10 RX ORDER — SODIUM CHLORIDE 0.9 % (FLUSH) 0.9 %
10 SYRINGE (ML) INJECTION EVERY 12 HOURS SCHEDULED
Status: DISCONTINUED | OUTPATIENT
Start: 2019-08-10 | End: 2019-08-19 | Stop reason: HOSPADM

## 2019-08-10 RX ORDER — IPRATROPIUM BROMIDE AND ALBUTEROL SULFATE 2.5; .5 MG/3ML; MG/3ML
3 SOLUTION RESPIRATORY (INHALATION)
Status: DISCONTINUED | OUTPATIENT
Start: 2019-08-11 | End: 2019-08-11

## 2019-08-10 RX ADMIN — MAGNESIUM GLUCONATE 500 MG ORAL TABLET 400 MG: 500 TABLET ORAL at 22:46

## 2019-08-10 RX ADMIN — FUROSEMIDE 40 MG: 10 INJECTION, SOLUTION INTRAMUSCULAR; INTRAVENOUS at 19:37

## 2019-08-10 ASSESSMENT — ENCOUNTER SYMPTOMS
ABDOMINAL PAIN: 0
BACK PAIN: 0
SORE THROAT: 0
COUGH: 0
NAUSEA: 0
SHORTNESS OF BREATH: 1
VOMITING: 0

## 2019-08-10 ASSESSMENT — PAIN SCALES - GENERAL: PAINLEVEL_OUTOF10: 0

## 2019-08-10 NOTE — ED PROVIDER NOTES
failure ([address]    2. Lower extremity edema    3. Dizziness    4. Frequent falls          DISPOSITION/PLAN   DISPOSITION Decision To Admit 08/10/2019 09:26:00 PM      PATIENT REFERRED TO:  No follow-up provider specified. DISCHARGE MEDICATIONS:  New Prescriptions    No medications on file     Controlled Substances Monitoring:     No flowsheet data found.     (Please note that portions of this note were completed with a voice recognition program.  Efforts were made to edit the dictations but occasionally words are mis-transcribed.)    Chad Alberto MD (electronically signed)  Attending Emergency Physician           Florecita Coates MD  08/10/19 2130       Chad Alberto MD  08/10/19 8106

## 2019-08-10 NOTE — ED NOTES
Bed: 15  Expected date:   Expected time:   Means of arrival:   Comments:  [address], RN  08/10/19 2713

## 2019-08-11 PROBLEM — N30.00 ACUTE CYSTITIS: Status: ACTIVE | Noted: 2019-08-11

## 2019-08-11 PROBLEM — Z99.81 ON HOME O2: Status: ACTIVE | Noted: 2019-08-11

## 2019-08-11 PROBLEM — J96.11 CHRONIC RESPIRATORY FAILURE WITH HYPOXIA (HCC): Status: ACTIVE | Noted: 2019-08-11

## 2019-08-11 LAB
A/G RATIO: 0.9 (ref 1.1–2.2)
ALBUMIN SERPL-MCNC: 3.7 G/DL (ref 3.4–5)
ALP BLD-CCNC: 81 U/L (ref 40–129)
ALT SERPL-CCNC: 15 U/L (ref 10–40)
ANION GAP SERPL CALCULATED.3IONS-SCNC: 12 MMOL/L (ref 3–16)
ANION GAP SERPL CALCULATED.3IONS-SCNC: 12 MMOL/L (ref 3–16)
AST SERPL-CCNC: 20 U/L (ref 15–37)
BACTERIA: ABNORMAL /HPF
BASE EXCESS VENOUS: 2.8 MMOL/L (ref -3–3)
BASOPHILS ABSOLUTE: 0.1 K/UL (ref 0–0.2)
BASOPHILS RELATIVE PERCENT: 1 %
BILIRUB SERPL-MCNC: 0.5 MG/DL (ref 0–1)
BILIRUBIN URINE: NEGATIVE
BLOOD, URINE: ABNORMAL
BUN BLDV-MCNC: 37 MG/DL (ref 7–20)
BUN BLDV-MCNC: 41 MG/DL (ref 7–20)
CALCIUM SERPL-MCNC: 10.3 MG/DL (ref 8.3–10.6)
CALCIUM SERPL-MCNC: 10.5 MG/DL (ref 8.3–10.6)
CARBOXYHEMOGLOBIN: 2.2 % (ref 0–1.5)
CHLORIDE BLD-SCNC: 93 MMOL/L (ref 99–110)
CHLORIDE BLD-SCNC: 94 MMOL/L (ref 99–110)
CLARITY: CLEAR
CO2: 31 MMOL/L (ref 21–32)
CO2: 33 MMOL/L (ref 21–32)
COLOR: YELLOW
CREAT SERPL-MCNC: 1.5 MG/DL (ref 0.6–1.2)
CREAT SERPL-MCNC: 1.6 MG/DL (ref 0.6–1.2)
EKG ATRIAL RATE: 89 BPM
EKG DIAGNOSIS: NORMAL
EKG P AXIS: 55 DEGREES
EKG P-R INTERVAL: 170 MS
EKG Q-T INTERVAL: 380 MS
EKG QRS DURATION: 90 MS
EKG QTC CALCULATION (BAZETT): 462 MS
EKG R AXIS: 107 DEGREES
EKG T AXIS: 13 DEGREES
EKG VENTRICULAR RATE: 89 BPM
EOSINOPHILS ABSOLUTE: 0.2 K/UL (ref 0–0.6)
EOSINOPHILS RELATIVE PERCENT: 1.8 %
EPITHELIAL CELLS, UA: ABNORMAL /HPF
GFR AFRICAN AMERICAN: 38
GFR AFRICAN AMERICAN: 41
GFR NON-AFRICAN AMERICAN: 32
GFR NON-AFRICAN AMERICAN: 34
GLOBULIN: 3.9 G/DL
GLUCOSE BLD-MCNC: 111 MG/DL (ref 70–99)
GLUCOSE BLD-MCNC: 120 MG/DL (ref 70–99)
GLUCOSE BLD-MCNC: 133 MG/DL (ref 70–99)
GLUCOSE BLD-MCNC: 137 MG/DL (ref 70–99)
GLUCOSE BLD-MCNC: 149 MG/DL (ref 70–99)
GLUCOSE BLD-MCNC: 153 MG/DL (ref 70–99)
GLUCOSE URINE: NEGATIVE MG/DL
HCO3 VENOUS: 29.6 MMOL/L (ref 23–29)
HCT VFR BLD CALC: 39.9 % (ref 36–48)
HEMOGLOBIN, VEN, REDUCED: 14 %
HEMOGLOBIN: 12.5 G/DL (ref 12–16)
KETONES, URINE: NEGATIVE MG/DL
LACTIC ACID: 1.7 MMOL/L (ref 0.4–2)
LEUKOCYTE ESTERASE, URINE: NEGATIVE
LYMPHOCYTES ABSOLUTE: 1 K/UL (ref 1–5.1)
LYMPHOCYTES RELATIVE PERCENT: 9 %
MAGNESIUM: 1.8 MG/DL (ref 1.8–2.4)
MCH RBC QN AUTO: 25.1 PG (ref 26–34)
MCHC RBC AUTO-ENTMCNC: 31.3 G/DL (ref 31–36)
MCV RBC AUTO: 80.1 FL (ref 80–100)
METHEMOGLOBIN VENOUS: 0.7 %
MICROSCOPIC EXAMINATION: YES
MONOCYTES ABSOLUTE: 1 K/UL (ref 0–1.3)
MONOCYTES RELATIVE PERCENT: 9.5 %
NEUTROPHILS ABSOLUTE: 8.6 K/UL (ref 1.7–7.7)
NEUTROPHILS RELATIVE PERCENT: 78.7 %
NITRITE, URINE: POSITIVE
O2 CONTENT, VEN: 37 VOL %
O2 SAT, VEN: 65 %
O2 THERAPY: ABNORMAL
PCO2, VEN: 54.7 MMHG (ref 40–50)
PDW BLD-RTO: 22.3 % (ref 12.4–15.4)
PERFORMED ON: ABNORMAL
PH UA: 6 (ref 5–8)
PH VENOUS: 7.35 (ref 7.35–7.45)
PLATELET # BLD: 243 K/UL (ref 135–450)
PMV BLD AUTO: 8.3 FL (ref 5–10.5)
PO2, VEN: 37.2 MMHG (ref 25–40)
POTASSIUM SERPL-SCNC: 5.4 MMOL/L (ref 3.5–5.1)
POTASSIUM SERPL-SCNC: 5.5 MMOL/L (ref 3.5–5.1)
PRO-BNP: 7357 PG/ML (ref 0–124)
PROTEIN UA: ABNORMAL MG/DL
RBC # BLD: 4.98 M/UL (ref 4–5.2)
RBC UA: ABNORMAL /HPF (ref 0–2)
SODIUM BLD-SCNC: 136 MMOL/L (ref 136–145)
SODIUM BLD-SCNC: 139 MMOL/L (ref 136–145)
SPECIFIC GRAVITY UA: 1.01 (ref 1–1.03)
TCO2 CALC VENOUS: 12 MMOL/L
TOTAL PROTEIN: 7.6 G/DL (ref 6.4–8.2)
TROPONIN: 0.03 NG/ML
URINE REFLEX TO CULTURE: YES
URINE TYPE: ABNORMAL
UROBILINOGEN, URINE: 0.2 E.U./DL
WBC # BLD: 10.9 K/UL (ref 4–11)
WBC UA: ABNORMAL /HPF (ref 0–5)

## 2019-08-11 PROCEDURE — 80048 BASIC METABOLIC PNL TOTAL CA: CPT

## 2019-08-11 PROCEDURE — 51702 INSERT TEMP BLADDER CATH: CPT

## 2019-08-11 PROCEDURE — 6370000000 HC RX 637 (ALT 250 FOR IP): Performed by: NURSE PRACTITIONER

## 2019-08-11 PROCEDURE — 2580000003 HC RX 258: Performed by: NURSE PRACTITIONER

## 2019-08-11 PROCEDURE — 94150 VITAL CAPACITY TEST: CPT

## 2019-08-11 PROCEDURE — 1200000000 HC SEMI PRIVATE

## 2019-08-11 PROCEDURE — 36415 COLL VENOUS BLD VENIPUNCTURE: CPT

## 2019-08-11 PROCEDURE — 94761 N-INVAS EAR/PLS OXIMETRY MLT: CPT

## 2019-08-11 PROCEDURE — 2700000000 HC OXYGEN THERAPY PER DAY

## 2019-08-11 PROCEDURE — 6360000002 HC RX W HCPCS: Performed by: NURSE PRACTITIONER

## 2019-08-11 PROCEDURE — 93010 ELECTROCARDIOGRAM REPORT: CPT | Performed by: INTERNAL MEDICINE

## 2019-08-11 PROCEDURE — 94640 AIRWAY INHALATION TREATMENT: CPT

## 2019-08-11 PROCEDURE — 83036 HEMOGLOBIN GLYCOSYLATED A1C: CPT

## 2019-08-11 PROCEDURE — 80061 LIPID PANEL: CPT

## 2019-08-11 PROCEDURE — 83735 ASSAY OF MAGNESIUM: CPT

## 2019-08-11 RX ORDER — DEXTROSE MONOHYDRATE 25 G/50ML
12.5 INJECTION, SOLUTION INTRAVENOUS PRN
Status: DISCONTINUED | OUTPATIENT
Start: 2019-08-11 | End: 2019-08-19 | Stop reason: HOSPADM

## 2019-08-11 RX ORDER — HYDROCODONE BITARTRATE AND ACETAMINOPHEN 5; 325 MG/1; MG/1
1 TABLET ORAL EVERY 6 HOURS PRN
Status: DISCONTINUED | OUTPATIENT
Start: 2019-08-11 | End: 2019-08-19 | Stop reason: HOSPADM

## 2019-08-11 RX ORDER — TRAMADOL HYDROCHLORIDE 50 MG/1
50 TABLET ORAL ONCE
Status: COMPLETED | OUTPATIENT
Start: 2019-08-11 | End: 2019-08-11

## 2019-08-11 RX ORDER — ALBUTEROL SULFATE 90 UG/1
2 AEROSOL, METERED RESPIRATORY (INHALATION) EVERY 6 HOURS PRN
Status: DISCONTINUED | OUTPATIENT
Start: 2019-08-11 | End: 2019-08-19 | Stop reason: HOSPADM

## 2019-08-11 RX ORDER — DEXTROSE MONOHYDRATE 50 MG/ML
100 INJECTION, SOLUTION INTRAVENOUS PRN
Status: DISCONTINUED | OUTPATIENT
Start: 2019-08-11 | End: 2019-08-19 | Stop reason: HOSPADM

## 2019-08-11 RX ORDER — IPRATROPIUM BROMIDE AND ALBUTEROL SULFATE 2.5; .5 MG/3ML; MG/3ML
3 SOLUTION RESPIRATORY (INHALATION)
Status: DISCONTINUED | OUTPATIENT
Start: 2019-08-11 | End: 2019-08-19 | Stop reason: HOSPADM

## 2019-08-11 RX ORDER — NICOTINE POLACRILEX 4 MG
15 LOZENGE BUCCAL PRN
Status: DISCONTINUED | OUTPATIENT
Start: 2019-08-11 | End: 2019-08-19 | Stop reason: HOSPADM

## 2019-08-11 RX ADMIN — IPRATROPIUM BROMIDE AND ALBUTEROL SULFATE 3 ML: .5; 3 SOLUTION RESPIRATORY (INHALATION) at 15:36

## 2019-08-11 RX ADMIN — FUROSEMIDE 40 MG: 10 INJECTION, SOLUTION INTRAMUSCULAR; INTRAVENOUS at 20:54

## 2019-08-11 RX ADMIN — FUROSEMIDE 40 MG: 10 INJECTION, SOLUTION INTRAMUSCULAR; INTRAVENOUS at 08:12

## 2019-08-11 RX ADMIN — ROSUVASTATIN CALCIUM 40 MG: 10 TABLET, FILM COATED ORAL at 17:55

## 2019-08-11 RX ADMIN — MONTELUKAST SODIUM 10 MG: 10 TABLET ORAL at 20:53

## 2019-08-11 RX ADMIN — OXYBUTYNIN CHLORIDE 10 MG: 5 TABLET, EXTENDED RELEASE ORAL at 20:53

## 2019-08-11 RX ADMIN — IPRATROPIUM BROMIDE AND ALBUTEROL SULFATE 3 ML: .5; 3 SOLUTION RESPIRATORY (INHALATION) at 08:07

## 2019-08-11 RX ADMIN — TRAMADOL HYDROCHLORIDE 50 MG: 50 TABLET, FILM COATED ORAL at 03:27

## 2019-08-11 RX ADMIN — BUSPIRONE HYDROCHLORIDE 10 MG: 5 TABLET ORAL at 08:12

## 2019-08-11 RX ADMIN — ENOXAPARIN SODIUM 40 MG: 40 INJECTION SUBCUTANEOUS at 08:19

## 2019-08-11 RX ADMIN — MAGNESIUM GLUCONATE 500 MG ORAL TABLET 400 MG: 500 TABLET ORAL at 08:12

## 2019-08-11 RX ADMIN — BUSPIRONE HYDROCHLORIDE 10 MG: 5 TABLET ORAL at 13:35

## 2019-08-11 RX ADMIN — LEVOTHYROXINE SODIUM 88 MCG: 0.09 TABLET ORAL at 06:24

## 2019-08-11 RX ADMIN — IPRATROPIUM BROMIDE AND ALBUTEROL SULFATE 3 ML: .5; 3 SOLUTION RESPIRATORY (INHALATION) at 11:56

## 2019-08-11 RX ADMIN — OXYBUTYNIN CHLORIDE 10 MG: 5 TABLET, EXTENDED RELEASE ORAL at 00:24

## 2019-08-11 RX ADMIN — BUSPIRONE HYDROCHLORIDE 10 MG: 5 TABLET ORAL at 20:53

## 2019-08-11 RX ADMIN — Medication 10 ML: at 20:54

## 2019-08-11 RX ADMIN — CEFTRIAXONE SODIUM 1 G: 1 INJECTION, POWDER, FOR SOLUTION INTRAMUSCULAR; INTRAVENOUS at 03:27

## 2019-08-11 RX ADMIN — Medication 10 ML: at 08:13

## 2019-08-11 RX ADMIN — Medication 10 ML: at 00:24

## 2019-08-11 RX ADMIN — HYDROCODONE BITARTRATE AND ACETAMINOPHEN 1 TABLET: 5; 325 TABLET ORAL at 06:24

## 2019-08-11 RX ADMIN — MONTELUKAST SODIUM 10 MG: 10 TABLET ORAL at 00:24

## 2019-08-11 RX ADMIN — MAGNESIUM GLUCONATE 500 MG ORAL TABLET 400 MG: 500 TABLET ORAL at 20:53

## 2019-08-11 RX ADMIN — INSULIN LISPRO 1 UNITS: 100 INJECTION, SOLUTION INTRAVENOUS; SUBCUTANEOUS at 12:08

## 2019-08-11 RX ADMIN — IPRATROPIUM BROMIDE AND ALBUTEROL SULFATE 3 ML: .5; 3 SOLUTION RESPIRATORY (INHALATION) at 20:04

## 2019-08-11 RX ADMIN — FUROSEMIDE 40 MG: 10 INJECTION, SOLUTION INTRAMUSCULAR; INTRAVENOUS at 00:24

## 2019-08-11 RX ADMIN — ROSUVASTATIN CALCIUM 40 MG: 10 TABLET, FILM COATED ORAL at 00:23

## 2019-08-11 RX ADMIN — BUSPIRONE HYDROCHLORIDE 10 MG: 5 TABLET ORAL at 00:24

## 2019-08-11 ASSESSMENT — PAIN SCALES - GENERAL
PAINLEVEL_OUTOF10: 10
PAINLEVEL_OUTOF10: 5
PAINLEVEL_OUTOF10: 10
PAINLEVEL_OUTOF10: 6
PAINLEVEL_OUTOF10: 0
PAINLEVEL_OUTOF10: 10

## 2019-08-11 NOTE — PLAN OF CARE
Problem: Falls - Risk of:  Goal: Will remain free from falls  Description  Will remain free from falls  Outcome: Ongoing  Goal: Absence of physical injury  Description  Absence of physical injury  Outcome: Ongoing     Problem: Risk for Impaired Skin Integrity  Goal: Tissue integrity - skin and mucous membranes  Description  Structural intactness and normal physiological function of skin and  mucous membranes. Outcome: Ongoing     Problem: Pain:  Goal: Pain level will decrease  Description  Pain level will decrease  Outcome: Ongoing  Goal: Control of acute pain  Description  Control of acute pain  Outcome: Ongoing  Goal: Control of chronic pain  Description  Control of chronic pain  Outcome: Ongoing     Problem: OXYGENATION/RESPIRATORY FUNCTION  Goal: Patient will maintain patent airway  Outcome: Ongoing  Goal: Patient will achieve/maintain normal respiratory rate/effort  Description  Respiratory rate and effort will be within normal limits for the patient  Outcome: Ongoing     Problem: HEMODYNAMIC STATUS  Goal: Patient has stable vital signs and fluid balance  Outcome: Ongoing     Problem: FLUID AND ELECTROLYTE IMBALANCE  Goal: Fluid and electrolyte balance are achieved/maintained  Outcome: Ongoing  Note:     Patient's EF (Ejection Fraction) is greater than 40%    Patient's weights and intake/output reviewed:    Patient's Last Weight: 140.5 kg obtained by standing scale. Intake/Output Summary (Last 24 hours) at 8/11/2019 0515  Last data filed at 8/11/2019 0350  Gross per 24 hour   Intake 360 ml   Output 1350 ml   Net -990 ml             Ongoing Functional Capacity Assessment:  Patient  able to ambulate distance of 15 feet in 1 Minuteswith moderate difficulty. Patient noted to be on 4 supplemental O2 with SpO2 of 97 %. Pt resting in bed at this time on 4 L O2. Pt with complaints of shortness of breath. Pt with pitting lower extremity edema.      Patient and/or Family's stated Goal of Care this Admission:

## 2019-08-11 NOTE — PROGRESS NOTES
08/10/2019    WBCUA 6-10 08/10/2019    BACTERIA 3+ 08/10/2019    RBCUA 3-5 08/10/2019    BLOODU TRACE 08/10/2019    SPECGRAV 1.010 08/10/2019    GLUCOSEU Negative 08/10/2019       Radiology:  XR CHEST PORTABLE   Final Result   1. Indeterminate nodular density left suprahilar region which I do not   appreciate on prior studies. This may reflect sequela from old granulomatous   disease. Correlation with IV contrast-enhanced CT chest recommended. 2. Chronic pulmonary changes. 3. Calcific atherosclerosis aorta. 4. Cardiomegaly. Assessment/Plan:    Active Hospital Problems    Diagnosis    On home O2 [Z99.81]    Chronic respiratory failure with hypoxia ([facility]) [J96.11]    Acute cystitis [N30.00]    Acute on chronic diastolic CHF (congestive heart failure) ([facility] [I50.33]    Suspected sleep apnea [R29.818]    Morbid obesity with BMI of 50.0-59.9, adult ([facility]) [E66.01, Z68.43]    Interstitial lung disease ([facility] [J84.9]     Acute on chronic diastolic heart failure  - last echo 4/19 with EF 86-06%, grade 1 diastolic dysfunction, pulm HTN  - BNP elevated, BLE edema, cardiomegaly  - continue IV lasix  - cardiology consulted  - ACE wraps BLE    DMII  - well controlled  - holding home metformin  - SSI ordered    Chronic hypoxic respiratory failure 2/2 COPD  - On home O2  - continue singulairveronicaoneenedina    UTI  - noted on UTI  - continue ceftriaxone  - Urine culture pending    Hypothyroidism  - continue home synthroid    HLD  - continue statin    Morbid obesity  With Body mass index is 06.84 kg/m². Complicating assessment and treatment. Placing patient at risk for multiple co-morbidities as well as early death and contributing to the patient's presentation. Counseled on weight loss. DVT Prophylaxis: lovenox  Diet: DIET CARDIAC; Carb Control: 5 carb choices (75 gms)/meal; Low Sodium (2 GM);  Daily Fluid Restriction: 1500 ml  Code Status: Full Code    PT/OT Eval Status: not ordered    Dispo - possibly 2-3

## 2019-08-11 NOTE — PLAN OF CARE
Problem: Falls - Risk of:  Goal: Will remain free from falls  Description  Will remain free from falls  8/11/2019 0742 by Og Guan RN  Outcome: Ongoing  Patient instructed to use call light if assistance is needed. Call light within reach. Bed locked and in lowest position. Bed alarm on.

## 2019-08-11 NOTE — PLAN OF CARE
Problem: ACTIVITY INTOLERANCE/IMPAIRED MOBILITY  Goal: Mobility/activity is maintained at optimum level for patient  8/11/2019 1316 by Shala Romo RN  Outcome: Ongoing      Patient's EF (Ejection Fraction) is greater than 40%    Patient's weights and intake/output reviewed:    Patient's Last Weight: 309 lbs obtained by standing scale. Intake/Output Summary (Last 24 hours) at 8/11/2019 1318  Last data filed at 8/11/2019 0930  Gross per 24 hour   Intake 580 ml   Output 1350 ml   Net -770 ml         Patient stated Daily Functional Goal:  Increase activity tolerance. Ongoing Functional Capacity Assessment:  Patient  able to ambulate distance of 15 feet in 30 seconds. with moderate difficulty. Patient noted to be on 4L supplemental O2 with SpO2 of 94%. Pt resting in bed at this time on 4 L O2. Pt with complaints of shortness of breath. Pt with pitting lower extremity edema. Patient and/or Family's stated Goal of Care this Admission: reduce shortness of breath, increase activity tolerance and reduce lower extremity edema prior to discharge      Comorbidities Reviewed Yes  Patient has a past medical history of Acute on chronic diastolic CHF (congestive heart failure) ([address] JAQUELIN (acute kidney injury) ([address] Arthritis, Asthma, COPD (chronic obstructive pulmonary disease) ([address] Diabetes mellitus ([address] Hyperlipidemia, Hypertension, MRSA (methicillin resistant staph aureus) culture positive, Other disorders of kidney and ureter in diseases classified elsewhere, Pneumonia due to infectious organism, and Thyroid disease.

## 2019-08-11 NOTE — H&P
HGB, HCT, PLT in the last 72 hours. No results for input(s): NA, K, CL, CO2, BUN, CREATININE, CALCIUM, PHOS in the last 72 hours. Invalid input(s): MAGNES  No results for input(s): AST, ALT, BILIDIR, BILITOT, ALKPHOS in the last 72 hours. No results for input(s): INR in the last 72 hours. No results for input(s): Aissatou [place] in the last 72 hours. Urinalysis:      Lab Results   Component Value Date    NITRU POSITIVE 04/17/2019    WBCUA 20-50 04/17/2019    BACTERIA 4+ 04/17/2019    RBCUA 0-2 04/17/2019    BLOODU TRACE-LYSED 04/17/2019    SPECGRAV 1.015 04/17/2019    GLUCOSEU Negative 04/17/2019       Radiology:     CXR: I have reviewed the CXR with the following interpretation: see below  EKG:  I have reviewed the EKG with the following interpretation: Normal sinus rhythmPossible Right ventricular hypertrophyNonspecific ST abnormalityAbnormal ECGWhen compared with ECG of 23-APR-2019 10:03,Aberrant conduction is no longer PresentNonspecific T wave abnormality no longer evident in Anterolateral leads    XR CHEST PORTABLE   Final Result   1. Indeterminate nodular density left suprahilar region which I do not   appreciate on prior studies. This may reflect sequela from old granulomatous   disease. Correlation with IV contrast-enhanced CT chest recommended. 2. Chronic pulmonary changes. 3. Calcific atherosclerosis aorta. 4. Cardiomegaly. ASSESSMENT:    Active Hospital Problems    Diagnosis Date Noted    On home O2 [Z99.81] 08/11/2019    Chronic respiratory failure with hypoxia [facility]) [J96.11] 08/11/2019    Acute on chronic diastolic CHF (congestive heart failure) ([facility] [I50.33] 08/10/2019    Suspected sleep apnea [R29.818] 04/26/2019    Morbid obesity with BMI of 50.0-59.9, adult ([facility] [E66.01, Z68.43] 04/26/2019    Interstitial lung disease ([facility] [J84.9]          PLAN:  CHF, Acute on chronic, systolic/diastolic/combined, NYHA  , STAGE  .   Likely Cause/contributing factors -  CKD, HTN, DM, valvular heart disease  Last ECHO 4/16/19 showed EF - 55-60 %, grade 1 diastolic dysfunction, pulmonary HTN  CXR shows cardiomegaly, pulmonary vascular congestion, BNP elevated 7,700  Plan:  - diurese with IV lasix until euvolemia,  monitoring strict input and output, daily weights, f/u lytes and watch renal functions closely  - supplemental O2, low salt diet, fluid restriction to <1500cc  Ace wraps to bilateral lower extremities  Chronic respiratory failure with hypoxia, secondary to interstitial lung disease, with home O2, with suspected sleep apnea  Oxygen therapy protocol  IS  Acute cystitis  Ceftriaxone  Awaiting urine culture  BMI =27.4  Complicating assessment and treatment, placing patient at high risk for multiple co-morbidities as well as early death and contributing to the patient's presentation. Counseled on weight loss. DVT Prophylaxis: lovenox  Diet: DIET CARDIAC; Carb Control: 5 carb choices (75 gms)/meal; Low Sodium (2 GM); Daily Fluid Restriction: 1500 ml  Code Status: Full Code    PT/OT Eval Status: not ordered    Dispo - greater then [address], APRN - CNP    Thank you No primary care provider on file. for the opportunity to be involved in this patient's care. If you have any questions or concerns please feel free to contact me at [phone].

## 2019-08-11 NOTE — ED NOTES
Bedside report to floor RN at this time. Patient is alert and oriented. Personal belongings to bag and placed on stretcher at this time and patient placed on tele.  Patient then taken to the floor via stretcher       Kate Hannah RN  08/10/19 2066

## 2019-08-12 LAB
ANION GAP SERPL CALCULATED.3IONS-SCNC: 12 MMOL/L (ref 3–16)
BUN BLDV-MCNC: 37 MG/DL (ref 7–20)
CALCIUM SERPL-MCNC: 10.2 MG/DL (ref 8.3–10.6)
CHLORIDE BLD-SCNC: 94 MMOL/L (ref 99–110)
CHOLESTEROL, TOTAL: 73 MG/DL (ref 0–199)
CO2: 34 MMOL/L (ref 21–32)
CREAT SERPL-MCNC: 1.6 MG/DL (ref 0.6–1.2)
ESTIMATED AVERAGE GLUCOSE: 151.3 MG/DL
GFR AFRICAN AMERICAN: 38
GFR NON-AFRICAN AMERICAN: 32
GLUCOSE BLD-MCNC: 111 MG/DL (ref 70–99)
GLUCOSE BLD-MCNC: 127 MG/DL (ref 70–99)
GLUCOSE BLD-MCNC: 157 MG/DL (ref 70–99)
GLUCOSE BLD-MCNC: 161 MG/DL (ref 70–99)
GLUCOSE BLD-MCNC: 179 MG/DL (ref 70–99)
HBA1C MFR BLD: 6.9 %
HDLC SERPL-MCNC: 35 MG/DL (ref 40–60)
LDL CHOLESTEROL CALCULATED: 21 MG/DL
MAGNESIUM: 2 MG/DL (ref 1.8–2.4)
PERFORMED ON: ABNORMAL
POTASSIUM SERPL-SCNC: 5.3 MMOL/L (ref 3.5–5.1)
PRO-BNP: 5583 PG/ML (ref 0–124)
SODIUM BLD-SCNC: 140 MMOL/L (ref 136–145)
TRIGL SERPL-MCNC: 85 MG/DL (ref 0–150)
VLDLC SERPL CALC-MCNC: 17 MG/DL

## 2019-08-12 PROCEDURE — 6370000000 HC RX 637 (ALT 250 FOR IP): Performed by: NURSE PRACTITIONER

## 2019-08-12 PROCEDURE — 94761 N-INVAS EAR/PLS OXIMETRY MLT: CPT

## 2019-08-12 PROCEDURE — 6360000002 HC RX W HCPCS: Performed by: INTERNAL MEDICINE

## 2019-08-12 PROCEDURE — 80048 BASIC METABOLIC PNL TOTAL CA: CPT

## 2019-08-12 PROCEDURE — 36415 COLL VENOUS BLD VENIPUNCTURE: CPT

## 2019-08-12 PROCEDURE — 2700000000 HC OXYGEN THERAPY PER DAY

## 2019-08-12 PROCEDURE — 83880 ASSAY OF NATRIURETIC PEPTIDE: CPT

## 2019-08-12 PROCEDURE — 1200000000 HC SEMI PRIVATE

## 2019-08-12 PROCEDURE — 99222 1ST HOSP IP/OBS MODERATE 55: CPT | Performed by: INTERNAL MEDICINE

## 2019-08-12 PROCEDURE — 2580000003 HC RX 258: Performed by: NURSE PRACTITIONER

## 2019-08-12 PROCEDURE — 83735 ASSAY OF MAGNESIUM: CPT

## 2019-08-12 PROCEDURE — 94640 AIRWAY INHALATION TREATMENT: CPT

## 2019-08-12 PROCEDURE — 6360000002 HC RX W HCPCS: Performed by: NURSE PRACTITIONER

## 2019-08-12 RX ORDER — FUROSEMIDE 10 MG/ML
80 INJECTION INTRAMUSCULAR; INTRAVENOUS 2 TIMES DAILY
Status: DISCONTINUED | OUTPATIENT
Start: 2019-08-12 | End: 2019-08-18

## 2019-08-12 RX ORDER — SODIUM CHLORIDE 9 MG/ML
INJECTION, SOLUTION INTRAVENOUS
Status: DISPENSED
Start: 2019-08-12 | End: 2019-08-12

## 2019-08-12 RX ADMIN — IPRATROPIUM BROMIDE AND ALBUTEROL SULFATE 3 ML: .5; 3 SOLUTION RESPIRATORY (INHALATION) at 08:16

## 2019-08-12 RX ADMIN — INSULIN LISPRO 1 UNITS: 100 INJECTION, SOLUTION INTRAVENOUS; SUBCUTANEOUS at 12:26

## 2019-08-12 RX ADMIN — BUSPIRONE HYDROCHLORIDE 10 MG: 5 TABLET ORAL at 09:57

## 2019-08-12 RX ADMIN — IPRATROPIUM BROMIDE AND ALBUTEROL SULFATE 3 ML: .5; 3 SOLUTION RESPIRATORY (INHALATION) at 23:56

## 2019-08-12 RX ADMIN — IPRATROPIUM BROMIDE AND ALBUTEROL SULFATE 3 ML: .5; 3 SOLUTION RESPIRATORY (INHALATION) at 12:06

## 2019-08-12 RX ADMIN — OXYBUTYNIN CHLORIDE 10 MG: 5 TABLET, EXTENDED RELEASE ORAL at 20:03

## 2019-08-12 RX ADMIN — MAGNESIUM GLUCONATE 500 MG ORAL TABLET 400 MG: 500 TABLET ORAL at 20:03

## 2019-08-12 RX ADMIN — IPRATROPIUM BROMIDE AND ALBUTEROL SULFATE 3 ML: .5; 3 SOLUTION RESPIRATORY (INHALATION) at 16:10

## 2019-08-12 RX ADMIN — MAGNESIUM GLUCONATE 500 MG ORAL TABLET 400 MG: 500 TABLET ORAL at 09:57

## 2019-08-12 RX ADMIN — HYDROCODONE BITARTRATE AND ACETAMINOPHEN 1 TABLET: 5; 325 TABLET ORAL at 05:12

## 2019-08-12 RX ADMIN — MONTELUKAST SODIUM 10 MG: 10 TABLET ORAL at 20:03

## 2019-08-12 RX ADMIN — BUSPIRONE HYDROCHLORIDE 10 MG: 5 TABLET ORAL at 13:52

## 2019-08-12 RX ADMIN — CEFTRIAXONE SODIUM 1 G: 1 INJECTION, POWDER, FOR SOLUTION INTRAMUSCULAR; INTRAVENOUS at 01:13

## 2019-08-12 RX ADMIN — Medication 10 ML: at 09:58

## 2019-08-12 RX ADMIN — HYDROCODONE BITARTRATE AND ACETAMINOPHEN 1 TABLET: 5; 325 TABLET ORAL at 20:12

## 2019-08-12 RX ADMIN — ACETAMINOPHEN 650 MG: 325 TABLET ORAL at 23:30

## 2019-08-12 RX ADMIN — INSULIN LISPRO 1 UNITS: 100 INJECTION, SOLUTION INTRAVENOUS; SUBCUTANEOUS at 18:23

## 2019-08-12 RX ADMIN — LEVOTHYROXINE SODIUM 88 MCG: 0.09 TABLET ORAL at 05:12

## 2019-08-12 RX ADMIN — BUSPIRONE HYDROCHLORIDE 10 MG: 5 TABLET ORAL at 20:03

## 2019-08-12 RX ADMIN — Medication 10 ML: at 20:04

## 2019-08-12 RX ADMIN — HYDROCODONE BITARTRATE AND ACETAMINOPHEN 1 TABLET: 5; 325 TABLET ORAL at 13:52

## 2019-08-12 RX ADMIN — ROSUVASTATIN CALCIUM 40 MG: 10 TABLET, FILM COATED ORAL at 18:23

## 2019-08-12 RX ADMIN — FUROSEMIDE 80 MG: 10 INJECTION, SOLUTION INTRAMUSCULAR; INTRAVENOUS at 20:04

## 2019-08-12 RX ADMIN — FUROSEMIDE 80 MG: 10 INJECTION, SOLUTION INTRAMUSCULAR; INTRAVENOUS at 09:57

## 2019-08-12 RX ADMIN — INSULIN LISPRO 1 UNITS: 100 INJECTION, SOLUTION INTRAVENOUS; SUBCUTANEOUS at 21:55

## 2019-08-12 RX ADMIN — ENOXAPARIN SODIUM 40 MG: 40 INJECTION SUBCUTANEOUS at 10:09

## 2019-08-12 ASSESSMENT — PAIN DESCRIPTION - ORIENTATION
ORIENTATION: RIGHT;LEFT
ORIENTATION: LEFT;RIGHT
ORIENTATION: RIGHT;LEFT

## 2019-08-12 ASSESSMENT — PAIN DESCRIPTION - FREQUENCY: FREQUENCY: INTERMITTENT

## 2019-08-12 ASSESSMENT — PAIN SCALES - GENERAL
PAINLEVEL_OUTOF10: 10
PAINLEVEL_OUTOF10: 8
PAINLEVEL_OUTOF10: 7
PAINLEVEL_OUTOF10: 10
PAINLEVEL_OUTOF10: 10
PAINLEVEL_OUTOF10: 7

## 2019-08-12 ASSESSMENT — PAIN DESCRIPTION - PAIN TYPE
TYPE: ACUTE PAIN

## 2019-08-12 ASSESSMENT — PAIN DESCRIPTION - DESCRIPTORS: DESCRIPTORS: CRAMPING

## 2019-08-12 ASSESSMENT — PAIN DESCRIPTION - ONSET: ONSET: GRADUAL

## 2019-08-12 ASSESSMENT — PAIN DESCRIPTION - PROGRESSION: CLINICAL_PROGRESSION: GRADUALLY WORSENING

## 2019-08-12 ASSESSMENT — PAIN DESCRIPTION - LOCATION
LOCATION: HAND
LOCATION: LEG
LOCATION: LEG

## 2019-08-12 NOTE — PROGRESS NOTES
wt)  · Admission Body Wt: 309 lb (140.2 kg)  · Usual Body Wt: 275 lb (124.7 kg)(per EMR)  · % Weight Change: 12% wt gain x4 months  · Ideal Body Wt: 110 lb (49.9 kg), % Ideal Body 280%  · BMI Classification: BMI > or equal to 40.0 Obese Class III    Nutrition Interventions:   Continue current diet, Start ONS  Continued Inpatient Monitoring, Education Completed    Nutrition Evaluation:   · Evaluation: Goals set   · Goals: Patient will continue to tolerate and consume 50%< of PO meals and supplements    · Monitoring: Nutrition Progression, Meal Intake, Supplement Intake, Patient/Family Education, I&O, Wound Healing, Pertinent Labs, Weight      Electronically signed by Real Bailey RD, LD on 8/12/19 at 3:29 PM  Contact Number: Office: [phone]; [address]: [address]

## 2019-08-12 NOTE — PROGRESS NOTES
magnesium hydroxide, ondansetron, nitroGLYCERIN      Intake/Output Summary (Last 24 hours) at 8/12/2019 0735  Last data filed at 8/12/2019 0555  Gross per 24 hour   Intake 640 ml   Output 2725 ml   Net -2085 ml       Physical Exam Performed:    /72   Pulse 84   Temp 98.3 °F (36.8 °C) (Oral)   Resp 22   Ht 5' 2\" (1.575 m)   Wt (!) 317 lb 14.5 oz (144.2 kg)   SpO2 94%   BMI 58.15 kg/m²     General appearance:  mild apparent distress, appears stated age and cooperative. HEENT:  Normal cephalic, atraumatic without obvious deformity. Pupils equal, round, and reactive to light. Extra ocular muscles intact. Conjunctivae/corneas clear. Neck: Supple, with full range of motion. No jugular venous distention. Trachea midline. Respiratory: increased respiratory effort. Dim with crackles to auscultation, bilaterally  Cardiovascular:  Regular rate and rhythm with normal S1/S2 without murmurs, rubs or gallops. Abdomen: Soft, non-tender, non-distended with normal bowel sounds. Musculoskeletal:  No clubbing, cyanosis. 3+bilateral pitting edema to lower ext. Full range of motion without deformity. Skin: Skin color, texture, turgor normal.  No rashes or lesions. Neurologic:  Neurovascularly intact without any focal sensory/motor deficits. Cranial nerves: II-XII intact, grossly non-focal.  Psychiatric:  Alert and oriented, thought content appropriate, poor insight  Capillary Refill: Brisk,< 3 seconds   Peripheral Pulses: +2 palpable, equal bilaterally     Labs:   Recent Labs     08/10/19  1857   WBC 10.9   HGB 12.5   HCT 39.9        Recent Labs     08/10/19  1857 08/11/19  0507 08/12/19  0452    139 140   K 5.4* 5.5* 5.3*   CL 93* 94* 94*   CO2 31 33* 34*   BUN 41* 37* 37*   CREATININE 1.6* 1.5* 1.6*   CALCIUM 10.3 10.5 10.2     Recent Labs     08/10/19  1857   AST 20   ALT 15   BILITOT 0.5   ALKPHOS 81     No results for input(s): INR in the last 72 hours.   Recent Labs     08/10/19  1857   TROPONINI 0.03*       Urinalysis:      Lab Results   Component Value Date    NITRU POSITIVE 08/10/2019    WBCUA 6-10 08/10/2019    BACTERIA 3+ 08/10/2019    RBCUA 3-5 08/10/2019    BLOODU TRACE 08/10/2019    SPECGRAV 1.010 08/10/2019    GLUCOSEU Negative 08/10/2019       Radiology:  XR CHEST PORTABLE   Final Result   1. Indeterminate nodular density left suprahilar region which I do not   appreciate on prior studies. This may reflect sequela from old granulomatous   disease. Correlation with IV contrast-enhanced CT chest recommended. 2. Chronic pulmonary changes. 3. Calcific atherosclerosis aorta. 4. Cardiomegaly. Assessment/Plan:    Active Hospital Problems    Diagnosis    On home O2 [Z99.81]    Chronic respiratory failure with hypoxia ([facility]) [J96.11]    Acute cystitis [N30.00]    Acute on chronic diastolic CHF (congestive heart failure) ([facility] [I50.33]    Suspected sleep apnea [R29.818]    Morbid obesity with BMI of 50.0-59.9, adult ([facility]) [E66.01, Z68.43]    Interstitial lung disease ([facility] [J84.9]       CHF - Acute on chronic diastolic heart failure w/ last echo 4/19 with EF 18-66%, grade 1 diastolic dysfunction, pulmonary HTN. Continue IV Lasix. Cardiology consulted and appreciated in advance. DM2 - controlled on home oral antiGlycemics - held. Follow FSBS/SSI low regimen.  - SSI ordered    Chronic hypoxic respiratory failure 2/2 COPD - supplemental O2 and wean as tolerated. Continue home Singulair/HHN    UTI - admission U/A c/w acute cystitis. Started on empiric Rocephin 11 August pending Cx results. HyperLipidemia - controlled on home Statin. Continue, w/ f/u and med adjustment w/ PCP    HypoThyroid - clinically euthyroid on oral replacement therapy. Continue, w/ outpt monitoring as previously arranged. Morbid Obesity -  With Body mass index is 58.15 kg/m². Complicating assessment and treatment.  Placing patient at risk for multiple co-morbidities as well as early death and contributing to the patient's presentation. Counseled on weight loss. DVT Prophylaxis: LMWH  Diet: DIET CARDIAC; Carb Control: 5 carb choices (75 gms)/meal; Low Sodium (2 GM); Daily Fluid Restriction: 1500 ml  Code Status: Full Code      PT/OT Eval Status: not yet ordered    Dispo - possibly Tues/Wed 13/14 August pending clinical improvement and Cardiology recs.      Zurdo Chan MD

## 2019-08-12 NOTE — CONSULTS
Authorizing Provider   metFORMIN (GLUCOPHAGE) 500 MG tablet Take 500 mg by mouth 2 times daily (with meals)   Yes Historical Provider, MD   busPIRone (BUSPAR) 10 MG tablet Take 10 mg by mouth 3 times daily   Yes Historical Provider, MD   ipratropium-albuterol (DUONEB) 0.5-2.5 (3) MG/3ML SOLN nebulizer solution Inhale 3 mLs into the lungs every 4 hours (while awake) 4/27/19  Yes Suki Dunn MD   magnesium oxide (MAG-OX) 400 (241.3 Mg) MG TABS tablet Take 1 tablet by mouth 4 times daily 4/27/19  Yes Suki Dunn MD   potassium chloride (KLOR-CON M) 20 MEQ extended release tablet Take 2 tablets by mouth daily 12/29/18  Yes Chasity Castelan MD   silver sulfADIAZINE (SILVADENE) 1 % cream Apply topically 3 times daily as needed Apply topically to kerwin area   Yes Historical Provider, MD   rosuvastatin (CRESTOR) 40 MG tablet Take 40 mg by mouth every evening   Yes Historical Provider, MD   montelukast (SINGULAIR) 10 MG tablet Take 10 mg by mouth nightly   Yes Historical Provider, MD   fluticasone (FLONASE) 50 MCG/ACT nasal spray 2 sprays by Nasal route daily   Yes Historical Provider, MD   furosemide (LASIX) 20 MG tablet Take 40 mg by mouth daily    Yes Historical Provider, MD   PROAIR  (90 BASE) MCG/ACT inhaler 2 puffs every 6 hours as needed for Wheezing or Shortness of Breath  12/8/14  Yes Historical Provider, MD   docusate sodium (COLACE) 100 MG capsule Take 100 mg by mouth 2 times daily as needed  11/24/14  Yes Historical Provider, MD   levothyroxine (SYNTHROID) 88 MCG tablet  12/16/14  Yes Historical Provider, MD   oxybutynin (DITROPAN-XL) 10 MG CR tablet  11/3/14  Yes Historical Provider, MD   acetaminophen (TYLENOL) 325 MG tablet Take 650 mg by mouth every 6 hours as needed for Pain    Historical Provider, MD       In-patient schedule medications:   cefTRIAXone (ROCEPHIN) IV  1 g Intravenous Q24H    ipratropium-albuterol  3 mL Inhalation Q4H WA    insulin lispro  0-6 Units Subcutaneous TID     insulin lispro  0-3 Units Subcutaneous Nightly    busPIRone  10 mg Oral TID    levothyroxine  88 mcg Oral Daily    montelukast  10 mg Oral Nightly    oxybutynin  10 mg Oral Nightly    rosuvastatin  40 mg Oral QPM    sodium chloride flush  10 mL Intravenous 2 times per day    enoxaparin  40 mg Subcutaneous Daily    furosemide  40 mg Intravenous BID    magnesium oxide  400 mg Oral BID         Infusion Medications:   sodium chloride      dextrose           Allergies:  Aspirin; Celecoxib; Fexofenadine; Gabapentin; Niacin er; and Adhesive tape     Review of Systems:   All 14 point review of symptoms completed. Pertinent positives identified in the HPI, all other review of symptoms findings as below. 14 point review of systems unable to be completed due to patient condition/cooperation. All available positives mentioned in history of present illness.        Physical Examination:    [unfilled]  /72   Pulse 84   Temp 98.3 °F (36.8 °C) (Oral)   Resp 22   Ht 5' 2\" (1.575 m)   Wt (!) 317 lb 14.5 oz (144.2 kg)   SpO2 94%   BMI 58.15 kg/m²    Weight: (!) 317 lb 14.5 oz (144.2 kg)     Wt Readings from Last 3 Encounters:   08/12/19 (!) 317 lb 14.5 oz (144.2 kg)   04/27/19 276 lb 1.6 oz (125.2 kg)   12/28/18 274 lb 12.8 oz (124.6 kg)       Intake/Output Summary (Last 24 hours) at 8/12/2019 7080  Last data filed at 8/12/2019 0555  Gross per 24 hour   Intake 640 ml   Output 2725 ml   Net -2085 ml       General Appearance:  Alert x 2 , cooperative, no distress, appears older than stated age   Head:  Normocephalic, without obvious abnormality, atraumatic   Eyes:  PERRL, conjunctiva/corneas clear       Nose: Nares normal, no drainage or sinus tenderness   Throat: Lips, mucosa, and tongue normal   Neck: Supple, symmetrical, trachea midline, no adenopathy, thyroid: not enlarged, symmetric, no tenderness/mass/nodules, no carotid bruit or JVD       Lungs:   Reduced to auscultation bilaterally, respirations minimally

## 2019-08-12 NOTE — PLAN OF CARE
breath, increase activity tolerance, better understand heart failure and disease management, be more comfortable and reduce lower extremity edema prior to discharge      Comorbidities Reviewed Yes  Patient has a past medical history of Acute on chronic diastolic CHF (congestive heart failure) ([facility] JAQUELIN (acute kidney injury) ([facility] Arthritis, Asthma, COPD (chronic obstructive pulmonary disease) ([facility] Hyperlipidemia, Hypertension, MRSA (methicillin resistant staph aureus) culture positive, Other disorders of kidney and ureter in diseases classified elsewhere, Pneumonia due to infectious organism, and Thyroid disease. >>For CHF and Comorbidity documentation on Education Time and Topics, please see Education Tab           Problem: ACTIVITY INTOLERANCE/IMPAIRED MOBILITY  Goal: Mobility/activity is maintained at optimum level for patient  Outcome: Ongoing     Problem:  Activity:  Goal: Risk for activity intolerance will decrease  Description  Risk for activity intolerance will decrease  Outcome: Ongoing     Problem: Coping:  Goal: Ability to adjust to condition or change in health will improve  Description  Ability to adjust to condition or change in health will improve  Outcome: Ongoing     Problem: Fluid Volume:  Goal: Ability to maintain a balanced intake and output will improve  Description  Ability to maintain a balanced intake and output will improve  Outcome: Ongoing     Problem: Health Behavior:  Goal: Ability to identify and utilize available resources and services will improve  Description  Ability to identify and utilize available resources and services will improve  Outcome: Ongoing  Goal: Ability to manage health-related needs will improve  Description  Ability to manage health-related needs will improve  Outcome: Ongoing     Problem: Metabolic:  Goal: Ability to maintain appropriate glucose levels will improve  Description  Ability to maintain appropriate glucose levels will improve  Outcome: Ongoing     Problem: Nutritional:  Goal: Maintenance of adequate nutrition will improve  Description  Maintenance of adequate nutrition will improve  Outcome: Ongoing  Goal: Progress toward achieving an optimal weight will improve  Description  Progress toward achieving an optimal weight will improve  Outcome: Ongoing     Problem: Physical Regulation:  Goal: Complications related to the disease process, condition or treatment will be avoided or minimized  Description  Complications related to the disease process, condition or treatment will be avoided or minimized  Outcome: Ongoing  Goal: Diagnostic test results will improve  Description  Diagnostic test results will improve  Outcome: Ongoing     Problem: Skin Integrity:  Goal: Risk for impaired skin integrity will decrease  Description  Risk for impaired skin integrity will decrease  Outcome: Ongoing     Problem: Tissue Perfusion:  Goal: Adequacy of tissue perfusion will improve  Description  Adequacy of tissue perfusion will improve  Outcome: Ongoing

## 2019-08-13 LAB
ALBUMIN SERPL-MCNC: 3.1 G/DL (ref 3.4–5)
ANION GAP SERPL CALCULATED.3IONS-SCNC: 9 MMOL/L (ref 3–16)
BUN BLDV-MCNC: 39 MG/DL (ref 7–20)
CALCIUM SERPL-MCNC: 10.1 MG/DL (ref 8.3–10.6)
CHLORIDE BLD-SCNC: 92 MMOL/L (ref 99–110)
CO2: 37 MMOL/L (ref 21–32)
CREAT SERPL-MCNC: 1.6 MG/DL (ref 0.6–1.2)
GFR AFRICAN AMERICAN: 38
GFR NON-AFRICAN AMERICAN: 32
GLUCOSE BLD-MCNC: 107 MG/DL (ref 70–99)
GLUCOSE BLD-MCNC: 131 MG/DL (ref 70–99)
GLUCOSE BLD-MCNC: 162 MG/DL (ref 70–99)
GLUCOSE BLD-MCNC: 170 MG/DL (ref 70–99)
GLUCOSE BLD-MCNC: 174 MG/DL (ref 70–99)
HCT VFR BLD CALC: 37.5 % (ref 36–48)
HEMOGLOBIN: 11.7 G/DL (ref 12–16)
MAGNESIUM: 1.8 MG/DL (ref 1.8–2.4)
MCH RBC QN AUTO: 25.1 PG (ref 26–34)
MCHC RBC AUTO-ENTMCNC: 31.3 G/DL (ref 31–36)
MCV RBC AUTO: 80.1 FL (ref 80–100)
ORGANISM: ABNORMAL
ORGANISM: ABNORMAL
PDW BLD-RTO: 21.7 % (ref 12.4–15.4)
PERFORMED ON: ABNORMAL
PHOSPHORUS: 3.9 MG/DL (ref 2.5–4.9)
PLATELET # BLD: 205 K/UL (ref 135–450)
PMV BLD AUTO: 7.6 FL (ref 5–10.5)
POTASSIUM SERPL-SCNC: 4.2 MMOL/L (ref 3.5–5.1)
RBC # BLD: 4.68 M/UL (ref 4–5.2)
SODIUM BLD-SCNC: 138 MMOL/L (ref 136–145)
URINE CULTURE, ROUTINE: ABNORMAL
URINE CULTURE, ROUTINE: ABNORMAL
WBC # BLD: 8.2 K/UL (ref 4–11)

## 2019-08-13 PROCEDURE — 99233 SBSQ HOSP IP/OBS HIGH 50: CPT | Performed by: INTERNAL MEDICINE

## 2019-08-13 PROCEDURE — 6370000000 HC RX 637 (ALT 250 FOR IP): Performed by: NURSE PRACTITIONER

## 2019-08-13 PROCEDURE — 85027 COMPLETE CBC AUTOMATED: CPT

## 2019-08-13 PROCEDURE — 94669 MECHANICAL CHEST WALL OSCILL: CPT

## 2019-08-13 PROCEDURE — 1200000000 HC SEMI PRIVATE

## 2019-08-13 PROCEDURE — 36415 COLL VENOUS BLD VENIPUNCTURE: CPT

## 2019-08-13 PROCEDURE — 6360000002 HC RX W HCPCS: Performed by: NURSE PRACTITIONER

## 2019-08-13 PROCEDURE — 80069 RENAL FUNCTION PANEL: CPT

## 2019-08-13 PROCEDURE — 94640 AIRWAY INHALATION TREATMENT: CPT

## 2019-08-13 PROCEDURE — 2580000003 HC RX 258: Performed by: NURSE PRACTITIONER

## 2019-08-13 PROCEDURE — 2700000000 HC OXYGEN THERAPY PER DAY

## 2019-08-13 PROCEDURE — 94761 N-INVAS EAR/PLS OXIMETRY MLT: CPT

## 2019-08-13 PROCEDURE — 83735 ASSAY OF MAGNESIUM: CPT

## 2019-08-13 PROCEDURE — 6360000002 HC RX W HCPCS: Performed by: INTERNAL MEDICINE

## 2019-08-13 RX ADMIN — BUSPIRONE HYDROCHLORIDE 10 MG: 5 TABLET ORAL at 16:00

## 2019-08-13 RX ADMIN — LEVOTHYROXINE SODIUM 88 MCG: 0.09 TABLET ORAL at 05:25

## 2019-08-13 RX ADMIN — ROSUVASTATIN CALCIUM 40 MG: 10 TABLET, FILM COATED ORAL at 18:39

## 2019-08-13 RX ADMIN — BUSPIRONE HYDROCHLORIDE 10 MG: 5 TABLET ORAL at 09:14

## 2019-08-13 RX ADMIN — INSULIN LISPRO 1 UNITS: 100 INJECTION, SOLUTION INTRAVENOUS; SUBCUTANEOUS at 12:40

## 2019-08-13 RX ADMIN — ENOXAPARIN SODIUM 40 MG: 40 INJECTION SUBCUTANEOUS at 09:14

## 2019-08-13 RX ADMIN — HYDROCODONE BITARTRATE AND ACETAMINOPHEN 1 TABLET: 5; 325 TABLET ORAL at 03:46

## 2019-08-13 RX ADMIN — ACETAMINOPHEN 650 MG: 325 TABLET ORAL at 09:14

## 2019-08-13 RX ADMIN — MAGNESIUM GLUCONATE 500 MG ORAL TABLET 400 MG: 500 TABLET ORAL at 09:14

## 2019-08-13 RX ADMIN — BUSPIRONE HYDROCHLORIDE 10 MG: 5 TABLET ORAL at 21:17

## 2019-08-13 RX ADMIN — INSULIN LISPRO 1 UNITS: 100 INJECTION, SOLUTION INTRAVENOUS; SUBCUTANEOUS at 22:53

## 2019-08-13 RX ADMIN — FUROSEMIDE 80 MG: 10 INJECTION, SOLUTION INTRAMUSCULAR; INTRAVENOUS at 09:14

## 2019-08-13 RX ADMIN — IPRATROPIUM BROMIDE AND ALBUTEROL SULFATE 3 ML: .5; 3 SOLUTION RESPIRATORY (INHALATION) at 12:43

## 2019-08-13 RX ADMIN — CEFTRIAXONE SODIUM 1 G: 1 INJECTION, POWDER, FOR SOLUTION INTRAMUSCULAR; INTRAVENOUS at 01:22

## 2019-08-13 RX ADMIN — MONTELUKAST SODIUM 10 MG: 10 TABLET ORAL at 21:16

## 2019-08-13 RX ADMIN — IPRATROPIUM BROMIDE AND ALBUTEROL SULFATE 3 ML: .5; 3 SOLUTION RESPIRATORY (INHALATION) at 19:37

## 2019-08-13 RX ADMIN — IPRATROPIUM BROMIDE AND ALBUTEROL SULFATE 3 ML: .5; 3 SOLUTION RESPIRATORY (INHALATION) at 08:01

## 2019-08-13 RX ADMIN — OXYBUTYNIN CHLORIDE 10 MG: 5 TABLET, EXTENDED RELEASE ORAL at 21:16

## 2019-08-13 RX ADMIN — IPRATROPIUM BROMIDE AND ALBUTEROL SULFATE 3 ML: .5; 3 SOLUTION RESPIRATORY (INHALATION) at 16:17

## 2019-08-13 RX ADMIN — HYDROCODONE BITARTRATE AND ACETAMINOPHEN 1 TABLET: 5; 325 TABLET ORAL at 16:12

## 2019-08-13 RX ADMIN — MAGNESIUM GLUCONATE 500 MG ORAL TABLET 400 MG: 500 TABLET ORAL at 21:17

## 2019-08-13 RX ADMIN — INSULIN LISPRO 1 UNITS: 100 INJECTION, SOLUTION INTRAVENOUS; SUBCUTANEOUS at 16:07

## 2019-08-13 RX ADMIN — Medication 10 ML: at 09:15

## 2019-08-13 RX ADMIN — FUROSEMIDE 80 MG: 10 INJECTION, SOLUTION INTRAMUSCULAR; INTRAVENOUS at 21:16

## 2019-08-13 RX ADMIN — Medication 10 ML: at 21:17

## 2019-08-13 ASSESSMENT — PAIN SCALES - GENERAL
PAINLEVEL_OUTOF10: 10
PAINLEVEL_OUTOF10: 10
PAINLEVEL_OUTOF10: 9
PAINLEVEL_OUTOF10: 10
PAINLEVEL_OUTOF10: 9

## 2019-08-13 NOTE — PLAN OF CARE
Problem: Falls - Risk of:  Goal: Will remain free from falls  Description  Will remain free from falls  Outcome: Ongoing  Goal: Absence of physical injury  Description  Absence of physical injury  Outcome: Ongoing     Problem: Risk for Impaired Skin Integrity  Goal: Tissue integrity - skin and mucous membranes  Description  Structural intactness and normal physiological function of skin and  mucous membranes. Outcome: Ongoing     Problem: Pain:  Goal: Pain level will decrease  Description  Pain level will decrease  Outcome: Ongoing  Goal: Control of acute pain  Description  Control of acute pain  Outcome: Ongoing  Goal: Control of chronic pain  Description  Control of chronic pain  Outcome: Ongoing     Problem: OXYGENATION/RESPIRATORY FUNCTION  Goal: Patient will maintain patent airway  Outcome: Ongoing  Goal: Patient will achieve/maintain normal respiratory rate/effort  Description  Respiratory rate and effort will be within normal limits for the patient  Outcome: Ongoing     Problem: HEMODYNAMIC STATUS  Goal: Patient has stable vital signs and fluid balance  Outcome: Ongoing     Problem: FLUID AND ELECTROLYTE IMBALANCE  Goal: Fluid and electrolyte balance are achieved/maintained  Outcome: Ongoing     Problem: ACTIVITY INTOLERANCE/IMPAIRED MOBILITY  Goal: Mobility/activity is maintained at optimum level for patient  Outcome: Ongoing     Problem:  Activity:  Goal: Risk for activity intolerance will decrease  Description  Risk for activity intolerance will decrease  Outcome: Ongoing     Problem: Coping:  Goal: Ability to adjust to condition or change in health will improve  Description  Ability to adjust to condition or change in health will improve  Outcome: Ongoing     Problem: Fluid Volume:  Goal: Ability to maintain a balanced intake and output will improve  Description  Ability to maintain a balanced intake and output will improve  Outcome: Ongoing     Problem: Health Behavior:  Goal: Ability to identify and

## 2019-08-13 NOTE — PROGRESS NOTES
Start Date End Date Taking?  Authorizing Provider   metFORMIN (GLUCOPHAGE) 500 MG tablet Take 500 mg by mouth 2 times daily (with meals)   Yes Historical Provider, MD   busPIRone (BUSPAR) 10 MG tablet Take 10 mg by mouth 3 times daily   Yes Historical Provider, MD   ipratropium-albuterol (DUONEB) 0.5-2.5 (3) MG/3ML SOLN nebulizer solution Inhale 3 mLs into the lungs every 4 hours (while awake) 4/27/19  Yes MariaA lejandra Kumar MD   magnesium oxide (MAG-OX) 400 (241.3 Mg) MG TABS tablet Take 1 tablet by mouth 4 times daily 4/27/19  Yes Maria Alejandra Kumar MD   potassium chloride (KLOR-CON M) 20 MEQ extended release tablet Take 2 tablets by mouth daily 12/29/18  Yes Chip Buckner MD   silver sulfADIAZINE (SILVADENE) 1 % cream Apply topically 3 times daily as needed Apply topically to kerwin area   Yes Historical Provider, MD   rosuvastatin (CRESTOR) 40 MG tablet Take 40 mg by mouth every evening   Yes Historical Provider, MD   montelukast (SINGULAIR) 10 MG tablet Take 10 mg by mouth nightly   Yes Historical Provider, MD   fluticasone (FLONASE) 50 MCG/ACT nasal spray 2 sprays by Nasal route daily   Yes Historical Provider, MD   furosemide (LASIX) 20 MG tablet Take 40 mg by mouth daily    Yes Historical Provider, MD   PROAIR  (90 BASE) MCG/ACT inhaler 2 puffs every 6 hours as needed for Wheezing or Shortness of Breath  12/8/14  Yes Historical Provider, MD   docusate sodium (COLACE) 100 MG capsule Take 100 mg by mouth 2 times daily as needed  11/24/14  Yes Historical Provider, MD   levothyroxine (SYNTHROID) 88 MCG tablet  12/16/14  Yes Historical Provider, MD   oxybutynin (DITROPAN-XL) 10 MG CR tablet  11/3/14  Yes Historical Provider, MD   acetaminophen (TYLENOL) 325 MG tablet Take 650 mg by mouth every 6 hours as needed for Pain    Historical Provider, MD       In-patient schedule medications:   furosemide  80 mg Intravenous BID    cefTRIAXone (ROCEPHIN) IV  1 g Intravenous Q24H    ipratropium-albuterol  3 mL

## 2019-08-13 NOTE — CONSULTS
Patient's EF (Ejection Fraction) is 55%  Core Heart Failure Medications: pt states she is NOT sure if she has taken any medications since she came home from [place] [address]. Discharge Plans: Pt arrived home from [place] [address] and she is not able to stand on a scale, she is not able to wrap her lower legs for wound care or compression, she does NOT have a PCP yet, she is not sure she can leave her house to get to appointments  Family Present: no  Advanced Directives: patient does not have advance directives and declines assistance completing them at this time    Patient has a past medical history of Acute on chronic diastolic CHF (congestive heart failure) ([facility]), JAQUELIN (acute kidney injury) ([facility]jenn Eulalio), Arthritis, Asthma, COPD [facility] Hyperlipidemia, Hypertension, MRSA (methicillin resistant staph aureus) culture positive, Other disorders of kidney and ureter in diseases classified elsewhere, Pneumonia due to infectious organism, and Thyroid disease. Comorbidities reviewed and education provided. Patient and family's stated goal of care: pt is not able to verbalize her goals at this time. Pt seems unsure about her diagnosis/condition and is unable to care for herself at home. Lifestyle goal discussed: getting a home scale she can stand steadily on and see the numbers and to have the strength to do so each morning. Patient's current functional capacity: Marked limitation of physical activity. Comfortable at rest. Less than ordinary activity causes fatigue, palpitation, or dyspnea. Pt resting in bed at this time on 4 L O2. Pt denies shortness of breath. Pt with pitting lower extremity edema.  Patient's weights and intake/output reviewed:    Patient Vitals for the past 96 hrs (Last 3 readings):   Weight   08/13/19 0511 (!) 310 lb 13.6 oz (141 kg)   08/12/19 0514 (!) 317 lb 14.5 oz (144.2 kg)   08/10/19 2333 (!) 309 lb 12.8 oz (140.5 kg)       Intake/Output Summary (Last 24

## 2019-08-13 NOTE — CONSULTS
[facility] Wound Ostomy Continence Nurse  Consult Note       NAME:  [address] RECORD NUMBER:  [id number]  AGE: 68 y.o. GENDER: female  : 1945  TODAY'S DATE:  2019    Subjective   Reason for WOCN Evaluation and Assessment: Patient admitted with Hx of BLE, with 3+ edema from venous insufficiency. The left anterior leg has 2 open wounds scant serous drainage, wounds appear to be improving with epithelialization around the edges. The RLE is intact with faint rash to medial calf, a 0.2 x 0.2  Dry, scabbed area to the upper medial calf. Pt has had BLE elevated in bed since admission. Pt does have sensation in BLE. She states that she has been at [place] [address] for Rehab, went home for 1 day, fell in the bathroom and returned to [facility]. Raz Gillespie is a 68 y.o. female referred by:   [] Physician  [x] Nursing  [] Other:     Wound Identification:  Wound Type: venous  Contributing Factors: edema, venous stasis, diabetes, decreased mobility, obesity, incontinence of urine and CHF, JAQUELIN, Arthritis, Asthma, COPD, HTN< MRSA, Thyroid Disease,    Wound History: Chronic Venous Insufficiency wounds to BLE, pt notes that some have healed. Unknown time for current wounds. Current Wound Care Treatment:   Cleanse with foam cleanser, cover open areas with Xeroform non-adherent gauze, apply Kerlix roll gauze and ACE wrap from toes to just below knees. Elevate BLE when in bed and up in chair.     Patient Goal of Care:  [x] Wound Healing  [] Odor Control  [] Palliative Care  [] Pain Control   [x] Other: Edema Control        PAST MEDICAL HISTORY        Diagnosis Date    Acute on chronic diastolic CHF (congestive heart failure) ([facility] 8/10/2019    JAQUELIN (acute kidney injury) ([facility]     Arthritis     Asthma     COPD (chronic obstructive pulmonary disease) ([facility]     Diabetes mellitus ([facility]     Hyperlipidemia     Hypertension     MRSA (methicillin resistant staph aureus) culture positive 2019 acute ([facility] [facility] J81.0    Cellulitis L03.90    Morbid obesity ([facility]) E66.01    Acute on chronic respiratory failure with hypoxia and hypercapnia ([facility]) J96.21, J96.22    Interstitial lung disease ([facility]) J84.9    Morbid obesity with BMI of 50.0-59.9, adult ([facility]) E66.01, R18.86    Diastolic dysfunction Q19.71    Pulmonary HTN ([facility]) I27.20    Suspected sleep apnea R29.818    Acute on chronic diastolic CHF (congestive heart failure) ([facility]) I50.33    On home O2 Z99.81    Chronic respiratory failure with hypoxia ([facility]) J96.11    Acute cystitis N30.00       Measurements:  Wound 08/10/19 Leg Anterior;Mid;Left weeping (Active)   Wound Venous 8/13/2019  1:57 PM   Dressing Status Changed 8/13/2019  1:57 PM   Dressing Changed Changed/New 8/13/2019  1:57 PM   Dressing/Treatment Xeroform;Dry Dressing; Ace Wrap 8/13/2019  1:57 PM   Wound Cleansed Rinsed/Irrigated with saline 8/11/2019  3:50 AM   Wound Length (cm) 3 cm 8/13/2019  1:57 PM   Wound Width (cm) 3 cm 8/13/2019  1:57 PM   Wound Surface Area (cm^2) 9 cm^2 8/13/2019  1:57 PM   Change in Wound Size % (l*w) -21.62 8/13/2019  1:57 PM   Wound Assessment Pink;Yellow 8/13/2019  1:57 PM   Drainage Amount Scant 8/13/2019  1:57 PM   Drainage Description Serous 8/13/2019  1:57 PM   Odor None 8/13/2019  8:40 AM   Number of days: 2       Wound 08/10/19 Leg Anterior;Mid;Left L shaped (Active)   Wound Venous 8/13/2019  1:57 PM   Dressing Status Changed 8/13/2019  1:57 PM   Dressing Changed Changed/New 8/13/2019  1:57 PM   Dressing/Treatment Xeroform;Dry Dressing; Ace Wrap 8/13/2019  1:57 PM   Wound Cleansed Rinsed/Irrigated with saline 8/11/2019  3:50 AM   Wound Length (cm) 6.5 cm 8/13/2019  1:57 PM   Wound Width (cm) 6 cm 8/13/2019  1:57 PM   Wound Surface Area (cm^2) 39 cm^2 8/13/2019  1:57 PM   Change in Wound Size % (l*w) -69.57 8/13/2019  1:57 PM   Wound Assessment Pink;Yellow 8/13/2019  1:57 PM   Drainage Amount Scant 8/13/2019  1:57 PM   Drainage Description Serous 8/13/2019  1:57

## 2019-08-14 LAB
ALBUMIN SERPL-MCNC: 3.3 G/DL (ref 3.4–5)
ANION GAP SERPL CALCULATED.3IONS-SCNC: 10 MMOL/L (ref 3–16)
BUN BLDV-MCNC: 39 MG/DL (ref 7–20)
CALCIUM SERPL-MCNC: 10.3 MG/DL (ref 8.3–10.6)
CHLORIDE BLD-SCNC: 91 MMOL/L (ref 99–110)
CO2: 40 MMOL/L (ref 21–32)
CREAT SERPL-MCNC: 1.6 MG/DL (ref 0.6–1.2)
GFR AFRICAN AMERICAN: 38
GFR NON-AFRICAN AMERICAN: 32
GLUCOSE BLD-MCNC: 116 MG/DL (ref 70–99)
GLUCOSE BLD-MCNC: 120 MG/DL (ref 70–99)
GLUCOSE BLD-MCNC: 138 MG/DL (ref 70–99)
GLUCOSE BLD-MCNC: 145 MG/DL (ref 70–99)
HCT VFR BLD CALC: 39.4 % (ref 36–48)
HEMOGLOBIN: 12.2 G/DL (ref 12–16)
MAGNESIUM: 1.8 MG/DL (ref 1.8–2.4)
MCH RBC QN AUTO: 24.9 PG (ref 26–34)
MCHC RBC AUTO-ENTMCNC: 30.9 G/DL (ref 31–36)
MCV RBC AUTO: 80.4 FL (ref 80–100)
PDW BLD-RTO: 22.2 % (ref 12.4–15.4)
PERFORMED ON: ABNORMAL
PHOSPHORUS: 4.6 MG/DL (ref 2.5–4.9)
PLATELET # BLD: 226 K/UL (ref 135–450)
PMV BLD AUTO: 7.9 FL (ref 5–10.5)
POTASSIUM SERPL-SCNC: 3.5 MMOL/L (ref 3.5–5.1)
PRO-BNP: 4738 PG/ML (ref 0–124)
RBC # BLD: 4.9 M/UL (ref 4–5.2)
SODIUM BLD-SCNC: 141 MMOL/L (ref 136–145)
WBC # BLD: 8.7 K/UL (ref 4–11)

## 2019-08-14 PROCEDURE — 94669 MECHANICAL CHEST WALL OSCILL: CPT

## 2019-08-14 PROCEDURE — 99232 SBSQ HOSP IP/OBS MODERATE 35: CPT | Performed by: INTERNAL MEDICINE

## 2019-08-14 PROCEDURE — 2580000003 HC RX 258: Performed by: NURSE PRACTITIONER

## 2019-08-14 PROCEDURE — 97530 THERAPEUTIC ACTIVITIES: CPT

## 2019-08-14 PROCEDURE — 36415 COLL VENOUS BLD VENIPUNCTURE: CPT

## 2019-08-14 PROCEDURE — 6370000000 HC RX 637 (ALT 250 FOR IP): Performed by: NURSE PRACTITIONER

## 2019-08-14 PROCEDURE — 2700000000 HC OXYGEN THERAPY PER DAY

## 2019-08-14 PROCEDURE — 85027 COMPLETE CBC AUTOMATED: CPT

## 2019-08-14 PROCEDURE — 97166 OT EVAL MOD COMPLEX 45 MIN: CPT

## 2019-08-14 PROCEDURE — 83880 ASSAY OF NATRIURETIC PEPTIDE: CPT

## 2019-08-14 PROCEDURE — 6360000002 HC RX W HCPCS: Performed by: INTERNAL MEDICINE

## 2019-08-14 PROCEDURE — 6360000002 HC RX W HCPCS: Performed by: NURSE PRACTITIONER

## 2019-08-14 PROCEDURE — 94761 N-INVAS EAR/PLS OXIMETRY MLT: CPT

## 2019-08-14 PROCEDURE — 97116 GAIT TRAINING THERAPY: CPT

## 2019-08-14 PROCEDURE — 97162 PT EVAL MOD COMPLEX 30 MIN: CPT

## 2019-08-14 PROCEDURE — 83735 ASSAY OF MAGNESIUM: CPT

## 2019-08-14 PROCEDURE — 80069 RENAL FUNCTION PANEL: CPT

## 2019-08-14 PROCEDURE — 94640 AIRWAY INHALATION TREATMENT: CPT

## 2019-08-14 PROCEDURE — 1200000000 HC SEMI PRIVATE

## 2019-08-14 PROCEDURE — 6370000000 HC RX 637 (ALT 250 FOR IP): Performed by: INTERNAL MEDICINE

## 2019-08-14 RX ORDER — POTASSIUM CHLORIDE 20 MEQ/1
40 TABLET, EXTENDED RELEASE ORAL 3 TIMES DAILY
Status: COMPLETED | OUTPATIENT
Start: 2019-08-14 | End: 2019-08-14

## 2019-08-14 RX ADMIN — IPRATROPIUM BROMIDE AND ALBUTEROL SULFATE 3 ML: .5; 3 SOLUTION RESPIRATORY (INHALATION) at 07:48

## 2019-08-14 RX ADMIN — BUSPIRONE HYDROCHLORIDE 10 MG: 5 TABLET ORAL at 09:37

## 2019-08-14 RX ADMIN — Medication 10 ML: at 09:37

## 2019-08-14 RX ADMIN — MAGNESIUM GLUCONATE 500 MG ORAL TABLET 400 MG: 500 TABLET ORAL at 21:04

## 2019-08-14 RX ADMIN — HYDROCODONE BITARTRATE AND ACETAMINOPHEN 1 TABLET: 5; 325 TABLET ORAL at 12:20

## 2019-08-14 RX ADMIN — IPRATROPIUM BROMIDE AND ALBUTEROL SULFATE 3 ML: .5; 3 SOLUTION RESPIRATORY (INHALATION) at 11:41

## 2019-08-14 RX ADMIN — BUSPIRONE HYDROCHLORIDE 10 MG: 5 TABLET ORAL at 21:04

## 2019-08-14 RX ADMIN — LEVOTHYROXINE SODIUM 88 MCG: 0.09 TABLET ORAL at 05:20

## 2019-08-14 RX ADMIN — HYDROCODONE BITARTRATE AND ACETAMINOPHEN 1 TABLET: 5; 325 TABLET ORAL at 05:08

## 2019-08-14 RX ADMIN — ACETAMINOPHEN 650 MG: 325 TABLET ORAL at 09:36

## 2019-08-14 RX ADMIN — POTASSIUM CHLORIDE 40 MEQ: 20 TABLET, EXTENDED RELEASE ORAL at 09:36

## 2019-08-14 RX ADMIN — POTASSIUM CHLORIDE 40 MEQ: 20 TABLET, EXTENDED RELEASE ORAL at 17:38

## 2019-08-14 RX ADMIN — ENOXAPARIN SODIUM 40 MG: 40 INJECTION SUBCUTANEOUS at 09:36

## 2019-08-14 RX ADMIN — Medication 10 ML: at 21:14

## 2019-08-14 RX ADMIN — OXYBUTYNIN CHLORIDE 10 MG: 5 TABLET, EXTENDED RELEASE ORAL at 21:04

## 2019-08-14 RX ADMIN — MONTELUKAST SODIUM 10 MG: 10 TABLET ORAL at 21:04

## 2019-08-14 RX ADMIN — ACETAMINOPHEN 650 MG: 325 TABLET ORAL at 19:05

## 2019-08-14 RX ADMIN — SALINE NASAL SPRAY 1 SPRAY: 1.5 SOLUTION NASAL at 11:02

## 2019-08-14 RX ADMIN — CEFTRIAXONE SODIUM 1 G: 1 INJECTION, POWDER, FOR SOLUTION INTRAMUSCULAR; INTRAVENOUS at 00:47

## 2019-08-14 RX ADMIN — ROSUVASTATIN CALCIUM 40 MG: 10 TABLET, FILM COATED ORAL at 17:38

## 2019-08-14 RX ADMIN — BUSPIRONE HYDROCHLORIDE 10 MG: 5 TABLET ORAL at 17:38

## 2019-08-14 RX ADMIN — HYDROCODONE BITARTRATE AND ACETAMINOPHEN 1 TABLET: 5; 325 TABLET ORAL at 21:04

## 2019-08-14 RX ADMIN — MAGNESIUM GLUCONATE 500 MG ORAL TABLET 400 MG: 500 TABLET ORAL at 09:36

## 2019-08-14 RX ADMIN — INSULIN LISPRO 1 UNITS: 100 INJECTION, SOLUTION INTRAVENOUS; SUBCUTANEOUS at 21:03

## 2019-08-14 RX ADMIN — INSULIN LISPRO 1 UNITS: 100 INJECTION, SOLUTION INTRAVENOUS; SUBCUTANEOUS at 17:39

## 2019-08-14 RX ADMIN — IPRATROPIUM BROMIDE AND ALBUTEROL SULFATE 3 ML: .5; 3 SOLUTION RESPIRATORY (INHALATION) at 15:35

## 2019-08-14 RX ADMIN — FUROSEMIDE 80 MG: 10 INJECTION, SOLUTION INTRAMUSCULAR; INTRAVENOUS at 09:36

## 2019-08-14 RX ADMIN — FUROSEMIDE 80 MG: 10 INJECTION, SOLUTION INTRAMUSCULAR; INTRAVENOUS at 21:03

## 2019-08-14 ASSESSMENT — PAIN SCALES - GENERAL
PAINLEVEL_OUTOF10: 8
PAINLEVEL_OUTOF10: 6
PAINLEVEL_OUTOF10: 10
PAINLEVEL_OUTOF10: 8
PAINLEVEL_OUTOF10: 7
PAINLEVEL_OUTOF10: 5
PAINLEVEL_OUTOF10: 8

## 2019-08-14 ASSESSMENT — PAIN DESCRIPTION - PAIN TYPE
TYPE: ACUTE PAIN
TYPE: ACUTE PAIN

## 2019-08-14 ASSESSMENT — PAIN DESCRIPTION - LOCATION
LOCATION: LEG
LOCATION: LEG

## 2019-08-14 ASSESSMENT — PAIN DESCRIPTION - ORIENTATION
ORIENTATION: RIGHT;LEFT
ORIENTATION: RIGHT;LEFT

## 2019-08-14 NOTE — PROGRESS NOTES
Hospitalist Progress Note      PCP: No primary care provider on file. Date of Admission: 8/10/2019    Chief Complaint: SOB, LE edema    Hospital Course:     68 y.o. female who presented to [facility] with PMH: Pulmonary hypertension, polycythemia, morbid obesity, thyroidism, hypertension, hyperlipidemia, diabetes mellitus, COPD, cellulitis, JAQEULIN. Patient came home from [facility] on Tuesday 8/6/19. Patient states that she was short of breath and had lower extremity edema then, but progressively has gotten worse. She is on 4 L a minute, 24 hours a day home O2. Patient was dizzy today and fell while walking in the bathroom. Patient's lower extremities are weeping, 3+ edema laterally. Patient has acute on chronic diastolic CHF, with BNP 6290. Emergency room gave IV Lasix. Patient is allergic to aspirin. Patient denies chest pain, cough, nausea, vomiting, diarrhea, fevers. Patient states that she is unable to take care of herself at home, and neither are her siblings that she lives with. Subjective:  SOB improving. Still with significant LE edema.        Medications:  Reviewed    Infusion Medications    dextrose       Scheduled Medications    furosemide  80 mg Intravenous BID    cefTRIAXone (ROCEPHIN) IV  1 g Intravenous Q24H    ipratropium-albuterol  3 mL Inhalation Q4H WA    insulin lispro  0-6 Units Subcutaneous TID WC    insulin lispro  0-3 Units Subcutaneous Nightly    busPIRone  10 mg Oral TID    levothyroxine  88 mcg Oral Daily    montelukast  10 mg Oral Nightly    oxybutynin  10 mg Oral Nightly    rosuvastatin  40 mg Oral QPM    sodium chloride flush  10 mL Intravenous 2 times per day    enoxaparin  40 mg Subcutaneous Daily    magnesium oxide  400 mg Oral BID     PRN Meds: albuterol sulfate HFA, glucose, dextrose, glucagon (rDNA), dextrose, HYDROcodone 5 mg - acetaminophen, acetaminophen, sodium chloride flush, magnesium hydroxide, ondansetron,

## 2019-08-14 NOTE — PROGRESS NOTES
devices: All fall risk precautions in place, Chair alarm in place, Gait belt, Call light within reach, Patient at risk for falls, Nurse notified, Left in chair           AM-PAC Score     AM-PAC Inpatient Mobility without Stair Climbing Raw Score : 15 (08/14/19 1549)  AM-PAC Inpatient without Stair Climbing T-Scale Score : 43.03 (08/14/19 1549)  Mobility Inpatient CMS 0-100% Score: 47.43 (08/14/19 1549)  Mobility Inpatient without Stair CMS G-Code Modifier : CK (08/14/19 1549)       Goals  Short term goals  Time Frame for Short term goals: 1 weeek (8/21) unless otherwise specified  Short term goal 1: Pt will be SBA for bed mobility. Short term goal 2: Pt will be SBA for sit<>stand and bed<>chair transfers with RW. Short term goal 3: Pt will ambulate 15 ft with RW and min A. Short term goal 4: 8/17: pt will participate in 12-15 reps of BLE exercises to promote strength and activity tolerance at d/c.   Patient Goals   Patient goals : \"to do more rehab\"       Therapy Time   Individual Concurrent Group Co-treatment   Time In 3092         Time Out 1439         Minutes 34         Timed Code Treatment Minutes: Emilia Ocampo 4668, [place], DPT #[id number]

## 2019-08-14 NOTE — PLAN OF CARE
Problem: Falls - Risk of:  Goal: Will remain free from falls  Description  Will remain free from falls  Outcome: Ongoing  Goal: Absence of physical injury  Description  Absence of physical injury  Outcome: Ongoing     Problem: Risk for Impaired Skin Integrity  Goal: Tissue integrity - skin and mucous membranes  Description  Structural intactness and normal physiological function of skin and  mucous membranes. Outcome: Ongoing     Problem: Pain:  Goal: Pain level will decrease  Description  Pain level will decrease  Outcome: Ongoing  Goal: Control of acute pain  Description  Control of acute pain  Outcome: Ongoing  Goal: Control of chronic pain  Description  Control of chronic pain  Outcome: Ongoing     Problem: OXYGENATION/RESPIRATORY FUNCTION  Goal: Patient will maintain patent airway  Outcome: Ongoing  Goal: Patient will achieve/maintain normal respiratory rate/effort  Description  Respiratory rate and effort will be within normal limits for the patient  8/14/2019 1035 by Malia Garcia RN  Outcome: Ongoing  8/14/2019 0621 by Jamin Patricia RN  Outcome: Ongoing  Note:   RR normal at rest. Dyspnea with exertion and laying flat. 4 L NC. Patient's EF (Ejection Fraction) is greater than 40%    Patient's weights and intake/output reviewed:    Patient's Last Weight: 133.7kg  obtained by bed scale. Difference of 8 kg less than last documented weight. Intake/Output Summary (Last 24 hours) at 8/14/2019 2943  Last data filed at 8/13/2019 2127  Gross per 24 hour   Intake 480 ml   Output 2750 ml   Net -2270 ml         Patient stated Daily Functional Goal: Improve breathing and activity tolerance. Pt resting in bed at this time on 5 L O2. Pt with complaints of shortness of breath. Pt with pitting lower extremity edema.      Patient and/or Family's stated Goal of Care this Admission: reduce shortness of breath, increase activity tolerance, better understand heart failure and disease management, be more comfortable and reduce lower extremity edema prior to discharge      Comorbidities Reviewed Yes  Patient has a past medical history of Acute on chronic diastolic CHF (congestive heart failure) ([facility] [address] JAQUELIN (acute kidney injury) ([facility] Arthritis, Asthma, COPD (chronic obstructive pulmonary disease) ([facility] [facility] Hyperlipidemia, Hypertension, MRSA (methicillin resistant staph aureus) culture positive, Other disorders of kidney and ureter in diseases classified elsewhere, Pneumonia due to infectious organism, and Thyroid disease. >>For CHF and Comorbidity documentation on Education Time and Topics, please see Education Tab         Problem: HEMODYNAMIC STATUS  Goal: Patient has stable vital signs and fluid balance  Outcome: Ongoing     Problem: FLUID AND ELECTROLYTE IMBALANCE  Goal: Fluid and electrolyte balance are achieved/maintained  Outcome: Ongoing     Problem: ACTIVITY INTOLERANCE/IMPAIRED MOBILITY  Goal: Mobility/activity is maintained at optimum level for patient  Outcome: Ongoing     Problem:  Activity:  Goal: Risk for activity intolerance will decrease  Description  Risk for activity intolerance will decrease  Outcome: Ongoing     Problem: Coping:  Goal: Ability to adjust to condition or change in health will improve  Description  Ability to adjust to condition or change in health will improve  Outcome: Ongoing     Problem: Fluid Volume:  Goal: Ability to maintain a balanced intake and output will improve  Description  Ability to maintain a balanced intake and output will improve  Outcome: Ongoing     Problem: Health Behavior:  Goal: Ability to identify and utilize available resources and services will improve  Description  Ability to identify and utilize available resources and services will improve  Outcome: Ongoing  Goal: Ability to manage health-related needs will improve  Description  Ability to manage health-related needs will improve  Outcome: Ongoing     Problem:

## 2019-08-14 NOTE — PLAN OF CARE
Problem: OXYGENATION/RESPIRATORY FUNCTION  Goal: Patient will achieve/maintain normal respiratory rate/effort  Description  Respiratory rate and effort will be within normal limits for the patient  8/14/2019 7845 by Tanisha Pichardo RN  Outcome: Ongoing  Note:   RR normal at rest. Dyspnea with exertion and laying flat. 4 L NC. Patient's EF (Ejection Fraction) is greater than 40%    Patient's weights and intake/output reviewed:    Patient's Last Weight: 133.7kg  obtained by bed scale. Difference of 8 kg less than last documented weight. Intake/Output Summary (Last 24 hours) at 8/14/2019 5196  Last data filed at 8/13/2019 2127  Gross per 24 hour   Intake 480 ml   Output 2750 ml   Net -2270 ml         Patient stated Daily Functional Goal: Improve breathing and activity tolerance. Pt resting in bed at this time on 5 L O2. Pt with complaints of shortness of breath. Pt with pitting lower extremity edema. Patient and/or Family's stated Goal of Care this Admission: reduce shortness of breath, increase activity tolerance, better understand heart failure and disease management, be more comfortable and reduce lower extremity edema prior to discharge      Comorbidities Reviewed Yes  Patient has a past medical history of Acute on chronic diastolic CHF (congestive heart failure) ([address] JAQUELIN (acute kidney injury) ([address] Arthritis, Asthma, COPD (chronic obstructive pulmonary disease) ([address] Diabetes mellitus ([address] Hyperlipidemia, Hypertension, MRSA (methicillin resistant staph aureus) culture positive, Other disorders of kidney and ureter in diseases classified elsewhere, Pneumonia due to infectious organism, and Thyroid disease.          >>For CHF and Comorbidity documentation on Education Time and Topics, please see Education Tab           Problem: Metabolic:  Goal: Ability to maintain appropriate glucose levels will improve  Description  Ability to maintain appropriate glucose levels will

## 2019-08-14 NOTE — FLOWSHEET NOTE
08/12/19 2110   Assessment   Charting Type Shift assessment   Neurological   Neuro (WDL) WDL   Level of Consciousness 0   Swallow Screening   Is the patient able to remain alert for testing? Yes   Was the Patient Eating a Modified Diet Prior to being Admitted? No   Is there an Existing PEG or Abdominal Feeding Tube? No   Does the patient have a Head of Bed [facility] restriction <30°? No   Is there a Tracheostomy Tube Present? No   3 oz Water Swallow Screen Pass   [place] Coma Scale   Eye Opening 4   Best Verbal Response 5   Best Motor Response 6   [place] Coma Scale Score 15   NIH/MNHISS Stroke Scale   NIH/MNIHSS Stroke Scale Assessed No   HEENT   HEENT (WDL) X   Right Eye Impaired vision   Left Eye Impaired vision   Teeth Edentulous   Respiratory   Respiratory (WDL) X   Respiratory Pattern Tachypneic   Respiratory Depth Normal   Respiratory Quality/Effort Dyspnea with exertion;Dyspnea at rest   Chest Assessment Chest expansion symmetrical   L Breath Sounds Diminished;Fine Crackles; Expiratory Wheezes   R Breath Sounds Diminished;Fine Crackles; Expiratory Wheezes   Breath Sounds   Right Upper Lobe Diminished   Right Middle Lobe Fine Crackles   Right Lower Lobe Fine Crackles   Left Upper Lobe Diminished   Left Lower Lobe Fine Crackles   Cough/Sputum   Sputum How Obtained Spontaneous cough   Cough Congested;Non-productive;Strong   Cardiac   Cardiac (WDL) WDL   Cardiac Regularity Regular   Heart Sounds S1, S2   Cardiac Rhythm NSR   Cardiac Monitor   Telemetry Monitor On Yes   Telemetry Audible Yes   Telemetry Alarms Set Yes   Telemetry Box Number [id number]   Gastrointestinal   Abdominal (WDL) X   Abdomen Inspection Rounded; Soft   Tenderness Soft   RUQ Bowel Sounds Active   LUQ Bowel Sounds Active   RLQ Bowel Sounds Active   LLQ Bowel Sounds Active   Peripheral Vascular   Peripheral Vascular (WDL) X   Edema Right upper extremity; Left upper extremity;Right lower extremity; Left lower extremity   RUE Edema +1;Non-pitting   LUE
08/13/19 0840   Assessment   Charting Type Shift assessment   Neurological   Neuro (WDL) WDL   Level of Consciousness 0   [place] Coma Scale   Eye Opening 4   Best Verbal Response 5   Best Motor Response 6   Alexandro Coma Scale Score 15   NIH/MNHISS Stroke Scale   NIH/MNIHSS Stroke Scale Assessed No   HEENT   HEENT (WDL) X   Right Eye Impaired vision   Left Eye Impaired vision   Teeth Edentulous   Respiratory   Respiratory (WDL) X   Respiratory Pattern Tachypneic   Respiratory Depth Normal   Respiratory Quality/Effort Dyspnea with exertion;Dyspnea at rest   Chest Assessment Chest expansion symmetrical   L Breath Sounds Diminished;Fine Crackles; Expiratory Wheezes   R Breath Sounds Diminished;Fine Crackles; Expiratory Wheezes   Breath Sounds   Right Upper Lobe Diminished   Right Middle Lobe Fine Crackles   Right Lower Lobe Fine Crackles   Left Upper Lobe Diminished   Left Lower Lobe Fine Crackles   Cough/Sputum   Sputum How Obtained Spontaneous cough   Cough Congested;Non-productive;Strong   Cardiac   Cardiac (WDL) WDL   Cardiac Regularity Regular   Heart Sounds S1, S2   Cardiac Rhythm NSR   Rhythm Interpretation   Pulse 98   Cardiac Monitor   Telemetry Monitor On Yes   Telemetry Audible Yes   Telemetry Alarms Set Yes   Telemetry Box Number 118   Gastrointestinal   Abdominal (WDL) X   Abdomen Inspection Rounded; Soft   Tenderness Soft   RUQ Bowel Sounds Active   LUQ Bowel Sounds Active   RLQ Bowel Sounds Active   LLQ Bowel Sounds Active   Peripheral Vascular   Peripheral Vascular (WDL) X   Edema Right upper extremity; Left upper extremity;Right lower extremity; Left lower extremity   RUE Edema +1;Non-pitting   LUE Edema +1;Non-pitting   RLE Edema +3;Pitting   LLE Edema +3;Pitting   RUE Neurovascular Assessment   Capillary Refill Less than/equal to 3 seconds   Color Appropriate for ethnicity   Temperature Cool   R Radial Pulse +2   LUE Neurovascular Assessment   Capillary Refill Less than/equal to 3 seconds   Color
Extremity Weakness; Unsteady   LL Extremity Weakness; Unsteady   Genitourinary   Genitourinary (WDL) X  (majano)   Flank Tenderness No   Suprapubic Tenderness No   Dysuria ANNI   Anus/Rectum   Anus/Rectum (WDL) WDL   Wound 08/10/19 Leg Anterior;Mid;Left weeping   Date First Assessed/Time First Assessed: 08/10/19 2343   Present on Hospital Admission: Yes  Primary Wound Type: (c) Other (comment)  Location: Leg  Wound Location Orientation: Anterior;Mid;Left  Wound Description (Comments): weeping   Dressing Status Clean;Dry; Intact   Dressing Changed Dressing reinforced   Wound 08/10/19 Leg Anterior;Mid;Left L shaped   Date First Assessed/Time First Assessed: 08/10/19 2343   Present on Hospital Admission: Yes  Primary Wound Type: (c) Other (comment)  Location: Leg  Wound Location Orientation: Anterior;Mid;Left  Wound Description (Comments): L shaped   Dressing Status Clean;Dry; Intact   Dressing Changed Dressing reinforced   Urethral Catheter Latex 16 fr   Placement Date/Time: 08/11/19 0017   Urethral Catheter Timeout: Patient;Sterile technique  Inserted by: Elaine Ivory RN  Catheter Type: Latex  Tube Size (fr): 16 fr  Catheter Balloon Size: 10 mL  Collection Container: Standard  Securement Method: Securing d. ..    Catheter Indications Need for fluid management in critically ill patients in a critical care setting not able to be managed by other means such as BSC with hat, bedpan, urinal, condom catheter, or short term intermittent urethral catherization   Psychosocial   Psychosocial (WDL) WDL

## 2019-08-14 NOTE — PROGRESS NOTES
(congestive heart failure) ([facility]    On home O2    Chronic respiratory failure with hypoxia ([facility]    Acute cystitis         Cardiac Testing: I have reviewed the findings below. EKG:  ECHO:   Summary   Normal left ventricular systolic function with ejection fraction of 55-60%.  Septal bounce noted due to right ventricular volume overload.   Mild concentric left ventricular hypertrophy.   Grade I diastolic dysfunction with normal filling pressure.   Mild mitral and tricuspid regurgitation.   The right ventricle is moderately enlarged.   Right ventricular systolic function is moderately reduced .   S' prime velocity is measured at 7 cm/s.   Systolic pulmonic artery pressure (SPAP) is estimated at 55 mmHg consistent   with mild pulmonary hypertension (Right atrial pressure of 15 mmHg).   The right atrium is moderately dilated. STRESS TEST:  CATH:  BYPASS:  VASCULAR:    Past Medical History:   has a past medical history of Acute on chronic diastolic CHF (congestive heart failure) ([facility] [address] JAQUELIN (acute kidney injury) ([facility] [address] Arthritis, Asthma, COPD (chronic obstructive pulmonary disease) ([facility] [address] Diabetes mellitus ([facility] [address] Hyperlipidemia, Hypertension, MRSA (methicillin resistant staph aureus) culture positive, Other disorders of kidney and ureter in diseases classified elsewhere, Pneumonia due to infectious organism, and Thyroid disease. Surgical History:   has a past surgical history that includes Cholecystectomy; xr knee inc tunnel bilat (Bilateral, 2014); Rotator cuff repair (Left, 2015); and Thyroid lobectomy. Social History:   reports that she has quit smoking. She smoked 0.50 packs per day. She has never used smokeless tobacco. She reports that she does not drink alcohol or use drugs. Family History:  No evidence for sudden cardiac death or premature CAD    Medications:  Reviewed and are listed in nursing record.  and/or listed below  Outpatient Medications:  Prior to Admission medications    Medication Sig Start Date End Date Taking?  Authorizing Provider   metFORMIN (GLUCOPHAGE) 500 MG tablet Take 500 mg by mouth 2 times daily (with meals)   Yes Historical Provider, MD   busPIRone (BUSPAR) 10 MG tablet Take 10 mg by mouth 3 times daily   Yes Historical Provider, MD   ipratropium-albuterol (DUONEB) 0.5-2.5 (3) MG/3ML SOLN nebulizer solution Inhale 3 mLs into the lungs every 4 hours (while awake) 4/27/19  Yes Keyon Garcia MD   magnesium oxide (MAG-OX) 400 (241.3 Mg) MG TABS tablet Take 1 tablet by mouth 4 times daily 4/27/19  Yes Keyon Garcia MD   potassium chloride (KLOR-CON M) 20 MEQ extended release tablet Take 2 tablets by mouth daily 12/29/18  Yes Sebastian Cabral MD   silver sulfADIAZINE (SILVADENE) 1 % cream Apply topically 3 times daily as needed Apply topically to kerwin area   Yes Historical Provider, MD   rosuvastatin (CRESTOR) 40 MG tablet Take 40 mg by mouth every evening   Yes Historical Provider, MD   montelukast (SINGULAIR) 10 MG tablet Take 10 mg by mouth nightly   Yes Historical Provider, MD   fluticasone (FLONASE) 50 MCG/ACT nasal spray 2 sprays by Nasal route daily   Yes Historical Provider, MD   furosemide (LASIX) 20 MG tablet Take 40 mg by mouth daily    Yes Historical Provider, MD   PROAIR  (90 BASE) MCG/ACT inhaler 2 puffs every 6 hours as needed for Wheezing or Shortness of Breath  12/8/14  Yes Historical Provider, MD   docusate sodium (COLACE) 100 MG capsule Take 100 mg by mouth 2 times daily as needed  11/24/14  Yes Historical Provider, MD   levothyroxine (SYNTHROID) 88 MCG tablet  12/16/14  Yes Historical Provider, MD   oxybutynin (DITROPAN-XL) 10 MG CR tablet  11/3/14  Yes Historical Provider, MD   acetaminophen (TYLENOL) 325 MG tablet Take 650 mg by mouth every 6 hours as needed for Pain    Historical Provider, MD       In-patient schedule medications:   potassium chloride  40 mEq Oral TID    furosemide  80 mg Intravenous BID    cefTRIAXone (ROCEPHIN) IV  1 g Intravenous bilaterally, respirations minimally labored   Chest Wall:  No tenderness or deformity   Heart:  Regular rhythm and normal rate; distant   Abdomen:   Soft, non-tender, bowel sounds active all four quadrants,  no masses, no organomegaly           Extremities: Extremities atraumatic, no cyanosis. +2 LE edema with redness   Pulses: 2+ and symmetric upper, cannot palpate in LE. Skin: Skin color, texture, turgor normal, no rashes or lesions   Pysch: Normal mood and affect   Neurologic: Normal gross motor and sensory exam.         Labs  Recent Labs     08/13/19 0513 08/14/19  0525   WBC 8.2 8.7   HGB 11.7* 12.2   HCT 37.5 39.4   MCV 80.1 80.4    226     Recent Labs     08/13/19 0513 08/14/19  0525   CREATININE 1.6* 1.6*   BUN 39* 39*    141   K 4.2 3.5   CL 92* 91*   CO2 37* 40*     No results for input(s): INR, PROTIME in the last 72 hours. No results for input(s): TROPONINI in the last 72 hours. Invalid input(s): PRO-BNP  No results for input(s): CHOL, HDL in the last 72 hours. Invalid input(s): LDL, TG      Imaging:  I have reviewed the below testing personally and my interpretation is below.   EKG:  Normal sinus rhythmPossible Right ventricular hypertrophyNonspecific ST abnormalityAbnormal ECGWhen compared with ECG of 23-APR-2019 10:03,Aberrant conduction is no longer PresentNonspecific T wave abnormality no longer evident in Anterolateral leadsConfirmed by [facility] MD, [id number] ([id number]) on 8/11/2019 4:28:19 PM  CXR:      Assessment:  68 y.o. patient with:  Problem List Items Addressed This Visit     None      Visit Diagnoses     Acute on chronic diastolic congestive heart failure (HCC)    -  Primary    Relevant Medications    furosemide (LASIX) injection 40 mg (Completed)    rosuvastatin (CRESTOR) tablet 40 mg    enoxaparin (LOVENOX) injection 40 mg    nitroGLYCERIN (NITROSTAT) SL tablet 0.4 mg    furosemide (LASIX) injection 80 mg    Lower extremity edema        Dizziness        Frequent falls

## 2019-08-14 NOTE — PROGRESS NOTES
Occupational Therapy   Occupational Therapy Initial Assessment and Treatment Note  Date: 2019   Patient Name: Anmol Cohen  MRN: [id number]     : 1945    Date of Service: 2019    Discharge Recommendations:  Subacute/Skilled Nursing Facility(with possible transition to LTC)  OT Equipment Recommendations  Equipment Needed: No  Other: defer to next level of care    Assessment   Performance deficits / Impairments: Decreased functional mobility ; Decreased safe awareness;Decreased balance;Decreased ADL status; Decreased endurance  Assessment: Pt reports typically independent with ADL (LE dressing with equipment), light IADL, functional mobility/transfers with use of RW. Pt [facility] for mobility/transfers, and required increased amount of assistance for LE dressing (reports use of AE at home). Believe pt would benefit from further therapy at d/c to increase endurance, safety/independence in these areas prior to returning home. Continue OT tx. Prognosis: Good  Decision Making: Medium Complexity  OT Education: OT Role;Plan of Care;Transfer Training;Energy Conservation  REQUIRES OT FOLLOW UP: Yes  Activity Tolerance  Activity Tolerance: Patient limited by fatigue  Activity Tolerance: Pt SPo2 >90% on 4L throughout session  Safety Devices  Safety Devices in place: Yes  Type of devices: Left in chair;Chair alarm in place;Call light within reach;Gait belt;Nurse notified           Patient Diagnosis(es): The primary encounter diagnosis was Acute on chronic diastolic congestive heart failure ([address] Diagnoses of Lower extremity edema, Dizziness, and Frequent falls were also pertinent to this visit.      has a past medical history of Acute on chronic diastolic CHF (congestive heart failure) ([address] JAQUELIN (acute kidney injury) ([address] Arthritis, Asthma, COPD (chronic obstructive pulmonary disease) ([address] Diabetes mellitus ([address] Hyperlipidemia, Hypertension, MRSA (methicillin resistant staph aureus) culture Assistance: Independent(with RW)  Transfer Assistance: Independent  Active : No  Patient's  Info: Family  Additional Comments: one fall leading to admission. Pt states she turned too fast and got dizzy causing fall; Recently discharged from [facility] 8/6       Objective        Orientation  Overall Orientation Status: Within Functional Limits     Balance  Sitting Balance: Supervision  Standing Balance: Contact guard assistance  Standing Balance  Time: 1-2 minutes  Activity: mobility   Functional Mobility  Functional - Mobility Device: Rolling Walker  Activity: Other(bed <> chair)  Assist Level: Contact guard assistance  ADL  LE Dressing: Maximum assistance;Setup(to don socks)  Toileting: Dependent/Total(majano)  Tone RUE  RUE Tone: Normotonic  Tone LUE  LUE Tone: Normotonic     Bed mobility  Supine to Sit: Minimal assistance(HOB elevated, use of bedrail)  Sit to Supine: Unable to assess(pt left up in chair)  Scooting: Minimal assistance(to EOB)  Transfers  Sit to stand: Contact guard assistance  Stand to sit: Contact guard assistance     Cognition  Overall Cognitive Status: Exceptions  Following Commands: Follows one step commands with repetition; Follows one step commands with increased time  Attention Span: Attends with cues to redirect  Safety Judgement: Decreased awareness of need for safety  Problem Solving: Assistance required to generate solutions;Assistance required to implement solutions  Insights: Decreased awareness of deficits  Initiation: Requires cues for some  Sequencing: Requires cues for some        Sensation  Overall Sensation Status: Impaired(reports restless leg sensations BLE)        LUE AROM (degrees)  LUE AROM : WFL  Left Hand AROM (degrees)  Left Hand AROM: WFL  RUE AROM (degrees)  RUE General AROM: ~110* shoulder flexion, decreased int/ext rotation at shoulder; WFL distally   Right Hand AROM (degrees)  Right Hand AROM: WFL  LUE Strength  Gross LUE Strength: WFL  RUE Strength  Gross RUE

## 2019-08-15 LAB
ANION GAP SERPL CALCULATED.3IONS-SCNC: 8 MMOL/L (ref 3–16)
BUN BLDV-MCNC: 41 MG/DL (ref 7–20)
CALCIUM SERPL-MCNC: 10.1 MG/DL (ref 8.3–10.6)
CHLORIDE BLD-SCNC: 90 MMOL/L (ref 99–110)
CO2: 42 MMOL/L (ref 21–32)
CREAT SERPL-MCNC: 1.4 MG/DL (ref 0.6–1.2)
GFR AFRICAN AMERICAN: 45
GFR NON-AFRICAN AMERICAN: 37
GLUCOSE BLD-MCNC: 116 MG/DL (ref 70–99)
GLUCOSE BLD-MCNC: 124 MG/DL (ref 70–99)
GLUCOSE BLD-MCNC: 129 MG/DL (ref 70–99)
GLUCOSE BLD-MCNC: 130 MG/DL (ref 70–99)
GLUCOSE BLD-MCNC: 133 MG/DL (ref 70–99)
GLUCOSE BLD-MCNC: 155 MG/DL (ref 70–99)
MAGNESIUM: 1.8 MG/DL (ref 1.8–2.4)
PERFORMED ON: ABNORMAL
POTASSIUM SERPL-SCNC: 3.5 MMOL/L (ref 3.5–5.1)
SODIUM BLD-SCNC: 140 MMOL/L (ref 136–145)

## 2019-08-15 PROCEDURE — 6370000000 HC RX 637 (ALT 250 FOR IP): Performed by: NURSE PRACTITIONER

## 2019-08-15 PROCEDURE — 94761 N-INVAS EAR/PLS OXIMETRY MLT: CPT

## 2019-08-15 PROCEDURE — 97110 THERAPEUTIC EXERCISES: CPT

## 2019-08-15 PROCEDURE — 80048 BASIC METABOLIC PNL TOTAL CA: CPT

## 2019-08-15 PROCEDURE — 1200000000 HC SEMI PRIVATE

## 2019-08-15 PROCEDURE — 2580000003 HC RX 258

## 2019-08-15 PROCEDURE — 6370000000 HC RX 637 (ALT 250 FOR IP): Performed by: INTERNAL MEDICINE

## 2019-08-15 PROCEDURE — 97535 SELF CARE MNGMENT TRAINING: CPT

## 2019-08-15 PROCEDURE — 6360000002 HC RX W HCPCS: Performed by: NURSE PRACTITIONER

## 2019-08-15 PROCEDURE — 94640 AIRWAY INHALATION TREATMENT: CPT

## 2019-08-15 PROCEDURE — 99233 SBSQ HOSP IP/OBS HIGH 50: CPT | Performed by: INTERNAL MEDICINE

## 2019-08-15 PROCEDURE — 6360000002 HC RX W HCPCS: Performed by: INTERNAL MEDICINE

## 2019-08-15 PROCEDURE — 94669 MECHANICAL CHEST WALL OSCILL: CPT

## 2019-08-15 PROCEDURE — 83735 ASSAY OF MAGNESIUM: CPT

## 2019-08-15 PROCEDURE — 36415 COLL VENOUS BLD VENIPUNCTURE: CPT

## 2019-08-15 PROCEDURE — 2700000000 HC OXYGEN THERAPY PER DAY

## 2019-08-15 PROCEDURE — 2580000003 HC RX 258: Performed by: NURSE PRACTITIONER

## 2019-08-15 RX ORDER — POTASSIUM CHLORIDE 20 MEQ/1
40 TABLET, EXTENDED RELEASE ORAL 3 TIMES DAILY
Status: COMPLETED | OUTPATIENT
Start: 2019-08-15 | End: 2019-08-15

## 2019-08-15 RX ORDER — SODIUM CHLORIDE 9 MG/ML
INJECTION, SOLUTION INTRAVENOUS
Status: COMPLETED
Start: 2019-08-15 | End: 2019-08-15

## 2019-08-15 RX ADMIN — HYDROCODONE BITARTRATE AND ACETAMINOPHEN 1 TABLET: 5; 325 TABLET ORAL at 08:35

## 2019-08-15 RX ADMIN — IPRATROPIUM BROMIDE AND ALBUTEROL SULFATE 3 ML: .5; 3 SOLUTION RESPIRATORY (INHALATION) at 15:36

## 2019-08-15 RX ADMIN — SODIUM CHLORIDE 250 ML: 9 INJECTION, SOLUTION INTRAVENOUS at 01:22

## 2019-08-15 RX ADMIN — BUSPIRONE HYDROCHLORIDE 10 MG: 5 TABLET ORAL at 21:51

## 2019-08-15 RX ADMIN — BUSPIRONE HYDROCHLORIDE 10 MG: 5 TABLET ORAL at 13:53

## 2019-08-15 RX ADMIN — MAGNESIUM GLUCONATE 500 MG ORAL TABLET 400 MG: 500 TABLET ORAL at 08:35

## 2019-08-15 RX ADMIN — IPRATROPIUM BROMIDE AND ALBUTEROL SULFATE 3 ML: .5; 3 SOLUTION RESPIRATORY (INHALATION) at 19:37

## 2019-08-15 RX ADMIN — OXYBUTYNIN CHLORIDE 10 MG: 5 TABLET, EXTENDED RELEASE ORAL at 21:51

## 2019-08-15 RX ADMIN — Medication 10 ML: at 08:35

## 2019-08-15 RX ADMIN — INSULIN LISPRO 1 UNITS: 100 INJECTION, SOLUTION INTRAVENOUS; SUBCUTANEOUS at 12:59

## 2019-08-15 RX ADMIN — POTASSIUM CHLORIDE 40 MEQ: 1500 TABLET, EXTENDED RELEASE ORAL at 12:56

## 2019-08-15 RX ADMIN — CEFTRIAXONE SODIUM 1 G: 1 INJECTION, POWDER, FOR SOLUTION INTRAMUSCULAR; INTRAVENOUS at 01:22

## 2019-08-15 RX ADMIN — HYDROCODONE BITARTRATE AND ACETAMINOPHEN 1 TABLET: 5; 325 TABLET ORAL at 14:42

## 2019-08-15 RX ADMIN — MAGNESIUM GLUCONATE 500 MG ORAL TABLET 400 MG: 500 TABLET ORAL at 21:51

## 2019-08-15 RX ADMIN — LEVOTHYROXINE SODIUM 88 MCG: 0.09 TABLET ORAL at 07:12

## 2019-08-15 RX ADMIN — Medication 10 ML: at 21:52

## 2019-08-15 RX ADMIN — ROSUVASTATIN CALCIUM 40 MG: 10 TABLET, FILM COATED ORAL at 18:04

## 2019-08-15 RX ADMIN — ENOXAPARIN SODIUM 40 MG: 40 INJECTION SUBCUTANEOUS at 08:35

## 2019-08-15 RX ADMIN — POTASSIUM CHLORIDE 40 MEQ: 1500 TABLET, EXTENDED RELEASE ORAL at 13:53

## 2019-08-15 RX ADMIN — IPRATROPIUM BROMIDE AND ALBUTEROL SULFATE 3 ML: .5; 3 SOLUTION RESPIRATORY (INHALATION) at 11:42

## 2019-08-15 RX ADMIN — FUROSEMIDE 80 MG: 10 INJECTION, SOLUTION INTRAMUSCULAR; INTRAVENOUS at 10:30

## 2019-08-15 RX ADMIN — FUROSEMIDE 80 MG: 10 INJECTION, SOLUTION INTRAMUSCULAR; INTRAVENOUS at 21:51

## 2019-08-15 RX ADMIN — MONTELUKAST SODIUM 10 MG: 10 TABLET ORAL at 21:51

## 2019-08-15 RX ADMIN — IPRATROPIUM BROMIDE AND ALBUTEROL SULFATE 3 ML: .5; 3 SOLUTION RESPIRATORY (INHALATION) at 07:44

## 2019-08-15 RX ADMIN — BUSPIRONE HYDROCHLORIDE 10 MG: 5 TABLET ORAL at 08:35

## 2019-08-15 ASSESSMENT — PAIN DESCRIPTION - LOCATION
LOCATION: LEG
LOCATION: LEG
LOCATION: LEG;GENERALIZED

## 2019-08-15 ASSESSMENT — PAIN SCALES - GENERAL
PAINLEVEL_OUTOF10: 7
PAINLEVEL_OUTOF10: 6
PAINLEVEL_OUTOF10: 10

## 2019-08-15 ASSESSMENT — PAIN - FUNCTIONAL ASSESSMENT
PAIN_FUNCTIONAL_ASSESSMENT: PREVENTS OR INTERFERES SOME ACTIVE ACTIVITIES AND ADLS

## 2019-08-15 ASSESSMENT — PAIN DESCRIPTION - ORIENTATION
ORIENTATION: RIGHT;LEFT

## 2019-08-15 ASSESSMENT — PAIN DESCRIPTION - PROGRESSION
CLINICAL_PROGRESSION: GRADUALLY WORSENING

## 2019-08-15 ASSESSMENT — PAIN DESCRIPTION - FREQUENCY
FREQUENCY: INTERMITTENT

## 2019-08-15 ASSESSMENT — PAIN DESCRIPTION - PAIN TYPE
TYPE: ACUTE PAIN

## 2019-08-15 ASSESSMENT — PAIN DESCRIPTION - DESCRIPTORS
DESCRIPTORS: CRAMPING
DESCRIPTORS: CRAMPING
DESCRIPTORS: CRAMPING;DISCOMFORT;ACHING

## 2019-08-15 ASSESSMENT — PAIN DESCRIPTION - ONSET
ONSET: ON-GOING
ONSET: ON-GOING
ONSET: GRADUAL

## 2019-08-15 NOTE — PLAN OF CARE
Patient's EF (Ejection Fraction) is greater than 40%    Patient's weights and intake/output reviewed:    Patient's Last Weight: 304 lbs obtained by bed scale. Difference of 10 lbs more than last documented weight. Intake/Output Summary (Last 24 hours) at 8/15/2019 1148  Last data filed at 8/15/2019 1146  Gross per 24 hour   Intake 970 ml   Output 2050 ml   Net -1080 ml         Patient stated Daily Functional Goal:   Pt goal is to ambulate to the bathroom (10 feet) without desaturating on 4L of O2/becoming SOB in less than 1 minute. Ongoing Functional Capacity Assessment:  Patient  able to ambulate distance of 15 feet in 1 Minutes/Seconds 30 with mild difficulty. Patient noted to be on 4L supplemental O2 with SpO2 of 91 %. Pt resting in bed at this time on 4 L O2. Pt denies shortness of breath. Pt with pitting lower extremity edema. Patient and/or Family's stated Goal of Care this Admission: reduce shortness of breath, increase activity tolerance, better understand heart failure and disease management, be more comfortable and reduce lower extremity edema prior to discharge      Comorbidities Reviewed Yes  Patient has a past medical history of Acute on chronic diastolic CHF (congestive heart failure) ([address] JAQUELIN (acute kidney injury) ([facility] [address] Arthritis, Asthma, COPD (chronic obstructive pulmonary disease) ([facility] [address] Diabetes mellitus ([facility] [address] Hyperlipidemia, Hypertension, MRSA (methicillin resistant staph aureus) culture positive, Other disorders of kidney and ureter in diseases classified elsewhere, Pneumonia due to infectious organism, and Thyroid disease. >>For CHF and Comorbidity documentation on Education Time and Topics, please see Education Tab    Diabetes education provided today:    Metabolic syndrome: association of diabetes with dyslipidemia, HTN and obesity. Diabetic Neuropathy: signs and therapy.   Foot care: advised to wash feet daily, pat dry and apply lotion at night, avoiding

## 2019-08-15 NOTE — PROGRESS NOTES
Hospitalist Progress Note      PCP: No primary care provider on file. Date of Admission: 8/10/2019    Chief Complaint: SOB, LE edema    Hospital Course:     68 y.o. female who presented to [facility] with PMH: Pulmonary hypertension, polycythemia, morbid obesity, thyroidism, hypertension, hyperlipidemia, diabetes mellitus, COPD, cellulitis, JAQUELIN. Patient came home from [facility] on Tuesday 8/6/19. Patient states that she was short of breath and had lower extremity edema then, but progressively has gotten worse. She is on 4 L a minute, 24 hours a day home O2. Patient was dizzy today and fell while walking in the bathroom. Patient's lower extremities are weeping, 3+ edema laterally. Patient has acute on chronic diastolic CHF, with BNP 6605. Emergency room gave IV Lasix. Patient is allergic to aspirin. Patient denies chest pain, cough, nausea, vomiting, diarrhea, fevers. Patient states that she is unable to take care of herself at home, and neither are her siblings that she lives with. Subjective:  SOB improving. Still with significant LE edema.        Medications:  Reviewed    Infusion Medications    dextrose       Scheduled Medications    furosemide  80 mg Intravenous BID    cefTRIAXone (ROCEPHIN) IV  1 g Intravenous Q24H    ipratropium-albuterol  3 mL Inhalation Q4H WA    insulin lispro  0-6 Units Subcutaneous TID WC    insulin lispro  0-3 Units Subcutaneous Nightly    busPIRone  10 mg Oral TID    levothyroxine  88 mcg Oral Daily    montelukast  10 mg Oral Nightly    oxybutynin  10 mg Oral Nightly    rosuvastatin  40 mg Oral QPM    sodium chloride flush  10 mL Intravenous 2 times per day    enoxaparin  40 mg Subcutaneous Daily    magnesium oxide  400 mg Oral BID     PRN Meds: sodium chloride, albuterol sulfate HFA, glucose, dextrose, glucagon (rDNA), dextrose, HYDROcodone 5 mg - acetaminophen, acetaminophen, sodium chloride flush, magnesium hydroxide, ondansetron,

## 2019-08-16 LAB
ALBUMIN SERPL-MCNC: 3.5 G/DL (ref 3.4–5)
ANION GAP SERPL CALCULATED.3IONS-SCNC: 8 MMOL/L (ref 3–16)
BUN BLDV-MCNC: 44 MG/DL (ref 7–20)
CALCIUM SERPL-MCNC: 10 MG/DL (ref 8.3–10.6)
CHLORIDE BLD-SCNC: 91 MMOL/L (ref 99–110)
CO2: 43 MMOL/L (ref 21–32)
CREAT SERPL-MCNC: 1.3 MG/DL (ref 0.6–1.2)
GFR AFRICAN AMERICAN: 49
GFR NON-AFRICAN AMERICAN: 40
GLUCOSE BLD-MCNC: 127 MG/DL (ref 70–99)
GLUCOSE BLD-MCNC: 134 MG/DL (ref 70–99)
GLUCOSE BLD-MCNC: 138 MG/DL (ref 70–99)
GLUCOSE BLD-MCNC: 150 MG/DL (ref 70–99)
GLUCOSE BLD-MCNC: 166 MG/DL (ref 70–99)
HCT VFR BLD CALC: 40.2 % (ref 36–48)
HEMOGLOBIN: 12.4 G/DL (ref 12–16)
MCH RBC QN AUTO: 24.8 PG (ref 26–34)
MCHC RBC AUTO-ENTMCNC: 30.7 G/DL (ref 31–36)
MCV RBC AUTO: 80.8 FL (ref 80–100)
PDW BLD-RTO: 22.2 % (ref 12.4–15.4)
PERFORMED ON: ABNORMAL
PHOSPHORUS: 2.6 MG/DL (ref 2.5–4.9)
PLATELET # BLD: 216 K/UL (ref 135–450)
PMV BLD AUTO: 7.7 FL (ref 5–10.5)
POTASSIUM SERPL-SCNC: 4.1 MMOL/L (ref 3.5–5.1)
PRO-BNP: 3556 PG/ML (ref 0–124)
RBC # BLD: 4.98 M/UL (ref 4–5.2)
SODIUM BLD-SCNC: 142 MMOL/L (ref 136–145)
WBC # BLD: 8.8 K/UL (ref 4–11)

## 2019-08-16 PROCEDURE — 2580000003 HC RX 258: Performed by: NURSE PRACTITIONER

## 2019-08-16 PROCEDURE — 94669 MECHANICAL CHEST WALL OSCILL: CPT

## 2019-08-16 PROCEDURE — 6370000000 HC RX 637 (ALT 250 FOR IP): Performed by: NURSE PRACTITIONER

## 2019-08-16 PROCEDURE — 94761 N-INVAS EAR/PLS OXIMETRY MLT: CPT

## 2019-08-16 PROCEDURE — 80069 RENAL FUNCTION PANEL: CPT

## 2019-08-16 PROCEDURE — 83880 ASSAY OF NATRIURETIC PEPTIDE: CPT

## 2019-08-16 PROCEDURE — 1200000000 HC SEMI PRIVATE

## 2019-08-16 PROCEDURE — 97110 THERAPEUTIC EXERCISES: CPT

## 2019-08-16 PROCEDURE — 99232 SBSQ HOSP IP/OBS MODERATE 35: CPT | Performed by: INTERNAL MEDICINE

## 2019-08-16 PROCEDURE — 2700000000 HC OXYGEN THERAPY PER DAY

## 2019-08-16 PROCEDURE — 6370000000 HC RX 637 (ALT 250 FOR IP): Performed by: INTERNAL MEDICINE

## 2019-08-16 PROCEDURE — 85027 COMPLETE CBC AUTOMATED: CPT

## 2019-08-16 PROCEDURE — 36415 COLL VENOUS BLD VENIPUNCTURE: CPT

## 2019-08-16 PROCEDURE — 6360000002 HC RX W HCPCS: Performed by: INTERNAL MEDICINE

## 2019-08-16 PROCEDURE — 94640 AIRWAY INHALATION TREATMENT: CPT

## 2019-08-16 PROCEDURE — 6360000002 HC RX W HCPCS: Performed by: NURSE PRACTITIONER

## 2019-08-16 RX ORDER — FLUTICASONE PROPIONATE 50 MCG
1 SPRAY, SUSPENSION (ML) NASAL DAILY
Status: DISCONTINUED | OUTPATIENT
Start: 2019-08-16 | End: 2019-08-19 | Stop reason: HOSPADM

## 2019-08-16 RX ADMIN — OXYBUTYNIN CHLORIDE 10 MG: 5 TABLET, EXTENDED RELEASE ORAL at 20:24

## 2019-08-16 RX ADMIN — BUSPIRONE HYDROCHLORIDE 10 MG: 5 TABLET ORAL at 09:42

## 2019-08-16 RX ADMIN — ROSUVASTATIN CALCIUM 40 MG: 10 TABLET, FILM COATED ORAL at 17:24

## 2019-08-16 RX ADMIN — IPRATROPIUM BROMIDE AND ALBUTEROL SULFATE 3 ML: .5; 3 SOLUTION RESPIRATORY (INHALATION) at 08:01

## 2019-08-16 RX ADMIN — FUROSEMIDE 80 MG: 10 INJECTION, SOLUTION INTRAMUSCULAR; INTRAVENOUS at 20:24

## 2019-08-16 RX ADMIN — LEVOTHYROXINE SODIUM 88 MCG: 0.09 TABLET ORAL at 06:31

## 2019-08-16 RX ADMIN — FUROSEMIDE 80 MG: 10 INJECTION, SOLUTION INTRAMUSCULAR; INTRAVENOUS at 09:42

## 2019-08-16 RX ADMIN — MONTELUKAST SODIUM 10 MG: 10 TABLET ORAL at 20:24

## 2019-08-16 RX ADMIN — HYDROCODONE BITARTRATE AND ACETAMINOPHEN 1 TABLET: 5; 325 TABLET ORAL at 09:50

## 2019-08-16 RX ADMIN — IPRATROPIUM BROMIDE AND ALBUTEROL SULFATE 3 ML: .5; 3 SOLUTION RESPIRATORY (INHALATION) at 19:09

## 2019-08-16 RX ADMIN — ENOXAPARIN SODIUM 40 MG: 40 INJECTION SUBCUTANEOUS at 09:42

## 2019-08-16 RX ADMIN — INSULIN LISPRO 1 UNITS: 100 INJECTION, SOLUTION INTRAVENOUS; SUBCUTANEOUS at 20:25

## 2019-08-16 RX ADMIN — Medication 10 ML: at 09:44

## 2019-08-16 RX ADMIN — HYDROCODONE BITARTRATE AND ACETAMINOPHEN 1 TABLET: 5; 325 TABLET ORAL at 20:24

## 2019-08-16 RX ADMIN — MAGNESIUM GLUCONATE 500 MG ORAL TABLET 400 MG: 500 TABLET ORAL at 20:25

## 2019-08-16 RX ADMIN — CEFTRIAXONE SODIUM 1 G: 1 INJECTION, POWDER, FOR SOLUTION INTRAMUSCULAR; INTRAVENOUS at 01:04

## 2019-08-16 RX ADMIN — MAGNESIUM GLUCONATE 500 MG ORAL TABLET 400 MG: 500 TABLET ORAL at 09:42

## 2019-08-16 RX ADMIN — IPRATROPIUM BROMIDE AND ALBUTEROL SULFATE 3 ML: .5; 3 SOLUTION RESPIRATORY (INHALATION) at 12:08

## 2019-08-16 RX ADMIN — SALINE NASAL SPRAY 1 SPRAY: 1.5 SOLUTION NASAL at 15:10

## 2019-08-16 RX ADMIN — BUSPIRONE HYDROCHLORIDE 10 MG: 5 TABLET ORAL at 20:24

## 2019-08-16 RX ADMIN — IPRATROPIUM BROMIDE AND ALBUTEROL SULFATE 3 ML: .5; 3 SOLUTION RESPIRATORY (INHALATION) at 15:30

## 2019-08-16 RX ADMIN — ACETAMINOPHEN 650 MG: 325 TABLET ORAL at 15:10

## 2019-08-16 RX ADMIN — Medication 10 ML: at 20:25

## 2019-08-16 RX ADMIN — FLUTICASONE PROPIONATE 1 SPRAY: 50 SPRAY, METERED NASAL at 16:40

## 2019-08-16 RX ADMIN — BUSPIRONE HYDROCHLORIDE 10 MG: 5 TABLET ORAL at 15:06

## 2019-08-16 ASSESSMENT — PAIN DESCRIPTION - LOCATION
LOCATION: BACK;LEG
LOCATION: LEG

## 2019-08-16 ASSESSMENT — PAIN SCALES - GENERAL
PAINLEVEL_OUTOF10: 0
PAINLEVEL_OUTOF10: 0
PAINLEVEL_OUTOF10: 10
PAINLEVEL_OUTOF10: 7
PAINLEVEL_OUTOF10: 0
PAINLEVEL_OUTOF10: 0
PAINLEVEL_OUTOF10: 8

## 2019-08-16 ASSESSMENT — PAIN DESCRIPTION - ORIENTATION
ORIENTATION: RIGHT;LEFT;LOWER;MID
ORIENTATION: RIGHT;LEFT

## 2019-08-16 ASSESSMENT — PAIN DESCRIPTION - ONSET
ONSET: ON-GOING
ONSET: ON-GOING

## 2019-08-16 ASSESSMENT — PAIN DESCRIPTION - PAIN TYPE
TYPE: ACUTE PAIN
TYPE: ACUTE PAIN

## 2019-08-16 ASSESSMENT — PAIN DESCRIPTION - PROGRESSION
CLINICAL_PROGRESSION: GRADUALLY WORSENING
CLINICAL_PROGRESSION: GRADUALLY WORSENING

## 2019-08-16 ASSESSMENT — PAIN DESCRIPTION - FREQUENCY
FREQUENCY: INTERMITTENT
FREQUENCY: INTERMITTENT

## 2019-08-16 ASSESSMENT — PAIN DESCRIPTION - DESCRIPTORS
DESCRIPTORS: CRAMPING;DISCOMFORT;ACHING
DESCRIPTORS: CRAMPING;DISCOMFORT;ACHING

## 2019-08-16 NOTE — PROGRESS NOTES
Patient is awake, alert and oriented x4. Assessment is complete, see flow sheet. Bed in lowest position, wheels locked, call light is within reach. Patient denies any further needs at the moment. Will continue to monitor.     /78   Pulse 81   Temp 98.1 °F (36.7 °C) (Oral)   Resp 18   Ht 5' 2\" (1.575 m)   Wt 299 lb 14.4 oz (136 kg)   SpO2 93%   BMI 54.85 kg/m²     On 4L of O2  POC Glucose 138  Pain 8/10 in BLE - PRN norco given

## 2019-08-16 NOTE — PROGRESS NOTES
Start Date End Date Taking?  Authorizing Provider   metFORMIN (GLUCOPHAGE) 500 MG tablet Take 500 mg by mouth 2 times daily (with meals)   Yes Historical Provider, MD   busPIRone (BUSPAR) 10 MG tablet Take 10 mg by mouth 3 times daily   Yes Historical Provider, MD   ipratropium-albuterol (DUONEB) 0.5-2.5 (3) MG/3ML SOLN nebulizer solution Inhale 3 mLs into the lungs every 4 hours (while awake) 4/27/19  Yes Primo Palacio MD   magnesium oxide (MAG-OX) 400 (241.3 Mg) MG TABS tablet Take 1 tablet by mouth 4 times daily 4/27/19  Yes Primo Palacio MD   potassium chloride (KLOR-CON M) 20 MEQ extended release tablet Take 2 tablets by mouth daily 12/29/18  Yes Park Mccrary MD   silver sulfADIAZINE (SILVADENE) 1 % cream Apply topically 3 times daily as needed Apply topically to kerwin area   Yes Historical Provider, MD   rosuvastatin (CRESTOR) 40 MG tablet Take 40 mg by mouth every evening   Yes Historical Provider, MD   montelukast (SINGULAIR) 10 MG tablet Take 10 mg by mouth nightly   Yes Historical Provider, MD   fluticasone (FLONASE) 50 MCG/ACT nasal spray 2 sprays by Nasal route daily   Yes Historical Provider, MD   furosemide (LASIX) 20 MG tablet Take 40 mg by mouth daily    Yes Historical Provider, MD   PROAIR  (90 BASE) MCG/ACT inhaler 2 puffs every 6 hours as needed for Wheezing or Shortness of Breath  12/8/14  Yes Historical Provider, MD   docusate sodium (COLACE) 100 MG capsule Take 100 mg by mouth 2 times daily as needed  11/24/14  Yes Historical Provider, MD   levothyroxine (SYNTHROID) 88 MCG tablet  12/16/14  Yes Historical Provider, MD   oxybutynin (DITROPAN-XL) 10 MG CR tablet  11/3/14  Yes Historical Provider, MD   acetaminophen (TYLENOL) 325 MG tablet Take 650 mg by mouth every 6 hours as needed for Pain    Historical Provider, MD       In-patient schedule medications:   furosemide  80 mg Intravenous BID    cefTRIAXone (ROCEPHIN) IV  1 g Intravenous Q24H    ipratropium-albuterol  3 mL labored   Chest Wall:  No tenderness or deformity   Heart:  Regular rhythm and normal rate; distant   Abdomen:   Soft, non-tender, bowel sounds active all four quadrants,  no masses, no organomegaly           Extremities: Extremities atraumatic, no cyanosis. +2 LE edema with redness   Pulses: 2+ and symmetric upper, cannot palpate in LE. Skin: Skin color, texture, turgor normal, no rashes or lesions   Pysch: Normal mood and affect   Neurologic: Normal gross motor and sensory exam.         Labs  Recent Labs     08/14/19  0525   WBC 8.7   HGB 12.2   HCT 39.4   MCV 80.4        Recent Labs     08/14/19  0525 08/15/19  0804   CREATININE 1.6* 1.4*   BUN 39* 41*    140   K 3.5 3.5   CL 91* 90*   CO2 40* 42*     No results for input(s): INR, PROTIME in the last 72 hours. No results for input(s): TROPONINI in the last 72 hours. Invalid input(s): PRO-BNP  No results for input(s): CHOL, HDL in the last 72 hours. Invalid input(s): LDL, TG      Imaging:  I have reviewed the below testing personally and my interpretation is below. EKG:  Normal sinus rhythmPossible Right ventricular hypertrophyNonspecific ST abnormalityAbnormal ECGWhen compared with ECG of 23-APR-2019 10:03,Aberrant conduction is no longer PresentNonspecific T wave abnormality no longer evident in Anterolateral leadsConfirmed by [facility] MD, [id number] ([id number]) on 8/11/2019 4:28:19 PM  CXR:      Assessment:  68 y.o. patient with:  Problem List Items Addressed This Visit     None      Visit Diagnoses     Acute on chronic diastolic congestive heart failure (HCC)    -  Primary    Relevant Medications    furosemide (LASIX) injection 40 mg (Completed)    rosuvastatin (CRESTOR) tablet 40 mg    enoxaparin (LOVENOX) injection 40 mg    nitroGLYCERIN (NITROSTAT) SL tablet 0.4 mg    furosemide (LASIX) injection 80 mg    Lower extremity edema        Dizziness        Frequent falls          Chronic hypoxic respiratory failure. Plan:  1.  CHF education

## 2019-08-16 NOTE — PROGRESS NOTES
1516  [facility]   Cardiovascular Evaluation    PATIENT: Emilio Jordan  DATE: 2019  MRN: [id number]  CSN: [id number]  : 1945    Primary Care Doctor/Referring provider: No primary care provider on file. Referring - Caitlin Ma MD    Reason for evaluation/Chief complaint:   Extremity Weakness (weak for last couple weeks. . pt has CHF and has large amounts of eddema in her legs) and Shortness of Breath (CHF'er with edema)      Subjective: Feels better. Still with LE edema    History of present illness on initial date of evaluation:   Emilio Jordan is a 68 y.o. patient who presents with the above complaints. The patient is not able to give detailed information about the events of hospitalization due to their underlying medical illness. The majority of the information is taken from the chart, medical staff, and available family. She is able to mention that she noted swelling and blisters in her legs. She states that she does now know what is causing them. \"I don't know what is all wrong with me,\" she states.          Patient Active Problem List   Diagnosis    Polycythemia, secondary    Leukocytosis    COPD exacerbation ([place])    Diabetes mellitus ([place])    Hyperlipidemia    Hypertension    Hypothyroidism    Thyroid nodule    Pulmonary nodule, left    Acute respiratory failure with hypoxia and hypercapnia (HCC)    Acute encephalopathy    Pneumonia due to infectious organism    JAQUELIN (acute kidney injury) ([place])    Aspiration pneumonia ([place])    HCAP (healthcare-associated pneumonia)    Acute hypoxemic respiratory failure ([place])    Pulmonary edema, acute (HCC)    Cellulitis    Morbid obesity ([place])    Acute on chronic respiratory failure with hypoxia and hypercapnia (HCC)    Interstitial lung disease ([place])    Morbid obesity with BMI of 50.0-59.9, adult (HCC)    Diastolic dysfunction    Pulmonary HTN ([place])    Suspected sleep apnea    Acute on chronic diastolic CHF Inhalation Q4H WA    insulin lispro  0-6 Units Subcutaneous TID WC    insulin lispro  0-3 Units Subcutaneous Nightly    busPIRone  10 mg Oral TID    levothyroxine  88 mcg Oral Daily    montelukast  10 mg Oral Nightly    oxybutynin  10 mg Oral Nightly    rosuvastatin  40 mg Oral QPM    sodium chloride flush  10 mL Intravenous 2 times per day    enoxaparin  40 mg Subcutaneous Daily    magnesium oxide  400 mg Oral BID         Infusion Medications:   dextrose           Allergies:  Aspirin; Celecoxib; Fexofenadine; Gabapentin; Niacin er; and Adhesive tape     Review of Systems:   All 14 point review of symptoms completed. Pertinent positives identified in the HPI, all other review of symptoms findings as below. 14 point review of systems unable to be completed due to patient condition/cooperation. All available positives mentioned in history of present illness.        Physical Examination:    [unfilled]  /88   Pulse 85   Temp 97.6 °F (36.4 °C)   Resp 14   Ht 5' 2\" (1.575 m)   Wt 299 lb 14.4 oz (136 kg)   SpO2 91%   BMI 54.85 kg/m²    Weight: 299 lb 14.4 oz (136 kg)     Wt Readings from Last 3 Encounters:   08/16/19 299 lb 14.4 oz (136 kg)   04/27/19 276 lb 1.6 oz (125.2 kg)   12/28/18 274 lb 12.8 oz (124.6 kg)       Intake/Output Summary (Last 24 hours) at 8/16/2019 1333  Last data filed at 8/16/2019 1205  Gross per 24 hour   Intake 1087 ml   Output 3875 ml   Net -2788 ml       General Appearance:  Alert x 3 , cooperative, no distress, appears older than stated age   Head:  Normocephalic, without obvious abnormality, atraumatic   Eyes:  PERRL, conjunctiva/corneas clear       Nose: Nares normal, no drainage or sinus tenderness   Throat: Lips, mucosa, and tongue normal   Neck: Supple, symmetrical, trachea midline, no adenopathy, thyroid: not enlarged, symmetric, no tenderness/mass/nodules, no carotid bruit or JVD       Lungs:   Reduced to auscultation bilaterally, respirations minimally labored

## 2019-08-16 NOTE — PROGRESS NOTES
surgical history that includes Cholecystectomy; xr knee inc tunnel bilat (Bilateral, 2014); Rotator cuff repair (Left, 2015); and Thyroid lobectomy. Restrictions  Restrictions/Precautions  Restrictions/Precautions: General Precautions, Fall Risk  Position Activity Restriction  Other position/activity restrictions: up as tolerated; contact for MRSA  Subjective   General  Chart Reviewed: Yes  Response To Previous Treatment: Patient with no complaints from previous session. Family / Caregiver Present: No  Referring Practitioner: Dr. Brittnee Herrera MD  Subjective  Subjective: pt agreeable to therapy  General Comment  Comments: Pt resting in bed on approach; RN cleared pt for therapy  Pain Screening  Patient Currently in Pain: Denies       Objective   Bed mobility  Supine to Sit: Unable to assess  Sit to Supine: Unable to assess  Comment: Pt declining all OOB mobility this date     Transfers  Sit to Stand: Unable to assess(pt declining OOB mobility)           Exercises  Quad Sets: x 10 BLE  Heelslides: x 10 BLE  Gluteal Sets: x 10 BLE  Hip Abduction: Supine x 10 BLE  Ankle Pumps: x 10 BLE  Comments: Cues provided for technique                             AM-PAC Score     AM-PAC Inpatient Mobility without Stair Climbing Raw Score : 13 (08/16/19 1427)  AM-PAC Inpatient without Stair Climbing T-Scale Score : 38.96 (08/16/19 1427)  Mobility Inpatient CMS 0-100% Score: 58.44 (08/16/19 1427)  Mobility Inpatient without Stair CMS G-Code Modifier : CK (08/16/19 1427)       Goals  Short term goals  Time Frame for Short term goals: 1 weeek (8/21) unless otherwise specified  Short term goal 1: Pt will be SBA for bed mobility. Short term goal 2: Pt will be SBA for sit<>stand and bed<>chair transfers with RW. Short term goal 3: Pt will ambulate 15 ft with RW and min A. Short term goal 4: 8/17: pt will participate in 12-15 reps of BLE exercises to promote strength and activity tolerance at d/c.   Patient Goals   Patient goals : \"to do

## 2019-08-16 NOTE — PROGRESS NOTES
Hospitalist Progress Note      PCP: No primary care provider on file. Date of Admission: 8/10/2019    Chief Complaint: SOB, LE edema    Hospital Course:     68 y.o. female who presented to [facility] with PMH: Pulmonary hypertension, polycythemia, morbid obesity, thyroidism, hypertension, hyperlipidemia, diabetes mellitus, COPD, cellulitis, JAQUELIN. Patient came home from [facility] on Tuesday 8/6/19. Patient states that she was short of breath and had lower extremity edema then, but progressively has gotten worse. She is on 4 L a minute, 24 hours a day home O2. Patient was dizzy today and fell while walking in the bathroom. Patient's lower extremities are weeping, 3+ edema laterally. Patient has acute on chronic diastolic CHF, with BNP 0104. Emergency room gave IV Lasix. Patient is allergic to aspirin. Patient denies chest pain, cough, nausea, vomiting, diarrhea, fevers. Patient states that she is unable to take care of herself at home, and neither are her siblings that she lives with. Subjective:  SOB improving. Still with significant LE edema.        Medications:  Reviewed    Infusion Medications    dextrose       Scheduled Medications    furosemide  80 mg Intravenous BID    cefTRIAXone (ROCEPHIN) IV  1 g Intravenous Q24H    ipratropium-albuterol  3 mL Inhalation Q4H WA    insulin lispro  0-6 Units Subcutaneous TID WC    insulin lispro  0-3 Units Subcutaneous Nightly    busPIRone  10 mg Oral TID    levothyroxine  88 mcg Oral Daily    montelukast  10 mg Oral Nightly    oxybutynin  10 mg Oral Nightly    rosuvastatin  40 mg Oral QPM    sodium chloride flush  10 mL Intravenous 2 times per day    enoxaparin  40 mg Subcutaneous Daily    magnesium oxide  400 mg Oral BID     PRN Meds: sodium chloride, albuterol sulfate HFA, glucose, dextrose, glucagon (rDNA), dextrose, HYDROcodone 5 mg - acetaminophen, acetaminophen, sodium chloride flush, magnesium hydroxide, ondansetron, monitoring as previously arranged. Morbid Obesity -  With Body mass index is 54.85 kg/m². Complicating assessment and treatment. Placing patient at risk for multiple co-morbidities as well as early death and contributing to the patient's presentation. Counseled on weight loss. DVT Prophylaxis: LMWH  Diet: DIET CARDIAC; Carb Control: 5 carb choices (75 gms)/meal; Low Sodium (2 GM);  Daily Fluid Restriction: 1500 ml  Dietary Nutrition Supplements: Wound Healing Oral Supplement  Code Status: Full Code      PT/OT Eval Status: seen w/ recs for SNF     Dispo - possibly Sat/Sunday 17/18 August at the earliest pending clinical improvement,  and Cardiology recs, likely not until Monday 19 August.     Naty Skinner MD

## 2019-08-16 NOTE — PLAN OF CARE
Problem: Nutrition  Goal: Optimal nutrition therapy  Outcome: Ongoing  Note:   Nutrition Problem: Increased nutrient needs  Intervention: Food and/or Nutrient Delivery: Modify current diet, Continue current ONS  Nutritional Goals: Patient will continue to tolerate and consume 50%< of PO meals and supplements

## 2019-08-17 LAB
GLUCOSE BLD-MCNC: 120 MG/DL (ref 70–99)
GLUCOSE BLD-MCNC: 134 MG/DL (ref 70–99)
GLUCOSE BLD-MCNC: 170 MG/DL (ref 70–99)
GLUCOSE BLD-MCNC: 174 MG/DL (ref 70–99)
PERFORMED ON: ABNORMAL

## 2019-08-17 PROCEDURE — 94640 AIRWAY INHALATION TREATMENT: CPT

## 2019-08-17 PROCEDURE — 6360000002 HC RX W HCPCS: Performed by: NURSE PRACTITIONER

## 2019-08-17 PROCEDURE — 99232 SBSQ HOSP IP/OBS MODERATE 35: CPT | Performed by: INTERNAL MEDICINE

## 2019-08-17 PROCEDURE — 2580000003 HC RX 258: Performed by: NURSE PRACTITIONER

## 2019-08-17 PROCEDURE — 6370000000 HC RX 637 (ALT 250 FOR IP): Performed by: NURSE PRACTITIONER

## 2019-08-17 PROCEDURE — 6370000000 HC RX 637 (ALT 250 FOR IP): Performed by: INTERNAL MEDICINE

## 2019-08-17 PROCEDURE — 94761 N-INVAS EAR/PLS OXIMETRY MLT: CPT

## 2019-08-17 PROCEDURE — 6360000002 HC RX W HCPCS: Performed by: INTERNAL MEDICINE

## 2019-08-17 PROCEDURE — 1200000000 HC SEMI PRIVATE

## 2019-08-17 PROCEDURE — 94669 MECHANICAL CHEST WALL OSCILL: CPT

## 2019-08-17 PROCEDURE — 2700000000 HC OXYGEN THERAPY PER DAY

## 2019-08-17 RX ADMIN — CEFTRIAXONE SODIUM 1 G: 1 INJECTION, POWDER, FOR SOLUTION INTRAMUSCULAR; INTRAVENOUS at 02:10

## 2019-08-17 RX ADMIN — ROSUVASTATIN CALCIUM 40 MG: 10 TABLET, FILM COATED ORAL at 18:37

## 2019-08-17 RX ADMIN — Medication 10 ML: at 20:40

## 2019-08-17 RX ADMIN — BUSPIRONE HYDROCHLORIDE 10 MG: 5 TABLET ORAL at 15:36

## 2019-08-17 RX ADMIN — FUROSEMIDE 80 MG: 10 INJECTION, SOLUTION INTRAMUSCULAR; INTRAVENOUS at 10:27

## 2019-08-17 RX ADMIN — IPRATROPIUM BROMIDE AND ALBUTEROL SULFATE 3 ML: .5; 3 SOLUTION RESPIRATORY (INHALATION) at 16:21

## 2019-08-17 RX ADMIN — BUSPIRONE HYDROCHLORIDE 10 MG: 5 TABLET ORAL at 20:39

## 2019-08-17 RX ADMIN — LEVOTHYROXINE SODIUM 88 MCG: 0.09 TABLET ORAL at 06:39

## 2019-08-17 RX ADMIN — INSULIN LISPRO 1 UNITS: 100 INJECTION, SOLUTION INTRAVENOUS; SUBCUTANEOUS at 16:55

## 2019-08-17 RX ADMIN — ENOXAPARIN SODIUM 40 MG: 40 INJECTION SUBCUTANEOUS at 10:27

## 2019-08-17 RX ADMIN — MAGNESIUM GLUCONATE 500 MG ORAL TABLET 400 MG: 500 TABLET ORAL at 20:39

## 2019-08-17 RX ADMIN — FLUTICASONE PROPIONATE 1 SPRAY: 50 SPRAY, METERED NASAL at 10:30

## 2019-08-17 RX ADMIN — IPRATROPIUM BROMIDE AND ALBUTEROL SULFATE 3 ML: .5; 3 SOLUTION RESPIRATORY (INHALATION) at 08:37

## 2019-08-17 RX ADMIN — BUSPIRONE HYDROCHLORIDE 10 MG: 5 TABLET ORAL at 10:27

## 2019-08-17 RX ADMIN — MAGNESIUM GLUCONATE 500 MG ORAL TABLET 400 MG: 500 TABLET ORAL at 10:27

## 2019-08-17 RX ADMIN — IPRATROPIUM BROMIDE AND ALBUTEROL SULFATE 3 ML: .5; 3 SOLUTION RESPIRATORY (INHALATION) at 19:36

## 2019-08-17 RX ADMIN — OXYBUTYNIN CHLORIDE 10 MG: 5 TABLET, EXTENDED RELEASE ORAL at 20:39

## 2019-08-17 RX ADMIN — MONTELUKAST SODIUM 10 MG: 10 TABLET ORAL at 20:39

## 2019-08-17 RX ADMIN — FUROSEMIDE 80 MG: 10 INJECTION, SOLUTION INTRAMUSCULAR; INTRAVENOUS at 20:39

## 2019-08-17 RX ADMIN — ACETAMINOPHEN 650 MG: 325 TABLET ORAL at 20:39

## 2019-08-17 RX ADMIN — IPRATROPIUM BROMIDE AND ALBUTEROL SULFATE 3 ML: .5; 3 SOLUTION RESPIRATORY (INHALATION) at 11:46

## 2019-08-17 RX ADMIN — Medication 10 ML: at 10:28

## 2019-08-17 RX ADMIN — INSULIN LISPRO 1 UNITS: 100 INJECTION, SOLUTION INTRAVENOUS; SUBCUTANEOUS at 20:40

## 2019-08-17 ASSESSMENT — PAIN SCALES - GENERAL
PAINLEVEL_OUTOF10: 4
PAINLEVEL_OUTOF10: 0

## 2019-08-17 NOTE — PROGRESS NOTES
Patient's EF (Ejection Fraction) is greater than 40%    Patient's weights and intake/output reviewed:    Patient's Last Weight:   299 lbs obtained by bed scale. Intake/Output Summary (Last 24 hours) at 8/17/2019 1138  Last data filed at 8/17/2019 1113  Gross per 24 hour   Intake 600 ml   Output 1150 ml   Net -550 ml         Patient stated Daily Functional Goal: (Note:help the patient identify a challenging but achievable goal per shift that can aid in progression towards increased functional capacity at discharge ie sit in the chair for x amount of time, Decrease walk time by x seconds, stand or walk with minimal assistance, decrease pain to a level of x/10, etc)   Ongoing Functional Capacity Assessment:  Patient  unable to ambulate distance of 15 feet in 0Minutes/Seconds 0. Patient noted to be on 4 L supplemental O2 with SpO2 of 94 %. Pt resting in bed at this time on 4 L O2. Pt denies shortness of breath. Pt with pitting lower extremity edema. Patient and/or Family's stated Goal of Care this Admission: reduce shortness of breath, increase activity tolerance, better understand heart failure and disease management, be more comfortable and reduce lower extremity edema prior to discharge      Comorbidities Reviewed Yes  Patient has a past medical history of Acute on chronic diastolic CHF (congestive heart failure) ([address] JAQUELIN (acute kidney injury) ([address] Arthritis, Asthma, COPD (chronic obstructive pulmonary disease) ([address] Diabetes mellitus ([address] Hyperlipidemia, Hypertension, MRSA (methicillin resistant staph aureus) culture positive, Other disorders of kidney and ureter in diseases classified elsewhere, Pneumonia due to infectious organism, and Thyroid disease. Educated pt on hyper/hypoglycemia and treatment.         >>For CHF and Comorbidity documentation on Education Time and Topics, please see Education Tab

## 2019-08-17 NOTE — PLAN OF CARE
Problem: OXYGENATION/RESPIRATORY FUNCTION  Goal: Patient will maintain patent airway  Outcome: Ongoing     Pt remains on 4L of O2. No s/s of SOB. Will continue to monitor. Problem: Activity:  Goal: Risk for activity intolerance will decrease  Description  Risk for activity intolerance will decrease  8/17/2019 1116 by Minerva Casarez RN  Outcome: Ongoing    Pt continues with glucose monitoring and insulin therapy.

## 2019-08-17 NOTE — PROGRESS NOTES
[facility]   Cardi[facility] Evaluation    PATIENT: Franca Ham  DATE: 2019  MRN: [id number]  CSN: [id number]  : 1945    Primary Care Doctor/Referring provider: No primary care provider on file. Referring - Melissa Patel MD    Reason for evaluation/Chief complaint:   Extremity Weakness (weak for last couple weeks. . pt has CHF and has large amounts of eddema in her legs) and Shortness of Breath (CHF'er with edema)      Subjective: Feels better. Still with LE edema    History of present illness on initial date of evaluation:   Franca Ham is a 68 y.o. patient who presents with the above complaints. The patient is not able to give detailed information about the events of hospitalization due to their underlying medical illness. The majority of the information is taken from the chart, medical staff, and available family. She is able to mention that she noted swelling and blisters in her legs. She states that she does now know what is causing them. \"I don't know what is all wrong with me,\" she states.          Patient Active Problem List   Diagnosis    Polycythemia, secondary    Leukocytosis    COPD exacerbation ([address]    Diabetes mellitus ([address]    Hyperlipidemia    Hypertension    Hypothyroidism    Thyroid nodule    Pulmonary nodule, left    Acute respiratory failure with hypoxia and hypercapnia (HCC)    Acute encephalopathy    Pneumonia due to infectious organism    JAQUELIN (acute kidney injury) ([address]    Aspiration pneumonia ([address]    HCAP (healthcare-associated pneumonia)    Acute hypoxemic respiratory failure ([address]    Pulmonary edema, acute (HCC)    Cellulitis    Morbid obesity ([address]    Acute on chronic respiratory failure with hypoxia and hypercapnia (HCC)    Interstitial lung disease ([address]    Morbid obesity with BMI of 50.0-59.9, adult (HCC)    Diastolic dysfunction    Pulmonary HTN ([address]    Suspected sleep apnea    Acute on chronic diastolic CHF Wall:  No tenderness or deformity   Heart:  Regular rhythm and normal rate; distant   Abdomen:   Soft, non-tender, bowel sounds active all four quadrants,  no masses, no organomegaly           Extremities: Extremities atraumatic, no cyanosis. +2 LE edema with redness   Pulses: 2+ and symmetric upper, cannot palpate in LE. Skin: Skin color, texture, turgor normal, no rashes or lesions   Pysch: Normal mood and affect   Neurologic: Normal gross motor and sensory exam.         Labs  Recent Labs     08/16/19  0847   WBC 8.8   HGB 12.4   HCT 40.2   MCV 80.8        Recent Labs     08/15/19  0804 08/16/19  0847   CREATININE 1.4* 1.3*   BUN 41* 44*    142   K 3.5 4.1   CL 90* 91*   CO2 42* 43*     No results for input(s): INR, PROTIME in the last 72 hours. No results for input(s): TROPONINI in the last 72 hours. Invalid input(s): PRO-BNP  No results for input(s): CHOL, HDL in the last 72 hours. Invalid input(s): LDL, TG      Imaging:  I have reviewed the below testing personally and my interpretation is below. EKG:  Normal sinus rhythmPossible Right ventricular hypertrophyNonspecific ST abnormalityAbnormal ECGWhen compared with ECG of 23-APR-2019 10:03,Aberrant conduction is no longer PresentNonspecific T wave abnormality no longer evident in Anterolateral leadsConfirmed by [facility] MD, [id number] ([id number]) on 8/11/2019 4:28:19 PM  CXR:      Assessment:  68 y.o. patient with:  Problem List Items Addressed This Visit     None      Visit Diagnoses     Acute on chronic diastolic congestive heart failure (HCC)    -  Primary    Relevant Medications    furosemide (LASIX) injection 40 mg (Completed)    rosuvastatin (CRESTOR) tablet 40 mg    enoxaparin (LOVENOX) injection 40 mg    nitroGLYCERIN (NITROSTAT) SL tablet 0.4 mg    furosemide (LASIX) injection 80 mg    Lower extremity edema        Dizziness        Frequent falls          Chronic hypoxic respiratory failure. Plan:  1. CHF education reinforced.    ~salt

## 2019-08-18 LAB
ALBUMIN SERPL-MCNC: 3.1 G/DL (ref 3.4–5)
ANION GAP SERPL CALCULATED.3IONS-SCNC: 8 MMOL/L (ref 3–16)
BUN BLDV-MCNC: 36 MG/DL (ref 7–20)
CALCIUM SERPL-MCNC: 9.8 MG/DL (ref 8.3–10.6)
CHLORIDE BLD-SCNC: 91 MMOL/L (ref 99–110)
CO2: 43 MMOL/L (ref 21–32)
CREAT SERPL-MCNC: 1.3 MG/DL (ref 0.6–1.2)
GFR AFRICAN AMERICAN: 49
GFR NON-AFRICAN AMERICAN: 40
GLUCOSE BLD-MCNC: 129 MG/DL (ref 70–99)
GLUCOSE BLD-MCNC: 139 MG/DL (ref 70–99)
GLUCOSE BLD-MCNC: 154 MG/DL (ref 70–99)
GLUCOSE BLD-MCNC: 167 MG/DL (ref 70–99)
GLUCOSE BLD-MCNC: 173 MG/DL (ref 70–99)
HCT VFR BLD CALC: 39.1 % (ref 36–48)
HEMOGLOBIN: 12.2 G/DL (ref 12–16)
MCH RBC QN AUTO: 24.7 PG (ref 26–34)
MCHC RBC AUTO-ENTMCNC: 31.2 G/DL (ref 31–36)
MCV RBC AUTO: 79.3 FL (ref 80–100)
PDW BLD-RTO: 21.8 % (ref 12.4–15.4)
PERFORMED ON: ABNORMAL
PHOSPHORUS: 2.4 MG/DL (ref 2.5–4.9)
PLATELET # BLD: 207 K/UL (ref 135–450)
PMV BLD AUTO: 7.5 FL (ref 5–10.5)
POTASSIUM SERPL-SCNC: 3.6 MMOL/L (ref 3.5–5.1)
RBC # BLD: 4.93 M/UL (ref 4–5.2)
SODIUM BLD-SCNC: 142 MMOL/L (ref 136–145)
WBC # BLD: 7.7 K/UL (ref 4–11)

## 2019-08-18 PROCEDURE — 94640 AIRWAY INHALATION TREATMENT: CPT

## 2019-08-18 PROCEDURE — 85027 COMPLETE CBC AUTOMATED: CPT

## 2019-08-18 PROCEDURE — 1200000000 HC SEMI PRIVATE

## 2019-08-18 PROCEDURE — 6370000000 HC RX 637 (ALT 250 FOR IP): Performed by: INTERNAL MEDICINE

## 2019-08-18 PROCEDURE — 2580000003 HC RX 258: Performed by: NURSE PRACTITIONER

## 2019-08-18 PROCEDURE — 80069 RENAL FUNCTION PANEL: CPT

## 2019-08-18 PROCEDURE — 94761 N-INVAS EAR/PLS OXIMETRY MLT: CPT

## 2019-08-18 PROCEDURE — 6370000000 HC RX 637 (ALT 250 FOR IP): Performed by: NURSE PRACTITIONER

## 2019-08-18 PROCEDURE — 94669 MECHANICAL CHEST WALL OSCILL: CPT

## 2019-08-18 PROCEDURE — 6360000002 HC RX W HCPCS: Performed by: NURSE PRACTITIONER

## 2019-08-18 PROCEDURE — 2700000000 HC OXYGEN THERAPY PER DAY

## 2019-08-18 PROCEDURE — 36415 COLL VENOUS BLD VENIPUNCTURE: CPT

## 2019-08-18 RX ORDER — FUROSEMIDE 40 MG/1
40 TABLET ORAL 2 TIMES DAILY
Status: DISCONTINUED | OUTPATIENT
Start: 2019-08-18 | End: 2019-08-19 | Stop reason: HOSPADM

## 2019-08-18 RX ADMIN — FLUTICASONE PROPIONATE 1 SPRAY: 50 SPRAY, METERED NASAL at 11:50

## 2019-08-18 RX ADMIN — IPRATROPIUM BROMIDE AND ALBUTEROL SULFATE 3 ML: .5; 3 SOLUTION RESPIRATORY (INHALATION) at 16:16

## 2019-08-18 RX ADMIN — OXYBUTYNIN CHLORIDE 10 MG: 5 TABLET, EXTENDED RELEASE ORAL at 22:12

## 2019-08-18 RX ADMIN — Medication 10 ML: at 11:50

## 2019-08-18 RX ADMIN — ENOXAPARIN SODIUM 40 MG: 40 INJECTION SUBCUTANEOUS at 08:52

## 2019-08-18 RX ADMIN — BUSPIRONE HYDROCHLORIDE 10 MG: 5 TABLET ORAL at 08:53

## 2019-08-18 RX ADMIN — IPRATROPIUM BROMIDE AND ALBUTEROL SULFATE 3 ML: .5; 3 SOLUTION RESPIRATORY (INHALATION) at 19:52

## 2019-08-18 RX ADMIN — BUSPIRONE HYDROCHLORIDE 10 MG: 5 TABLET ORAL at 22:12

## 2019-08-18 RX ADMIN — LEVOTHYROXINE SODIUM 88 MCG: 0.09 TABLET ORAL at 05:21

## 2019-08-18 RX ADMIN — FUROSEMIDE 40 MG: 40 TABLET ORAL at 16:53

## 2019-08-18 RX ADMIN — FUROSEMIDE 40 MG: 40 TABLET ORAL at 08:52

## 2019-08-18 RX ADMIN — ROSUVASTATIN CALCIUM 40 MG: 10 TABLET, FILM COATED ORAL at 17:07

## 2019-08-18 RX ADMIN — MONTELUKAST SODIUM 10 MG: 10 TABLET ORAL at 22:11

## 2019-08-18 RX ADMIN — INSULIN LISPRO 1 UNITS: 100 INJECTION, SOLUTION INTRAVENOUS; SUBCUTANEOUS at 12:06

## 2019-08-18 RX ADMIN — INSULIN LISPRO 1 UNITS: 100 INJECTION, SOLUTION INTRAVENOUS; SUBCUTANEOUS at 17:08

## 2019-08-18 RX ADMIN — IPRATROPIUM BROMIDE AND ALBUTEROL SULFATE 3 ML: .5; 3 SOLUTION RESPIRATORY (INHALATION) at 08:46

## 2019-08-18 RX ADMIN — HYDROCODONE BITARTRATE AND ACETAMINOPHEN 1 TABLET: 5; 325 TABLET ORAL at 05:24

## 2019-08-18 RX ADMIN — MAGNESIUM GLUCONATE 500 MG ORAL TABLET 400 MG: 500 TABLET ORAL at 08:52

## 2019-08-18 RX ADMIN — IPRATROPIUM BROMIDE AND ALBUTEROL SULFATE 3 ML: .5; 3 SOLUTION RESPIRATORY (INHALATION) at 11:32

## 2019-08-18 RX ADMIN — MAGNESIUM GLUCONATE 500 MG ORAL TABLET 400 MG: 500 TABLET ORAL at 22:11

## 2019-08-18 RX ADMIN — BUSPIRONE HYDROCHLORIDE 10 MG: 5 TABLET ORAL at 16:52

## 2019-08-18 RX ADMIN — Medication 10 ML: at 22:12

## 2019-08-18 RX ADMIN — ACETAMINOPHEN 650 MG: 325 TABLET ORAL at 05:20

## 2019-08-18 ASSESSMENT — PAIN SCALES - GENERAL
PAINLEVEL_OUTOF10: 0
PAINLEVEL_OUTOF10: 10
PAINLEVEL_OUTOF10: 0
PAINLEVEL_OUTOF10: 4
PAINLEVEL_OUTOF10: 10

## 2019-08-18 ASSESSMENT — PAIN DESCRIPTION - PROGRESSION: CLINICAL_PROGRESSION: GRADUALLY WORSENING

## 2019-08-19 VITALS
BODY MASS INDEX: 53.92 KG/M2 | WEIGHT: 293 LBS | DIASTOLIC BLOOD PRESSURE: 65 MMHG | TEMPERATURE: 99.4 F | HEIGHT: 62 IN | RESPIRATION RATE: 18 BRPM | SYSTOLIC BLOOD PRESSURE: 106 MMHG | OXYGEN SATURATION: 91 % | HEART RATE: 85 BPM

## 2019-08-19 LAB
ALBUMIN SERPL-MCNC: 3.4 G/DL (ref 3.4–5)
ANION GAP SERPL CALCULATED.3IONS-SCNC: 8 MMOL/L (ref 3–16)
BUN BLDV-MCNC: 33 MG/DL (ref 7–20)
CALCIUM SERPL-MCNC: 10.1 MG/DL (ref 8.3–10.6)
CHLORIDE BLD-SCNC: 91 MMOL/L (ref 99–110)
CO2: 44 MMOL/L (ref 21–32)
CREAT SERPL-MCNC: 1.2 MG/DL (ref 0.6–1.2)
GFR AFRICAN AMERICAN: 53
GFR NON-AFRICAN AMERICAN: 44
GLUCOSE BLD-MCNC: 135 MG/DL (ref 70–99)
GLUCOSE BLD-MCNC: 136 MG/DL (ref 70–99)
GLUCOSE BLD-MCNC: 141 MG/DL (ref 70–99)
GLUCOSE BLD-MCNC: 144 MG/DL (ref 70–99)
HCT VFR BLD CALC: 39.4 % (ref 36–48)
HEMOGLOBIN: 12.3 G/DL (ref 12–16)
MCH RBC QN AUTO: 25 PG (ref 26–34)
MCHC RBC AUTO-ENTMCNC: 31.2 G/DL (ref 31–36)
MCV RBC AUTO: 79.9 FL (ref 80–100)
PDW BLD-RTO: 22.2 % (ref 12.4–15.4)
PERFORMED ON: ABNORMAL
PHOSPHORUS: 2.8 MG/DL (ref 2.5–4.9)
PLATELET # BLD: 206 K/UL (ref 135–450)
PMV BLD AUTO: 8.5 FL (ref 5–10.5)
POTASSIUM SERPL-SCNC: 3.4 MMOL/L (ref 3.5–5.1)
RBC # BLD: 4.93 M/UL (ref 4–5.2)
SODIUM BLD-SCNC: 143 MMOL/L (ref 136–145)
WBC # BLD: 8.1 K/UL (ref 4–11)

## 2019-08-19 PROCEDURE — 97110 THERAPEUTIC EXERCISES: CPT

## 2019-08-19 PROCEDURE — 97530 THERAPEUTIC ACTIVITIES: CPT

## 2019-08-19 PROCEDURE — 6370000000 HC RX 637 (ALT 250 FOR IP): Performed by: INTERNAL MEDICINE

## 2019-08-19 PROCEDURE — 94640 AIRWAY INHALATION TREATMENT: CPT

## 2019-08-19 PROCEDURE — 2700000000 HC OXYGEN THERAPY PER DAY

## 2019-08-19 PROCEDURE — 6360000002 HC RX W HCPCS: Performed by: NURSE PRACTITIONER

## 2019-08-19 PROCEDURE — 97535 SELF CARE MNGMENT TRAINING: CPT

## 2019-08-19 PROCEDURE — 2580000003 HC RX 258: Performed by: NURSE PRACTITIONER

## 2019-08-19 PROCEDURE — 6370000000 HC RX 637 (ALT 250 FOR IP): Performed by: NURSE PRACTITIONER

## 2019-08-19 PROCEDURE — 85027 COMPLETE CBC AUTOMATED: CPT

## 2019-08-19 PROCEDURE — 94761 N-INVAS EAR/PLS OXIMETRY MLT: CPT

## 2019-08-19 PROCEDURE — 36415 COLL VENOUS BLD VENIPUNCTURE: CPT

## 2019-08-19 PROCEDURE — 80069 RENAL FUNCTION PANEL: CPT

## 2019-08-19 PROCEDURE — 94669 MECHANICAL CHEST WALL OSCILL: CPT

## 2019-08-19 RX ADMIN — HYDROCODONE BITARTRATE AND ACETAMINOPHEN 1 TABLET: 5; 325 TABLET ORAL at 15:31

## 2019-08-19 RX ADMIN — Medication 10 ML: at 09:20

## 2019-08-19 RX ADMIN — FUROSEMIDE 40 MG: 40 TABLET ORAL at 09:20

## 2019-08-19 RX ADMIN — HYDROCODONE BITARTRATE AND ACETAMINOPHEN 1 TABLET: 5; 325 TABLET ORAL at 09:21

## 2019-08-19 RX ADMIN — MAGNESIUM GLUCONATE 500 MG ORAL TABLET 400 MG: 500 TABLET ORAL at 09:20

## 2019-08-19 RX ADMIN — INSULIN LISPRO 1 UNITS: 100 INJECTION, SOLUTION INTRAVENOUS; SUBCUTANEOUS at 09:22

## 2019-08-19 RX ADMIN — IPRATROPIUM BROMIDE AND ALBUTEROL SULFATE 3 ML: .5; 3 SOLUTION RESPIRATORY (INHALATION) at 08:24

## 2019-08-19 RX ADMIN — BUSPIRONE HYDROCHLORIDE 10 MG: 5 TABLET ORAL at 13:43

## 2019-08-19 RX ADMIN — FLUTICASONE PROPIONATE 1 SPRAY: 50 SPRAY, METERED NASAL at 09:21

## 2019-08-19 RX ADMIN — BUSPIRONE HYDROCHLORIDE 10 MG: 5 TABLET ORAL at 09:20

## 2019-08-19 RX ADMIN — IPRATROPIUM BROMIDE AND ALBUTEROL SULFATE 3 ML: .5; 3 SOLUTION RESPIRATORY (INHALATION) at 11:40

## 2019-08-19 RX ADMIN — LEVOTHYROXINE SODIUM 88 MCG: 0.09 TABLET ORAL at 06:08

## 2019-08-19 RX ADMIN — ENOXAPARIN SODIUM 40 MG: 40 INJECTION SUBCUTANEOUS at 09:20

## 2019-08-19 ASSESSMENT — PAIN DESCRIPTION - ORIENTATION
ORIENTATION: RIGHT;LEFT;MID;LOWER
ORIENTATION: RIGHT;LEFT;LOWER;MID

## 2019-08-19 ASSESSMENT — PAIN DESCRIPTION - FREQUENCY
FREQUENCY: CONTINUOUS
FREQUENCY: CONTINUOUS

## 2019-08-19 ASSESSMENT — PAIN DESCRIPTION - ONSET
ONSET: ON-GOING
ONSET: ON-GOING

## 2019-08-19 ASSESSMENT — PAIN SCALES - GENERAL
PAINLEVEL_OUTOF10: 0
PAINLEVEL_OUTOF10: 10
PAINLEVEL_OUTOF10: 0
PAINLEVEL_OUTOF10: 9

## 2019-08-19 ASSESSMENT — PAIN DESCRIPTION - LOCATION
LOCATION: BACK;LEG
LOCATION: BACK;LEG

## 2019-08-19 ASSESSMENT — PAIN DESCRIPTION - DESCRIPTORS: DESCRIPTORS: BURNING;CRAMPING;DISCOMFORT;ACHING

## 2019-08-19 ASSESSMENT — PAIN DESCRIPTION - PAIN TYPE
TYPE: ACUTE PAIN
TYPE: ACUTE PAIN

## 2019-08-19 ASSESSMENT — PAIN DESCRIPTION - PROGRESSION
CLINICAL_PROGRESSION: GRADUALLY WORSENING
CLINICAL_PROGRESSION: GRADUALLY WORSENING

## 2019-08-19 NOTE — PROGRESS NOTES
Patient is awake, alert and oriented x4. Assessment is complete, see flow sheet. Bed in lowest position, wheels locked, call light is within reach. Patient denies any further needs at the moment. Will continue to monitor.     Vitals:    08/19/19 0756   BP: 128/88   Pulse: 81   Resp: 18   Temp: 98.3 °F (36.8 °C)   SpO2: 95%     On 5L O2  POC Glucose 141

## 2019-08-19 NOTE — PROGRESS NOTES
0 8/19/2019  1:16 PM   Undermining Maxium Distance (cm) 0 8/19/2019  1:16 PM   Wound Assessment Clean;Dry; Intact 8/19/2019  1:16 PM   Drainage Amount Scant 8/19/2019  1:16 PM   Drainage Description Serosanguinous 8/19/2019  1:16 PM   Odor None 8/19/2019  1:16 PM   Margins Attached edges; Defined edges 8/19/2019  1:16 PM   Yelena-wound Assessment Edema; Red 8/19/2019  1:16 PM   Red%Wound Bed 100 8/19/2019  1:16 PM   Culture Taken No 8/19/2019  1:16 PM   Number of days: 8    L Leg Anterior Mid:         Wound 08/10/19 Leg Anterior;Mid;Left L shaped (Active)   Wound Image   8/19/2019  1:16 PM   Wound Venous 8/19/2019  1:16 PM   Dressing Status Intact 8/19/2019  1:16 PM   Dressing Changed Changed/New 8/19/2019  1:16 PM   Dressing/Treatment Xeroform;4x4;Other (comment); Ace Wrap 8/19/2019  1:16 PM   Wound Cleansed Rinsed/Irrigated with saline 8/19/2019  1:16 PM   Wound Length (cm) 4 cm 8/19/2019  1:16 PM   Wound Width (cm) 3 cm 8/19/2019  1:16 PM   Wound Depth (cm) 0 cm 8/19/2019  1:16 PM   Wound Surface Area (cm^2) 12 cm^2 8/19/2019  1:16 PM   Change in Wound Size % (l*w) 47.83 8/19/2019  1:16 PM   Wound Volume (cm^3) 0 cm^3 8/19/2019  1:16 PM   Distance Tunneling (cm) 0 cm 8/19/2019  1:16 PM   Tunneling Position ___ O'Clock 0 8/19/2019  1:16 PM   Undermining Starts ___ O'Clock 0 8/19/2019  1:16 PM   Undermining Ends___ O'Clock 0 8/19/2019  1:16 PM   Undermining Maxium Distance (cm) 0 8/19/2019  1:16 PM   Wound Assessment Clean;Dry; Intact 8/19/2019  1:16 PM   Drainage Amount Scant 8/19/2019  1:16 PM   Drainage Description Serosanguinous 8/19/2019  1:16 PM   Odor None 8/19/2019  1:16 PM   Margins Attached edges; Defined edges 8/19/2019  1:16 PM   Yelena-wound Assessment Edema; Red 8/19/2019  1:16 PM   Red%Wound Bed 100 8/19/2019  1:16 PM   Culture Taken No 8/19/2019  1:16 PM   Number of days: 8   L Leg Mid L shaped:      Response to treatment:  Well tolerated by patient.      Pain Assessment:  Severity:  1 / 10  Quality of pain:

## 2019-08-19 NOTE — PROGRESS NOTES
Occupational Therapy  Facility/Department: [place] [address] REMOTE TELEMETRY  Daily Treatment Note  NAME: Braxton Parham  : 1945  MRN: [id number]    Date of Service: 2019    Discharge Recommendations:  Subacute/Skilled Nursing Facility  OT Equipment Recommendations  Equipment Needed: No  Other: defer to next level of care    Assessment   Performance deficits / Impairments: Decreased functional mobility ; Decreased safe awareness;Decreased balance;Decreased ADL status; Decreased endurance;Decreased strength  After treatment, pt found to be presenting with the above mentioned deficits. Pt would benefit from continued skilled occupational therapy to address these deficits, increasing safety and independence with ADL and functional mobility. Prognosis: Fair  REQUIRES OT FOLLOW UP: Yes  Activity Tolerance  Activity Tolerance: Patient Tolerated treatment well  Activity Tolerance: Pt on 5L O2 via NC during session. SPO2 = 87% after standing for ~2 mins to wash hands at sink. Pt needing ~4 mins seated rest to recover SPO2 to 92%. Safety Devices  Safety Devices in place: Yes  Type of devices: Call light within reach;Gait belt;Nurse notified; Chair alarm in place; Left in chair       Patient Diagnosis(es): The primary encounter diagnosis was Acute on chronic diastolic congestive heart failure ([address] Diagnoses of Lower extremity edema, Dizziness, and Frequent falls were also pertinent to this visit. has a past medical history of Acute on chronic diastolic CHF (congestive heart failure) ([address] JAQUELIN (acute kidney injury) ([address] Arthritis, Asthma, COPD (chronic obstructive pulmonary disease) ([address] Diabetes mellitus ([address] Hyperlipidemia, Hypertension, MRSA (methicillin resistant staph aureus) culture positive, Other disorders of kidney and ureter in diseases classified elsewhere, Pneumonia due to infectious organism, and Thyroid disease.    has a past surgical history that includes Cholecystectomy; xr knee inc tunnel 63.03 (08/19/19 1017)  ADL Inpatient CMS G-Code Modifier : CL (08/19/19 1017)    Goals  Short term goals  Time Frame for Short term goals: 1 week (By 8/21/19)  Short term goal 1: Pt will complete functional transfers with Supervision  Short term goal 2: Pt will complete LE dressing with Min A with or without use of AE  Short term goal 3: Pt will perform 2-4 minutes dynamic standing activity with SBA by 8/18  Patient Goals   Patient goals : \"to do more rehab\"       Therapy Time   Individual Concurrent Group Co-treatment   Time In 0917         Time Out 1012         Minutes 55         Timed Code Treatment Minutes: 54 Minutes       If patient is discharged prior to next treatment session, this note will serve as the discharge summary.   Zabrina Harris OTR/L #[id number]

## 2019-08-19 NOTE — PLAN OF CARE
Patient's EF (Ejection Fraction) is greater than 40%    Patient's weights and intake/output reviewed:    Patient's Last Weight: 294.6 lbs obtained by bed scale. Difference of 5 lbs less than last documented weight. Intake/Output Summary (Last 24 hours) at 8/19/2019 1046  Last data filed at 8/19/2019 0931  Gross per 24 hour   Intake 1130 ml   Output 1800 ml   Net -670 ml         Patient stated Daily Functional Goal:   Pt goal is to ambulate to the bathroom (10 feet) without desaturating on 4L of O2/becoming SOB in less than 2 minute    Ongoing Functional Capacity Assessment:  Patient  able to ambulate distance of 15 feet in 3 Minuteswith moderate difficulty. Patient noted to be on 5L supplemental O2 with SpO2 of 95%. Pt up in chair at this time on 5 L O2. Pt with complaints of shortness of breath. Pt with pitting lower extremity edema. Patient and/or Family's stated Goal of Care this Admission: reduce shortness of breath, increase activity tolerance, better understand heart failure and disease management, be more comfortable and reduce lower extremity edema prior to discharge      Comorbidities Reviewed Yes  Patient has a past medical history of Acute on chronic diastolic CHF (congestive heart failure) ([address] JAQUELIN (acute kidney injury) ([facility] Arthritis, Asthma, COPD (chronic obstructive pulmonary disease) ([facility] Diabetes mellitus ([address] Hyperlipidemia, Hypertension, MRSA (methicillin resistant staph aureus) culture positive, Other disorders of kidney and ureter in diseases classified elsewhere, Pneumonia due to infectious organism, and Thyroid disease. >>For CHF and Comorbidity documentation on Education Time and Topics, please see Education Tab    Diabetes education provided today:    Metabolic syndrome: association of diabetes with dyslipidemia, HTN and obesity. Diabetic Neuropathy: signs and therapy.   Foot care: advised to wash feet daily, pat dry and apply lotion at night, avoiding

## 2019-08-26 ENCOUNTER — OFFICE VISIT (OUTPATIENT)
Dept: CARDIOLOGY CLINIC | Age: 74
End: 2019-08-26
Payer: MEDICARE

## 2019-08-26 VITALS
WEIGHT: 293 LBS | SYSTOLIC BLOOD PRESSURE: 118 MMHG | OXYGEN SATURATION: 97 % | HEIGHT: 62 IN | HEART RATE: 89 BPM | BODY MASS INDEX: 53.92 KG/M2 | DIASTOLIC BLOOD PRESSURE: 68 MMHG

## 2019-08-26 DIAGNOSIS — Z99.81 ON HOME O2: ICD-10-CM

## 2019-08-26 DIAGNOSIS — I10 ESSENTIAL HYPERTENSION: ICD-10-CM

## 2019-08-26 DIAGNOSIS — E66.01 MORBID OBESITY (HCC): ICD-10-CM

## 2019-08-26 DIAGNOSIS — I50.33 ACUTE ON CHRONIC DIASTOLIC CHF (CONGESTIVE HEART FAILURE) (HCC): Primary | ICD-10-CM

## 2019-08-26 PROCEDURE — 99214 OFFICE O/P EST MOD 30 MIN: CPT | Performed by: NURSE PRACTITIONER

## 2019-08-26 RX ORDER — GUAIFENESIN 400 MG/1
400 TABLET ORAL 2 TIMES DAILY PRN
COMMUNITY

## 2019-08-26 RX ORDER — BUDESONIDE AND FORMOTEROL FUMARATE DIHYDRATE 160; 4.5 UG/1; UG/1
2 AEROSOL RESPIRATORY (INHALATION) 2 TIMES DAILY
COMMUNITY

## 2019-08-26 ASSESSMENT — ENCOUNTER SYMPTOMS
CHEST TIGHTNESS: 0
VOMITING: 0
WHEEZING: 0
COUGH: 1
NAUSEA: 0
SHORTNESS OF BREATH: 1

## 2019-08-26 NOTE — PROGRESS NOTES
Provider, MD   furosemide (LASIX) 20 MG tablet Take 40 mg by mouth daily     Historical Provider, MD   PROAIR  (90 BASE) MCG/ACT inhaler 2 puffs every 6 hours as needed for Wheezing or Shortness of Breath  12/8/14   Historical Provider, MD   docusate sodium (COLACE) 100 MG capsule Take 100 mg by mouth 2 times daily as needed  11/24/14   Historical Provider, MD   levothyroxine (SYNTHROID) 88 MCG tablet  12/16/14   Historical Provider, MD   oxybutynin (DITROPAN-XL) 10 MG CR tablet  11/3/14   Historical Provider, MD       Allergies:  Aspirin; Celecoxib; Fexofenadine; Gabapentin; Niacin er; and Adhesive tape    Social History:   reports that she has quit smoking. She smoked 0.50 packs per day. She has never used smokeless tobacco. She reports that she does not drink alcohol or use drugs. Family History: family history includes Cancer in her brother and paternal aunt; Diabetes in her mother and sister. Review of Systems   Review of Systems   Constitutional: Positive for fatigue. Negative for activity change. Respiratory: Positive for cough and shortness of breath. Negative for chest tightness and wheezing. Cardiovascular: Positive for leg swelling. Negative for chest pain and palpitations. Gastrointestinal: Negative for nausea and vomiting. Neurological: Negative for dizziness, syncope, weakness and light-headedness. Hematological: Bruises/bleeds easily. Psychiatric/Behavioral: Positive for sleep disturbance. OBJECTIVE:    Vital signs:    /68   Pulse 89   Ht 5' 2\" (1.575 m)   Wt (!) 304 lb (137.9 kg) Comment: 08/25/2019 @ Nursing facility  SpO2 97%   Breastfeeding?  No   BMI 55.60 kg/m²      Physical Exam:  Constitutional:  Comfortable and alert, NAD, obese, appears older stated age  Eyes: PERRL, sclera nonicteric  Neck:  Supple, no masses, no thyroidmegaly, no JVD  Skin:  Warm and dry; no rash, scattered abrasions  Heart:  Regular, normal apex, S1 and S2 normal, no

## 2019-08-26 NOTE — PATIENT INSTRUCTIONS
Past Medical History:   has a past medical history of Acute on chronic diastolic CHF (congestive heart failure) ([facility] JAQUELIN (acute kidney injury) ([facility] Arthritis, Asthma, COPD (chronic obstructive pulmonary disease) ([facility] Hyperlipidemia, Hypertension, MRSA (methicillin resistant staph aureus) culture positive, Other disorders of kidney and ureter in diseases classified elsewhere, Pneumonia due to infectious organism, and Thyroid disease. Past Surgical History:   has a past surgical history that includes Cholecystectomy; xr knee inc tunnel bilat (Bilateral, 2014); Rotator cuff repair (Left, 2015); and Thyroid lobectomy. Home Medications:    Prior to Admission medications    Medication Sig Start Date End Date Taking?  Authorizing Provider   metFORMIN (GLUCOPHAGE) 500 MG tablet Take 500 mg by mouth 2 times daily (with meals)    Historical Provider, MD   busPIRone (BUSPAR) 10 MG tablet Take 10 mg by mouth 3 times daily    Historical Provider, MD   ipratropium-albuterol (DUONEB) 0.5-2.5 (3) MG/3ML SOLN nebulizer solution Inhale 3 mLs into the lungs every 4 hours (while awake) 4/27/19   Alban Arceo MD   magnesium oxide (MAG-OX) 400 (241.3 Mg) MG TABS tablet Take 1 tablet by mouth 4 times daily 4/27/19   Alban Arceo MD   potassium chloride (KLOR-CON M) 20 MEQ extended release tablet Take 2 tablets by mouth daily 12/29/18   Memo Nathan MD   silver sulfADIAZINE (SILVADENE) 1 % cream Apply topically 3 times daily as needed Apply topically to kerwin area    Historical Provider, MD   rosuvastatin (CRESTOR) 40 MG tablet Take 40 mg by mouth every evening    Historical Provider, MD   montelukast (SINGULAIR) 10 MG tablet Take 10 mg by mouth nightly    Historical Provider, MD   fluticasone (FLONASE) 50 MCG/ACT nasal spray 2 sprays by Nasal route daily    Historical Provider, MD   acetaminophen (TYLENOL) 325 MG tablet Take 650 mg by mouth every 6 hours as needed for Pain    Historical

## 2019-09-13 ENCOUNTER — HOSPITAL ENCOUNTER (INPATIENT)
Age: 74
LOS: 11 days | Discharge: NURSING, REHAB OR LONG-TERM CARE | DRG: 291 | End: 2019-09-24
Attending: EMERGENCY MEDICINE | Admitting: INTERNAL MEDICINE
Payer: MEDICARE

## 2019-09-13 ENCOUNTER — APPOINTMENT (OUTPATIENT)
Dept: GENERAL RADIOLOGY | Age: 74
DRG: 291 | End: 2019-09-13
Payer: MEDICARE

## 2019-09-13 DIAGNOSIS — N17.9 AKI (ACUTE KIDNEY INJURY) (HCC): Primary | ICD-10-CM

## 2019-09-13 DIAGNOSIS — I50.23 ACUTE ON CHRONIC SYSTOLIC CONGESTIVE HEART FAILURE (HCC): ICD-10-CM

## 2019-09-13 PROBLEM — I50.9 CHF (CONGESTIVE HEART FAILURE) (HCC): Status: ACTIVE | Noted: 2019-09-13

## 2019-09-13 PROBLEM — N39.0 UTI (URINARY TRACT INFECTION): Status: ACTIVE | Noted: 2019-09-13

## 2019-09-13 PROBLEM — I50.43 CHF (CONGESTIVE HEART FAILURE), NYHA CLASS I, ACUTE ON CHRONIC, COMBINED (HCC): Status: ACTIVE | Noted: 2019-09-13

## 2019-09-13 LAB
A/G RATIO: 0.9 (ref 1.1–2.2)
ALBUMIN SERPL-MCNC: 3.7 G/DL (ref 3.4–5)
ALP BLD-CCNC: 94 U/L (ref 40–129)
ALT SERPL-CCNC: 30 U/L (ref 10–40)
AMORPHOUS: ABNORMAL /HPF
ANION GAP SERPL CALCULATED.3IONS-SCNC: 14 MMOL/L (ref 3–16)
AST SERPL-CCNC: 82 U/L (ref 15–37)
BACTERIA: ABNORMAL /HPF
BASOPHILS ABSOLUTE: 0 K/UL (ref 0–0.2)
BASOPHILS RELATIVE PERCENT: 0.3 %
BILIRUB SERPL-MCNC: 0.6 MG/DL (ref 0–1)
BILIRUBIN URINE: NEGATIVE
BLOOD, URINE: ABNORMAL
BUN BLDV-MCNC: 38 MG/DL (ref 7–20)
CALCIUM SERPL-MCNC: 10 MG/DL (ref 8.3–10.6)
CHLORIDE BLD-SCNC: 93 MMOL/L (ref 99–110)
CLARITY: CLEAR
CO2: 33 MMOL/L (ref 21–32)
COLOR: YELLOW
CREAT SERPL-MCNC: 2.1 MG/DL (ref 0.6–1.2)
EOSINOPHILS ABSOLUTE: 0 K/UL (ref 0–0.6)
EOSINOPHILS RELATIVE PERCENT: 0.3 %
EPITHELIAL CELLS, UA: ABNORMAL /HPF
GFR AFRICAN AMERICAN: 28
GFR NON-AFRICAN AMERICAN: 23
GLOBULIN: 3.9 G/DL
GLUCOSE BLD-MCNC: 143 MG/DL (ref 70–99)
GLUCOSE BLD-MCNC: 222 MG/DL (ref 70–99)
GLUCOSE URINE: NEGATIVE MG/DL
HCT VFR BLD CALC: 40.4 % (ref 36–48)
HEMOGLOBIN: 12.6 G/DL (ref 12–16)
KETONES, URINE: NEGATIVE MG/DL
LEUKOCYTE ESTERASE, URINE: ABNORMAL
LYMPHOCYTES ABSOLUTE: 1.2 K/UL (ref 1–5.1)
LYMPHOCYTES RELATIVE PERCENT: 12 %
MCH RBC QN AUTO: 25 PG (ref 26–34)
MCHC RBC AUTO-ENTMCNC: 31.2 G/DL (ref 31–36)
MCV RBC AUTO: 80 FL (ref 80–100)
MICROSCOPIC EXAMINATION: YES
MONOCYTES ABSOLUTE: 0.9 K/UL (ref 0–1.3)
MONOCYTES RELATIVE PERCENT: 9.9 %
NEUTROPHILS ABSOLUTE: 7.4 K/UL (ref 1.7–7.7)
NEUTROPHILS RELATIVE PERCENT: 77.5 %
NITRITE, URINE: NEGATIVE
PDW BLD-RTO: 23.7 % (ref 12.4–15.4)
PERFORMED ON: ABNORMAL
PH UA: 6.5 (ref 5–8)
PLATELET # BLD: 179 K/UL (ref 135–450)
PMV BLD AUTO: 8.6 FL (ref 5–10.5)
POTASSIUM REFLEX MAGNESIUM: 3.9 MMOL/L (ref 3.5–5.1)
PRO-BNP: 7474 PG/ML (ref 0–124)
PROCALCITONIN: 0.15 NG/ML (ref 0–0.15)
PROTEIN UA: 30 MG/DL
RBC # BLD: 5.05 M/UL (ref 4–5.2)
RBC UA: ABNORMAL /HPF (ref 0–2)
RENAL EPITHELIAL, UA: ABNORMAL /HPF
SODIUM BLD-SCNC: 140 MMOL/L (ref 136–145)
SPECIFIC GRAVITY UA: 1.01 (ref 1–1.03)
TOTAL PROTEIN: 7.6 G/DL (ref 6.4–8.2)
TROPONIN: 0.05 NG/ML
URINE TYPE: ABNORMAL
UROBILINOGEN, URINE: 0.2 E.U./DL
WBC # BLD: 9.6 K/UL (ref 4–11)
WBC UA: ABNORMAL /HPF (ref 0–5)

## 2019-09-13 PROCEDURE — 84484 ASSAY OF TROPONIN QUANT: CPT

## 2019-09-13 PROCEDURE — 94761 N-INVAS EAR/PLS OXIMETRY MLT: CPT

## 2019-09-13 PROCEDURE — 81001 URINALYSIS AUTO W/SCOPE: CPT

## 2019-09-13 PROCEDURE — 99285 EMERGENCY DEPT VISIT HI MDM: CPT

## 2019-09-13 PROCEDURE — 1200000000 HC SEMI PRIVATE

## 2019-09-13 PROCEDURE — 93005 ELECTROCARDIOGRAM TRACING: CPT | Performed by: EMERGENCY MEDICINE

## 2019-09-13 PROCEDURE — 6370000000 HC RX 637 (ALT 250 FOR IP): Performed by: INTERNAL MEDICINE

## 2019-09-13 PROCEDURE — 2580000003 HC RX 258: Performed by: INTERNAL MEDICINE

## 2019-09-13 PROCEDURE — 85025 COMPLETE CBC W/AUTO DIFF WBC: CPT

## 2019-09-13 PROCEDURE — 6370000000 HC RX 637 (ALT 250 FOR IP): Performed by: NURSE PRACTITIONER

## 2019-09-13 PROCEDURE — 94640 AIRWAY INHALATION TREATMENT: CPT

## 2019-09-13 PROCEDURE — 84145 PROCALCITONIN (PCT): CPT

## 2019-09-13 PROCEDURE — 6360000002 HC RX W HCPCS: Performed by: EMERGENCY MEDICINE

## 2019-09-13 PROCEDURE — 83880 ASSAY OF NATRIURETIC PEPTIDE: CPT

## 2019-09-13 PROCEDURE — 71046 X-RAY EXAM CHEST 2 VIEWS: CPT

## 2019-09-13 PROCEDURE — 6360000002 HC RX W HCPCS: Performed by: INTERNAL MEDICINE

## 2019-09-13 PROCEDURE — 80053 COMPREHEN METABOLIC PANEL: CPT

## 2019-09-13 PROCEDURE — 2700000000 HC OXYGEN THERAPY PER DAY

## 2019-09-13 RX ORDER — ACETAMINOPHEN 325 MG/1
650 TABLET ORAL EVERY 4 HOURS PRN
Status: DISCONTINUED | OUTPATIENT
Start: 2019-09-13 | End: 2019-09-24 | Stop reason: HOSPADM

## 2019-09-13 RX ORDER — MONTELUKAST SODIUM 10 MG/1
10 TABLET ORAL NIGHTLY
Status: DISCONTINUED | OUTPATIENT
Start: 2019-09-13 | End: 2019-09-24 | Stop reason: HOSPADM

## 2019-09-13 RX ORDER — OXYBUTYNIN CHLORIDE 5 MG/1
10 TABLET, EXTENDED RELEASE ORAL NIGHTLY
Status: DISCONTINUED | OUTPATIENT
Start: 2019-09-13 | End: 2019-09-24 | Stop reason: HOSPADM

## 2019-09-13 RX ORDER — DEXTROSE MONOHYDRATE 50 MG/ML
100 INJECTION, SOLUTION INTRAVENOUS PRN
Status: DISCONTINUED | OUTPATIENT
Start: 2019-09-13 | End: 2019-09-24 | Stop reason: HOSPADM

## 2019-09-13 RX ORDER — IPRATROPIUM BROMIDE AND ALBUTEROL SULFATE 2.5; .5 MG/3ML; MG/3ML
3 SOLUTION RESPIRATORY (INHALATION)
Status: DISCONTINUED | OUTPATIENT
Start: 2019-09-13 | End: 2019-09-24 | Stop reason: HOSPADM

## 2019-09-13 RX ORDER — GUAIFENESIN 600 MG/1
600 TABLET, EXTENDED RELEASE ORAL 2 TIMES DAILY PRN
Status: DISCONTINUED | OUTPATIENT
Start: 2019-09-13 | End: 2019-09-24 | Stop reason: HOSPADM

## 2019-09-13 RX ORDER — BENZONATATE 100 MG/1
100 CAPSULE ORAL 3 TIMES DAILY PRN
Status: DISCONTINUED | OUTPATIENT
Start: 2019-09-13 | End: 2019-09-24 | Stop reason: HOSPADM

## 2019-09-13 RX ORDER — BUSPIRONE HYDROCHLORIDE 5 MG/1
10 TABLET ORAL 3 TIMES DAILY
Status: DISCONTINUED | OUTPATIENT
Start: 2019-09-13 | End: 2019-09-24 | Stop reason: HOSPADM

## 2019-09-13 RX ORDER — LEVOTHYROXINE SODIUM 88 UG/1
88 TABLET ORAL DAILY
Status: DISCONTINUED | OUTPATIENT
Start: 2019-09-13 | End: 2019-09-24 | Stop reason: HOSPADM

## 2019-09-13 RX ORDER — FLUTICASONE PROPIONATE 50 MCG
2 SPRAY, SUSPENSION (ML) NASAL DAILY
Status: DISCONTINUED | OUTPATIENT
Start: 2019-09-13 | End: 2019-09-24 | Stop reason: HOSPADM

## 2019-09-13 RX ORDER — SODIUM CHLORIDE 0.9 % (FLUSH) 0.9 %
10 SYRINGE (ML) INJECTION PRN
Status: DISCONTINUED | OUTPATIENT
Start: 2019-09-13 | End: 2019-09-24 | Stop reason: HOSPADM

## 2019-09-13 RX ORDER — NICOTINE POLACRILEX 4 MG
15 LOZENGE BUCCAL PRN
Status: DISCONTINUED | OUTPATIENT
Start: 2019-09-13 | End: 2019-09-24 | Stop reason: HOSPADM

## 2019-09-13 RX ORDER — SODIUM CHLORIDE 0.9 % (FLUSH) 0.9 %
10 SYRINGE (ML) INJECTION EVERY 12 HOURS SCHEDULED
Status: DISCONTINUED | OUTPATIENT
Start: 2019-09-13 | End: 2019-09-24 | Stop reason: HOSPADM

## 2019-09-13 RX ORDER — POTASSIUM CHLORIDE 20 MEQ/1
40 TABLET, EXTENDED RELEASE ORAL DAILY
Status: DISCONTINUED | OUTPATIENT
Start: 2019-09-13 | End: 2019-09-24 | Stop reason: HOSPADM

## 2019-09-13 RX ORDER — FUROSEMIDE 10 MG/ML
40 INJECTION INTRAMUSCULAR; INTRAVENOUS ONCE
Status: COMPLETED | OUTPATIENT
Start: 2019-09-13 | End: 2019-09-13

## 2019-09-13 RX ORDER — ONDANSETRON 2 MG/ML
4 INJECTION INTRAMUSCULAR; INTRAVENOUS EVERY 6 HOURS PRN
Status: DISCONTINUED | OUTPATIENT
Start: 2019-09-13 | End: 2019-09-24 | Stop reason: HOSPADM

## 2019-09-13 RX ORDER — FUROSEMIDE 10 MG/ML
40 INJECTION INTRAMUSCULAR; INTRAVENOUS 3 TIMES DAILY
Status: DISCONTINUED | OUTPATIENT
Start: 2019-09-13 | End: 2019-09-17

## 2019-09-13 RX ORDER — DEXTROSE MONOHYDRATE 25 G/50ML
12.5 INJECTION, SOLUTION INTRAVENOUS PRN
Status: DISCONTINUED | OUTPATIENT
Start: 2019-09-13 | End: 2019-09-24 | Stop reason: HOSPADM

## 2019-09-13 RX ORDER — ALBUTEROL SULFATE 90 UG/1
2 AEROSOL, METERED RESPIRATORY (INHALATION) EVERY 6 HOURS PRN
Status: DISCONTINUED | OUTPATIENT
Start: 2019-09-13 | End: 2019-09-24 | Stop reason: HOSPADM

## 2019-09-13 RX ORDER — ROSUVASTATIN CALCIUM 10 MG/1
40 TABLET, COATED ORAL EVERY EVENING
Status: DISCONTINUED | OUTPATIENT
Start: 2019-09-13 | End: 2019-09-24 | Stop reason: HOSPADM

## 2019-09-13 RX ORDER — SODIUM CHLORIDE 9 MG/ML
INJECTION, SOLUTION INTRAVENOUS
Status: DISPENSED
Start: 2019-09-13 | End: 2019-09-14

## 2019-09-13 RX ORDER — DOCUSATE SODIUM 100 MG/1
100 CAPSULE, LIQUID FILLED ORAL 2 TIMES DAILY PRN
Status: DISCONTINUED | OUTPATIENT
Start: 2019-09-13 | End: 2019-09-24 | Stop reason: HOSPADM

## 2019-09-13 RX ADMIN — FUROSEMIDE 40 MG: 10 INJECTION, SOLUTION INTRAMUSCULAR; INTRAVENOUS at 20:53

## 2019-09-13 RX ADMIN — MONTELUKAST SODIUM 10 MG: 10 TABLET, FILM COATED ORAL at 20:49

## 2019-09-13 RX ADMIN — IPRATROPIUM BROMIDE AND ALBUTEROL SULFATE 3 ML: .5; 3 SOLUTION RESPIRATORY (INHALATION) at 20:05

## 2019-09-13 RX ADMIN — POTASSIUM CHLORIDE 40 MEQ: 20 TABLET, EXTENDED RELEASE ORAL at 20:49

## 2019-09-13 RX ADMIN — ROSUVASTATIN CALCIUM 40 MG: 10 TABLET, FILM COATED ORAL at 20:52

## 2019-09-13 RX ADMIN — ACETAMINOPHEN 650 MG: 325 TABLET ORAL at 20:49

## 2019-09-13 RX ADMIN — OXYBUTYNIN CHLORIDE 10 MG: 5 TABLET, EXTENDED RELEASE ORAL at 20:51

## 2019-09-13 RX ADMIN — FUROSEMIDE 40 MG: 10 INJECTION, SOLUTION INTRAMUSCULAR; INTRAVENOUS at 17:22

## 2019-09-13 RX ADMIN — INSULIN LISPRO 1 UNITS: 100 INJECTION, SOLUTION INTRAVENOUS; SUBCUTANEOUS at 20:59

## 2019-09-13 RX ADMIN — FLUTICASONE PROPIONATE 2 SPRAY: 50 SPRAY, METERED NASAL at 20:53

## 2019-09-13 RX ADMIN — BENZONATATE 100 MG: 100 CAPSULE ORAL at 22:19

## 2019-09-13 RX ADMIN — Medication 2 PUFF: at 20:05

## 2019-09-13 RX ADMIN — CEFTRIAXONE SODIUM 1 G: 1 INJECTION, POWDER, FOR SOLUTION INTRAMUSCULAR; INTRAVENOUS at 21:17

## 2019-09-13 RX ADMIN — ENOXAPARIN SODIUM 40 MG: 40 INJECTION SUBCUTANEOUS at 20:52

## 2019-09-13 RX ADMIN — Medication 10 ML: at 20:54

## 2019-09-13 RX ADMIN — LEVOTHYROXINE SODIUM 88 MCG: 0.09 TABLET ORAL at 20:51

## 2019-09-13 RX ADMIN — BUSPIRONE HYDROCHLORIDE 10 MG: 5 TABLET ORAL at 20:48

## 2019-09-13 ASSESSMENT — ENCOUNTER SYMPTOMS
VOMITING: 0
DIARRHEA: 0
RHINORRHEA: 0
COUGH: 1
NAUSEA: 0
WHEEZING: 0
ABDOMINAL DISTENTION: 0
PHOTOPHOBIA: 0
SHORTNESS OF BREATH: 1
BACK PAIN: 0

## 2019-09-13 ASSESSMENT — PAIN SCALES - GENERAL
PAINLEVEL_OUTOF10: 5
PAINLEVEL_OUTOF10: 0
PAINLEVEL_OUTOF10: 0

## 2019-09-13 NOTE — H&P
Hospital Medicine History & Physical      PCP: No primary care provider on file. Date of Admission: 9/13/2019    Date of Service: Pt seen/examined on 13 Sept and Admitted to Inpatient with expected LOS greater than two midnights due to medical therapy. Chief Complaint:  SOB      History Of Present Illness:     68 y.o. female w/ a hx of O2 dependently COPD as well as known CHF admitted here at [facility] approx 1 month ago for CHF who represented to [facility] with a 1 month hx of progressive SOB/MOSHER. She describes exertional shortness of breath and associated LE edema but no PND/Orthopnea. The patient denies any fever/chills or other signs/sxs of systemic illness or identifiable aggravating/alleviating factors. Past Medical History:          Diagnosis Date    Acute on chronic diastolic CHF (congestive heart failure) ([facility] 8/10/2019    JAQUELIN (acute kidney injury) ([facility]     Arthritis     Asthma     COPD (chronic obstructive pulmonary disease) ([facility]     Diabetes mellitus ([facility]     Hyperlipidemia     Hypertension     MRSA (methicillin resistant staph aureus) culture positive 04/17/2019    left ft wound    Other disorders of kidney and ureter in diseases classified elsewhere     Pneumonia due to infectious organism 12/22/2018    Thyroid disease        Past Surgical History:          Procedure Laterality Date    CHOLECYSTECTOMY      ROTATOR CUFF REPAIR Left 2015    THYROID LOBECTOMY      XR KNEE INC TUNNEL BILAT Bilateral 2014       Medications Prior to Admission:      Prior to Admission medications    Medication Sig Start Date End Date Taking?  Authorizing Provider   guaiFENesin 400 MG tablet Take 400 mg by mouth 2 times daily as needed for Cough   Yes Historical Provider, MD   budesonide-formoterol (SYMBICORT) 160-4.5 MCG/ACT AERO Inhale 2 puffs into the lungs 2 times daily   Yes Historical Provider, MD   metFORMIN (GLUCOPHAGE) 500 MG tablet Take 500 mg by mouth 2 times daily (with

## 2019-09-13 NOTE — ED PROVIDER NOTES
Emergency Department Provider Note  Location: [facility] A[facility] CARD TELEMETRY  9/13/2019     Patient Identification  Heriberto Gray is a 68 y.o. female    Chief Complaint  Shortness of Breath (per patient, has been escalating for the past three days; hx of CHF, COPD)      Mode of Arrival  EMS    HPI  (History provided by patient)  20-year-old female with multiple comorbidities including COPD on 3 to 5 L oxygen at baseline, CHF, who presents from assisted living with 1 month progressive shortness of breath. She describes exertional shortness of breath. She denies having any chest pain or chest pressure no neck pain or shoulder pain. No lightheadedness no loss of consciousness. She also describes some increased clear sputum but no fevers or chills. I have reviewed the following nursing documentation:  Allergies: Allergies   Allergen Reactions    Aspirin Other (See Comments)     \"ulcers\"    Celecoxib Other (See Comments)     RHINITIS    Fexofenadine Swelling    Gabapentin Other (See Comments)    Niacin Er      Other reaction(s): Flushing    Adhesive Tape Rash     Paper tape  Paper tape  Paper tape  Paper tape       Past medical history:  has a past medical history of Acute on chronic diastolic CHF (congestive heart failure) ([address] (8/10/2019), JAQUELIN (acute kidney injury) ([address] Arthritis, Asthma, COPD (chronic obstructive pulmonary disease) ([address] Diabetes mellitus ([address] Hyperlipidemia, Hypertension, MRSA (methicillin resistant staph aureus) culture positive (04/17/2019), Other disorders of kidney and ureter in diseases classified elsewhere, Pneumonia due to infectious organism (12/22/2018), and Thyroid disease. Past surgical history:  has a past surgical history that includes Cholecystectomy; xr knee inc tunnel bilat (Bilateral, 2014); Rotator cuff repair (Left, 2015); and Thyroid lobectomy. Home medications:   Prior to Admission medications    Medication Sig Start Date End Date Taking?  Authorizing Last MAR action:  [facility]    09/13/19 1915 09/13/19 1851  cefTRIAXone (ROCEPHIN) 1 g IVPB in 50 mL D5W minibag  DAILY      Last MAR action:  New Bag - by Car[facility]13[facility]    09/13/19 1845 09/13/19 1841  fluticasone (FLONASE) 50 MCG/ACT nasal spray 2 spray  DAILY      Last MAR action:  Given - by Carole Amos on 09/13/19 [facility]    09/13/19 1845 09/13/19 1841  levothyroxine (SYNTHROID) tablet 88 mcg  DAILY      Last MAR action:  Given - by Carole Amos on 09/13/19 at [address] [facility] NAVA    09/13/19 1845 09/13/19 1841  potassium chloride (KLOR-CON M) extended release tablet 40 mEq  DAILY      Last MAR action:  Given - by Carole Amos on 09/13/19 [facility]    09/13/19 1845 09/13/19 1841  rosuvastatin (CRESTOR) tablet 40 mg  EVERY EVENING      Last MAR action:  Given - by Carole Amos on 09/13/19 at [facility] [facility]    09/13/19 1845 09/13/19 1841  enoxaparin (LOVENOX) injection 40 mg  DAILY      Last MAR action:  Given - by Carole Amos on 09/13/19 at [address] [facility] NAVA    09/13/19 1845 09/13/19 1841  insulin lispro (HUMALOG) injection vial 0-6 Units  3 TIMES DAILY WITH MEALS      Last MAR action:  Not Given - by Carole Amos on 09/13/19 at [place] [id number]    09/13/19 1841 09/13/19 1841  docusate sodium (COLACE) capsule 100 mg  2 TIMES DAILY PRN      Acknowledged GUILLERMO MYERS    09/13/19 1841 09/13/19 1841  guaiFENesin (MUCINEX) extended release tablet 600 mg  2 TIMES DAILY PRN      Acknowledged GUILLERMO MYERS    09/13/19 1841 09/13/19 1841  sodium chloride flush 0.9 % injection 10 mL  PRN      Acknowledged GUILLERMO MYERS    09/13/19 1841 09/13/19 1841  magnesium hydroxide (MILK OF MAGNESIA) 400 MG/5ML suspension 30 mL  DAILY PRN      Acknowledged GUILLERMO MYERS    09/13/19 1841 09/13/19 1841  ondansetron (ZOFRAN) injection 4 mg  EVERY 6 HOURS PRN      Acknowledged GUILLERMO MYERS    09/13/19

## 2019-09-13 NOTE — ED NOTES
Bed: 19  Expected date: 9/13/19  Expected time: 3:08 PM  Means of arrival: [address]  Comments:  66yo female SOB  X 2 breathing txt  125mg solumedrol given PIV     Christy Denise RN  09/13/19 3504

## 2019-09-14 LAB
ALBUMIN SERPL-MCNC: 3.4 G/DL (ref 3.4–5)
ALBUMIN SERPL-MCNC: 3.9 G/DL (ref 3.4–5)
ANION GAP SERPL CALCULATED.3IONS-SCNC: 10 MMOL/L (ref 3–16)
ANION GAP SERPL CALCULATED.3IONS-SCNC: 14 MMOL/L (ref 3–16)
BUN BLDV-MCNC: 39 MG/DL (ref 7–20)
BUN BLDV-MCNC: 41 MG/DL (ref 7–20)
CALCIUM SERPL-MCNC: 10 MG/DL (ref 8.3–10.6)
CALCIUM SERPL-MCNC: 10.3 MG/DL (ref 8.3–10.6)
CHLORIDE BLD-SCNC: 94 MMOL/L (ref 99–110)
CHLORIDE BLD-SCNC: 97 MMOL/L (ref 99–110)
CO2: 34 MMOL/L (ref 21–32)
CO2: 36 MMOL/L (ref 21–32)
CREAT SERPL-MCNC: 1.8 MG/DL (ref 0.6–1.2)
CREAT SERPL-MCNC: 2 MG/DL (ref 0.6–1.2)
EKG ATRIAL RATE: 87 BPM
EKG DIAGNOSIS: NORMAL
EKG P AXIS: 51 DEGREES
EKG P-R INTERVAL: 166 MS
EKG Q-T INTERVAL: 426 MS
EKG QRS DURATION: 126 MS
EKG QTC CALCULATION (BAZETT): 512 MS
EKG R AXIS: 95 DEGREES
EKG T AXIS: -15 DEGREES
EKG VENTRICULAR RATE: 87 BPM
GFR AFRICAN AMERICAN: 30
GFR AFRICAN AMERICAN: 33
GFR NON-AFRICAN AMERICAN: 24
GFR NON-AFRICAN AMERICAN: 28
GLUCOSE BLD-MCNC: 135 MG/DL (ref 70–99)
GLUCOSE BLD-MCNC: 140 MG/DL (ref 70–99)
GLUCOSE BLD-MCNC: 148 MG/DL (ref 70–99)
GLUCOSE BLD-MCNC: 150 MG/DL (ref 70–99)
GLUCOSE BLD-MCNC: 160 MG/DL (ref 70–99)
GLUCOSE BLD-MCNC: 250 MG/DL (ref 70–99)
HCT VFR BLD CALC: 37.4 % (ref 36–48)
HEMOGLOBIN: 11.6 G/DL (ref 12–16)
MAGNESIUM: 2.3 MG/DL (ref 1.8–2.4)
MCH RBC QN AUTO: 24.8 PG (ref 26–34)
MCHC RBC AUTO-ENTMCNC: 31.1 G/DL (ref 31–36)
MCV RBC AUTO: 79.6 FL (ref 80–100)
PDW BLD-RTO: 23.4 % (ref 12.4–15.4)
PERFORMED ON: ABNORMAL
PHOSPHORUS: 2.5 MG/DL (ref 2.5–4.9)
PHOSPHORUS: 3.8 MG/DL (ref 2.5–4.9)
PLATELET # BLD: 151 K/UL (ref 135–450)
PMV BLD AUTO: 8.6 FL (ref 5–10.5)
POTASSIUM SERPL-SCNC: 3.6 MMOL/L (ref 3.5–5.1)
POTASSIUM SERPL-SCNC: 3.7 MMOL/L (ref 3.5–5.1)
RBC # BLD: 4.69 M/UL (ref 4–5.2)
SODIUM BLD-SCNC: 142 MMOL/L (ref 136–145)
SODIUM BLD-SCNC: 143 MMOL/L (ref 136–145)
TROPONIN: 0.02 NG/ML
WBC # BLD: 5.4 K/UL (ref 4–11)

## 2019-09-14 PROCEDURE — 94640 AIRWAY INHALATION TREATMENT: CPT

## 2019-09-14 PROCEDURE — 93010 ELECTROCARDIOGRAM REPORT: CPT | Performed by: INTERNAL MEDICINE

## 2019-09-14 PROCEDURE — 6370000000 HC RX 637 (ALT 250 FOR IP): Performed by: INTERNAL MEDICINE

## 2019-09-14 PROCEDURE — 94150 VITAL CAPACITY TEST: CPT

## 2019-09-14 PROCEDURE — 85027 COMPLETE CBC AUTOMATED: CPT

## 2019-09-14 PROCEDURE — 94761 N-INVAS EAR/PLS OXIMETRY MLT: CPT

## 2019-09-14 PROCEDURE — 6360000002 HC RX W HCPCS: Performed by: INTERNAL MEDICINE

## 2019-09-14 PROCEDURE — 84484 ASSAY OF TROPONIN QUANT: CPT

## 2019-09-14 PROCEDURE — 80069 RENAL FUNCTION PANEL: CPT

## 2019-09-14 PROCEDURE — 1200000000 HC SEMI PRIVATE

## 2019-09-14 PROCEDURE — 2700000000 HC OXYGEN THERAPY PER DAY

## 2019-09-14 PROCEDURE — 36415 COLL VENOUS BLD VENIPUNCTURE: CPT

## 2019-09-14 PROCEDURE — 83735 ASSAY OF MAGNESIUM: CPT

## 2019-09-14 PROCEDURE — 2580000003 HC RX 258: Performed by: INTERNAL MEDICINE

## 2019-09-14 PROCEDURE — 96374 THER/PROPH/DIAG INJ IV PUSH: CPT

## 2019-09-14 RX ADMIN — IPRATROPIUM BROMIDE AND ALBUTEROL SULFATE 3 ML: .5; 3 SOLUTION RESPIRATORY (INHALATION) at 08:13

## 2019-09-14 RX ADMIN — MONTELUKAST SODIUM 10 MG: 10 TABLET, FILM COATED ORAL at 20:38

## 2019-09-14 RX ADMIN — INSULIN LISPRO 1 UNITS: 100 INJECTION, SOLUTION INTRAVENOUS; SUBCUTANEOUS at 16:50

## 2019-09-14 RX ADMIN — FUROSEMIDE 40 MG: 10 INJECTION, SOLUTION INTRAMUSCULAR; INTRAVENOUS at 20:38

## 2019-09-14 RX ADMIN — ROSUVASTATIN CALCIUM 40 MG: 10 TABLET, FILM COATED ORAL at 16:49

## 2019-09-14 RX ADMIN — CEFTRIAXONE SODIUM 1 G: 1 INJECTION, POWDER, FOR SOLUTION INTRAMUSCULAR; INTRAVENOUS at 08:07

## 2019-09-14 RX ADMIN — BUSPIRONE HYDROCHLORIDE 10 MG: 5 TABLET ORAL at 08:06

## 2019-09-14 RX ADMIN — OXYBUTYNIN CHLORIDE 10 MG: 5 TABLET, EXTENDED RELEASE ORAL at 20:38

## 2019-09-14 RX ADMIN — FLUTICASONE PROPIONATE 2 SPRAY: 50 SPRAY, METERED NASAL at 08:07

## 2019-09-14 RX ADMIN — BUSPIRONE HYDROCHLORIDE 10 MG: 5 TABLET ORAL at 20:38

## 2019-09-14 RX ADMIN — ENOXAPARIN SODIUM 40 MG: 40 INJECTION SUBCUTANEOUS at 08:06

## 2019-09-14 RX ADMIN — Medication 2 PUFF: at 08:13

## 2019-09-14 RX ADMIN — INSULIN LISPRO 1 UNITS: 100 INJECTION, SOLUTION INTRAVENOUS; SUBCUTANEOUS at 20:49

## 2019-09-14 RX ADMIN — LEVOTHYROXINE SODIUM 88 MCG: 0.09 TABLET ORAL at 05:43

## 2019-09-14 RX ADMIN — ACETAMINOPHEN 650 MG: 325 TABLET ORAL at 20:45

## 2019-09-14 RX ADMIN — FUROSEMIDE 40 MG: 10 INJECTION, SOLUTION INTRAMUSCULAR; INTRAVENOUS at 08:07

## 2019-09-14 RX ADMIN — POTASSIUM CHLORIDE 40 MEQ: 20 TABLET, EXTENDED RELEASE ORAL at 08:06

## 2019-09-14 RX ADMIN — FUROSEMIDE 40 MG: 10 INJECTION, SOLUTION INTRAMUSCULAR; INTRAVENOUS at 14:09

## 2019-09-14 RX ADMIN — IPRATROPIUM BROMIDE AND ALBUTEROL SULFATE 3 ML: .5; 3 SOLUTION RESPIRATORY (INHALATION) at 16:10

## 2019-09-14 RX ADMIN — Medication 10 ML: at 08:08

## 2019-09-14 RX ADMIN — INSULIN LISPRO 1 UNITS: 100 INJECTION, SOLUTION INTRAVENOUS; SUBCUTANEOUS at 08:07

## 2019-09-14 RX ADMIN — IPRATROPIUM BROMIDE AND ALBUTEROL SULFATE 3 ML: .5; 3 SOLUTION RESPIRATORY (INHALATION) at 19:18

## 2019-09-14 RX ADMIN — BUSPIRONE HYDROCHLORIDE 10 MG: 5 TABLET ORAL at 14:09

## 2019-09-14 RX ADMIN — IPRATROPIUM BROMIDE AND ALBUTEROL SULFATE 3 ML: .5; 3 SOLUTION RESPIRATORY (INHALATION) at 12:18

## 2019-09-14 RX ADMIN — Medication 10 ML: at 20:38

## 2019-09-14 RX ADMIN — INSULIN LISPRO 3 UNITS: 100 INJECTION, SOLUTION INTRAVENOUS; SUBCUTANEOUS at 11:55

## 2019-09-14 RX ADMIN — Medication 2 PUFF: at 19:18

## 2019-09-14 RX ADMIN — Medication 10 ML: at 14:09

## 2019-09-14 ASSESSMENT — PAIN SCALES - GENERAL
PAINLEVEL_OUTOF10: 3
PAINLEVEL_OUTOF10: 0

## 2019-09-14 NOTE — PROGRESS NOTES
Hospitalist Progress Note      PCP: No primary care provider on file. Date of Admission: 9/13/2019    Chief Complaint: SOB    Subjective: no new c/o. Medications:  Reviewed    Infusion Medications    dextrose       Scheduled Medications    mometasone-formoterol  2 puff Inhalation BID    busPIRone  10 mg Oral TID    fluticasone  2 spray Nasal Daily    ipratropium-albuterol  3 mL Inhalation Q4H WA    levothyroxine  88 mcg Oral Daily    montelukast  10 mg Oral Nightly    oxybutynin  10 mg Oral Nightly    potassium chloride  40 mEq Oral Daily    rosuvastatin  40 mg Oral QPM    furosemide  40 mg Intravenous TID    sodium chloride flush  10 mL Intravenous 2 times per day    enoxaparin  40 mg Subcutaneous Daily    insulin lispro  0-6 Units Subcutaneous TID WC    insulin lispro  0-3 Units Subcutaneous Nightly    cefTRIAXone (ROCEPHIN) IV  1 g Intravenous Daily     PRN Meds: docusate sodium, guaiFENesin, albuterol sulfate HFA, sodium chloride flush, magnesium hydroxide, ondansetron, acetaminophen, glucose, dextrose, glucagon (rDNA), dextrose, benzonatate      Intake/Output Summary (Last 24 hours) at 9/14/2019 1053  Last data filed at 9/14/2019 0818  Gross per 24 hour   Intake 240 ml   Output 1100 ml   Net -860 ml       Physical Exam Performed:    /75   Pulse 83   Temp 97.4 °F (36.3 °C) (Axillary)   Resp 18   Ht 5' 2\" (1.575 m)   Wt (!) 324 lb 11.8 oz (147.3 kg)   SpO2 95%   BMI 59.40 kg/m²     General appearance: No apparent distress, appears stated age and cooperative. HEENT: Pupils equal, round, and reactive to light. Conjunctivae/corneas clear. Neck: Supple, with full range of motion. No jugular venous distention. Trachea midline. Respiratory:  Normal respiratory effort. Clear to auscultation, bilaterally without Rales/Wheezes/Rhonchi. Cardiovascular: Regular rate and rhythm with normal S1/S2 without murmurs, rubs or gallops.   Abdomen: Soft, non-tender, non-distended with

## 2019-09-14 NOTE — PROGRESS NOTES
Secure message to Belkis Mane NP    \"Pt is requesting tessalon perles for her cough. Can we get this ordered for her? Thank you. \"    @[phone]    See new orders

## 2019-09-15 LAB
ALBUMIN SERPL-MCNC: 3.6 G/DL (ref 3.4–5)
ANION GAP SERPL CALCULATED.3IONS-SCNC: 11 MMOL/L (ref 3–16)
BUN BLDV-MCNC: 42 MG/DL (ref 7–20)
CALCIUM SERPL-MCNC: 9.9 MG/DL (ref 8.3–10.6)
CHLORIDE BLD-SCNC: 95 MMOL/L (ref 99–110)
CO2: 37 MMOL/L (ref 21–32)
CREAT SERPL-MCNC: 1.8 MG/DL (ref 0.6–1.2)
GFR AFRICAN AMERICAN: 33
GFR NON-AFRICAN AMERICAN: 28
GLUCOSE BLD-MCNC: 131 MG/DL (ref 70–99)
GLUCOSE BLD-MCNC: 156 MG/DL (ref 70–99)
GLUCOSE BLD-MCNC: 167 MG/DL (ref 70–99)
GLUCOSE BLD-MCNC: 176 MG/DL (ref 70–99)
GLUCOSE BLD-MCNC: 183 MG/DL (ref 70–99)
HCT VFR BLD CALC: 38.4 % (ref 36–48)
HEMOGLOBIN: 11.7 G/DL (ref 12–16)
MCH RBC QN AUTO: 24.5 PG (ref 26–34)
MCHC RBC AUTO-ENTMCNC: 30.5 G/DL (ref 31–36)
MCV RBC AUTO: 80.5 FL (ref 80–100)
PDW BLD-RTO: 23.3 % (ref 12.4–15.4)
PERFORMED ON: ABNORMAL
PHOSPHORUS: 3.6 MG/DL (ref 2.5–4.9)
PLATELET # BLD: 165 K/UL (ref 135–450)
PMV BLD AUTO: 9 FL (ref 5–10.5)
POTASSIUM SERPL-SCNC: 3.9 MMOL/L (ref 3.5–5.1)
RBC # BLD: 4.76 M/UL (ref 4–5.2)
SODIUM BLD-SCNC: 143 MMOL/L (ref 136–145)
WBC # BLD: 9.6 K/UL (ref 4–11)

## 2019-09-15 PROCEDURE — 80069 RENAL FUNCTION PANEL: CPT

## 2019-09-15 PROCEDURE — 6370000000 HC RX 637 (ALT 250 FOR IP): Performed by: INTERNAL MEDICINE

## 2019-09-15 PROCEDURE — 94761 N-INVAS EAR/PLS OXIMETRY MLT: CPT

## 2019-09-15 PROCEDURE — 93005 ELECTROCARDIOGRAM TRACING: CPT | Performed by: INTERNAL MEDICINE

## 2019-09-15 PROCEDURE — 6360000002 HC RX W HCPCS: Performed by: INTERNAL MEDICINE

## 2019-09-15 PROCEDURE — 2700000000 HC OXYGEN THERAPY PER DAY

## 2019-09-15 PROCEDURE — 94640 AIRWAY INHALATION TREATMENT: CPT

## 2019-09-15 PROCEDURE — 2580000003 HC RX 258: Performed by: INTERNAL MEDICINE

## 2019-09-15 PROCEDURE — 85027 COMPLETE CBC AUTOMATED: CPT

## 2019-09-15 PROCEDURE — 1200000000 HC SEMI PRIVATE

## 2019-09-15 PROCEDURE — 36415 COLL VENOUS BLD VENIPUNCTURE: CPT

## 2019-09-15 PROCEDURE — 6370000000 HC RX 637 (ALT 250 FOR IP): Performed by: NURSE PRACTITIONER

## 2019-09-15 RX ORDER — SODIUM CHLORIDE 9 MG/ML
INJECTION, SOLUTION INTRAVENOUS
Status: DISPENSED
Start: 2019-09-15 | End: 2019-09-15

## 2019-09-15 RX ADMIN — Medication 10 ML: at 20:59

## 2019-09-15 RX ADMIN — CEFTRIAXONE SODIUM 1 G: 1 INJECTION, POWDER, FOR SOLUTION INTRAMUSCULAR; INTRAVENOUS at 11:24

## 2019-09-15 RX ADMIN — IPRATROPIUM BROMIDE AND ALBUTEROL SULFATE 3 ML: .5; 3 SOLUTION RESPIRATORY (INHALATION) at 12:00

## 2019-09-15 RX ADMIN — FUROSEMIDE 40 MG: 10 INJECTION, SOLUTION INTRAMUSCULAR; INTRAVENOUS at 11:27

## 2019-09-15 RX ADMIN — INSULIN LISPRO 1 UNITS: 100 INJECTION, SOLUTION INTRAVENOUS; SUBCUTANEOUS at 20:41

## 2019-09-15 RX ADMIN — IPRATROPIUM BROMIDE AND ALBUTEROL SULFATE 3 ML: .5; 3 SOLUTION RESPIRATORY (INHALATION) at 19:13

## 2019-09-15 RX ADMIN — ACETAMINOPHEN 650 MG: 325 TABLET ORAL at 20:51

## 2019-09-15 RX ADMIN — Medication 10 ML: at 11:28

## 2019-09-15 RX ADMIN — MONTELUKAST SODIUM 10 MG: 10 TABLET, FILM COATED ORAL at 20:41

## 2019-09-15 RX ADMIN — Medication 2 PUFF: at 19:17

## 2019-09-15 RX ADMIN — ACETAMINOPHEN 650 MG: 325 TABLET ORAL at 13:03

## 2019-09-15 RX ADMIN — FLUTICASONE PROPIONATE 2 SPRAY: 50 SPRAY, METERED NASAL at 11:29

## 2019-09-15 RX ADMIN — POTASSIUM CHLORIDE 40 MEQ: 20 TABLET, EXTENDED RELEASE ORAL at 11:23

## 2019-09-15 RX ADMIN — FUROSEMIDE 40 MG: 10 INJECTION, SOLUTION INTRAMUSCULAR; INTRAVENOUS at 15:16

## 2019-09-15 RX ADMIN — BUSPIRONE HYDROCHLORIDE 10 MG: 5 TABLET ORAL at 20:41

## 2019-09-15 RX ADMIN — BUSPIRONE HYDROCHLORIDE 10 MG: 5 TABLET ORAL at 15:16

## 2019-09-15 RX ADMIN — INSULIN LISPRO 1 UNITS: 100 INJECTION, SOLUTION INTRAVENOUS; SUBCUTANEOUS at 12:55

## 2019-09-15 RX ADMIN — BENZONATATE 100 MG: 100 CAPSULE ORAL at 22:41

## 2019-09-15 RX ADMIN — OXYBUTYNIN CHLORIDE 10 MG: 5 TABLET, EXTENDED RELEASE ORAL at 20:41

## 2019-09-15 RX ADMIN — ENOXAPARIN SODIUM 40 MG: 40 INJECTION SUBCUTANEOUS at 11:23

## 2019-09-15 RX ADMIN — SALINE NASAL SPRAY 1 SPRAY: 1.5 SOLUTION NASAL at 22:30

## 2019-09-15 RX ADMIN — ROSUVASTATIN CALCIUM 40 MG: 10 TABLET, FILM COATED ORAL at 20:51

## 2019-09-15 RX ADMIN — BUSPIRONE HYDROCHLORIDE 10 MG: 5 TABLET ORAL at 11:28

## 2019-09-15 RX ADMIN — LEVOTHYROXINE SODIUM 88 MCG: 0.09 TABLET ORAL at 05:33

## 2019-09-15 RX ADMIN — ACETAMINOPHEN 650 MG: 325 TABLET ORAL at 17:54

## 2019-09-15 RX ADMIN — FUROSEMIDE 40 MG: 10 INJECTION, SOLUTION INTRAMUSCULAR; INTRAVENOUS at 20:52

## 2019-09-15 RX ADMIN — INSULIN LISPRO 1 UNITS: 100 INJECTION, SOLUTION INTRAVENOUS; SUBCUTANEOUS at 17:34

## 2019-09-15 RX ADMIN — IPRATROPIUM BROMIDE AND ALBUTEROL SULFATE 3 ML: .5; 3 SOLUTION RESPIRATORY (INHALATION) at 15:24

## 2019-09-15 ASSESSMENT — PAIN DESCRIPTION - LOCATION: LOCATION: HAND

## 2019-09-15 ASSESSMENT — PAIN SCALES - GENERAL
PAINLEVEL_OUTOF10: 6
PAINLEVEL_OUTOF10: 10
PAINLEVEL_OUTOF10: 0
PAINLEVEL_OUTOF10: 0
PAINLEVEL_OUTOF10: 4
PAINLEVEL_OUTOF10: 0
PAINLEVEL_OUTOF10: 6

## 2019-09-15 ASSESSMENT — PAIN DESCRIPTION - ORIENTATION: ORIENTATION: RIGHT;LEFT

## 2019-09-15 ASSESSMENT — PAIN DESCRIPTION - PAIN TYPE: TYPE: ACUTE PAIN

## 2019-09-15 NOTE — PROGRESS NOTES
Sent secure message to Dr. Boone Speaker Patient is c/o nasal dryness due to oxygen. Can she have order for nasal spray.   Thank you

## 2019-09-15 NOTE — PROGRESS NOTES
Patient had money from home brought with her and is sealed in a bag. Patient gave money to be locked in the lock box.   Money is locked in the box

## 2019-09-16 LAB
ALBUMIN SERPL-MCNC: 3.4 G/DL (ref 3.4–5)
ANION GAP SERPL CALCULATED.3IONS-SCNC: 10 MMOL/L (ref 3–16)
BUN BLDV-MCNC: 42 MG/DL (ref 7–20)
CALCIUM SERPL-MCNC: 9.8 MG/DL (ref 8.3–10.6)
CHLORIDE BLD-SCNC: 95 MMOL/L (ref 99–110)
CO2: 34 MMOL/L (ref 21–32)
CREAT SERPL-MCNC: 1.7 MG/DL (ref 0.6–1.2)
EKG ATRIAL RATE: 102 BPM
EKG DIAGNOSIS: NORMAL
EKG P AXIS: 46 DEGREES
EKG P-R INTERVAL: 164 MS
EKG Q-T INTERVAL: 360 MS
EKG QRS DURATION: 92 MS
EKG QTC CALCULATION (BAZETT): 469 MS
EKG R AXIS: 61 DEGREES
EKG T AXIS: -25 DEGREES
EKG VENTRICULAR RATE: 102 BPM
GFR AFRICAN AMERICAN: 36
GFR NON-AFRICAN AMERICAN: 29
GLUCOSE BLD-MCNC: 128 MG/DL (ref 70–99)
GLUCOSE BLD-MCNC: 131 MG/DL (ref 70–99)
GLUCOSE BLD-MCNC: 145 MG/DL (ref 70–99)
GLUCOSE BLD-MCNC: 151 MG/DL (ref 70–99)
GLUCOSE BLD-MCNC: 172 MG/DL (ref 70–99)
HCT VFR BLD CALC: 37.9 % (ref 36–48)
HEMOGLOBIN: 11.8 G/DL (ref 12–16)
MCH RBC QN AUTO: 24.9 PG (ref 26–34)
MCHC RBC AUTO-ENTMCNC: 31.1 G/DL (ref 31–36)
MCV RBC AUTO: 80.1 FL (ref 80–100)
PDW BLD-RTO: 23.4 % (ref 12.4–15.4)
PERFORMED ON: ABNORMAL
PHOSPHORUS: 3.4 MG/DL (ref 2.5–4.9)
PLATELET # BLD: 160 K/UL (ref 135–450)
PMV BLD AUTO: 8.8 FL (ref 5–10.5)
POTASSIUM SERPL-SCNC: 3.4 MMOL/L (ref 3.5–5.1)
RBC # BLD: 4.73 M/UL (ref 4–5.2)
SODIUM BLD-SCNC: 139 MMOL/L (ref 136–145)
WBC # BLD: 8.9 K/UL (ref 4–11)

## 2019-09-16 PROCEDURE — 36415 COLL VENOUS BLD VENIPUNCTURE: CPT

## 2019-09-16 PROCEDURE — 80069 RENAL FUNCTION PANEL: CPT

## 2019-09-16 PROCEDURE — 6370000000 HC RX 637 (ALT 250 FOR IP): Performed by: INTERNAL MEDICINE

## 2019-09-16 PROCEDURE — 94640 AIRWAY INHALATION TREATMENT: CPT

## 2019-09-16 PROCEDURE — 6370000000 HC RX 637 (ALT 250 FOR IP): Performed by: NURSE PRACTITIONER

## 2019-09-16 PROCEDURE — 6360000002 HC RX W HCPCS: Performed by: INTERNAL MEDICINE

## 2019-09-16 PROCEDURE — 2700000000 HC OXYGEN THERAPY PER DAY

## 2019-09-16 PROCEDURE — 93010 ELECTROCARDIOGRAM REPORT: CPT | Performed by: INTERNAL MEDICINE

## 2019-09-16 PROCEDURE — 2580000003 HC RX 258: Performed by: INTERNAL MEDICINE

## 2019-09-16 PROCEDURE — 1200000000 HC SEMI PRIVATE

## 2019-09-16 PROCEDURE — 99222 1ST HOSP IP/OBS MODERATE 55: CPT | Performed by: INTERNAL MEDICINE

## 2019-09-16 PROCEDURE — 85027 COMPLETE CBC AUTOMATED: CPT

## 2019-09-16 PROCEDURE — 94761 N-INVAS EAR/PLS OXIMETRY MLT: CPT

## 2019-09-16 RX ADMIN — IPRATROPIUM BROMIDE AND ALBUTEROL SULFATE 3 ML: .5; 3 SOLUTION RESPIRATORY (INHALATION) at 12:03

## 2019-09-16 RX ADMIN — Medication 10 ML: at 21:38

## 2019-09-16 RX ADMIN — Medication 2 PUFF: at 08:17

## 2019-09-16 RX ADMIN — ACETAMINOPHEN 650 MG: 325 TABLET ORAL at 21:37

## 2019-09-16 RX ADMIN — INSULIN LISPRO 1 UNITS: 100 INJECTION, SOLUTION INTRAVENOUS; SUBCUTANEOUS at 21:39

## 2019-09-16 RX ADMIN — BUSPIRONE HYDROCHLORIDE 10 MG: 5 TABLET ORAL at 21:37

## 2019-09-16 RX ADMIN — BUSPIRONE HYDROCHLORIDE 10 MG: 5 TABLET ORAL at 13:41

## 2019-09-16 RX ADMIN — BENZONATATE 100 MG: 100 CAPSULE ORAL at 21:38

## 2019-09-16 RX ADMIN — MONTELUKAST SODIUM 10 MG: 10 TABLET, FILM COATED ORAL at 21:37

## 2019-09-16 RX ADMIN — IPRATROPIUM BROMIDE AND ALBUTEROL SULFATE 3 ML: .5; 3 SOLUTION RESPIRATORY (INHALATION) at 08:17

## 2019-09-16 RX ADMIN — FLUTICASONE PROPIONATE 2 SPRAY: 50 SPRAY, METERED NASAL at 08:11

## 2019-09-16 RX ADMIN — INSULIN LISPRO 1 UNITS: 100 INJECTION, SOLUTION INTRAVENOUS; SUBCUTANEOUS at 16:43

## 2019-09-16 RX ADMIN — Medication 2 PUFF: at 19:37

## 2019-09-16 RX ADMIN — BUSPIRONE HYDROCHLORIDE 10 MG: 5 TABLET ORAL at 08:10

## 2019-09-16 RX ADMIN — IPRATROPIUM BROMIDE AND ALBUTEROL SULFATE 3 ML: .5; 3 SOLUTION RESPIRATORY (INHALATION) at 15:28

## 2019-09-16 RX ADMIN — ENOXAPARIN SODIUM 40 MG: 40 INJECTION SUBCUTANEOUS at 08:10

## 2019-09-16 RX ADMIN — FUROSEMIDE 40 MG: 10 INJECTION, SOLUTION INTRAMUSCULAR; INTRAVENOUS at 08:10

## 2019-09-16 RX ADMIN — IPRATROPIUM BROMIDE AND ALBUTEROL SULFATE 3 ML: .5; 3 SOLUTION RESPIRATORY (INHALATION) at 19:36

## 2019-09-16 RX ADMIN — POTASSIUM CHLORIDE 40 MEQ: 20 TABLET, EXTENDED RELEASE ORAL at 08:10

## 2019-09-16 RX ADMIN — FUROSEMIDE 40 MG: 10 INJECTION, SOLUTION INTRAMUSCULAR; INTRAVENOUS at 21:37

## 2019-09-16 RX ADMIN — LEVOTHYROXINE SODIUM 88 MCG: 0.09 TABLET ORAL at 06:31

## 2019-09-16 RX ADMIN — CEFTRIAXONE SODIUM 1 G: 1 INJECTION, POWDER, FOR SOLUTION INTRAMUSCULAR; INTRAVENOUS at 08:10

## 2019-09-16 RX ADMIN — ROSUVASTATIN CALCIUM 40 MG: 10 TABLET, FILM COATED ORAL at 16:43

## 2019-09-16 RX ADMIN — OXYBUTYNIN CHLORIDE 10 MG: 5 TABLET, EXTENDED RELEASE ORAL at 21:37

## 2019-09-16 RX ADMIN — FUROSEMIDE 40 MG: 10 INJECTION, SOLUTION INTRAMUSCULAR; INTRAVENOUS at 13:41

## 2019-09-16 RX ADMIN — Medication 10 ML: at 08:11

## 2019-09-16 ASSESSMENT — PAIN SCALES - GENERAL
PAINLEVEL_OUTOF10: 0
PAINLEVEL_OUTOF10: 10

## 2019-09-16 NOTE — CONSULTS
Provider, MD OCHOA  (90 BASE) MCG/ACT inhaler 2 puffs every 6 hours as needed for Wheezing or Shortness of Breath  12/8/14  Yes Historical Provider, MD   docusate sodium (COLACE) 100 MG capsule Take 100 mg by mouth 2 times daily as needed  11/24/14  Yes Historical Provider, MD   levothyroxine (SYNTHROID) 88 MCG tablet  12/16/14  Yes Historical Provider, MD   oxybutynin (DITROPAN-XL) 10 MG CR tablet  11/3/14  Yes Historical Provider, MD       In-patient schedule medications:   mometasone-formoterol  2 puff Inhalation BID    busPIRone  10 mg Oral TID    fluticasone  2 spray Nasal Daily    ipratropium-albuterol  3 mL Inhalation Q4H WA    levothyroxine  88 mcg Oral Daily    montelukast  10 mg Oral Nightly    oxybutynin  10 mg Oral Nightly    potassium chloride  40 mEq Oral Daily    rosuvastatin  40 mg Oral QPM    furosemide  40 mg Intravenous TID    sodium chloride flush  10 mL Intravenous 2 times per day    enoxaparin  40 mg Subcutaneous Daily    insulin lispro  0-6 Units Subcutaneous TID WC    insulin lispro  0-3 Units Subcutaneous Nightly    cefTRIAXone (ROCEPHIN) IV  1 g Intravenous Daily         Infusion Medications:   dextrose           Allergies:  Aspirin; Celecoxib; Fexofenadine; Gabapentin; Niacin er; and Adhesive tape     Review of Systems:   All 14 point review of symptoms completed. Pertinent positives identified in the HPI, all other review of symptoms findings as below. 14 point review of systems unable to be completed due to patient condition/cooperation. All available positives mentioned in history of present illness.        Physical Examination:    [unfilled]  /80   Pulse 66   Temp 99.3 °F (37.4 °C) (Oral)   Resp 16   Ht 5' 2\" (1.575 m)   Wt (!) 324 lb 4.8 oz (147.1 kg)   SpO2 93%   BMI 59.31 kg/m²    Weight: (!) 324 lb 4.8 oz (147.1 kg)     Wt Readings from Last 3 Encounters:   09/16/19 (!) 324 lb 4.8 oz (147.1 kg)   08/26/19 (!) 304 lb (137.9 kg)   08/19/19 294

## 2019-09-16 NOTE — CONSULTS
states she thinks 3 L. Pt is on 5 L now. Pt is very sedentary. She \"eats ice all day long because they do not give me many fluids at the nursing home\". Noted pt labs to appear nonanemic. She is weighed daily at the SNF but pt does not know if she is gaining wt. Encouraged pt to become more engaged with her care. Educated on reason to know her wt and trend it. Explained to request SNF staff to call attending provider if she has gained 3 lb in a day or 5 lb in a week. She is not adding salt to her foods at the SNF. Began gentle discussions with pt on the chronicity of her CHF disease. Pt has not considered this before but she states \"I guess we all have to die of something at sometime\". Consult made to spiritual care to discuss further and to review legal documents. Forms that are important for SNF residents with frequent hospital admissions. Patient provided with verbal and written education on CHF signs/symptoms, discharge medications, daily weights, low sodium diet, activity and follow-up. Heart Failure self-management education/written zone tool reviewed and encouraged to call at early signs of worsening CHF or when in yellow zone. Notified patient of scheduled hospital follow-up appointment with her SNF provider for within 7 days of her hospital discharge. Instructed patient to call the doctor post discharge if they experience shortness of breath, chest pain, swelling, cough, or weight gain of 3 pounds in a day / 5 pounds in a week. Also, notified patient to call the doctor with dizziness, increased fatigue, decreased or difficulty urinating. Pt education needs reinforcement. No additional questions at this time. Education Time: 20 Minutes    Recommendations:  1. Encourage follow-up appointment compliance. Next appointment: SNF  2. Educate further on fluid restriction 48 oz - 64 oz during inpatient stay so they can understand how to measure intake at home.    3. Review sodium

## 2019-09-16 NOTE — FLOWSHEET NOTE
09/16/19 1721   Encounter Summary   Services provided to: Patient   Referral/Consult From: Physician   Support System Family members   Continue Visiting   (9/16 Pt. wants sisters here to talk re. ADs;informed RN.)   Complexity of Encounter Moderate   Length of Encounter 15 minutes   Advance Directives (For Healthcare)   Patient Requests Assistance Other (Comment)  (9/16 pt.wants sisters here to have conversation)

## 2019-09-16 NOTE — PROGRESS NOTES
Hospitalist Progress Note      PCP: No primary care provider on file. Date of Admission: 9/13/2019    Chief Complaint: SOB    Subjective: no new c/o. Medications:  Reviewed    Infusion Medications    dextrose       Scheduled Medications    mometasone-formoterol  2 puff Inhalation BID    busPIRone  10 mg Oral TID    fluticasone  2 spray Nasal Daily    ipratropium-albuterol  3 mL Inhalation Q4H WA    levothyroxine  88 mcg Oral Daily    montelukast  10 mg Oral Nightly    oxybutynin  10 mg Oral Nightly    potassium chloride  40 mEq Oral Daily    rosuvastatin  40 mg Oral QPM    furosemide  40 mg Intravenous TID    sodium chloride flush  10 mL Intravenous 2 times per day    enoxaparin  40 mg Subcutaneous Daily    insulin lispro  0-6 Units Subcutaneous TID WC    insulin lispro  0-3 Units Subcutaneous Nightly    cefTRIAXone (ROCEPHIN) IV  1 g Intravenous Daily     PRN Meds: sodium chloride, docusate sodium, guaiFENesin, albuterol sulfate HFA, sodium chloride flush, magnesium hydroxide, ondansetron, acetaminophen, glucose, dextrose, glucagon (rDNA), dextrose, benzonatate      Intake/Output Summary (Last 24 hours) at 9/16/2019 0906  Last data filed at 9/16/2019 0817  Gross per 24 hour   Intake 1558 ml   Output 2000 ml   Net -442 ml       Physical Exam Performed:    /72   Pulse 94   Temp 97.6 °F (36.4 °C) (Oral)   Resp 20   Ht 5' 2\" (1.575 m)   Wt (!) 324 lb 4.8 oz (147.1 kg)   SpO2 94%   BMI 59.31 kg/m²     General appearance: No apparent distress, appears stated age and cooperative. HEENT: Pupils equal, round, and reactive to light. Conjunctivae/corneas clear. Neck: Supple, with full range of motion. No jugular venous distention. Trachea midline. Respiratory:  Normal respiratory effort. Clear to auscultation, bilaterally without Rales/Wheezes/Rhonchi. Cardiovascular: Regular rate and rhythm with normal S1/S2 without murmurs, rubs or gallops.   Abdomen: Soft, non-tender,

## 2019-09-16 NOTE — DISCHARGE INSTR - COC
Continuity of Care Form    Patient Name: Wilhemina Siemens   :  [id number]  MRN:  [id number]    Admit date:  2019  Discharge date:  2019  Code Status Order: Full Code   Advance Directives:   [facility] Documentation     Date/Time Healthcare Directive Type of Healthcare Directive Copy in [facility] [address] Agent's Name Healthcare Agent's Phone Number    [phone]  No, patient does not have an advance directive for healthcare treatment -- -- -- -- --          Admitting Physician:  Yonatan Scherer MD  PCP: No primary care provider on file.     Discharging Nurse: [facility] Unit/Room#: 0203/0203-01  Discharging Unit Phone Number: ***    Emergency Contact:   Extended Emergency Contact Information  Primary Emergency Contact: Ritchie Alonzo [address] Phone: [phone]  Mobile Phone: [phone]  Relation: Brother/Sister  Secondary Emergency Contact: Melissa Cassville  Address: [address] [place] [address],[address], [address] [place] [address] Phone: [phone]  Relation: Brother/Sister    Past Surgical History:  Past Surgical History:   Procedure Laterality Date    CHOLECYSTECTOMY      ROTATOR CUFF REPAIR Left 2015    THYROID LOBECTOMY      XR KNEE INC TUNNEL BILAT Bilateral        Immunization History:   Immunization History   Administered Date(s) Administered    Pneumococcal Conjugate 13-valent (Chiquis Solid) 2016    Tdap (Boostrix, Adacel) 2008       Active Problems:  Patient Active Problem List   Diagnosis Code    Polycythemia, secondary D75.1    Leukocytosis D72.829    COPD (chronic obstructive pulmonary disease) ([facility] J44.9    Diabetes mellitus ([facility] E11.9    Hyperlipidemia E78.5    Hypertension I10    Hypothyroidism E03.9    Thyroid nodule E04.1    Pulmonary nodule, left R91.1    Acute respiratory failure with hypoxia and hypercapnia (HCC) J96.01, J96.02    Acute encephalopathy G93.40    Pneumonia due to infectious organism J18.9    JAQUELIN (acute kidney injury) ([facility] N17.9    Aspiration pneumonia ([facility]) J69.0    HCAP (healthcare-associated pneumonia) J18.9    Acute hypoxemic respiratory failure ([facility]) J96.01    Pulmonary edema, acute ([facility]) J81.0    Cellulitis L03.90    Morbid obesity ([facility]) E66.01    Acute on chronic respiratory failure with hypoxia and hypercapnia ([facility]) J96.21, J96.22    Interstitial lung disease ([facility]) J84.9    Morbid obesity with BMI of 50.0-59.9, adult ([facility]) E66.01, O18.05    Diastolic dysfunction D65.66    Pulmonary HTN ([facility]) I27.20    Suspected sleep apnea R29.818    Acute on chronic diastolic CHF (congestive heart failure) ([facility]) I50.33    On home O2 Z99.81    Chronic respiratory failure with hypoxia ([facility]) J96.11    Acute cystitis N30.00    CHF (congestive heart failure) ([facility]) I50.9    CHF (congestive heart failure), NYHA class I, acute on chronic, combined ([facility]) I50.43    UTI (urinary tract infection) N39.0       Isolation/Infection:   Isolation          Contact        Patient Infection Status     Infection Onset Added Last Indicated Last Indicated By Review Planned Expiration Resolved Resolved By    [facility] 04/17/19 04/19/19 04/17/19 Wound Culture              Nurse Assessment:  Last Vital Signs: /72   Pulse 94   Temp 97.6 °F (36.4 °C) (Oral)   Resp 20   Ht 5' 2\" (1.575 m)   Wt (!) 324 lb 4.8 oz (147.1 kg)   SpO2 94%   BMI 59.31 kg/m²     Last documented pain score (0-10 scale): Pain Level: 0  Last Weight:   Wt Readings from Last 1 Encounters:   09/16/19 (!) 324 lb 4.8 oz (147.1 kg)     Mental Status:  oriented    IV Access:  - None    Nursing Mobility/ADLs:  Walking   Dependent  Transfer  Dependent  Bathing  Dependent  Dressing  Dependent  Toileting  Dependent  Feeding  Assisted  Med Admin  Dependent  Med Delivery   whole    Wound Care Documentation and Therapy:        Elimination:  Continence:   · Bowel: No  · Bladder: Yes  Urinary Catheter: None

## 2019-09-17 LAB
ALBUMIN SERPL-MCNC: 3.3 G/DL (ref 3.4–5)
ANION GAP SERPL CALCULATED.3IONS-SCNC: 10 MMOL/L (ref 3–16)
BACTERIA: ABNORMAL /HPF
BILIRUBIN URINE: NEGATIVE
BLOOD, URINE: ABNORMAL
BUN BLDV-MCNC: 42 MG/DL (ref 7–20)
CALCIUM SERPL-MCNC: 9.8 MG/DL (ref 8.3–10.6)
CHLORIDE BLD-SCNC: 94 MMOL/L (ref 99–110)
CLARITY: CLEAR
CO2: 36 MMOL/L (ref 21–32)
COLOR: YELLOW
CREAT SERPL-MCNC: 1.4 MG/DL (ref 0.6–1.2)
EPITHELIAL CELLS, UA: ABNORMAL /HPF
FERRITIN: 28.7 NG/ML (ref 15–150)
GFR AFRICAN AMERICAN: 45
GFR NON-AFRICAN AMERICAN: 37
GLUCOSE BLD-MCNC: 133 MG/DL (ref 70–99)
GLUCOSE BLD-MCNC: 136 MG/DL (ref 70–99)
GLUCOSE BLD-MCNC: 139 MG/DL (ref 70–99)
GLUCOSE BLD-MCNC: 170 MG/DL (ref 70–99)
GLUCOSE BLD-MCNC: 174 MG/DL (ref 70–99)
GLUCOSE URINE: NEGATIVE MG/DL
HCT VFR BLD CALC: 38.6 % (ref 36–48)
HEMOGLOBIN: 12.1 G/DL (ref 12–16)
IRON SATURATION: 17 % (ref 15–50)
IRON: 65 UG/DL (ref 37–145)
KETONES, URINE: NEGATIVE MG/DL
LEUKOCYTE ESTERASE, URINE: NEGATIVE
MCH RBC QN AUTO: 24.8 PG (ref 26–34)
MCHC RBC AUTO-ENTMCNC: 31.3 G/DL (ref 31–36)
MCV RBC AUTO: 79.1 FL (ref 80–100)
MICROSCOPIC EXAMINATION: YES
NITRITE, URINE: NEGATIVE
PDW BLD-RTO: 23.2 % (ref 12.4–15.4)
PERFORMED ON: ABNORMAL
PH UA: 7 (ref 5–8)
PHOSPHORUS: 3.6 MG/DL (ref 2.5–4.9)
PLATELET # BLD: 155 K/UL (ref 135–450)
PMV BLD AUTO: 8.4 FL (ref 5–10.5)
POTASSIUM SERPL-SCNC: 3.3 MMOL/L (ref 3.5–5.1)
PROTEIN UA: 30 MG/DL
RBC # BLD: 4.88 M/UL (ref 4–5.2)
RBC UA: ABNORMAL /HPF (ref 0–2)
SODIUM BLD-SCNC: 140 MMOL/L (ref 136–145)
SPECIFIC GRAVITY UA: 1.01 (ref 1–1.03)
TOTAL IRON BINDING CAPACITY: 376 UG/DL (ref 260–445)
URINE REFLEX TO CULTURE: ABNORMAL
URINE TYPE: ABNORMAL
UROBILINOGEN, URINE: 0.2 E.U./DL
WBC # BLD: 9.1 K/UL (ref 4–11)
WBC UA: ABNORMAL /HPF (ref 0–5)

## 2019-09-17 PROCEDURE — 2700000000 HC OXYGEN THERAPY PER DAY

## 2019-09-17 PROCEDURE — 94640 AIRWAY INHALATION TREATMENT: CPT

## 2019-09-17 PROCEDURE — 6370000000 HC RX 637 (ALT 250 FOR IP): Performed by: INTERNAL MEDICINE

## 2019-09-17 PROCEDURE — 6360000002 HC RX W HCPCS: Performed by: NURSE PRACTITIONER

## 2019-09-17 PROCEDURE — 2580000003 HC RX 258: Performed by: INTERNAL MEDICINE

## 2019-09-17 PROCEDURE — 80069 RENAL FUNCTION PANEL: CPT

## 2019-09-17 PROCEDURE — 6370000000 HC RX 637 (ALT 250 FOR IP): Performed by: NURSE PRACTITIONER

## 2019-09-17 PROCEDURE — 99233 SBSQ HOSP IP/OBS HIGH 50: CPT | Performed by: NURSE PRACTITIONER

## 2019-09-17 PROCEDURE — 85027 COMPLETE CBC AUTOMATED: CPT

## 2019-09-17 PROCEDURE — 83540 ASSAY OF IRON: CPT

## 2019-09-17 PROCEDURE — 1200000000 HC SEMI PRIVATE

## 2019-09-17 PROCEDURE — 6360000002 HC RX W HCPCS: Performed by: INTERNAL MEDICINE

## 2019-09-17 PROCEDURE — 81001 URINALYSIS AUTO W/SCOPE: CPT

## 2019-09-17 PROCEDURE — 82728 ASSAY OF FERRITIN: CPT

## 2019-09-17 PROCEDURE — 94761 N-INVAS EAR/PLS OXIMETRY MLT: CPT

## 2019-09-17 PROCEDURE — 83550 IRON BINDING TEST: CPT

## 2019-09-17 PROCEDURE — 36415 COLL VENOUS BLD VENIPUNCTURE: CPT

## 2019-09-17 RX ORDER — FUROSEMIDE 10 MG/ML
60 INJECTION INTRAMUSCULAR; INTRAVENOUS 3 TIMES DAILY
Status: DISCONTINUED | OUTPATIENT
Start: 2019-09-17 | End: 2019-09-20

## 2019-09-17 RX ADMIN — Medication 10 ML: at 09:15

## 2019-09-17 RX ADMIN — POTASSIUM CHLORIDE 40 MEQ: 20 TABLET, EXTENDED RELEASE ORAL at 09:14

## 2019-09-17 RX ADMIN — ENOXAPARIN SODIUM 40 MG: 40 INJECTION SUBCUTANEOUS at 09:14

## 2019-09-17 RX ADMIN — BUSPIRONE HYDROCHLORIDE 10 MG: 5 TABLET ORAL at 20:54

## 2019-09-17 RX ADMIN — ACETAMINOPHEN 650 MG: 325 TABLET ORAL at 21:10

## 2019-09-17 RX ADMIN — Medication 2 PUFF: at 08:10

## 2019-09-17 RX ADMIN — FUROSEMIDE 60 MG: 10 INJECTION, SOLUTION INTRAMUSCULAR; INTRAVENOUS at 15:17

## 2019-09-17 RX ADMIN — IPRATROPIUM BROMIDE AND ALBUTEROL SULFATE 3 ML: .5; 3 SOLUTION RESPIRATORY (INHALATION) at 11:57

## 2019-09-17 RX ADMIN — FLUTICASONE PROPIONATE 2 SPRAY: 50 SPRAY, METERED NASAL at 09:19

## 2019-09-17 RX ADMIN — ROSUVASTATIN CALCIUM 40 MG: 10 TABLET, FILM COATED ORAL at 16:51

## 2019-09-17 RX ADMIN — BENZONATATE 100 MG: 100 CAPSULE ORAL at 21:10

## 2019-09-17 RX ADMIN — FUROSEMIDE 40 MG: 10 INJECTION, SOLUTION INTRAMUSCULAR; INTRAVENOUS at 09:14

## 2019-09-17 RX ADMIN — LEVOTHYROXINE SODIUM 88 MCG: 0.09 TABLET ORAL at 06:30

## 2019-09-17 RX ADMIN — CEFTRIAXONE SODIUM 1 G: 1 INJECTION, POWDER, FOR SOLUTION INTRAMUSCULAR; INTRAVENOUS at 09:14

## 2019-09-17 RX ADMIN — IPRATROPIUM BROMIDE AND ALBUTEROL SULFATE 3 ML: .5; 3 SOLUTION RESPIRATORY (INHALATION) at 16:17

## 2019-09-17 RX ADMIN — IPRATROPIUM BROMIDE AND ALBUTEROL SULFATE 3 ML: .5; 3 SOLUTION RESPIRATORY (INHALATION) at 08:09

## 2019-09-17 RX ADMIN — Medication 10 ML: at 20:54

## 2019-09-17 RX ADMIN — Medication 2 PUFF: at 19:40

## 2019-09-17 RX ADMIN — IPRATROPIUM BROMIDE AND ALBUTEROL SULFATE 3 ML: .5; 3 SOLUTION RESPIRATORY (INHALATION) at 19:40

## 2019-09-17 RX ADMIN — OXYBUTYNIN CHLORIDE 10 MG: 5 TABLET, EXTENDED RELEASE ORAL at 20:49

## 2019-09-17 RX ADMIN — MONTELUKAST SODIUM 10 MG: 10 TABLET, FILM COATED ORAL at 20:54

## 2019-09-17 RX ADMIN — ACETAMINOPHEN 650 MG: 325 TABLET ORAL at 06:39

## 2019-09-17 RX ADMIN — ACETAMINOPHEN 650 MG: 325 TABLET ORAL at 16:12

## 2019-09-17 RX ADMIN — BUSPIRONE HYDROCHLORIDE 10 MG: 5 TABLET ORAL at 15:16

## 2019-09-17 RX ADMIN — INSULIN LISPRO 1 UNITS: 100 INJECTION, SOLUTION INTRAVENOUS; SUBCUTANEOUS at 11:48

## 2019-09-17 RX ADMIN — BUSPIRONE HYDROCHLORIDE 10 MG: 5 TABLET ORAL at 09:14

## 2019-09-17 RX ADMIN — INSULIN LISPRO 1 UNITS: 100 INJECTION, SOLUTION INTRAVENOUS; SUBCUTANEOUS at 21:12

## 2019-09-17 RX ADMIN — FUROSEMIDE 60 MG: 10 INJECTION, SOLUTION INTRAMUSCULAR; INTRAVENOUS at 20:54

## 2019-09-17 ASSESSMENT — PAIN SCALES - GENERAL
PAINLEVEL_OUTOF10: 9
PAINLEVEL_OUTOF10: 0
PAINLEVEL_OUTOF10: 10
PAINLEVEL_OUTOF10: 0
PAINLEVEL_OUTOF10: 3
PAINLEVEL_OUTOF10: 5
PAINLEVEL_OUTOF10: 10

## 2019-09-17 NOTE — PLAN OF CARE
Problem: OXYGENATION/RESPIRATORY FUNCTION  Goal: Patient will maintain patent airway  Outcome: Ongoing  Note:     Patient's EF (Ejection Fraction) is greater than 40%    Patient's weights and intake/output reviewed:    Patient's Last Weight: 321 lbs obtained by bed scale. Difference of 3 lbs less than last documented weight. Intake/Output Summary (Last 24 hours) at 9/17/2019 1100  Last data filed at 9/17/2019 0926  Gross per 24 hour   Intake 1380 ml   Output 1850 ml   Net -470 ml         Patient stated Daily Functional Goal: ( Pt will sit at the side of the bed and work with PT/OT    Ongoing Functional Capacity Assessment:  Patient  unable to ambulate distance of 15 feet in Minutes/Seconds   . Patient noted to be on 5L supplemental O2 with SpO2 of 97 %. Pt resting in bed at this time on 5 L O2. Pt denies shortness of breath. Pt with pitting lower extremity edema. Patient and/or Family's stated Goal of Care this Admission: reduce shortness of breath, increase activity tolerance, better understand heart failure and disease management, be more comfortable and reduce lower extremity edema prior to discharge      Comorbidities Reviewed Yes  Patient has a past medical history of Acute on chronic diastolic CHF (congestive heart failure) ([address] JAQUELIN (acute kidney injury) ([address] Arthritis, Asthma, COPD (chronic obstructive pulmonary disease) ([address] Diabetes mellitus ([address] Hyperlipidemia, Hypertension, MRSA (methicillin resistant staph aureus) culture positive, Other disorders of kidney and ureter in diseases classified elsewhere, Pneumonia due to infectious organism, and Thyroid disease.          >>For CHF and Comorbidity documentation on Education Time and Topics, please see Education Tab

## 2019-09-17 NOTE — PROGRESS NOTES
Chronic respiratory failure with hypoxia (HCC)    CHF (congestive heart failure), NYHA class I, acute on chronic, combined ([facility]    UTI (urinary tract infection)  Resolved Problems:    * No resolved hospital problems. *      Assessment/Plan:  ~ acute on chronic diastolic heart failure with chronic right heart failure  ~Chronic cor   ~Moderate pulmonary hypertension  ~COPD  ~ chronic hypoxemic respiratory failure  ~HTN  ~JAQUELIN- improving with lasix  ~elevated troponin due to JAQUELIN and CHF    Plan:  I/o's are not complete due to incontinence  Weight is down another 3lbs today.   Adjust lasix to 60mg IV TID for now  Replace potassium  Add on iron levels   Will consider adding low dose Spironolactone (aldactone)       Juan Medellin CNP, 9/17/2019, 9:47 AM

## 2019-09-17 NOTE — PLAN OF CARE
Problem: Falls - Risk of:  Goal: Will remain free from falls  Description  Will remain free from falls  Outcome: Ongoing  Note:   Pt will remain free from falls throughout hospital stay. Fall precautions in place, bed alarm on, bed in lowest position with wheels locked and side rails 2/4 up. Room door open and hourly rounding completed. Will continue to monitor throughout shift. Problem: Metabolic:  Goal: Ability to maintain appropriate glucose levels will improve  Description  Ability to maintain appropriate glucose levels will improve  Outcome: Ongoing  Note:   Pt will have accuchecks before meals and at bedtime with sliding scale insulin in place for coverage. Will continue to monitor for signs and symptoms of hypoglycemia and hyperglycemia throughout shift. Problem: Pain:  Goal: Pain level will decrease  Description  Pain level will decrease  Outcome: Ongoing  Note:   Pt will be satisfied with pain control. Pt uses numeric pain rating scale with reassessments after pain med administration. Will continue to monitor progression throughout shift.

## 2019-09-17 NOTE — PLAN OF CARE
Patient's EF (Ejection Fraction) is greater than 40%    Patient's weights and intake/output reviewed:    Patient's Last Weight: 324 lbs obtained by bed scale. Difference of 0 lbs than  last documented weight. Intake/Output Summary (Last 24 hours) at 9/17/2019 0328  Last data filed at 9/17/2019 0257  Gross per 24 hour   Intake 1860 ml   Output 2600 ml   Net -740 ml            Pt resting in bed at this time on 5 L O2. Pt with complaints of shortness of breath. Pt with pitting lower extremity edema. Patient and/or Family's stated Goal of Care this Admission: reduce shortness of breath, better understand heart failure and disease management, be more comfortable and reduce lower extremity edema prior to discharge      Comorbidities Reviewed Yes  Patient has a past medical history of Acute on chronic diastolic CHF (congestive heart failure) ([address] JAQUELIN (acute kidney injury) ([address] Arthritis, Asthma, COPD (chronic obstructive pulmonary disease) ([address] Diabetes mellitus ([address] Hyperlipidemia, Hypertension, MRSA (methicillin resistant staph aureus) culture positive, Other disorders of kidney and ureter in diseases classified elsewhere, Pneumonia due to infectious organism, and Thyroid disease.          >>For CHF and Comorbidity documentation on Education Time and Topics, please see Education Tab

## 2019-09-18 LAB
ANION GAP SERPL CALCULATED.3IONS-SCNC: 11 MMOL/L (ref 3–16)
BASE EXCESS ARTERIAL: 12.2 MMOL/L (ref -3–3)
BUN BLDV-MCNC: 41 MG/DL (ref 7–20)
CALCIUM SERPL-MCNC: 10 MG/DL (ref 8.3–10.6)
CARBOXYHEMOGLOBIN ARTERIAL: 1.4 % (ref 0–1.5)
CHLORIDE BLD-SCNC: 89 MMOL/L (ref 99–110)
CO2: 40 MMOL/L (ref 21–32)
CREAT SERPL-MCNC: 1.5 MG/DL (ref 0.6–1.2)
EKG ATRIAL RATE: 97 BPM
EKG DIAGNOSIS: NORMAL
EKG P AXIS: 48 DEGREES
EKG P-R INTERVAL: 168 MS
EKG Q-T INTERVAL: 386 MS
EKG QRS DURATION: 102 MS
EKG QTC CALCULATION (BAZETT): 490 MS
EKG R AXIS: 62 DEGREES
EKG T AXIS: -32 DEGREES
EKG VENTRICULAR RATE: 97 BPM
GFR AFRICAN AMERICAN: 41
GFR NON-AFRICAN AMERICAN: 34
GLUCOSE BLD-MCNC: 125 MG/DL (ref 70–99)
GLUCOSE BLD-MCNC: 133 MG/DL (ref 70–99)
GLUCOSE BLD-MCNC: 136 MG/DL (ref 70–99)
GLUCOSE BLD-MCNC: 146 MG/DL (ref 70–99)
GLUCOSE BLD-MCNC: 173 MG/DL (ref 70–99)
HCO3 ARTERIAL: 38.2 MMOL/L (ref 21–29)
HEMOGLOBIN, ART, EXTENDED: 13.2 G/DL (ref 12–16)
MAGNESIUM: 2 MG/DL (ref 1.8–2.4)
METHEMOGLOBIN ARTERIAL: 0.7 %
O2 CONTENT ARTERIAL: 17 ML/DL
O2 SAT, ARTERIAL: 92 %
O2 THERAPY: ABNORMAL
PCO2 ARTERIAL: 54.8 MMHG (ref 35–45)
PERFORMED ON: ABNORMAL
PH ARTERIAL: 7.46 (ref 7.35–7.45)
PO2 ARTERIAL: 64.4 MMHG (ref 75–108)
POTASSIUM REFLEX MAGNESIUM: 3.5 MMOL/L (ref 3.5–5.1)
SODIUM BLD-SCNC: 140 MMOL/L (ref 136–145)
TCO2 ARTERIAL: 39.9 MMOL/L

## 2019-09-18 PROCEDURE — 6360000002 HC RX W HCPCS: Performed by: NURSE PRACTITIONER

## 2019-09-18 PROCEDURE — 82803 BLOOD GASES ANY COMBINATION: CPT

## 2019-09-18 PROCEDURE — 1200000000 HC SEMI PRIVATE

## 2019-09-18 PROCEDURE — 2700000000 HC OXYGEN THERAPY PER DAY

## 2019-09-18 PROCEDURE — 2580000003 HC RX 258: Performed by: INTERNAL MEDICINE

## 2019-09-18 PROCEDURE — 6370000000 HC RX 637 (ALT 250 FOR IP): Performed by: NURSE PRACTITIONER

## 2019-09-18 PROCEDURE — 6370000000 HC RX 637 (ALT 250 FOR IP): Performed by: INTERNAL MEDICINE

## 2019-09-18 PROCEDURE — 80048 BASIC METABOLIC PNL TOTAL CA: CPT

## 2019-09-18 PROCEDURE — 94761 N-INVAS EAR/PLS OXIMETRY MLT: CPT

## 2019-09-18 PROCEDURE — 99232 SBSQ HOSP IP/OBS MODERATE 35: CPT | Performed by: NURSE PRACTITIONER

## 2019-09-18 PROCEDURE — 97110 THERAPEUTIC EXERCISES: CPT

## 2019-09-18 PROCEDURE — 93010 ELECTROCARDIOGRAM REPORT: CPT | Performed by: INTERNAL MEDICINE

## 2019-09-18 PROCEDURE — 97535 SELF CARE MNGMENT TRAINING: CPT

## 2019-09-18 PROCEDURE — 97166 OT EVAL MOD COMPLEX 45 MIN: CPT

## 2019-09-18 PROCEDURE — 93005 ELECTROCARDIOGRAM TRACING: CPT | Performed by: NURSE PRACTITIONER

## 2019-09-18 PROCEDURE — 97162 PT EVAL MOD COMPLEX 30 MIN: CPT

## 2019-09-18 PROCEDURE — 36415 COLL VENOUS BLD VENIPUNCTURE: CPT

## 2019-09-18 PROCEDURE — 6360000002 HC RX W HCPCS: Performed by: INTERNAL MEDICINE

## 2019-09-18 PROCEDURE — 97530 THERAPEUTIC ACTIVITIES: CPT

## 2019-09-18 PROCEDURE — 99223 1ST HOSP IP/OBS HIGH 75: CPT | Performed by: INTERNAL MEDICINE

## 2019-09-18 PROCEDURE — 94640 AIRWAY INHALATION TREATMENT: CPT

## 2019-09-18 PROCEDURE — 83735 ASSAY OF MAGNESIUM: CPT

## 2019-09-18 RX ORDER — KETOROLAC TROMETHAMINE 30 MG/ML
30 INJECTION, SOLUTION INTRAMUSCULAR; INTRAVENOUS EVERY 6 HOURS PRN
Status: DISCONTINUED | OUTPATIENT
Start: 2019-09-18 | End: 2019-09-18

## 2019-09-18 RX ORDER — SPIRONOLACTONE 25 MG/1
25 TABLET ORAL DAILY
Status: DISCONTINUED | OUTPATIENT
Start: 2019-09-18 | End: 2019-09-24 | Stop reason: HOSPADM

## 2019-09-18 RX ADMIN — BUSPIRONE HYDROCHLORIDE 10 MG: 5 TABLET ORAL at 09:48

## 2019-09-18 RX ADMIN — LEVOTHYROXINE SODIUM 88 MCG: 0.09 TABLET ORAL at 07:23

## 2019-09-18 RX ADMIN — IPRATROPIUM BROMIDE AND ALBUTEROL SULFATE 3 ML: .5; 3 SOLUTION RESPIRATORY (INHALATION) at 15:29

## 2019-09-18 RX ADMIN — FUROSEMIDE 60 MG: 10 INJECTION, SOLUTION INTRAMUSCULAR; INTRAVENOUS at 09:48

## 2019-09-18 RX ADMIN — SPIRONOLACTONE 25 MG: 25 TABLET ORAL at 14:19

## 2019-09-18 RX ADMIN — IPRATROPIUM BROMIDE AND ALBUTEROL SULFATE 3 ML: .5; 3 SOLUTION RESPIRATORY (INHALATION) at 07:49

## 2019-09-18 RX ADMIN — FUROSEMIDE 60 MG: 10 INJECTION, SOLUTION INTRAMUSCULAR; INTRAVENOUS at 21:33

## 2019-09-18 RX ADMIN — FUROSEMIDE 60 MG: 10 INJECTION, SOLUTION INTRAMUSCULAR; INTRAVENOUS at 14:18

## 2019-09-18 RX ADMIN — BENZONATATE 100 MG: 100 CAPSULE ORAL at 23:33

## 2019-09-18 RX ADMIN — Medication 10 ML: at 09:48

## 2019-09-18 RX ADMIN — Medication 10 ML: at 21:33

## 2019-09-18 RX ADMIN — OXYBUTYNIN CHLORIDE 10 MG: 5 TABLET, EXTENDED RELEASE ORAL at 21:33

## 2019-09-18 RX ADMIN — MONTELUKAST SODIUM 10 MG: 10 TABLET, FILM COATED ORAL at 21:33

## 2019-09-18 RX ADMIN — FLUTICASONE PROPIONATE 2 SPRAY: 50 SPRAY, METERED NASAL at 09:52

## 2019-09-18 RX ADMIN — IPRATROPIUM BROMIDE AND ALBUTEROL SULFATE 3 ML: .5; 3 SOLUTION RESPIRATORY (INHALATION) at 19:29

## 2019-09-18 RX ADMIN — ROSUVASTATIN CALCIUM 40 MG: 10 TABLET, FILM COATED ORAL at 16:57

## 2019-09-18 RX ADMIN — ENOXAPARIN SODIUM 40 MG: 40 INJECTION SUBCUTANEOUS at 09:48

## 2019-09-18 RX ADMIN — INSULIN LISPRO 1 UNITS: 100 INJECTION, SOLUTION INTRAVENOUS; SUBCUTANEOUS at 16:57

## 2019-09-18 RX ADMIN — POTASSIUM CHLORIDE 40 MEQ: 20 TABLET, EXTENDED RELEASE ORAL at 09:48

## 2019-09-18 RX ADMIN — BUSPIRONE HYDROCHLORIDE 10 MG: 5 TABLET ORAL at 21:33

## 2019-09-18 RX ADMIN — Medication 2 PUFF: at 19:30

## 2019-09-18 RX ADMIN — BUSPIRONE HYDROCHLORIDE 10 MG: 5 TABLET ORAL at 14:17

## 2019-09-18 RX ADMIN — IPRATROPIUM BROMIDE AND ALBUTEROL SULFATE 3 ML: .5; 3 SOLUTION RESPIRATORY (INHALATION) at 11:44

## 2019-09-18 RX ADMIN — Medication 2 PUFF: at 07:49

## 2019-09-18 RX ADMIN — ACETAMINOPHEN 650 MG: 325 TABLET ORAL at 03:32

## 2019-09-18 RX ADMIN — ACETAMINOPHEN 650 MG: 325 TABLET ORAL at 23:33

## 2019-09-18 ASSESSMENT — PAIN SCALES - GENERAL
PAINLEVEL_OUTOF10: 0
PAINLEVEL_OUTOF10: 0
PAINLEVEL_OUTOF10: 8
PAINLEVEL_OUTOF10: 0

## 2019-09-18 ASSESSMENT — ENCOUNTER SYMPTOMS
DIARRHEA: 0
CONSTIPATION: 0
VOICE CHANGE: 0
CHEST TIGHTNESS: 0
EYE PAIN: 0
SORE THROAT: 0
ABDOMINAL PAIN: 0
STRIDOR: 0
EYE DISCHARGE: 0
CHOKING: 0
SHORTNESS OF BREATH: 1
EYE ITCHING: 0

## 2019-09-18 ASSESSMENT — PAIN DESCRIPTION - PROGRESSION: CLINICAL_PROGRESSION: GRADUALLY WORSENING

## 2019-09-18 NOTE — PLAN OF CARE
Problem: OXYGENATION/RESPIRATORY FUNCTION  Goal: Patient will maintain patent airway  9/18/2019 1210 by Irvin Haley RN  Outcome: Ongoing  Note:     Patient's EF (Ejection Fraction) is greater than 40%    Patient's weights and intake/output reviewed:    Patient's Last Weight: 313 lbs obtained by bed scale. Difference of 4 lbs less than last documented weight. Intake/Output Summary (Last 24 hours) at 9/18/2019 1209  Last data filed at 9/18/2019 7570  Gross per 24 hour   Intake 1200 ml   Output 2075 ml   Net -875 ml         Patient stated Daily Functional Goal: ( Pt will work with PT/OT and get up to chair, tolerate lasix, decrease oxygen usage    Ongoing Functional Capacity Assessment:  Patient  unable to ambulate distance of 15 feet in Minutes/Seconds   . Patient noted to be on 4L supplemental O2 with SpO2 of 95 %. Pt resting in bed at this time on 4 L O2. Pt denies shortness of breath. Pt with pitting lower extremity edema. Patient and/or Family's stated Goal of Care this Admission: reduce shortness of breath, increase activity tolerance, better understand heart failure and disease management, be more comfortable and reduce lower extremity edema prior to discharge      Comorbidities Reviewed Yes  Patient has a past medical history of Acute on chronic diastolic CHF (congestive heart failure) ([address] JAQUELIN (acute kidney injury) ([address] Arthritis, Asthma, COPD (chronic obstructive pulmonary disease) ([facility] Diabetes mellitus ([facility] Hyperlipidemia, Hypertension, MRSA (methicillin resistant staph aureus) culture positive, Other disorders of kidney and ureter in diseases classified elsewhere, Pneumonia due to infectious organism, and Thyroid disease.          >>For CHF and Comorbidity documentation on Education Time and Topics, please see Education Tab      9/18/2019 0656 by Sudarshan Watt RN  Outcome: Met This Shift  Note:     Patient's EF (Ejection Fraction) is greater than 40%    Patient's weights

## 2019-09-18 NOTE — PROGRESS NOTES
End of shift report given to David Siddiqi, GILSON at bedside. Patient alert and oriented. Bed in lowest position with wheels locked. Call light within reach. No further needs at this time.

## 2019-09-18 NOTE — PROGRESS NOTES
Hospitalist Progress Note      PCP: No primary care provider on file. Date of Admission: 9/13/2019    Chief Complaint: SOB    Subjective:   Pt is on 4 LPM. Afebrile. VSS. No dyspnea or chest pain. No N/V/D. Weight is 313 lbs today. Medications:  Reviewed    Infusion Medications    dextrose       Scheduled Medications    spironolactone  25 mg Oral Daily    furosemide  60 mg Intravenous TID    mometasone-formoterol  2 puff Inhalation BID    busPIRone  10 mg Oral TID    fluticasone  2 spray Nasal Daily    ipratropium-albuterol  3 mL Inhalation Q4H WA    levothyroxine  88 mcg Oral Daily    montelukast  10 mg Oral Nightly    oxybutynin  10 mg Oral Nightly    potassium chloride  40 mEq Oral Daily    rosuvastatin  40 mg Oral QPM    sodium chloride flush  10 mL Intravenous 2 times per day    enoxaparin  40 mg Subcutaneous Daily    insulin lispro  0-6 Units Subcutaneous TID WC    insulin lispro  0-3 Units Subcutaneous Nightly     PRN Meds: sodium chloride, docusate sodium, guaiFENesin, albuterol sulfate HFA, sodium chloride flush, magnesium hydroxide, ondansetron, acetaminophen, glucose, dextrose, glucagon (rDNA), dextrose, benzonatate      Intake/Output Summary (Last 24 hours) at 9/18/2019 1618  Last data filed at 9/18/2019 1431  Gross per 24 hour   Intake 1440 ml   Output 2275 ml   Net -835 ml       Physical Exam Performed:    /74   Pulse 89   Temp 98.8 °F (37.1 °C) (Oral)   Resp 16   Ht 5' 2\" (1.575 m)   Wt (!) 313 lb 4.4 oz (142.1 kg)   SpO2 93%   BMI 57.30 kg/m²     General appearance: No apparent distress, appears stated age and cooperative. HEENT: Pupils equal, round, and reactive to light. Conjunctivae/corneas clear. Neck: Supple, with full range of motion. No jugular venous distention. Trachea midline. Respiratory:  Normal respiratory effort. Clear to auscultation, bilaterally without Rales/Wheezes/Rhonchi.   Cardiovascular: Regular rate and rhythm with normal S1/S2 without murmurs, rubs or gallops. Abdomen: Soft, non-tender, non-distended with normal bowel sounds. Musculoskeletal: No clubbing, cyanosis or edema bilaterally. Full range of motion without deformity. Skin: Skin color, texture, turgor normal.  No rashes or lesions. Neurologic:  Neurovascularly intact without any focal sensory/motor deficits. Cranial nerves: II-XII intact, grossly non-focal.  Psychiatric: Alert and oriented, thought content appropriate, normal insight  Capillary Refill: Brisk,< 3 seconds   Peripheral Pulses: +2 palpable, equal bilaterally       Labs:   Recent Labs     09/16/19  0603 09/17/19  0612   WBC 8.9 9.1   HGB 11.8* 12.1   HCT 37.9 38.6    155     Recent Labs     09/16/19  0603 09/17/19  0612 09/18/19  0841    140 140   K 3.4* 3.3* 3.5   CL 95* 94* 89*   CO2 34* 36* 40*   BUN 42* 42* 41*   CREATININE 1.7* 1.4* 1.5*   CALCIUM 9.8 9.8 10.0   PHOS 3.4 3.6  --      Urinalysis:      Lab Results   Component Value Date    NITRU Negative 09/17/2019    WBCUA 3-5 09/17/2019    BACTERIA 2+ 09/17/2019    RBCUA 0-2 09/17/2019    BLOODU LARGE 09/17/2019    SPECGRAV 1.010 09/17/2019    GLUCOSEU Negative 09/17/2019       Radiology:  XR CHEST STANDARD (2 VW)   Final Result   Cardiomegaly and chronic pulmonary change without acute cardiopulmonary   process. Stable left suprahilar nodular density.              Assessment/Plan:    Active Hospital Problems    Diagnosis    CHF (congestive heart failure) ([facility]) [I50.9]    CHF (congestive heart failure), NYHA class I, acute on chronic, combined ([facility] [I50.43]    UTI (urinary tract infection) [N39.0]    Chronic respiratory failure with hypoxia ([facility]) [J96.11]    Morbid obesity ([facility] [E66.01]    Interstitial lung disease ([facility] [J84.9]    JAQUELIN (acute kidney injury) ([facility] [N17.9]    Diabetes mellitus ([address] [E11.9]    Hyperlipidemia [E78.5]    Hypertension [I10]    Hypothyroidism [E03.9]    COPD (chronic obstructive pulmonary disease) ([facility] [J44.9]

## 2019-09-18 NOTE — CONSULTS
Pulmonary & Critical Care Consultation Note   Rosalinda Wu MD    REASONFOR CONSULTATION/CC: respiratory failure    Consult at request of  Willy Hirsch MD  PCP: No primary care provider on file. HISTORYOF PRESENT ILLNESS:    68y.o. year old female with complex medical history of asthma, ILD, and SOBEIDA she is intolerant of bipap for. Prior inpatient notes comment on the ILD but she never been worked up for this formally. She presented to the ED with one month history of progressive shortness of breath, now constant at rest. Nonproductive cough, denies sputum production, wheezing, or chest pain. She normally is wheelchair bound and states she is able to push herself around her home without difficulty. Workup identified decompensated CHF, she was admitted and placed on treatment with diuresis. Additional diagnosed with acute cystitis and has been taking atb   Her serial labs identified a metabolic alkalosis which prompted further pulmonary input. When seen she was mentating well and without evidence of hypercarbia, she states that she will never use a PAP device again because it was so uncomfortable. Denies congestion or other respiratory complaints.         Past Medical History:   Diagnosis Date    Acute on chronic diastolic CHF (congestive heart failure) ([address] 8/10/2019    JAQUELIN (acute kidney injury) ([address]     Arthritis     Asthma     COPD (chronic obstructive pulmonary disease) ([address]     Diabetes mellitus ([address]     Hyperlipidemia     Hypertension     MRSA (methicillin resistant staph aureus) culture positive 04/17/2019    left ft wound    Other disorders of kidney and ureter in diseases classified elsewhere     Pneumonia due to infectious organism 12/22/2018    Thyroid disease        Past Surgical History:   Procedure Laterality Date    CHOLECYSTECTOMY      ROTATOR CUFF REPAIR Left 2015    THYROID LOBECTOMY      XR KNEE INC TUNNEL BILAT Bilateral 2014       family history includes Cancer in abdominal pain, constipation and diarrhea. Endocrine: Negative for cold intolerance, heat intolerance and polydipsia. Genitourinary: Negative for dysuria, frequency and hematuria. Musculoskeletal: Negative for gait problem, joint swelling and neck stiffness. Neurological: Negative for dizziness, numbness and headaches. Psychiatric/Behavioral: Negative for agitation, confusion and hallucinations. Objective:   PHYSICAL EXAM:  /64   Pulse 90   Temp 97.7 °F (36.5 °C) (Axillary)   Resp 16   Ht 5' 2\" (1.575 m)   Wt (!) 313 lb 4.4 oz (142.1 kg)   SpO2 92%   BMI 57.30 kg/m²    Physical Exam   Constitutional: She appears well-developed and well-nourished. No distress. HENT:   Head: Normocephalic and atraumatic. Mouth/Throat: Oropharynx is clear and moist. No oropharyngeal exudate. Eyes: Pupils are equal, round, and reactive to light. EOM are normal.   Neck: Neck supple. No JVD present. Cardiovascular: Normal heart sounds. Exam reveals no gallop and no friction rub. No murmur heard. Pulmonary/Chest: Effort normal. She has no wheezes. She has no rales. Equal chest rise and expansion bilaterally   Abdominal: Soft. Bowel sounds are normal. She exhibits no distension. There is no tenderness. Musculoskeletal: Normal range of motion. She exhibits no edema. Lymphadenopathy:     She has no cervical adenopathy. Neurological: She is alert. No cranial nerve deficit. CN 2-12 grossly intact   Skin: Skin is warm and dry. No rash noted. She is not diaphoretic.           Data Reviewed:   LABS:  CBC:   Recent Labs     09/16/19  0603 09/17/19  0612   WBC 8.9 9.1   HGB 11.8* 12.1   HCT 37.9 38.6   MCV 80.1 79.1*    155     BMP:   Recent Labs     09/16/19  0603 09/17/19  0612 09/18/19  0841    140 140   K 3.4* 3.3* 3.5   CL 95* 94* 89*   CO2 34* 36* 40*   PHOS 3.4 3.6  --    BUN 42* 42* 41*   CREATININE 1.7* 1.4* 1.5*     LIVER PROFILE: No results for input(s): AST, ALT, LIPASE,

## 2019-09-19 LAB
ANION GAP SERPL CALCULATED.3IONS-SCNC: 14 MMOL/L (ref 3–16)
BASE EXCESS VENOUS: 15.8 MMOL/L (ref -3–3)
BUN BLDV-MCNC: 41 MG/DL (ref 7–20)
CALCIUM SERPL-MCNC: 10.4 MG/DL (ref 8.3–10.6)
CARBOXYHEMOGLOBIN: 1.6 % (ref 0–1.5)
CHLORIDE BLD-SCNC: 89 MMOL/L (ref 99–110)
CO2: 38 MMOL/L (ref 21–32)
CREAT SERPL-MCNC: 1.4 MG/DL (ref 0.6–1.2)
GFR AFRICAN AMERICAN: 45
GFR NON-AFRICAN AMERICAN: 37
GLUCOSE BLD-MCNC: 135 MG/DL (ref 70–99)
GLUCOSE BLD-MCNC: 136 MG/DL (ref 70–99)
GLUCOSE BLD-MCNC: 137 MG/DL (ref 70–99)
GLUCOSE BLD-MCNC: 158 MG/DL (ref 70–99)
GLUCOSE BLD-MCNC: 191 MG/DL (ref 70–99)
HCO3 VENOUS: 44.7 MMOL/L (ref 23–29)
MAGNESIUM: 2.1 MG/DL (ref 1.8–2.4)
METHEMOGLOBIN VENOUS: 1.1 %
O2 CONTENT, VEN: 5 VOL %
O2 SAT, VEN: 30 %
O2 THERAPY: ABNORMAL
PCO2, VEN: 77.5 MMHG (ref 40–50)
PERFORMED ON: ABNORMAL
PH VENOUS: 7.38 (ref 7.35–7.45)
PO2, VEN: 21.7 MMHG (ref 25–40)
POTASSIUM REFLEX MAGNESIUM: 3.3 MMOL/L (ref 3.5–5.1)
SODIUM BLD-SCNC: 141 MMOL/L (ref 136–145)
TCO2 CALC VENOUS: 47 MMOL/L

## 2019-09-19 PROCEDURE — 36415 COLL VENOUS BLD VENIPUNCTURE: CPT

## 2019-09-19 PROCEDURE — 94761 N-INVAS EAR/PLS OXIMETRY MLT: CPT

## 2019-09-19 PROCEDURE — 6370000000 HC RX 637 (ALT 250 FOR IP): Performed by: INTERNAL MEDICINE

## 2019-09-19 PROCEDURE — 82803 BLOOD GASES ANY COMBINATION: CPT

## 2019-09-19 PROCEDURE — 1200000000 HC SEMI PRIVATE

## 2019-09-19 PROCEDURE — 6360000002 HC RX W HCPCS: Performed by: INTERNAL MEDICINE

## 2019-09-19 PROCEDURE — 80048 BASIC METABOLIC PNL TOTAL CA: CPT

## 2019-09-19 PROCEDURE — 36600 WITHDRAWAL OF ARTERIAL BLOOD: CPT

## 2019-09-19 PROCEDURE — 97116 GAIT TRAINING THERAPY: CPT

## 2019-09-19 PROCEDURE — 6370000000 HC RX 637 (ALT 250 FOR IP): Performed by: NURSE PRACTITIONER

## 2019-09-19 PROCEDURE — 2580000003 HC RX 258: Performed by: INTERNAL MEDICINE

## 2019-09-19 PROCEDURE — 6370000000 HC RX 637 (ALT 250 FOR IP): Performed by: REGISTERED NURSE

## 2019-09-19 PROCEDURE — 99232 SBSQ HOSP IP/OBS MODERATE 35: CPT | Performed by: NURSE PRACTITIONER

## 2019-09-19 PROCEDURE — 99233 SBSQ HOSP IP/OBS HIGH 50: CPT | Performed by: INTERNAL MEDICINE

## 2019-09-19 PROCEDURE — 83735 ASSAY OF MAGNESIUM: CPT

## 2019-09-19 PROCEDURE — 6360000002 HC RX W HCPCS: Performed by: NURSE PRACTITIONER

## 2019-09-19 PROCEDURE — 2700000000 HC OXYGEN THERAPY PER DAY

## 2019-09-19 PROCEDURE — 94640 AIRWAY INHALATION TREATMENT: CPT

## 2019-09-19 PROCEDURE — 97110 THERAPEUTIC EXERCISES: CPT

## 2019-09-19 RX ORDER — FLUCONAZOLE 100 MG/1
150 TABLET ORAL ONCE
Status: COMPLETED | OUTPATIENT
Start: 2019-09-19 | End: 2019-09-19

## 2019-09-19 RX ADMIN — ROSUVASTATIN CALCIUM 40 MG: 10 TABLET, FILM COATED ORAL at 17:10

## 2019-09-19 RX ADMIN — IPRATROPIUM BROMIDE AND ALBUTEROL SULFATE 3 ML: .5; 3 SOLUTION RESPIRATORY (INHALATION) at 11:39

## 2019-09-19 RX ADMIN — FLUCONAZOLE 150 MG: 100 TABLET ORAL at 12:51

## 2019-09-19 RX ADMIN — IPRATROPIUM BROMIDE AND ALBUTEROL SULFATE 3 ML: .5; 3 SOLUTION RESPIRATORY (INHALATION) at 15:36

## 2019-09-19 RX ADMIN — POTASSIUM CHLORIDE 40 MEQ: 20 TABLET, EXTENDED RELEASE ORAL at 09:51

## 2019-09-19 RX ADMIN — IPRATROPIUM BROMIDE AND ALBUTEROL SULFATE 3 ML: .5; 3 SOLUTION RESPIRATORY (INHALATION) at 20:22

## 2019-09-19 RX ADMIN — Medication 2 PUFF: at 20:22

## 2019-09-19 RX ADMIN — ACETAMINOPHEN 650 MG: 325 TABLET ORAL at 05:43

## 2019-09-19 RX ADMIN — FUROSEMIDE 60 MG: 10 INJECTION, SOLUTION INTRAMUSCULAR; INTRAVENOUS at 13:07

## 2019-09-19 RX ADMIN — OXYBUTYNIN CHLORIDE 10 MG: 5 TABLET, EXTENDED RELEASE ORAL at 22:34

## 2019-09-19 RX ADMIN — Medication 2 PUFF: at 07:39

## 2019-09-19 RX ADMIN — MONTELUKAST SODIUM 10 MG: 10 TABLET, FILM COATED ORAL at 21:48

## 2019-09-19 RX ADMIN — Medication 10 ML: at 21:49

## 2019-09-19 RX ADMIN — BUSPIRONE HYDROCHLORIDE 10 MG: 5 TABLET ORAL at 09:51

## 2019-09-19 RX ADMIN — BUSPIRONE HYDROCHLORIDE 10 MG: 5 TABLET ORAL at 21:48

## 2019-09-19 RX ADMIN — INSULIN LISPRO 1 UNITS: 100 INJECTION, SOLUTION INTRAVENOUS; SUBCUTANEOUS at 21:48

## 2019-09-19 RX ADMIN — FUROSEMIDE 60 MG: 10 INJECTION, SOLUTION INTRAMUSCULAR; INTRAVENOUS at 21:48

## 2019-09-19 RX ADMIN — FLUTICASONE PROPIONATE 2 SPRAY: 50 SPRAY, METERED NASAL at 09:52

## 2019-09-19 RX ADMIN — BENZONATATE 100 MG: 100 CAPSULE ORAL at 09:51

## 2019-09-19 RX ADMIN — IPRATROPIUM BROMIDE AND ALBUTEROL SULFATE 3 ML: .5; 3 SOLUTION RESPIRATORY (INHALATION) at 07:38

## 2019-09-19 RX ADMIN — Medication 10 ML: at 09:52

## 2019-09-19 RX ADMIN — FUROSEMIDE 60 MG: 10 INJECTION, SOLUTION INTRAMUSCULAR; INTRAVENOUS at 09:51

## 2019-09-19 RX ADMIN — ACETAMINOPHEN 650 MG: 325 TABLET ORAL at 12:50

## 2019-09-19 RX ADMIN — BENZONATATE 100 MG: 100 CAPSULE ORAL at 23:31

## 2019-09-19 RX ADMIN — BUSPIRONE HYDROCHLORIDE 10 MG: 5 TABLET ORAL at 13:07

## 2019-09-19 RX ADMIN — ACETAMINOPHEN 650 MG: 325 TABLET ORAL at 23:31

## 2019-09-19 RX ADMIN — ACETAZOLAMIDE 500 MG: 500 INJECTION, POWDER, LYOPHILIZED, FOR SOLUTION INTRAVENOUS at 12:51

## 2019-09-19 RX ADMIN — INSULIN LISPRO 1 UNITS: 100 INJECTION, SOLUTION INTRAVENOUS; SUBCUTANEOUS at 17:10

## 2019-09-19 RX ADMIN — ENOXAPARIN SODIUM 40 MG: 40 INJECTION SUBCUTANEOUS at 09:51

## 2019-09-19 RX ADMIN — ACETAMINOPHEN 650 MG: 325 TABLET ORAL at 17:10

## 2019-09-19 RX ADMIN — SPIRONOLACTONE 25 MG: 25 TABLET ORAL at 09:51

## 2019-09-19 RX ADMIN — LEVOTHYROXINE SODIUM 88 MCG: 0.09 TABLET ORAL at 05:43

## 2019-09-19 ASSESSMENT — PAIN DESCRIPTION - LOCATION
LOCATION: HAND
LOCATION: GENERALIZED;FOOT;HAND
LOCATION: GENERALIZED;FOOT;HAND

## 2019-09-19 ASSESSMENT — PAIN DESCRIPTION - FREQUENCY
FREQUENCY: INTERMITTENT

## 2019-09-19 ASSESSMENT — PAIN DESCRIPTION - PROGRESSION
CLINICAL_PROGRESSION: GRADUALLY WORSENING
CLINICAL_PROGRESSION: GRADUALLY IMPROVING
CLINICAL_PROGRESSION: GRADUALLY WORSENING

## 2019-09-19 ASSESSMENT — PAIN DESCRIPTION - ORIENTATION
ORIENTATION: RIGHT;LEFT

## 2019-09-19 ASSESSMENT — PAIN SCALES - GENERAL
PAINLEVEL_OUTOF10: 7
PAINLEVEL_OUTOF10: 7
PAINLEVEL_OUTOF10: 10
PAINLEVEL_OUTOF10: 3
PAINLEVEL_OUTOF10: 0

## 2019-09-19 ASSESSMENT — PAIN DESCRIPTION - DESCRIPTORS
DESCRIPTORS: BURNING;ACHING;DISCOMFORT;CONSTANT
DESCRIPTORS: BURNING;ACHING;DISCOMFORT;CONSTANT

## 2019-09-19 ASSESSMENT — ENCOUNTER SYMPTOMS: SHORTNESS OF BREATH: 1

## 2019-09-19 ASSESSMENT — PAIN DESCRIPTION - PAIN TYPE
TYPE: CHRONIC PAIN

## 2019-09-19 ASSESSMENT — PAIN DESCRIPTION - ONSET
ONSET: ON-GOING
ONSET: GRADUAL
ONSET: ON-GOING

## 2019-09-19 ASSESSMENT — PAIN - FUNCTIONAL ASSESSMENT
PAIN_FUNCTIONAL_ASSESSMENT: ACTIVITIES ARE NOT PREVENTED
PAIN_FUNCTIONAL_ASSESSMENT: PREVENTS OR INTERFERES SOME ACTIVE ACTIVITIES AND ADLS
PAIN_FUNCTIONAL_ASSESSMENT: PREVENTS OR INTERFERES SOME ACTIVE ACTIVITIES AND ADLS

## 2019-09-19 ASSESSMENT — PAIN SCALES - WONG BAKER: WONGBAKER_NUMERICALRESPONSE: 0

## 2019-09-19 NOTE — PROGRESS NOTES
09/19/2019    K 3.3 09/19/2019    CL 89 09/19/2019    CO2 38 09/19/2019     CBC:    Lab Results   Component Value Date    WBC 9.1 09/17/2019    RBC 4.88 09/17/2019    RBC 4.92 12/18/2014    HGB 12.1 09/17/2019    HCT 38.6 09/17/2019    MCV 79.1 09/17/2019    RDW 23.2 09/17/2019     09/17/2019     BNP:    Lab Results   Component Value Date    PROBNP 7,474 09/13/2019    PROBNP 3,556 08/16/2019    PROBNP 4,738 08/14/2019    PROBNP 5,583 08/12/2019    PROBNP 7,357 08/10/2019     Fasting Lipid Panel:    Lab Results   Component Value Date    CHOL 73 08/11/2019    HDL 35 08/11/2019    TRIG 85 08/11/2019     Cardiac Enzymes:  CK/MbTroponin  Lab Results   Component Value Date    TROPONINI 0.02 09/14/2019     PT/ INR   Lab Results   Component Value Date    INR 1.11 12/21/2018    INR 0.98 03/13/2016    PROTIME 12.7 12/21/2018    PROTIME 11.2 03/13/2016     PTT No results found for: PTT   Lab Results   Component Value Date    MG 2.10 09/19/2019    No results found for: TSH    All labs and imaging reviewed today    Assessment:  Elevated troponin: due to JAQUELIN, CHF  Acute on chronic diastolic CHF: improving  Chronic right heart failure  Moderate pulmonary HTN  Contraction alkalosis  JAQUELIN: stable  HTN: controlled  COPD  SOBEIDA  Chronic hypoxemic respiratory failure: on home oxygen  Hypokalemia: replace    Plan:   1. Continue lasix 60 mg IV TID as renal function allows    - responding well with -3L yesterday but gained 2 lbs? 2. Diamox x1 today, appreciate pulmonary assistance  3. Monitor BMP  4. Strict I/Os, daily weights (standing scale if possible), low salt diet  5.  Monitor respiratory status closely    Iola Sever, APRN-CNP  [employer]  [phone]

## 2019-09-19 NOTE — PLAN OF CARE
Problem: Falls - Risk of:  Goal: Absence of physical injury  Description  Absence of physical injury  Outcome: Ongoing  Note:   Pt high fall risk. Instructed to use call light before getting out of bed. Call light within reach. Bed in low position. Bed alarm on. Will continue to monitor. Problem: Risk for Impaired Skin Integrity  Goal: Tissue integrity - skin and mucous membranes  Description  Structural intactness and normal physiological function of skin and  mucous membranes. Outcome: Ongoing  Note:   Patient's skin assessed. See flowsheet. Patient turned in bed. Pressure ulcer prevention in place. No issues with skin integrity this shift. Will continue to monitor. Problem: Pain:  Goal: Control of acute pain  Description  Control of acute pain  Outcome: Ongoing  Note:   Patient's pain level controlled at this time. Will continue to monitor.

## 2019-09-19 NOTE — PROGRESS NOTES
Pulmonary & Critical Care Inpatient Progress Note   Marina Arzola MD     REASON FOR TODAY'S VISIT:  Chronic resp failure    SUBJECTIVE:   More somnolent this AM but arouseable  Unable to obtain ABG today    Scheduled Meds:   acetaZOLAMIDE (DIAMOX) IVPB  500 mg Intravenous Once    spironolactone  25 mg Oral Daily    furosemide  60 mg Intravenous TID    mometasone-formoterol  2 puff Inhalation BID    busPIRone  10 mg Oral TID    fluticasone  2 spray Nasal Daily    ipratropium-albuterol  3 mL Inhalation Q4H WA    levothyroxine  88 mcg Oral Daily    montelukast  10 mg Oral Nightly    oxybutynin  10 mg Oral Nightly    potassium chloride  40 mEq Oral Daily    rosuvastatin  40 mg Oral QPM    sodium chloride flush  10 mL Intravenous 2 times per day    enoxaparin  40 mg Subcutaneous Daily    insulin lispro  0-6 Units Subcutaneous TID WC    insulin lispro  0-3 Units Subcutaneous Nightly       Continuous Infusions:   dextrose         PRN Meds:  sodium chloride, docusate sodium, guaiFENesin, albuterol sulfate HFA, sodium chloride flush, magnesium hydroxide, ondansetron, acetaminophen, glucose, dextrose, glucagon (rDNA), dextrose, benzonatate    ALLERGIES:  Patient is allergic to aspirin; celecoxib; fexofenadine; gabapentin; niacin er; and adhesive tape. Objective:   PHYSICAL EXAM:  /69   Pulse 85   Temp 97.5 °F (36.4 °C) (Oral)   Resp 18   Ht 5' 2\" (1.575 m)   Wt (!) 315 lb 11.2 oz (143.2 kg)   SpO2 97%   BMI 57.74 kg/m²    Physical Exam   Constitutional: She appears well-developed and well-nourished. No distress. HENT:   Head: Normocephalic and atraumatic. Mouth/Throat: Oropharynx is clear and moist. No oropharyngeal exudate. Eyes: Pupils are equal, round, and reactive to light. EOM are normal.   Neck: Neck supple. No JVD present. Cardiovascular: Normal heart sounds. Exam reveals no gallop and no friction rub. No murmur heard. Pulmonary/Chest: Effort normal. She has no wheezes.  She has no rales. Equal chest rise and expansion bilaterally   Abdominal: Soft. Bowel sounds are normal. She exhibits no distension. There is no tenderness. Musculoskeletal: Normal range of motion. She exhibits no edema. Lymphadenopathy:     She has no cervical adenopathy. Neurological: She is alert. No cranial nerve deficit. CN 2-12 grossly intact   Skin: Skin is warm and dry. No rash noted. She is not diaphoretic. Data Reviewed:   LABS:  CBC:  Recent Labs     09/17/19  0612   WBC 9.1   HGB 12.1   HCT 38.6   MCV 79.1*        BMP:  Recent Labs     09/17/19  0612 09/18/19  0841 09/19/19  0826    140 141   K 3.3* 3.5 3.3*   CL 94* 89* 89*   CO2 36* 40* 38*   PHOS 3.6  --   --    BUN 42* 41* 41*   CREATININE 1.4* 1.5* 1.4*     LIVER PROFILE: No results for input(s): AST, ALT, LIPASE, ALB, BILIDIR, BILITOT, ALKPHOS in the last 72 hours. Invalid input(s): AMYLASE  PT/INR:No results for input(s): PROTIME, INR in the last 72 hours. APTT: No results for input(s): APTT in the last 72 hours. UA:  Recent Labs     09/17/19  1615   COLORU Yellow   PHUR 7.0   WBCUA 3-5   RBCUA 0-2   BACTERIA 2+*   CLARITYU Clear   SPECGRAV 1.010   LEUKOCYTESUR Negative   UROBILINOGEN 0.2   BILIRUBINUR Negative   BLOODU LARGE*   GLUCOSEU Negative     Recent Labs     09/18/19  1610   PHART 7.461*   EEQ5WDM 54.8*   PO2ART 64.4*            CXR personally reviewed, cardiomegaly and pulmonary edema          Assessment:     1. Acute on chronic resp failure, mixed              -acute pulm edema  2. Acute dCHF  3. SOBEIDA and Obesity hypoventilation syndrome  4. Contraction alkalosis in the setting of diuresis  5. Possible ILD, not completely worked up  6. Asthma? Without acute exac  7.  Acute cystitis    Plan:      -Check VBG since ABGs are difficult to obtain  -Will give a dose of diamox, it does look like she has a contraction alkalosis  -Monitor for worsening hypercarbia, she refused BIPAP therapy however when her mentation

## 2019-09-19 NOTE — PROGRESS NOTES
injury) ([facility] [address] [N17.9]    Diabetes mellitus ([facility] [address] [E11.9]    Hyperlipidemia [E78.5]    Hypertension [I10]    Hypothyroidism [E03.9]    COPD (chronic obstructive pulmonary disease) ([facility]) [J44.9]        CHF - Acute on chronic diastolic heart failure w/ last echo 4/19 with EF 24-27%, grade 1 diastolic dysfunction, pulmonary HTN. Elevated BNP on admissin but no overt pulmonary edema on admission CXR. IV Lasix in ED - continued. Follow I/Os and serial labs. Cardiology consulted/following. Aldactone added per Cards. Diamox added today per Pulm for contraction alkalosis.      DM2 - controlled on home oral antiGlycemics - held. Followed FSBS/SSI low regimen.     Chronic Respiratory Failure - w/ hypoxia/hypercarbia, 2nd to COPD/ILD. Supplemental O2 and wean as tolerated. Continue home Singulair/HHN. Pt is refusing BiPAP therapy. If mentation worsens she will need BiPAP and transfer to .      UTI - admission U/A c/w acute cystitis. Recurrent, Completed Rocephin 11-17 August w/ Cx results - demonstrating >100K Enterococcus and E Coli sensitive to ABX. Resumed Rocephin 13 Sept. No culture sent, repeat UA was unremarkable. Stopped rocephin.      HTN - w/out known CAD and no evidence of active signs/sxs of ischemia/failure. Currently controlled on home meds w/ vitals reviewed and documented as above.     HyperLipidemia - controlled on home Statin. Continue, w/ f/u and med adjustment w/ PCP.     HypoThyroid - clinically euthyroid on oral replacement therapy. Continue, w/ outpt monitoring as previously arranged.      Morbid Obesity -  With Body mass index is 09.37 kg/m². Complicating assessment and treatment. Placing patient at risk for multiple co-morbidities as well as early death and contributing to the patient's presentation.  Counseled on weight loss.         DVT Prophylaxis: LMWH  Diet: DIET CARDIAC; Carb Control: 5 carb choices (75 gms)/meal  Code Status: Full Code      PT/OT Eval Status: Ordered      Dispo - Likely

## 2019-09-19 NOTE — PLAN OF CARE
Patient's EF (Ejection Fraction) is greater than 40%    Patient's weights and intake/output reviewed:    Patient's Last Weight: 315 lbs 11.2 oz obtained by bed scale. Difference of 2 lbs less than last documented weight. Intake/Output Summary (Last 24 hours) at 9/19/2019 1608  Last data filed at 9/19/2019 1600  Gross per 24 hour   Intake 1567 ml   Output 5100 ml   Net -3533 ml         Patient stated Daily Functional Goal:   Pt wants to be to able to feel comfortable - not SOB or swollen   Pt wants to be able to get up to the chair with minimal assist without getting SOB/in pain     Ongoing Functional Capacity Assessment:  Patient unable to ambulate distance of 15 feet with extreme difficulty. Patient noted to be on 4 supplemental O2 with SpO2 of 95 %. Pt up in chair at this time on 4 L O2. Pt with complaints of shortness of breath. Pt with pitting lower extremity edema. Patient and/or Family's stated Goal of Care this Admission: reduce shortness of breath, increase activity tolerance, better understand heart failure and disease management, be more comfortable and reduce lower extremity edema prior to discharge      Comorbidities Reviewed Yes  Patient has a past medical history of Acute on chronic diastolic CHF (congestive heart failure) ([facility] [address] JAQUELIN (acute kidney injury) ([facility] [address] Arthritis, Asthma, COPD (chronic obstructive pulmonary disease) ([facility] [address] Diabetes mellitus ([facility] [address] Hyperlipidemia, Hypertension, MRSA (methicillin resistant staph aureus) culture positive, Other disorders of kidney and ureter in diseases classified elsewhere, Pneumonia due to infectious organism, and Thyroid disease. >>For CHF and Comorbidity documentation on Education Time and Topics, please see Education Tab    Diabetes education provided today:    Metabolic syndrome: association of diabetes with dyslipidemia, HTN and obesity. Diabetic Neuropathy: signs and therapy.   Foot care: advised to wash feet daily, pat dry and

## 2019-09-20 ENCOUNTER — APPOINTMENT (OUTPATIENT)
Dept: GENERAL RADIOLOGY | Age: 74
DRG: 291 | End: 2019-09-20
Payer: MEDICARE

## 2019-09-20 PROBLEM — J96.22 ACUTE ON CHRONIC RESPIRATORY FAILURE WITH HYPERCAPNIA (HCC): Status: ACTIVE | Noted: 2019-09-20

## 2019-09-20 LAB
ANION GAP SERPL CALCULATED.3IONS-SCNC: 12 MMOL/L (ref 3–16)
BASE EXCESS VENOUS: 10.8 MMOL/L (ref -3–3)
BUN BLDV-MCNC: 42 MG/DL (ref 7–20)
CALCIUM SERPL-MCNC: 9.7 MG/DL (ref 8.3–10.6)
CARBOXYHEMOGLOBIN: 2.5 % (ref 0–1.5)
CHLORIDE BLD-SCNC: 91 MMOL/L (ref 99–110)
CO2: 37 MMOL/L (ref 21–32)
CREAT SERPL-MCNC: 1.3 MG/DL (ref 0.6–1.2)
GFR AFRICAN AMERICAN: 49
GFR NON-AFRICAN AMERICAN: 40
GLUCOSE BLD-MCNC: 114 MG/DL (ref 70–99)
GLUCOSE BLD-MCNC: 140 MG/DL (ref 70–99)
GLUCOSE BLD-MCNC: 156 MG/DL (ref 70–99)
GLUCOSE BLD-MCNC: 159 MG/DL (ref 70–99)
GLUCOSE BLD-MCNC: 197 MG/DL (ref 70–99)
HCO3 VENOUS: 36.7 MMOL/L (ref 23–29)
MAGNESIUM: 2.2 MG/DL (ref 1.8–2.4)
METHEMOGLOBIN VENOUS: 0.4 %
O2 CONTENT, VEN: 16 VOL %
O2 SAT, VEN: 90 %
O2 THERAPY: ABNORMAL
PCO2, VEN: 53.9 MMHG (ref 40–50)
PERFORMED ON: ABNORMAL
PH VENOUS: 7.45 (ref 7.35–7.45)
PO2, VEN: 58 MMHG (ref 25–40)
POTASSIUM REFLEX MAGNESIUM: 3.2 MMOL/L (ref 3.5–5.1)
PRO-BNP: 3777 PG/ML (ref 0–124)
SODIUM BLD-SCNC: 140 MMOL/L (ref 136–145)
TCO2 CALC VENOUS: 38 MMOL/L

## 2019-09-20 PROCEDURE — 1200000000 HC SEMI PRIVATE

## 2019-09-20 PROCEDURE — 36415 COLL VENOUS BLD VENIPUNCTURE: CPT

## 2019-09-20 PROCEDURE — 6370000000 HC RX 637 (ALT 250 FOR IP): Performed by: NURSE PRACTITIONER

## 2019-09-20 PROCEDURE — 6370000000 HC RX 637 (ALT 250 FOR IP): Performed by: INTERNAL MEDICINE

## 2019-09-20 PROCEDURE — 6370000000 HC RX 637 (ALT 250 FOR IP): Performed by: REGISTERED NURSE

## 2019-09-20 PROCEDURE — 99232 SBSQ HOSP IP/OBS MODERATE 35: CPT | Performed by: NURSE PRACTITIONER

## 2019-09-20 PROCEDURE — 2700000000 HC OXYGEN THERAPY PER DAY

## 2019-09-20 PROCEDURE — 99233 SBSQ HOSP IP/OBS HIGH 50: CPT | Performed by: INTERNAL MEDICINE

## 2019-09-20 PROCEDURE — 6360000002 HC RX W HCPCS: Performed by: NURSE PRACTITIONER

## 2019-09-20 PROCEDURE — 83735 ASSAY OF MAGNESIUM: CPT

## 2019-09-20 PROCEDURE — 83880 ASSAY OF NATRIURETIC PEPTIDE: CPT

## 2019-09-20 PROCEDURE — 94640 AIRWAY INHALATION TREATMENT: CPT

## 2019-09-20 PROCEDURE — 80048 BASIC METABOLIC PNL TOTAL CA: CPT

## 2019-09-20 PROCEDURE — 82803 BLOOD GASES ANY COMBINATION: CPT

## 2019-09-20 PROCEDURE — 6360000002 HC RX W HCPCS: Performed by: INTERNAL MEDICINE

## 2019-09-20 PROCEDURE — 94761 N-INVAS EAR/PLS OXIMETRY MLT: CPT

## 2019-09-20 PROCEDURE — 71045 X-RAY EXAM CHEST 1 VIEW: CPT

## 2019-09-20 PROCEDURE — 2580000003 HC RX 258: Performed by: INTERNAL MEDICINE

## 2019-09-20 RX ORDER — POTASSIUM CHLORIDE 20 MEQ/1
40 TABLET, EXTENDED RELEASE ORAL ONCE
Status: COMPLETED | OUTPATIENT
Start: 2019-09-20 | End: 2019-09-20

## 2019-09-20 RX ORDER — FUROSEMIDE 10 MG/ML
60 INJECTION INTRAMUSCULAR; INTRAVENOUS 2 TIMES DAILY
Status: DISCONTINUED | OUTPATIENT
Start: 2019-09-20 | End: 2019-09-23

## 2019-09-20 RX ADMIN — ACETAMINOPHEN 650 MG: 325 TABLET ORAL at 22:54

## 2019-09-20 RX ADMIN — POTASSIUM CHLORIDE 40 MEQ: 20 TABLET, EXTENDED RELEASE ORAL at 13:13

## 2019-09-20 RX ADMIN — FUROSEMIDE 60 MG: 10 INJECTION, SOLUTION INTRAMUSCULAR; INTRAVENOUS at 10:18

## 2019-09-20 RX ADMIN — MONTELUKAST SODIUM 10 MG: 10 TABLET, FILM COATED ORAL at 22:24

## 2019-09-20 RX ADMIN — ENOXAPARIN SODIUM 40 MG: 40 INJECTION SUBCUTANEOUS at 10:13

## 2019-09-20 RX ADMIN — Medication 2 PUFF: at 07:38

## 2019-09-20 RX ADMIN — OXYBUTYNIN CHLORIDE 10 MG: 5 TABLET, EXTENDED RELEASE ORAL at 22:24

## 2019-09-20 RX ADMIN — INSULIN LISPRO 1 UNITS: 100 INJECTION, SOLUTION INTRAVENOUS; SUBCUTANEOUS at 22:23

## 2019-09-20 RX ADMIN — SPIRONOLACTONE 25 MG: 25 TABLET ORAL at 10:13

## 2019-09-20 RX ADMIN — ACETAMINOPHEN 650 MG: 325 TABLET ORAL at 10:13

## 2019-09-20 RX ADMIN — INSULIN LISPRO 1 UNITS: 100 INJECTION, SOLUTION INTRAVENOUS; SUBCUTANEOUS at 10:15

## 2019-09-20 RX ADMIN — IPRATROPIUM BROMIDE AND ALBUTEROL SULFATE 3 ML: .5; 3 SOLUTION RESPIRATORY (INHALATION) at 15:10

## 2019-09-20 RX ADMIN — IPRATROPIUM BROMIDE AND ALBUTEROL SULFATE 3 ML: .5; 3 SOLUTION RESPIRATORY (INHALATION) at 19:18

## 2019-09-20 RX ADMIN — IPRATROPIUM BROMIDE AND ALBUTEROL SULFATE 3 ML: .5; 3 SOLUTION RESPIRATORY (INHALATION) at 07:38

## 2019-09-20 RX ADMIN — BUSPIRONE HYDROCHLORIDE 10 MG: 5 TABLET ORAL at 13:13

## 2019-09-20 RX ADMIN — FLUTICASONE PROPIONATE 2 SPRAY: 50 SPRAY, METERED NASAL at 10:15

## 2019-09-20 RX ADMIN — ROSUVASTATIN CALCIUM 40 MG: 10 TABLET, FILM COATED ORAL at 18:24

## 2019-09-20 RX ADMIN — Medication 10 ML: at 10:13

## 2019-09-20 RX ADMIN — BUSPIRONE HYDROCHLORIDE 10 MG: 5 TABLET ORAL at 22:24

## 2019-09-20 RX ADMIN — BUSPIRONE HYDROCHLORIDE 10 MG: 5 TABLET ORAL at 10:12

## 2019-09-20 RX ADMIN — Medication 10 ML: at 22:28

## 2019-09-20 RX ADMIN — ACETAMINOPHEN 650 MG: 325 TABLET ORAL at 18:24

## 2019-09-20 RX ADMIN — FUROSEMIDE 60 MG: 10 INJECTION, SOLUTION INTRAMUSCULAR; INTRAVENOUS at 18:24

## 2019-09-20 RX ADMIN — LEVOTHYROXINE SODIUM 88 MCG: 0.09 TABLET ORAL at 06:52

## 2019-09-20 RX ADMIN — BENZONATATE 100 MG: 100 CAPSULE ORAL at 22:53

## 2019-09-20 RX ADMIN — POTASSIUM CHLORIDE 40 MEQ: 20 TABLET, EXTENDED RELEASE ORAL at 10:13

## 2019-09-20 ASSESSMENT — ENCOUNTER SYMPTOMS: SHORTNESS OF BREATH: 1

## 2019-09-20 ASSESSMENT — PAIN DESCRIPTION - ORIENTATION
ORIENTATION: RIGHT;LEFT
ORIENTATION: RIGHT;LEFT

## 2019-09-20 ASSESSMENT — PAIN DESCRIPTION - PROGRESSION
CLINICAL_PROGRESSION: NOT CHANGED
CLINICAL_PROGRESSION: NOT CHANGED

## 2019-09-20 ASSESSMENT — PAIN DESCRIPTION - LOCATION
LOCATION: HAND
LOCATION: HAND;GENERALIZED

## 2019-09-20 ASSESSMENT — PAIN DESCRIPTION - FREQUENCY
FREQUENCY: INTERMITTENT
FREQUENCY: INTERMITTENT

## 2019-09-20 ASSESSMENT — PAIN SCALES - GENERAL
PAINLEVEL_OUTOF10: 5
PAINLEVEL_OUTOF10: 6
PAINLEVEL_OUTOF10: 8

## 2019-09-20 ASSESSMENT — PAIN DESCRIPTION - ONSET
ONSET: ON-GOING
ONSET: ON-GOING

## 2019-09-20 ASSESSMENT — PAIN DESCRIPTION - PAIN TYPE
TYPE: CHRONIC PAIN
TYPE: CHRONIC PAIN

## 2019-09-20 NOTE — PLAN OF CARE
Problem: Falls - Risk of:  Goal: Will remain free from falls  Description  Will remain free from falls  Outcome: Ongoing  Note:   Pt high fall risk. Instructed to use call light before getting out of bed. Call light within reach. Bed in low position. Bed alarm on. Will continue to monitor. Problem: Risk for Impaired Skin Integrity  Goal: Tissue integrity - skin and mucous membranes  Description  Structural intactness and normal physiological function of skin and  mucous membranes. Outcome: Ongoing  Note:   Patient's skin assessed. See flowsheet. Patient turned in bed. Pressure ulcer prevention in place. No issues with skin integrity this shift. Will continue to monitor.

## 2019-09-20 NOTE — PROGRESS NOTES
present. No distension or hernia. No tenderness. Musculoskeletal: No cyanosis. No clubbing. No obvious joint deformity. Lymphadenopathy: No cervical or supraclavicular adenopathy. Skin: Skin is warm and dry. No rash or nodules on the exposed extremities. Increased erythema and stasis of the lower extremities present  Psychiatric: Normal mood and affect. Behavior is normal.  No anxiety. Neurologic: Alert, awake and oriented. PERRL. Speech fluent        Results:  CBC: No results for input(s): WBC, HGB, HCT, MCV, PLT in the last 72 hours. BMP:   Recent Labs     09/18/19  0841 09/19/19  0826    141   K 3.5 3.3*   CL 89* 89*   CO2 40* 38*   BUN 41* 41*   CREATININE 1.5* 1.4*     UA:  Recent Labs     09/17/19  1615   COLORU Yellow   PHUR 7.0   WBCUA 3-5   RBCUA 0-2   BACTERIA 2+*   CLARITYU Clear   SPECGRAV 1.010   LEUKOCYTESUR Negative   UROBILINOGEN 0.2   BILIRUBINUR Negative   BLOODU LARGE*   GLUCOSEU Negative       Imaging:  I have reviewed radiology images personally. XR CHEST PORTABLE   Final Result   Chronic interstitial changes, similar to prior. Sequela of granulomatous   disease. Indeterminate left suprahilar nodule is unchanged. XR CHEST STANDARD (2 VW)   Final Result   Cardiomegaly and chronic pulmonary change without acute cardiopulmonary   process. Stable left suprahilar nodular density. Xr Chest Standard (2 Vw)    Result Date: 9/13/2019  EXAMINATION: TWO XRAY VIEWS OF THE CHEST 9/13/2019 4:19 pm COMPARISON: Chest radiograph performed 08/10/2019. HISTORY: ORDERING SYSTEM PROVIDED HISTORY: shortness of breath TECHNOLOGIST PROVIDED HISTORY: Reason for exam:->shortness of breath Reason for Exam: SOB Acuity: Acute Type of Exam: Initial FINDINGS: There is mild chronic pulmonary change. There is a stable left suprahilar nodular density. There is no acute consolidation or effusion. There is no pneumothorax. The heart is enlarged and stable.   The upper abdomen is unremarkable. The extrathoracic soft tissues are unremarkable. Cardiomegaly and chronic pulmonary change without acute cardiopulmonary process. Stable left suprahilar nodular density. Xr Chest Portable    Result Date: 9/20/2019  EXAMINATION: ONE XRAY VIEW OF THE CHEST 9/20/2019 5:35 am COMPARISON: 09/13/2019, 08/10/2019, 12/21/2018 HISTORY: ORDERING SYSTEM PROVIDED HISTORY: resp failure TECHNOLOGIST PROVIDED HISTORY: Reason for exam:->resp failure Reason for Exam: resp fail FINDINGS: Low lung volume. Elevation of right hemidiaphragm. Dense nodules at the left base are similar to prior, likely granulomas. Asymmetric nodular density in the left suprahilar region again seen. Coarse interstitial pattern is similar to prior. No pneumothorax. Cardiac and mediastinal silhouettes are similar to prior. Chronic interstitial changes, similar to prior. Sequela of granulomatous disease. Indeterminate left suprahilar nodule is unchanged. CT chest in 2018-  FINDINGS:   Chest:       Mediastinum: There are a few borderline prevascular, precarinal, right   peritracheal and right hilar lymph nodes, the largest of which measures 1.8   cm in diameter.  The central airways are patent.  The aorta, heart and   pericardium are unremarkable.       Lungs/pleura: There are is mild interlobular septal thickening scattered   throughout each lung.  There an indeterminate 6 mm pulmonary nodule within   the lateral aspect of the right upper lobe.  This is stable by report dating   back to 2001.  There is no pneumothorax, consolidation or effusion.       Soft Tissues/Bones: There is no fracture or osseous destruction. ECHO- Summary   Normal left ventricular systolic function with ejection fraction of 55-60%.    Septal bounce noted due to right ventricular volume overload.   Mild concentric left ventricular hypertrophy.   Grade I diastolic dysfunction with normal filling pressure.   Mild mitral and tricuspid Hyperlipidemia    Hypertension    Hypothyroidism    [facility]) [facility]    Morbid obesity ([facility]    Interstitial lung disease ([facility]    Morbid obesity with BMI of 50.0-59.9, adult ([facility]    Pulmonary HTN ([facility]    Suspected sleep apnea    Chronic respiratory failure with hypoxia (HCC)    CHF (congestive heart failure), NYHA class I, acute on chronic, combined ([facility]    UTI (urinary tract infection)    Acute on chronic respiratory failure with hypercapnia (HCC)  Resolved Problems:    * No resolved hospital problems.  *          Plan:   · Oxygen supplementation to keep saturation between 90 to 94% only  · Patient can be given BiPAP at night or on PRN basis-patient has been resistant to use of noninvasive ventilation in the past and patient does not want to to use it in the hospital or at home  · Pulmonary toilet no need of any antibiotics or prednisone from lung point of view  · Patient has been on IV Lasix 3 times a day along with Aldactone and patient also got 1 dose of Diamox  · Monitor input output closely  · Monitor renal function closely-patient creatinine is stable at this time  · Will hold off on any HRCT at this time for now but to be reevaluated if patient's clinical status does not improve  · Bronchodilators  · No need of any Dulera with DuoNeb  · Patient's erythema of the lower extremity appears to be typical stasis changes rather than any cellulitis-may be able to hold off on any antibiotics  · Patient's blood sugar control as per internal medicine team  · Patient also is in atrial for ablation with controlled ventricle rate at this time-IM/cardiology to decide about long-term anticoagulation, if no contraindication  · Patient is on Ditropan at this time along with DuoNeb please monitor for any increase anticholinergic side effects  · Synthroid as per IM as per TSH levels  · PUD and DVT prophylaxis as per IM  · Diet and Lysol modifications as per internal medicine  · Nutritional consult will

## 2019-09-21 PROBLEM — K11.21 ACUTE PAROTITIS: Status: ACTIVE | Noted: 2019-09-21

## 2019-09-21 LAB
ANION GAP SERPL CALCULATED.3IONS-SCNC: 11 MMOL/L (ref 3–16)
BUN BLDV-MCNC: 40 MG/DL (ref 7–20)
CALCIUM SERPL-MCNC: 9.8 MG/DL (ref 8.3–10.6)
CHLORIDE BLD-SCNC: 92 MMOL/L (ref 99–110)
CO2: 36 MMOL/L (ref 21–32)
CREAT SERPL-MCNC: 1.3 MG/DL (ref 0.6–1.2)
GFR AFRICAN AMERICAN: 49
GFR NON-AFRICAN AMERICAN: 40
GLUCOSE BLD-MCNC: 149 MG/DL (ref 70–99)
GLUCOSE BLD-MCNC: 155 MG/DL (ref 70–99)
GLUCOSE BLD-MCNC: 157 MG/DL (ref 70–99)
GLUCOSE BLD-MCNC: 172 MG/DL (ref 70–99)
GLUCOSE BLD-MCNC: 190 MG/DL (ref 70–99)
MAGNESIUM: 2.2 MG/DL (ref 1.8–2.4)
PERFORMED ON: ABNORMAL
POTASSIUM REFLEX MAGNESIUM: 3.5 MMOL/L (ref 3.5–5.1)
SODIUM BLD-SCNC: 139 MMOL/L (ref 136–145)
TSH REFLEX FT4: 2.1 UIU/ML (ref 0.27–4.2)

## 2019-09-21 PROCEDURE — 99233 SBSQ HOSP IP/OBS HIGH 50: CPT | Performed by: NURSE PRACTITIONER

## 2019-09-21 PROCEDURE — 6360000002 HC RX W HCPCS: Performed by: REGISTERED NURSE

## 2019-09-21 PROCEDURE — 6360000002 HC RX W HCPCS: Performed by: INTERNAL MEDICINE

## 2019-09-21 PROCEDURE — 6370000000 HC RX 637 (ALT 250 FOR IP): Performed by: NURSE PRACTITIONER

## 2019-09-21 PROCEDURE — 84443 ASSAY THYROID STIM HORMONE: CPT

## 2019-09-21 PROCEDURE — 99233 SBSQ HOSP IP/OBS HIGH 50: CPT | Performed by: INTERNAL MEDICINE

## 2019-09-21 PROCEDURE — 1200000000 HC SEMI PRIVATE

## 2019-09-21 PROCEDURE — 2580000003 HC RX 258: Performed by: REGISTERED NURSE

## 2019-09-21 PROCEDURE — 80048 BASIC METABOLIC PNL TOTAL CA: CPT

## 2019-09-21 PROCEDURE — 6370000000 HC RX 637 (ALT 250 FOR IP): Performed by: INTERNAL MEDICINE

## 2019-09-21 PROCEDURE — 36415 COLL VENOUS BLD VENIPUNCTURE: CPT

## 2019-09-21 PROCEDURE — 2580000003 HC RX 258

## 2019-09-21 PROCEDURE — 94640 AIRWAY INHALATION TREATMENT: CPT

## 2019-09-21 PROCEDURE — 6370000000 HC RX 637 (ALT 250 FOR IP): Performed by: REGISTERED NURSE

## 2019-09-21 PROCEDURE — 83735 ASSAY OF MAGNESIUM: CPT

## 2019-09-21 PROCEDURE — 6360000002 HC RX W HCPCS: Performed by: NURSE PRACTITIONER

## 2019-09-21 PROCEDURE — 2700000000 HC OXYGEN THERAPY PER DAY

## 2019-09-21 PROCEDURE — 94761 N-INVAS EAR/PLS OXIMETRY MLT: CPT

## 2019-09-21 PROCEDURE — 2580000003 HC RX 258: Performed by: INTERNAL MEDICINE

## 2019-09-21 RX ORDER — LINEZOLID 2 MG/ML
600 INJECTION, SOLUTION INTRAVENOUS EVERY 12 HOURS
Status: DISCONTINUED | OUTPATIENT
Start: 2019-09-21 | End: 2019-09-24

## 2019-09-21 RX ORDER — FLUCONAZOLE 100 MG/1
100 TABLET ORAL DAILY
Status: DISCONTINUED | OUTPATIENT
Start: 2019-09-21 | End: 2019-09-24

## 2019-09-21 RX ORDER — DIPHENHYDRAMINE HCL 25 MG
25 TABLET ORAL EVERY 6 HOURS PRN
Status: DISCONTINUED | OUTPATIENT
Start: 2019-09-21 | End: 2019-09-24 | Stop reason: HOSPADM

## 2019-09-21 RX ORDER — SODIUM CHLORIDE 9 MG/ML
INJECTION, SOLUTION INTRAVENOUS
Status: COMPLETED
Start: 2019-09-21 | End: 2019-09-21

## 2019-09-21 RX ADMIN — Medication 1 LOZENGE: at 11:03

## 2019-09-21 RX ADMIN — ACETAMINOPHEN 650 MG: 325 TABLET ORAL at 08:20

## 2019-09-21 RX ADMIN — IPRATROPIUM BROMIDE AND ALBUTEROL SULFATE 3 ML: .5; 3 SOLUTION RESPIRATORY (INHALATION) at 21:01

## 2019-09-21 RX ADMIN — IPRATROPIUM BROMIDE AND ALBUTEROL SULFATE 3 ML: .5; 3 SOLUTION RESPIRATORY (INHALATION) at 08:53

## 2019-09-21 RX ADMIN — INSULIN LISPRO 1 UNITS: 100 INJECTION, SOLUTION INTRAVENOUS; SUBCUTANEOUS at 17:03

## 2019-09-21 RX ADMIN — LINEZOLID 600 MG: 600 INJECTION, SOLUTION INTRAVENOUS at 12:57

## 2019-09-21 RX ADMIN — MONTELUKAST SODIUM 10 MG: 10 TABLET, FILM COATED ORAL at 20:29

## 2019-09-21 RX ADMIN — Medication 1 LOZENGE: at 14:58

## 2019-09-21 RX ADMIN — PIPERACILLIN SODIUM,TAZOBACTAM SODIUM 3.38 G: 3; .375 INJECTION, POWDER, FOR SOLUTION INTRAVENOUS at 20:57

## 2019-09-21 RX ADMIN — ACETAMINOPHEN 650 MG: 325 TABLET ORAL at 20:45

## 2019-09-21 RX ADMIN — FUROSEMIDE 60 MG: 10 INJECTION, SOLUTION INTRAMUSCULAR; INTRAVENOUS at 17:52

## 2019-09-21 RX ADMIN — IPRATROPIUM BROMIDE AND ALBUTEROL SULFATE 3 ML: .5; 3 SOLUTION RESPIRATORY (INHALATION) at 12:36

## 2019-09-21 RX ADMIN — INSULIN LISPRO 1 UNITS: 100 INJECTION, SOLUTION INTRAVENOUS; SUBCUTANEOUS at 20:30

## 2019-09-21 RX ADMIN — POTASSIUM CHLORIDE 40 MEQ: 20 TABLET, EXTENDED RELEASE ORAL at 08:20

## 2019-09-21 RX ADMIN — OXYBUTYNIN CHLORIDE 10 MG: 5 TABLET, EXTENDED RELEASE ORAL at 20:30

## 2019-09-21 RX ADMIN — BUSPIRONE HYDROCHLORIDE 10 MG: 5 TABLET ORAL at 14:58

## 2019-09-21 RX ADMIN — BUSPIRONE HYDROCHLORIDE 10 MG: 5 TABLET ORAL at 08:20

## 2019-09-21 RX ADMIN — Medication 10 ML: at 20:34

## 2019-09-21 RX ADMIN — INSULIN LISPRO 1 UNITS: 100 INJECTION, SOLUTION INTRAVENOUS; SUBCUTANEOUS at 11:38

## 2019-09-21 RX ADMIN — Medication 10 ML: at 08:19

## 2019-09-21 RX ADMIN — SODIUM CHLORIDE 500 ML: 9 INJECTION, SOLUTION INTRAVENOUS at 20:56

## 2019-09-21 RX ADMIN — IPRATROPIUM BROMIDE AND ALBUTEROL SULFATE 3 ML: .5; 3 SOLUTION RESPIRATORY (INHALATION) at 15:44

## 2019-09-21 RX ADMIN — ROSUVASTATIN CALCIUM 40 MG: 10 TABLET, FILM COATED ORAL at 17:52

## 2019-09-21 RX ADMIN — BUSPIRONE HYDROCHLORIDE 10 MG: 5 TABLET ORAL at 20:29

## 2019-09-21 RX ADMIN — PIPERACILLIN SODIUM,TAZOBACTAM SODIUM 3.38 G: 3; .375 INJECTION, POWDER, FOR SOLUTION INTRAVENOUS at 14:59

## 2019-09-21 RX ADMIN — FUROSEMIDE 60 MG: 10 INJECTION, SOLUTION INTRAMUSCULAR; INTRAVENOUS at 08:19

## 2019-09-21 RX ADMIN — BENZONATATE 100 MG: 100 CAPSULE ORAL at 20:45

## 2019-09-21 RX ADMIN — FLUCONAZOLE 100 MG: 100 TABLET ORAL at 12:57

## 2019-09-21 RX ADMIN — ENOXAPARIN SODIUM 40 MG: 40 INJECTION SUBCUTANEOUS at 08:20

## 2019-09-21 RX ADMIN — INSULIN LISPRO 1 UNITS: 100 INJECTION, SOLUTION INTRAVENOUS; SUBCUTANEOUS at 08:19

## 2019-09-21 RX ADMIN — SPIRONOLACTONE 25 MG: 25 TABLET ORAL at 08:19

## 2019-09-21 RX ADMIN — LEVOTHYROXINE SODIUM 88 MCG: 0.09 TABLET ORAL at 06:41

## 2019-09-21 RX ADMIN — DIPHENHYDRAMINE HCL 25 MG: 25 TABLET ORAL at 06:41

## 2019-09-21 RX ADMIN — FLUTICASONE PROPIONATE 2 SPRAY: 50 SPRAY, METERED NASAL at 09:32

## 2019-09-21 ASSESSMENT — PAIN DESCRIPTION - PAIN TYPE: TYPE: ACUTE PAIN;CHRONIC PAIN

## 2019-09-21 ASSESSMENT — PAIN DESCRIPTION - ONSET: ONSET: ON-GOING

## 2019-09-21 ASSESSMENT — PAIN - FUNCTIONAL ASSESSMENT: PAIN_FUNCTIONAL_ASSESSMENT: PREVENTS OR INTERFERES SOME ACTIVE ACTIVITIES AND ADLS

## 2019-09-21 ASSESSMENT — PAIN SCALES - GENERAL
PAINLEVEL_OUTOF10: 10
PAINLEVEL_OUTOF10: 10

## 2019-09-21 ASSESSMENT — PAIN DESCRIPTION - DESCRIPTORS: DESCRIPTORS: ACHING;DISCOMFORT;RADIATING

## 2019-09-21 ASSESSMENT — PAIN DESCRIPTION - FREQUENCY: FREQUENCY: INTERMITTENT

## 2019-09-21 ASSESSMENT — PAIN DESCRIPTION - ORIENTATION: ORIENTATION: RIGHT;LEFT;LOWER;MID

## 2019-09-21 ASSESSMENT — PAIN DESCRIPTION - PROGRESSION: CLINICAL_PROGRESSION: NOT CHANGED

## 2019-09-21 ASSESSMENT — PAIN DESCRIPTION - LOCATION: LOCATION: BACK;ARM;FACE

## 2019-09-21 NOTE — PROGRESS NOTES
INPATIENT PULMONARY CRITICAL CARE PROGRESS NOTE      Reason for visit    Respiratory failure    SUBJECTIVE: Patient when seen was not feeling well;patient having increased bodyaches and pain;patient having increased swelling and pain in Lt mandibular region  , patient did not have any increasing cough or expectoration, patient was not complaining of any chest pain or palpitations, patient shortness of breath has decreased, patient did not have any wheezing, patient was stating that she has been having some increased itching in the lower back which is bothering her, patient was afebrile and hemodynamically maintained, patient had atrial fibrillation with controlled ventricle rate on the monitor, patient's blood sugars were slightly on the higher side, patient was on 2 L of nasal can oxygen with saturation of 91%, patient on questioning states that she does have a history of snoring, patient also gives history of sleep fragmentation, patient continues to have increased leg edema, patient had adequate urine output overnight with cumulative fluid balance of -10.2 L, patient was alert and communicative when seen, no other pertinent review of system of concern         Physical Exam:  Blood pressure 107/62, pulse 51, temperature 98.1 °F (36.7 °C), temperature source Oral, resp. rate 16, height 5' 2\" (1.575 m), weight (!) 309 lb 4.9 oz (140.3 kg), SpO2 91 %, not currently breastfeeding.'     Constitutional:  No acute distress. On nasal cannula oxygen when seen  HENT:  Oropharynx is clear and moist. No thyromegaly. Short and large neck; Lt parotid tender swelling   Eyes:  Conjunctivae are normal. Pupils equal, round, and reactive to light. No scleral icterus. Neck: . No tracheal deviation present. No obvious thyroid mass. Cardiovascular: Normal rate, regular rhythm, normal heart sounds. No right ventricular heave. 2+ lower extremity edema.   With increased skin erythema decreased breath sound intensity  Pulmonary/Chest: Problems:    * No resolved hospital problems.  *          Plan:   · Oxygen supplementation to keep saturation between 90 to 94% only  · Pulmonary toilet no need of any antibiotics or prednisone from lung point of view  · Patient has been on IV Lasix 3 times a day along with Aldactone a  · Monitor input output closely  · Monitor renal function closely-patient creatinine is stable at this time  · Will hold off on any HRCT at this time for now but to be reevaluated if patient's clinical status does not improve  · Bronchodilators  · Patient has acute parotitis can be because of diuretics and anticholinergics along with that patient can also has bacterial or viral infections-will benefit from lemon drops (not available in the pharmacy and nursing was requested to asked the family to bring from the market if possible); hot compresses may help; internal medicine team to decide if patient will benefit from oral or IV antibiotics for any bacterial etiology if not improving  · Patient's blood sugar control as per internal medicine team  · Patient also is in atrial for ablation with controlled ventricle rate at this time-IM/cardiology to decide about long-term anticoagulation, if no contraindication  · Patient is on Ditropan at this time along with DuoNeb please monitor for any increase anticholinergic side effects and can also contribute to the parotitis  · Synthroid as per IM as per TSH levels  · PUD and DVT prophylaxis as per IM  · Diet and Life stylel modifications as per internal medicine  · Nutritional consult will help  · PT /OT evaluations    Case d/w patient and nursing       Electronically signed by:  Lucio Alonzo MD    9/21/2019    9:31 AM.

## 2019-09-21 NOTE — PROGRESS NOTES
[facility]     Cardiology                                     Progress Note    Admission date:  2019    Reason for follow up visit: CHF    HPI/CC: Karla Abdalla was admitted on 2019 with SOB. Has been treated for acute on chronic diastolic CHF and UTI. Rhythm has been sinus with PACs. Subjective: She complains of chronic SOB. Also having LE/foot pain. Denies chest pain and palpitations. Vitals:  Blood pressure 107/62, pulse 51, temperature 97.8 °F (36.6 °C), temperature source Oral, resp. rate 16, height 5' 2\" (1.575 m), weight (!) 309 lb 4.9 oz (140.3 kg), SpO2 92 %, not currently breastfeeding.   Temp  Av.1 °F (36.7 °C)  Min: 97.8 °F (36.6 °C)  Max: 98.6 °F (37 °C)  Pulse  Av  Min: 51  Max: 86  BP  Min: 95/62  Max: 108/71  SpO2  Av.4 %  Min: 92 %  Max: 95 %    24 hour I/O    Intake/Output Summary (Last 24 hours) at 2019 0815  Last data filed at 2019 0302  Gross per 24 hour   Intake 948 ml   Output 2400 ml   Net -1452 ml     Current Facility-Administered Medications   Medication Dose Route Frequency Provider Last Rate Last Dose    diphenhydrAMINE (BENADRYL) tablet 25 mg  25 mg Oral Q6H PRN Lina Bobby, APRN - CNP   25 mg at 19 0641    furosemide (LASIX) injection 60 mg  60 mg Intravenous BID Sharda School, APRN - CNP   60 mg at 19 1824    spironolactone (ALDACTONE) tablet 25 mg  25 mg Oral Daily [facility], APRN - CNP   25 mg at 19 1013    sodium chloride (OCEAN, BABY AYR) 0.65 % nasal spray 1 spray  1 spray Each Nostril Q4H PRN Addison Brennan MD   1 spray at 09/15/19 223    busPIRone (BUSPAR) tablet 10 mg  10 mg Oral TID Addison Brennan MD   10 mg at 19 2224    docusate sodium (COLACE) capsule 100 mg  100 mg Oral BID PRN Addison Brennan MD        fluticasone (FLONASE) 50 MCG/ACT nasal spray 2 spray  2 spray Nasal Daily Addison Brennan MD   2 spray at 19 1015    guaiFENesin (MUCINEX) extended release tablet 600 mg  600 mg 1015    insulin lispro (HUMALOG) injection vial 0-3 Units  0-3 Units Subcutaneous Nightly Giulia Mark MD   1 Units at 09/20/19 2223    benzonatate (TESSALON) capsule 100 mg  100 mg Oral TID PRN CARLOS Morales - NP   100 mg at 09/20/19 2253       Objective:     Telemetry monitor: SR with PACs    Physical Exam:  Constitutional and general appearance: alert, cooperative, no distress and appears older than stated age  HEENT: PERRL, no cervical lymphadenopathy. No masses palpable. Normal oral mucosa  Respiratory:  · Normal excursion and expansion without use of accessory muscles  · Resp auscultation: Normal breath sounds without wheezing, rhonchi, and rales  Cardiovascular:  · The apical impulse is not displaced  · Heart tones are crisp and normal. regular S1 and S2.  · Jugular venous pulsation difficult to assess  · The carotid upstroke is normal in amplitude and contour without delay or bruit  · Peripheral pulses are symmetrical and full   Abdomen:  · No masses or tenderness  · Bowel sounds present  Extremities:  ·  No cyanosis or clubbing  ·  2+ lower extremity edema  ·  Skin: warm and dry  Neurological:  · Alert and oriented  · Moves all extremities well  · No abnormalities of mood, affect, memory, mentation, or behavior are noted    Data    Echo 4/16/2019:  Normal left ventricular systolic function with ejection fraction of 55-60%. Septal bounce noted due to right ventricular volume overload. Mild concentric left ventricular hypertrophy. Grade I diastolic dysfunction with normal filling pressure. Mild mitral and tricuspid regurgitation. The right ventricle is moderately enlarged. Right ventricular systolic function is moderately reduced . S' prime velocity is measured at 7 cm/s. Systolic pulmonic artery pressure (SPAP) is estimated at 55 mmHg consistent with mild pulmonary hypertension (Right atrial pressure of 15 mmHg). The right atrium is moderately dilated.     All labs and testing reviewed. Lab Review     Renal Profile:   Lab Results   Component Value Date    CREATININE 1.3 09/21/2019    BUN 40 09/21/2019     09/21/2019    K 3.5 09/21/2019    CL 92 09/21/2019    CO2 36 09/21/2019     CBC:    Lab Results   Component Value Date    WBC 9.1 09/17/2019    RBC 4.88 09/17/2019    RBC 4.92 12/18/2014    HGB 12.1 09/17/2019    HCT 38.6 09/17/2019    MCV 79.1 09/17/2019    RDW 23.2 09/17/2019     09/17/2019     BNP:  No results found for: BNP  Fasting Lipid Panel:    Lab Results   Component Value Date    CHOL 73 08/11/2019    HDL 35 08/11/2019    TRIG 85 08/11/2019     Cardiac Enzymes:  CK/MbTroponin  Lab Results   Component Value Date    TROPONINI 0.02 09/14/2019     PT/ INR   Lab Results   Component Value Date    INR 1.11 12/21/2018    INR 0.98 03/13/2016    PROTIME 12.7 12/21/2018    PROTIME 11.2 03/13/2016     PTT No results found for: PTT   Lab Results   Component Value Date    MG 2.20 09/20/2019    No results found for: TSH    Assessment:  Acute on chronic diastolic CHF: ongoing   -dry weight 295-300 lbs    -adm weight 324 lbs (is 309 today)     -I&O is inaccurate but -3.3L U  Chronic right heart failure  Pulmonary HTN: mild on echo 4/2019  HTN: controlled   Known elevated troponin: in the setting of JAQUELIN and CHF  COPD: on home O2  SOBEIDA: refuses CPAP  History of recent JAQUELIN  Hypokalemia: improved  Hypothyroidism: on Synthroid  UTI: antibiotics complete    Plan:   1. Continue spironolactone   2. Continue Lasix 60mg IV BID, hopefully decrease on 9/22  3. Strict I&O, daily weight, fluid and sodium restriction   4. BMP in am  5. Check TSH  6.  ACE wraps to legs if patient tolerates     [address]  [phone]

## 2019-09-21 NOTE — PLAN OF CARE
Problem: OXYGENATION/RESPIRATORY FUNCTION  Goal: Patient will maintain patent airway  Outcome: Ongoing       Patient's EF (Ejection Fraction) is greater than 40%    Patient's weights and intake/output reviewed:    Patient's Last Weight: 309 lbs obtained by bed scale. Difference of 6 lbs less than last documented weight. Intake/Output Summary (Last 24 hours) at 9/21/2019 0508  Last data filed at 9/21/2019 0302  Gross per 24 hour   Intake 948 ml   Output 3300 ml   Net -2352 ml             Pt resting in bed at this time on 3 L O2. Pt with complaints of shortness of breath. Pt with nonpitting lower extremity edema. Patient and/or Family's stated Goal of Care this Admission: reduce shortness of breath, increase activity tolerance, better understand heart failure and disease management, be more comfortable and reduce lower extremity edema prior to discharge      Comorbidities Reviewed Yes  Patient has a past medical history of Acute on chronic diastolic CHF (congestive heart failure) ([address] JAQUELIN (acute kidney injury) ([facility] [facility] Arthritis, Asthma, COPD (chronic obstructive pulmonary disease) ([facility] [facility] Diabetes mellitus ([facility] [address] Hyperlipidemia, Hypertension, MRSA (methicillin resistant staph aureus) culture positive, Other disorders of kidney and ureter in diseases classified elsewhere, Pneumonia due to infectious organism, and Thyroid disease. Problem: Nutritional:  Goal: Progress toward achieving an optimal weight will improve  Description  Progress toward achieving an optimal weight will improve  Outcome: Ongoing     Diabetes education provided today:    Physical activity: advised to exercise 5-7 days a week 30-60 mins at least. Discussed how it affects BS readings.        >>For CHF and Comorbidity documentation on Education Time and Topics, please see Education Tab

## 2019-09-22 LAB
ANION GAP SERPL CALCULATED.3IONS-SCNC: 12 MMOL/L (ref 3–16)
BASOPHILS ABSOLUTE: 0.1 K/UL (ref 0–0.2)
BASOPHILS RELATIVE PERCENT: 0.8 %
BUN BLDV-MCNC: 35 MG/DL (ref 7–20)
CALCIUM SERPL-MCNC: 9.9 MG/DL (ref 8.3–10.6)
CHLORIDE BLD-SCNC: 93 MMOL/L (ref 99–110)
CO2: 34 MMOL/L (ref 21–32)
CREAT SERPL-MCNC: 1.4 MG/DL (ref 0.6–1.2)
EOSINOPHILS ABSOLUTE: 0 K/UL (ref 0–0.6)
EOSINOPHILS RELATIVE PERCENT: 0.5 %
GFR AFRICAN AMERICAN: 45
GFR NON-AFRICAN AMERICAN: 37
GLUCOSE BLD-MCNC: 130 MG/DL (ref 70–99)
GLUCOSE BLD-MCNC: 131 MG/DL (ref 70–99)
GLUCOSE BLD-MCNC: 148 MG/DL (ref 70–99)
GLUCOSE BLD-MCNC: 153 MG/DL (ref 70–99)
GLUCOSE BLD-MCNC: 208 MG/DL (ref 70–99)
HCT VFR BLD CALC: 39.9 % (ref 36–48)
HEMOGLOBIN: 12.3 G/DL (ref 12–16)
LYMPHOCYTES ABSOLUTE: 1.2 K/UL (ref 1–5.1)
LYMPHOCYTES RELATIVE PERCENT: 13.8 %
MCH RBC QN AUTO: 24.9 PG (ref 26–34)
MCHC RBC AUTO-ENTMCNC: 31 G/DL (ref 31–36)
MCV RBC AUTO: 80.4 FL (ref 80–100)
MONOCYTES ABSOLUTE: 0.9 K/UL (ref 0–1.3)
MONOCYTES RELATIVE PERCENT: 10.3 %
NEUTROPHILS ABSOLUTE: 6.7 K/UL (ref 1.7–7.7)
NEUTROPHILS RELATIVE PERCENT: 74.6 %
PDW BLD-RTO: 24.2 % (ref 12.4–15.4)
PERFORMED ON: ABNORMAL
PLATELET # BLD: 173 K/UL (ref 135–450)
PMV BLD AUTO: 7.8 FL (ref 5–10.5)
POTASSIUM REFLEX MAGNESIUM: 4 MMOL/L (ref 3.5–5.1)
RBC # BLD: 4.96 M/UL (ref 4–5.2)
SODIUM BLD-SCNC: 139 MMOL/L (ref 136–145)
WBC # BLD: 9 K/UL (ref 4–11)

## 2019-09-22 PROCEDURE — 6370000000 HC RX 637 (ALT 250 FOR IP): Performed by: INTERNAL MEDICINE

## 2019-09-22 PROCEDURE — 6360000002 HC RX W HCPCS: Performed by: REGISTERED NURSE

## 2019-09-22 PROCEDURE — 94761 N-INVAS EAR/PLS OXIMETRY MLT: CPT

## 2019-09-22 PROCEDURE — 6360000002 HC RX W HCPCS: Performed by: NURSE PRACTITIONER

## 2019-09-22 PROCEDURE — 1200000000 HC SEMI PRIVATE

## 2019-09-22 PROCEDURE — 2700000000 HC OXYGEN THERAPY PER DAY

## 2019-09-22 PROCEDURE — 6360000002 HC RX W HCPCS: Performed by: INTERNAL MEDICINE

## 2019-09-22 PROCEDURE — 36415 COLL VENOUS BLD VENIPUNCTURE: CPT

## 2019-09-22 PROCEDURE — 85025 COMPLETE CBC W/AUTO DIFF WBC: CPT

## 2019-09-22 PROCEDURE — 2580000003 HC RX 258: Performed by: INTERNAL MEDICINE

## 2019-09-22 PROCEDURE — 94640 AIRWAY INHALATION TREATMENT: CPT

## 2019-09-22 PROCEDURE — 6370000000 HC RX 637 (ALT 250 FOR IP): Performed by: REGISTERED NURSE

## 2019-09-22 PROCEDURE — 6370000000 HC RX 637 (ALT 250 FOR IP): Performed by: NURSE PRACTITIONER

## 2019-09-22 PROCEDURE — 2580000003 HC RX 258: Performed by: REGISTERED NURSE

## 2019-09-22 PROCEDURE — 80048 BASIC METABOLIC PNL TOTAL CA: CPT

## 2019-09-22 PROCEDURE — 99233 SBSQ HOSP IP/OBS HIGH 50: CPT | Performed by: NURSE PRACTITIONER

## 2019-09-22 RX ADMIN — LINEZOLID 600 MG: 600 INJECTION, SOLUTION INTRAVENOUS at 12:27

## 2019-09-22 RX ADMIN — BUSPIRONE HYDROCHLORIDE 10 MG: 5 TABLET ORAL at 21:10

## 2019-09-22 RX ADMIN — PIPERACILLIN SODIUM,TAZOBACTAM SODIUM 3.38 G: 3; .375 INJECTION, POWDER, FOR SOLUTION INTRAVENOUS at 05:23

## 2019-09-22 RX ADMIN — Medication 10 ML: at 21:38

## 2019-09-22 RX ADMIN — OXYBUTYNIN CHLORIDE 10 MG: 5 TABLET, EXTENDED RELEASE ORAL at 21:11

## 2019-09-22 RX ADMIN — PIPERACILLIN SODIUM,TAZOBACTAM SODIUM 3.38 G: 3; .375 INJECTION, POWDER, FOR SOLUTION INTRAVENOUS at 14:09

## 2019-09-22 RX ADMIN — PIPERACILLIN SODIUM,TAZOBACTAM SODIUM 3.38 G: 3; .375 INJECTION, POWDER, FOR SOLUTION INTRAVENOUS at 21:12

## 2019-09-22 RX ADMIN — FUROSEMIDE 60 MG: 10 INJECTION, SOLUTION INTRAMUSCULAR; INTRAVENOUS at 08:49

## 2019-09-22 RX ADMIN — ROSUVASTATIN CALCIUM 40 MG: 10 TABLET, FILM COATED ORAL at 17:47

## 2019-09-22 RX ADMIN — IPRATROPIUM BROMIDE AND ALBUTEROL SULFATE 3 ML: .5; 3 SOLUTION RESPIRATORY (INHALATION) at 19:30

## 2019-09-22 RX ADMIN — FLUTICASONE PROPIONATE 2 SPRAY: 50 SPRAY, METERED NASAL at 08:49

## 2019-09-22 RX ADMIN — LEVOTHYROXINE SODIUM 88 MCG: 0.09 TABLET ORAL at 06:30

## 2019-09-22 RX ADMIN — LINEZOLID 600 MG: 600 INJECTION, SOLUTION INTRAVENOUS at 02:17

## 2019-09-22 RX ADMIN — INSULIN LISPRO 1 UNITS: 100 INJECTION, SOLUTION INTRAVENOUS; SUBCUTANEOUS at 21:12

## 2019-09-22 RX ADMIN — BUSPIRONE HYDROCHLORIDE 10 MG: 5 TABLET ORAL at 14:09

## 2019-09-22 RX ADMIN — BENZONATATE 100 MG: 100 CAPSULE ORAL at 21:11

## 2019-09-22 RX ADMIN — ACETAMINOPHEN 650 MG: 325 TABLET ORAL at 21:11

## 2019-09-22 RX ADMIN — INSULIN LISPRO 1 UNITS: 100 INJECTION, SOLUTION INTRAVENOUS; SUBCUTANEOUS at 12:27

## 2019-09-22 RX ADMIN — IPRATROPIUM BROMIDE AND ALBUTEROL SULFATE 3 ML: .5; 3 SOLUTION RESPIRATORY (INHALATION) at 15:43

## 2019-09-22 RX ADMIN — Medication 1 LOZENGE: at 08:49

## 2019-09-22 RX ADMIN — SPIRONOLACTONE 25 MG: 25 TABLET ORAL at 08:49

## 2019-09-22 RX ADMIN — FLUCONAZOLE 100 MG: 100 TABLET ORAL at 08:49

## 2019-09-22 RX ADMIN — IPRATROPIUM BROMIDE AND ALBUTEROL SULFATE 3 ML: .5; 3 SOLUTION RESPIRATORY (INHALATION) at 08:16

## 2019-09-22 RX ADMIN — ACETAMINOPHEN 650 MG: 325 TABLET ORAL at 08:49

## 2019-09-22 RX ADMIN — Medication 10 ML: at 08:49

## 2019-09-22 RX ADMIN — POTASSIUM CHLORIDE 40 MEQ: 20 TABLET, EXTENDED RELEASE ORAL at 08:49

## 2019-09-22 RX ADMIN — BUSPIRONE HYDROCHLORIDE 10 MG: 5 TABLET ORAL at 08:49

## 2019-09-22 RX ADMIN — Medication 1 LOZENGE: at 14:10

## 2019-09-22 RX ADMIN — MONTELUKAST SODIUM 10 MG: 10 TABLET, FILM COATED ORAL at 21:11

## 2019-09-22 RX ADMIN — ENOXAPARIN SODIUM 40 MG: 40 INJECTION SUBCUTANEOUS at 08:49

## 2019-09-22 RX ADMIN — FUROSEMIDE 60 MG: 10 INJECTION, SOLUTION INTRAMUSCULAR; INTRAVENOUS at 17:47

## 2019-09-22 ASSESSMENT — PAIN DESCRIPTION - LOCATION: LOCATION: ABDOMEN

## 2019-09-22 ASSESSMENT — PAIN DESCRIPTION - FREQUENCY: FREQUENCY: INTERMITTENT

## 2019-09-22 ASSESSMENT — PAIN DESCRIPTION - DESCRIPTORS: DESCRIPTORS: CRAMPING

## 2019-09-22 ASSESSMENT — PAIN DESCRIPTION - ONSET: ONSET: ON-GOING

## 2019-09-22 ASSESSMENT — PAIN SCALES - GENERAL
PAINLEVEL_OUTOF10: 5
PAINLEVEL_OUTOF10: 0
PAINLEVEL_OUTOF10: 3

## 2019-09-22 ASSESSMENT — PAIN DESCRIPTION - PAIN TYPE: TYPE: ACUTE PAIN

## 2019-09-22 ASSESSMENT — PAIN DESCRIPTION - PROGRESSION: CLINICAL_PROGRESSION: NOT CHANGED

## 2019-09-22 ASSESSMENT — PAIN - FUNCTIONAL ASSESSMENT: PAIN_FUNCTIONAL_ASSESSMENT: ACTIVITIES ARE NOT PREVENTED

## 2019-09-22 NOTE — PROGRESS NOTES
effort. Clear to auscultation, bilaterally without Rales/Wheezes/Rhonchi. Cardiovascular: Regular rate and rhythm with normal S1/S2 without murmurs, rubs or gallops. Abdomen: Soft, non-tender, non-distended with normal bowel sounds. Musculoskeletal: No clubbing, cyanosis or edema bilaterally. Full range of motion without deformity. Skin: Skin color, texture, turgor normal.  No rashes or lesions. Neurologic:  Neurovascularly intact without any focal sensory/motor deficits. Cranial nerves: II-XII intact, grossly non-focal.  Psychiatric: Alert and oriented, thought content appropriate, normal insight  Capillary Refill: Brisk,< 3 seconds   Peripheral Pulses: +2 palpable, equal bilaterally       Labs:   Recent Labs     09/22/19  1000   WBC 9.0   HGB 12.3   HCT 39.9        Recent Labs     09/20/19  0734 09/21/19  0729 09/22/19  1000    139 139   K 3.2* 3.5 4.0   CL 91* 92* 93*   CO2 37* 36* 34*   BUN 42* 40* 35*   CREATININE 1.3* 1.3* 1.4*   CALCIUM 9.7 9.8 9.9     Urinalysis:      Lab Results   Component Value Date    NITRU Negative 09/17/2019    WBCUA 3-5 09/17/2019    BACTERIA 2+ 09/17/2019    RBCUA 0-2 09/17/2019    BLOODU LARGE 09/17/2019    SPECGRAV 1.010 09/17/2019    GLUCOSEU Negative 09/17/2019       Radiology:  XR CHEST PORTABLE   Final Result   Chronic interstitial changes, similar to prior. Sequela of granulomatous   disease. Indeterminate left suprahilar nodule is unchanged. XR CHEST STANDARD (2 VW)   Final Result   Cardiomegaly and chronic pulmonary change without acute cardiopulmonary   process. Stable left suprahilar nodular density.              Assessment/Plan:    Active Hospital Problems    Diagnosis    Pulmonary nodule, left [R91.1]     Priority: High     Class: Acute    Acute parotitis [K11.21]    Acute on chronic respiratory failure with hypercapnia (HCC) [J96.22]    UTI (urinary tract infection) [N39.0]    Chronic respiratory failure with hypoxia (HCC)

## 2019-09-22 NOTE — PLAN OF CARE
Problem: Nutritional:  Goal: Maintenance of adequate nutrition will improve  Description  Maintenance of adequate nutrition will improve  Outcome: Ongoing   Pt with orders for ACHS, SSI, and Lantus. Instructed importance of following carb control diet to manage stable glucose levels. Will continue to monitor.

## 2019-09-23 LAB
ANION GAP SERPL CALCULATED.3IONS-SCNC: 11 MMOL/L (ref 3–16)
BUN BLDV-MCNC: 34 MG/DL (ref 7–20)
CALCIUM SERPL-MCNC: 10.2 MG/DL (ref 8.3–10.6)
CHLORIDE BLD-SCNC: 94 MMOL/L (ref 99–110)
CO2: 33 MMOL/L (ref 21–32)
CREAT SERPL-MCNC: 1.4 MG/DL (ref 0.6–1.2)
GFR AFRICAN AMERICAN: 45
GFR NON-AFRICAN AMERICAN: 37
GLUCOSE BLD-MCNC: 132 MG/DL (ref 70–99)
GLUCOSE BLD-MCNC: 163 MG/DL (ref 70–99)
GLUCOSE BLD-MCNC: 176 MG/DL (ref 70–99)
GLUCOSE BLD-MCNC: 207 MG/DL (ref 70–99)
GLUCOSE BLD-MCNC: 220 MG/DL (ref 70–99)
PERFORMED ON: ABNORMAL
POTASSIUM REFLEX MAGNESIUM: 3.7 MMOL/L (ref 3.5–5.1)
SODIUM BLD-SCNC: 138 MMOL/L (ref 136–145)

## 2019-09-23 PROCEDURE — 94640 AIRWAY INHALATION TREATMENT: CPT

## 2019-09-23 PROCEDURE — 6360000002 HC RX W HCPCS: Performed by: NURSE PRACTITIONER

## 2019-09-23 PROCEDURE — 6360000002 HC RX W HCPCS: Performed by: REGISTERED NURSE

## 2019-09-23 PROCEDURE — 1200000000 HC SEMI PRIVATE

## 2019-09-23 PROCEDURE — 6370000000 HC RX 637 (ALT 250 FOR IP): Performed by: NURSE PRACTITIONER

## 2019-09-23 PROCEDURE — 97110 THERAPEUTIC EXERCISES: CPT

## 2019-09-23 PROCEDURE — 94761 N-INVAS EAR/PLS OXIMETRY MLT: CPT

## 2019-09-23 PROCEDURE — 6370000000 HC RX 637 (ALT 250 FOR IP): Performed by: INTERNAL MEDICINE

## 2019-09-23 PROCEDURE — 6370000000 HC RX 637 (ALT 250 FOR IP): Performed by: REGISTERED NURSE

## 2019-09-23 PROCEDURE — 80048 BASIC METABOLIC PNL TOTAL CA: CPT

## 2019-09-23 PROCEDURE — 2580000003 HC RX 258: Performed by: INTERNAL MEDICINE

## 2019-09-23 PROCEDURE — 36415 COLL VENOUS BLD VENIPUNCTURE: CPT

## 2019-09-23 PROCEDURE — 2580000003 HC RX 258: Performed by: REGISTERED NURSE

## 2019-09-23 PROCEDURE — 2700000000 HC OXYGEN THERAPY PER DAY

## 2019-09-23 PROCEDURE — 6360000002 HC RX W HCPCS: Performed by: INTERNAL MEDICINE

## 2019-09-23 PROCEDURE — 99232 SBSQ HOSP IP/OBS MODERATE 35: CPT | Performed by: NURSE PRACTITIONER

## 2019-09-23 PROCEDURE — 97535 SELF CARE MNGMENT TRAINING: CPT

## 2019-09-23 RX ORDER — TORSEMIDE 20 MG/1
60 TABLET ORAL DAILY
Status: DISCONTINUED | OUTPATIENT
Start: 2019-09-23 | End: 2019-09-24 | Stop reason: HOSPADM

## 2019-09-23 RX ADMIN — FUROSEMIDE 60 MG: 10 INJECTION, SOLUTION INTRAMUSCULAR; INTRAVENOUS at 09:30

## 2019-09-23 RX ADMIN — POTASSIUM CHLORIDE 40 MEQ: 20 TABLET, EXTENDED RELEASE ORAL at 09:29

## 2019-09-23 RX ADMIN — LINEZOLID 600 MG: 600 INJECTION, SOLUTION INTRAVENOUS at 01:40

## 2019-09-23 RX ADMIN — BUSPIRONE HYDROCHLORIDE 10 MG: 5 TABLET ORAL at 21:22

## 2019-09-23 RX ADMIN — IPRATROPIUM BROMIDE AND ALBUTEROL SULFATE 3 ML: .5; 3 SOLUTION RESPIRATORY (INHALATION) at 11:15

## 2019-09-23 RX ADMIN — SPIRONOLACTONE 25 MG: 25 TABLET ORAL at 09:29

## 2019-09-23 RX ADMIN — FLUCONAZOLE 100 MG: 100 TABLET ORAL at 09:29

## 2019-09-23 RX ADMIN — INSULIN LISPRO 1 UNITS: 100 INJECTION, SOLUTION INTRAVENOUS; SUBCUTANEOUS at 08:08

## 2019-09-23 RX ADMIN — LEVOTHYROXINE SODIUM 88 MCG: 0.09 TABLET ORAL at 05:14

## 2019-09-23 RX ADMIN — PIPERACILLIN SODIUM,TAZOBACTAM SODIUM 3.38 G: 3; .375 INJECTION, POWDER, FOR SOLUTION INTRAVENOUS at 21:22

## 2019-09-23 RX ADMIN — OXYBUTYNIN CHLORIDE 10 MG: 5 TABLET, EXTENDED RELEASE ORAL at 21:22

## 2019-09-23 RX ADMIN — INSULIN LISPRO 2 UNITS: 100 INJECTION, SOLUTION INTRAVENOUS; SUBCUTANEOUS at 17:26

## 2019-09-23 RX ADMIN — BUSPIRONE HYDROCHLORIDE 10 MG: 5 TABLET ORAL at 09:29

## 2019-09-23 RX ADMIN — Medication 10 ML: at 21:23

## 2019-09-23 RX ADMIN — ENOXAPARIN SODIUM 40 MG: 40 INJECTION SUBCUTANEOUS at 09:29

## 2019-09-23 RX ADMIN — Medication 10 ML: at 09:28

## 2019-09-23 RX ADMIN — IPRATROPIUM BROMIDE AND ALBUTEROL SULFATE 3 ML: .5; 3 SOLUTION RESPIRATORY (INHALATION) at 07:25

## 2019-09-23 RX ADMIN — IPRATROPIUM BROMIDE AND ALBUTEROL SULFATE 3 ML: .5; 3 SOLUTION RESPIRATORY (INHALATION) at 19:24

## 2019-09-23 RX ADMIN — TORSEMIDE 60 MG: 20 TABLET ORAL at 18:44

## 2019-09-23 RX ADMIN — BUSPIRONE HYDROCHLORIDE 10 MG: 5 TABLET ORAL at 13:36

## 2019-09-23 RX ADMIN — Medication 1 LOZENGE: at 09:34

## 2019-09-23 RX ADMIN — IPRATROPIUM BROMIDE AND ALBUTEROL SULFATE 3 ML: .5; 3 SOLUTION RESPIRATORY (INHALATION) at 15:33

## 2019-09-23 RX ADMIN — INSULIN LISPRO 1 UNITS: 100 INJECTION, SOLUTION INTRAVENOUS; SUBCUTANEOUS at 21:27

## 2019-09-23 RX ADMIN — MONTELUKAST SODIUM 10 MG: 10 TABLET, FILM COATED ORAL at 21:22

## 2019-09-23 RX ADMIN — FLUTICASONE PROPIONATE 2 SPRAY: 50 SPRAY, METERED NASAL at 09:28

## 2019-09-23 RX ADMIN — PIPERACILLIN SODIUM,TAZOBACTAM SODIUM 3.38 G: 3; .375 INJECTION, POWDER, FOR SOLUTION INTRAVENOUS at 05:14

## 2019-09-23 RX ADMIN — LINEZOLID 600 MG: 600 INJECTION, SOLUTION INTRAVENOUS at 17:25

## 2019-09-23 RX ADMIN — PIPERACILLIN SODIUM,TAZOBACTAM SODIUM 3.38 G: 3; .375 INJECTION, POWDER, FOR SOLUTION INTRAVENOUS at 13:38

## 2019-09-23 ASSESSMENT — PAIN DESCRIPTION - ORIENTATION: ORIENTATION: RIGHT

## 2019-09-23 ASSESSMENT — PAIN SCALES - GENERAL: PAINLEVEL_OUTOF10: 9

## 2019-09-23 ASSESSMENT — PAIN DESCRIPTION - LOCATION: LOCATION: SHOULDER

## 2019-09-23 ASSESSMENT — PAIN DESCRIPTION - PAIN TYPE: TYPE: ACUTE PAIN

## 2019-09-23 NOTE — PROGRESS NOTES
nodule, left [R91.1]     Priority: High     Class: Acute    Acute parotitis [K11.21]    Acute on chronic respiratory failure with hypercapnia ([facility]) [J96.22]    UTI (urinary tract infection) [N39.0]    Chronic respiratory failure with hypoxia ([facility]) [J96.11]    Acute on chronic diastolic CHF (congestive heart failure) ([facility]) [I50.33]    Suspected sleep apnea [R29.818]    Morbid obesity with BMI of 50.0-59.9, adult ([facility]) [E66.01, Z68.43]    Pul[facility] HTN ([facility] [I27.20]    Morbid obesity ([facility]) [E66.01]    Interstitial lung disease ([facility]) [J84.9]    JAQUELIN (acute kidney injury) ([facility] [N17.9]    Diabetes mellitus ([facility] [E11.9]    Hyperlipidemia [E78.5]    Hypertension [I10]    Hypothyroidism [E03.9]    COPD (chronic obstructive pulmonary disease) ([facility]) [J44.9]     CHF - Acute on chronic diastolic heart failure w/ last echo 4/19 with EF 81-34%, grade 1 diastolic dysfunction, pulmonary HTN.   Elevated BNP on admissin but no overt pulmonary edema on admission CXR. IV Lasix in ED - continued. Follow I/Os and serial labs. Cardiology consulted/following. Aldactone added per Cards. x1 dose of diamox given per Pulm for contraction alkalosis on 9/19. Weight 324 -> 305 lbs.     DM2 - controlled on home oral antiGlycemics - held. Followed FSBS/SSI low regimen.     Hypokalemia - secondary to diuresis. Monitored and repleted as needed.      Chronic Respiratory Failure - w/ hypoxia/hypercarbia, 2nd to COPD/ILD. Supplemental O2 and wean as tolerated. Continue home Singulair/HHN. Pt is refusing BiPAP therapy.     UTI - admission U/A c/w acute cystitis. Recurrent, Completed Rocephin 11-17 August w/ Cx results - demonstrating >100K Enterococcus and E Coli sensitive to ABX. Resumed Rocephin 13 Sept. No culture was sent, repeat UA was unremarkable. Stopped rocephin.     Acute left-sided parotitis, possibly bacterial (clinically improving):  - Started on empiric abx including linezolid and zosyn on 9/21. Monitor response.  Likely can

## 2019-09-23 NOTE — PROGRESS NOTES
mellitus ([facility] [address]    Hyperlipidemia    Hypertension    Hypothyroidism    JAQUELIN (acute kidney injury) ([facility] [address]    Morbid obesity ([facility] [address]    Inter[facility] disease ([facility]    Morbid obesity with BMI of 50.0-59.9, adult ([facility] [address]    Pulmonary HTN ([facility]    Suspected sleep apnea    Acute on chronic diastolic CHF (congestive heart failure) (HCC)    Chronic respiratory failure with hypoxia (HCC)    UTI (urinary tract infection)    Acute on chronic respiratory failure with hypercapnia (HCC)    Acute parotitis  Resolved Problems:    * No resolved hospital problems.  *      Assessment/Plan:  ~ acute on chronic diastolic heart failure with chronic right heart failure  ~Chronic cor pulmonale   ~Moderate pulmonary hypertension  ~COPD  ~ chronic hypoxemic respiratory failure  ~HTN  ~Acute parotitis       Plan:  Net negative 13L this admission  D/c IV lasix and change to PO torsemide 60mg daily   Continue Spironolactone (aldactone)  Daily weights, daily BMP, repeat BNP tomorrow    Dispo: tomorrow to [facility]      iKm Griffin CNP, 9/23/2019, 10:02 AM

## 2019-09-24 VITALS
DIASTOLIC BLOOD PRESSURE: 78 MMHG | OXYGEN SATURATION: 94 % | WEIGHT: 293 LBS | HEART RATE: 83 BPM | BODY MASS INDEX: 53.92 KG/M2 | RESPIRATION RATE: 16 BRPM | SYSTOLIC BLOOD PRESSURE: 110 MMHG | HEIGHT: 62 IN | TEMPERATURE: 97.8 F

## 2019-09-24 LAB
ANION GAP SERPL CALCULATED.3IONS-SCNC: 11 MMOL/L (ref 3–16)
BUN BLDV-MCNC: 33 MG/DL (ref 7–20)
CALCIUM SERPL-MCNC: 10.8 MG/DL (ref 8.3–10.6)
CHLORIDE BLD-SCNC: 94 MMOL/L (ref 99–110)
CO2: 33 MMOL/L (ref 21–32)
CREAT SERPL-MCNC: 1.5 MG/DL (ref 0.6–1.2)
GFR AFRICAN AMERICAN: 41
GFR NON-AFRICAN AMERICAN: 34
GLUCOSE BLD-MCNC: 118 MG/DL (ref 70–99)
GLUCOSE BLD-MCNC: 150 MG/DL (ref 70–99)
GLUCOSE BLD-MCNC: 166 MG/DL (ref 70–99)
PERFORMED ON: ABNORMAL
PERFORMED ON: ABNORMAL
POTASSIUM REFLEX MAGNESIUM: 3.9 MMOL/L (ref 3.5–5.1)
PRO-BNP: 3598 PG/ML (ref 0–124)
SODIUM BLD-SCNC: 138 MMOL/L (ref 136–145)

## 2019-09-24 PROCEDURE — 6360000002 HC RX W HCPCS: Performed by: REGISTERED NURSE

## 2019-09-24 PROCEDURE — 6370000000 HC RX 637 (ALT 250 FOR IP): Performed by: INTERNAL MEDICINE

## 2019-09-24 PROCEDURE — 94761 N-INVAS EAR/PLS OXIMETRY MLT: CPT

## 2019-09-24 PROCEDURE — 94640 AIRWAY INHALATION TREATMENT: CPT

## 2019-09-24 PROCEDURE — 6360000002 HC RX W HCPCS: Performed by: INTERNAL MEDICINE

## 2019-09-24 PROCEDURE — 80048 BASIC METABOLIC PNL TOTAL CA: CPT

## 2019-09-24 PROCEDURE — 2580000003 HC RX 258: Performed by: REGISTERED NURSE

## 2019-09-24 PROCEDURE — 2700000000 HC OXYGEN THERAPY PER DAY

## 2019-09-24 PROCEDURE — 83880 ASSAY OF NATRIURETIC PEPTIDE: CPT

## 2019-09-24 PROCEDURE — 36415 COLL VENOUS BLD VENIPUNCTURE: CPT

## 2019-09-24 PROCEDURE — 6370000000 HC RX 637 (ALT 250 FOR IP): Performed by: NURSE PRACTITIONER

## 2019-09-24 PROCEDURE — 99232 SBSQ HOSP IP/OBS MODERATE 35: CPT | Performed by: NURSE PRACTITIONER

## 2019-09-24 PROCEDURE — 6370000000 HC RX 637 (ALT 250 FOR IP): Performed by: REGISTERED NURSE

## 2019-09-24 PROCEDURE — 2580000003 HC RX 258: Performed by: INTERNAL MEDICINE

## 2019-09-24 RX ORDER — LINEZOLID 600 MG/1
600 TABLET, FILM COATED ORAL EVERY 12 HOURS SCHEDULED
Qty: 10 TABLET | Refills: 0 | Status: ON HOLD
Start: 2019-09-24 | End: 2019-09-30 | Stop reason: HOSPADM

## 2019-09-24 RX ORDER — BENZONATATE 100 MG/1
100 CAPSULE ORAL 3 TIMES DAILY PRN
Qty: 21 CAPSULE | Refills: 0
Start: 2019-09-24 | End: 2019-10-01

## 2019-09-24 RX ORDER — AMOXICILLIN AND CLAVULANATE POTASSIUM 875; 125 MG/1; MG/1
1 TABLET, FILM COATED ORAL EVERY 12 HOURS SCHEDULED
Status: DISCONTINUED | OUTPATIENT
Start: 2019-09-24 | End: 2019-09-24 | Stop reason: HOSPADM

## 2019-09-24 RX ORDER — AMOXICILLIN AND CLAVULANATE POTASSIUM 875; 125 MG/1; MG/1
1 TABLET, FILM COATED ORAL EVERY 12 HOURS SCHEDULED
Qty: 10 TABLET | Refills: 0 | Status: ON HOLD
Start: 2019-09-24 | End: 2019-09-30 | Stop reason: HOSPADM

## 2019-09-24 RX ORDER — SPIRONOLACTONE 25 MG/1
25 TABLET ORAL DAILY
Qty: 30 TABLET | Refills: 3
Start: 2019-09-25

## 2019-09-24 RX ORDER — TORSEMIDE 20 MG/1
60 TABLET ORAL DAILY
Qty: 60 TABLET | Refills: 1
Start: 2019-09-25

## 2019-09-24 RX ORDER — LINEZOLID 600 MG/1
600 TABLET, FILM COATED ORAL EVERY 12 HOURS SCHEDULED
Status: DISCONTINUED | OUTPATIENT
Start: 2019-09-24 | End: 2019-09-24 | Stop reason: HOSPADM

## 2019-09-24 RX ORDER — AMOXICILLIN AND CLAVULANATE POTASSIUM 875; 125 MG/1; MG/1
1 TABLET, FILM COATED ORAL EVERY 12 HOURS SCHEDULED
Qty: 20 TABLET | Refills: 0 | Status: CANCELLED | OUTPATIENT
Start: 2019-09-25 | End: 2019-10-05

## 2019-09-24 RX ORDER — AMOXICILLIN AND CLAVULANATE POTASSIUM 875; 125 MG/1; MG/1
1 TABLET, FILM COATED ORAL EVERY 12 HOURS SCHEDULED
Status: DISCONTINUED | OUTPATIENT
Start: 2019-09-25 | End: 2019-09-24

## 2019-09-24 RX ADMIN — LEVOTHYROXINE SODIUM 88 MCG: 0.09 TABLET ORAL at 06:27

## 2019-09-24 RX ADMIN — LINEZOLID 600 MG: 600 TABLET, FILM COATED ORAL at 15:50

## 2019-09-24 RX ADMIN — IPRATROPIUM BROMIDE AND ALBUTEROL SULFATE 3 ML: .5; 3 SOLUTION RESPIRATORY (INHALATION) at 15:51

## 2019-09-24 RX ADMIN — ENOXAPARIN SODIUM 40 MG: 40 INJECTION SUBCUTANEOUS at 08:39

## 2019-09-24 RX ADMIN — Medication 1 LOZENGE: at 08:43

## 2019-09-24 RX ADMIN — TORSEMIDE 60 MG: 20 TABLET ORAL at 08:39

## 2019-09-24 RX ADMIN — POTASSIUM CHLORIDE 40 MEQ: 20 TABLET, EXTENDED RELEASE ORAL at 08:39

## 2019-09-24 RX ADMIN — LINEZOLID 600 MG: 600 INJECTION, SOLUTION INTRAVENOUS at 01:40

## 2019-09-24 RX ADMIN — AMOXICILLIN AND CLAVULANATE POTASSIUM 1 TABLET: 875; 125 TABLET, FILM COATED ORAL at 15:50

## 2019-09-24 RX ADMIN — LINEZOLID 600 MG: 600 INJECTION, SOLUTION INTRAVENOUS at 12:18

## 2019-09-24 RX ADMIN — IPRATROPIUM BROMIDE AND ALBUTEROL SULFATE 3 ML: .5; 3 SOLUTION RESPIRATORY (INHALATION) at 08:33

## 2019-09-24 RX ADMIN — SPIRONOLACTONE 25 MG: 25 TABLET ORAL at 08:39

## 2019-09-24 RX ADMIN — FLUCONAZOLE 100 MG: 100 TABLET ORAL at 08:39

## 2019-09-24 RX ADMIN — BUSPIRONE HYDROCHLORIDE 10 MG: 5 TABLET ORAL at 14:34

## 2019-09-24 RX ADMIN — INSULIN LISPRO 1 UNITS: 100 INJECTION, SOLUTION INTRAVENOUS; SUBCUTANEOUS at 08:44

## 2019-09-24 RX ADMIN — FLUTICASONE PROPIONATE 2 SPRAY: 50 SPRAY, METERED NASAL at 08:43

## 2019-09-24 RX ADMIN — Medication 10 ML: at 08:40

## 2019-09-24 RX ADMIN — Medication 1 LOZENGE: at 14:34

## 2019-09-24 RX ADMIN — BUSPIRONE HYDROCHLORIDE 10 MG: 5 TABLET ORAL at 08:39

## 2019-09-24 RX ADMIN — PIPERACILLIN SODIUM,TAZOBACTAM SODIUM 3.38 G: 3; .375 INJECTION, POWDER, FOR SOLUTION INTRAVENOUS at 06:27

## 2019-09-24 NOTE — DISCHARGE SUMMARY
infection:  -given diflucan.     Physical Exam Performed:     /78   Pulse 83   Temp 97.8 °F (36.6 °C) (Oral)   Resp 16   Ht 5' 2\" (1.575 m)   Wt (!) 302 lb 7.5 oz (137.2 kg)   SpO2 94%   BMI 55.32 kg/m²      General appearance: No apparent distress, appears stated age and cooperative. obese  HEENT: Pupils equal, round, and reactive to light. Conjunctivae/corneas clear. Neck: Supple, with full range of motion. No jugular venous distention. Trachea midline. Respiratory:  Normal respiratory effort. Clear to auscultation, bilaterally without Rales/Wheezes/Rhonchi. Cardiovascular: Regular rate and rhythm with normal S1/S2 without murmurs, rubs or gallops. Abdomen: Soft, non-tender, non-distended with normal bowel sounds. Musculoskeletal: No clubbing, cyanosis or edema bilaterally.  Full range of motion without deformity. Skin: Skin color, texture, turgor normal.  No rashes or lesions. Neurologic:  Neurovascularly intact without any focal sensory/motor deficits. Cranial nerves: II-XII intact, grossly non-focal.  Psychiatric: Alert and oriented, thought content appropriate, normal insight  Capillary Refill: Brisk,< 3 seconds   Peripheral Pulses: +2 palpable, equal bilaterally        Labs: For convenience and continuity at follow-up the following most recent labs are provided:      CBC:    Lab Results   Component Value Date    WBC 8.3 09/26/2019    HGB 13.1 09/26/2019    HCT 42.0 09/26/2019     09/26/2019       Renal:    Lab Results   Component Value Date     09/27/2019    K 4.4 09/27/2019    K 3.9 09/24/2019    CL 92 09/27/2019    CO2 32 09/27/2019    BUN 35 09/27/2019    CREATININE 1.4 09/27/2019    CALCIUM 10.8 09/27/2019    PHOS 3.6 09/17/2019         Significant Diagnostic Studies    Radiology:   XR CHEST PORTABLE   Final Result   Chronic interstitial changes, similar to prior. Sequela of granulomatous   disease. Indeterminate left suprahilar nodule is unchanged.          XR CHEST STANDARD (2 VW)   Final Result   Cardiomegaly and chronic pulmonary change without acute cardiopulmonary   process. Stable left suprahilar nodular density.                 Consults:     IP CONSULT TO HOSPITALIST  IP CONSULT TO CARDIOLOGY  IP CONSULT TO SPIRITUAL SERVICES  IP CONSULT TO PULMONOLOGY    Disposition:  ECF     Condition at Discharge: Stable    Discharge Instructions/Follow-up:  Cards as outpt    Code Status:  Full Code     Activity: activity as tolerated    Diet: cardiac diet and diabetic diet      Discharge Medications:     Discharge Medication List as of 9/24/2019  3:40 PM           Details   benzonatate (TESSALON) 100 MG capsule Take 1 capsule by mouth 3 times daily as needed for Cough, Disp-21 capsule, R-0NO PRINT      torsemide (DEMADEX) 20 MG tablet Take 3 tablets by mouth daily, Disp-60 tablet, R-1NO PRINT      spironolactone (ALDACTONE) 25 MG tablet Take 1 tablet by mouth daily, Disp-30 tablet, R-3NO PRINT      linezolid (ZYVOX) 600 MG tablet Take 1 tablet by mouth every 12 hours for 5 days, Disp-10 tablet, R-0NO PRINT      amoxicillin-clavulanate (AUGMENTIN) 875-125 MG per tablet Take 1 tablet by mouth every 12 hours for 5 days, Disp-10 tablet, R-0NO PRINT              Details   metFORMIN (GLUCOPHAGE) 500 MG tablet Take 1 tablet by mouth 2 times daily (with meals) Hold if Creatinine > 1.4, Disp-60 tablet, R-3NO PRINT              Details   guaiFENesin 400 MG tablet Take 400 mg by mouth 2 times daily as needed for CoughHistorical Med      budesonide-formoterol (SYMBICORT) 160-4.5 MCG/ACT AERO Inhale 2 puffs into the lungs 2 times dailyHistorical Med      busPIRone (BUSPAR) 10 MG tablet Take 10 mg by mouth 3 times dailyHistorical Med      ipratropium-albuterol (DUONEB) 0.5-2.5 (3) MG/3ML SOLN nebulizer solution Inhale 3 mLs into the lungs every 4 hours (while awake), Disp-360 mL, R-0Normal      magnesium oxide (MAG-OX) 400 (241.3 Mg) MG TABS tablet Take 1 tablet by mouth 4 times daily, Disp-14

## 2019-09-24 NOTE — PROGRESS NOTES
Shift change report received from GILSON Valverde at bedside. Patient alert and oriented. Bed in lowest position with wheels locked. Call light within reach.

## 2019-09-24 NOTE — PLAN OF CARE
Problem: Falls - Risk of:  Goal: Will remain free from falls  Description  Will remain free from falls  Outcome: Ongoing  Note:   Pt high fall risk. Instructed to use call light before getting out of bed. Call light within reach. Bed in low position. Bed alarm on. Patient free from falls and near-misses this shift. Will continue to monitor patient. Problem: Risk for Impaired Skin Integrity  Goal: Tissue integrity - skin and mucous membranes  Description  Structural intactness and normal physiological function of skin and  mucous membranes. Outcome: Ongoing  Note:   Patient's skin assessed. See flowsheet. Patient turned in bed. Pressure ulcer prevention in place. No issues with skin integrity this shift. Will continue to monitor. Problem: Pain:  Goal: Pain level will decrease  Description  Pain level will decrease  Outcome: Ongoing  Note:   Patient's pain level controlled at this time. Will continue to monitor. Problem: Metabolic:  Goal: Ability to maintain appropriate glucose levels will improve  Description  Ability to maintain appropriate glucose levels will improve  Outcome: Ongoing  Note:   Pt at risk for elevated blood glucose levels R/T DM. Pt will have glucose levels monitored prior to breakfast, lunch, dinner, and bedtime. Elevated levels will be treated via sliding scale instructions. Pt understands the need to monitor levels and has no further complaints at this time.

## 2019-09-24 NOTE — PROGRESS NOTES
lispro  0-3 Units Subcutaneous Nightly      dextrose         Lab Data:  CBC:   Recent Labs     09/22/19  1000   WBC 9.0   HGB 12.3        BMP:    Recent Labs     09/22/19  1000 09/23/19  0723 09/24/19  0711    138 138   K 4.0 3.7 3.9   CO2 34* 33* 33*   BUN 35* 34* 33*   CREATININE 1.4* 1.4* 1.5*     INR:  No results for input(s): INR in the last 72 hours. BNP:  No results for input(s): PROBNP in the last 72 hours. Lab Results   Component Value Date    LVEF 58 04/16/2019       Echo 4/16/2019:  Normal left ventricular systolic function with ejection fraction of 55-60%. Septal bounce noted due to right ventricular volume overload. Mild concentric left ventricular hypertrophy. Grade I diastolic dysfunction with normal filling pressure. Mild mitral and tricuspid regurgitation. The right ventricle is moderately enlarged. Right ventricular systolic function is moderately reduced . S' prime velocity is measured at 7 cm/s. Systolic pulmonic artery pressure (SPAP) is estimated at 55 mmHg consistent with mild pulmonary hypertension (Right atrial pressure of 15 mmHg). The right atrium is moderately dilated.          Weights:    09/24/19 0500  302 lb 7.5 oz (137.2 kg)  Bed scale     09/23/19 0623  305 lb 1.9 oz (138.4 kg)  Bed scale     09/22/19 0643  307 lb 5.1 oz (139.4 kg)  Bed scale     09/21/19 0546  309 lb 4.9 oz (140.3 kg)  Bed scale     09/20/19 0542  309 lb 8.4 oz (140.4 kg)  Bed scale     09/19/19 0628  315 lb 11.2 oz (143.2 kg)  Bed scale     09/18/19 0536  313 lb 4.4 oz (142.1 kg)  Bed scale     09/17/19 1547  317 lb 7.4 oz (144 kg)  --     09/17/19 0402  321 lb 14 oz (146 kg)  Bed scale     09/16/19 0526  324 lb 4.8 oz (147.1 kg)  Bed scale     09/14/19 0504  324 lb 11.8 oz (147.3 kg)  Bed scale     09/13/19 2115  324 lb 1.2 oz (147 kg)  Bed scale     09/13/19 1531  304 lb (137.9 kg)  Stated               Active Problems:    Pulmonary nodule, left    COPD (chronic obstructive pulmonary disease) ([facility]    Diabetes mellitus ([facility]    Hyperlipidemia    Hypertension    Hypothyroidism    JAQUELIN (acute kidney injury) ([facility]    Morbid obesity ([facility]    Interstitial lung disease ([facility]    Morbid obesity with BMI of 50.0-59.9, adult ([facility]    Pulmonary HTN ([facility]    Suspected sleep apnea    Acute on chronic diastolic CHF (congestive heart failure) (HCC)    Chronic respiratory failure with hypoxia (HCC)    UTI (urinary tract infection)    Acute on chronic respiratory failure with hypercapnia (HCC)    Acute parotitis  Resolved Problems:    * No resolved hospital problems. *      Assessment/Plan:  ~ acute on chronic diastolic heart failure with chronic right heart failure  ~Chronic cor pulmonale   ~Moderate pulmonary hypertension  ~COPD  ~ chronic hypoxemic respiratory failure  ~HTN  ~Acute parotitis       Plan:  Net negative 16L this admission  Torsemide 60mg daily   Continue Spironolactone (aldactone) 25mg daily   Daily weights, daily BMP, repeat BNP tomorrow    Dispo:okay for discharge.  Repeat BMP in 1 week       Governor Littler, CNP, 9/24/2019, 9:50 AM

## 2019-09-24 NOTE — CARE COORDINATION
CASE MANAGEMENT DISCHARGE SUMMARY      Discharge to: [facility], [address] [facility]. Precertification completed: N/A  Hospital Exemption Notification (HENS) completed: N/A    New Durable Medical Equipment ordered/agency: None    Transportation:    Family/car:Squad   Medical Transport explained to pt/family. Pt/family voice no agency preference. Pt voiced no preference  [address] up time:1600   Ambulance form completed: Yes    Notified: Pt at bedside. Family: Pt to call   Facility/Agency: MARGARETH/AVS faxed to [facility]   RN: [address]   Phone number for report to facility: [phone]    Note: Discharging nurse to complete MARGARETH, reconcile AVS, and place final copy with patient's discharge packet. RN to ensure that written prescriptions for  Level II medications are sent with patient to the facility as per protocol.

## 2019-09-24 NOTE — PROGRESS NOTES
End of shift report given to Deny Huff RN at bedside. Patient alert and oriented. Bed in lowest position with wheels locked. Call light within reach. No further needs at this time.

## 2019-09-26 ENCOUNTER — APPOINTMENT (OUTPATIENT)
Dept: CT IMAGING | Age: 74
DRG: 291 | End: 2019-09-26
Payer: MEDICARE

## 2019-09-26 ENCOUNTER — APPOINTMENT (OUTPATIENT)
Dept: GENERAL RADIOLOGY | Age: 74
DRG: 291 | End: 2019-09-26
Payer: MEDICARE

## 2019-09-26 ENCOUNTER — HOSPITAL ENCOUNTER (INPATIENT)
Age: 74
LOS: 4 days | Discharge: OTHER INSTITUTION | DRG: 291 | End: 2019-09-30
Attending: EMERGENCY MEDICINE | Admitting: INTERNAL MEDICINE
Payer: MEDICARE

## 2019-09-26 DIAGNOSIS — I50.9 ACUTE ON CHRONIC CONGESTIVE HEART FAILURE, UNSPECIFIED HEART FAILURE TYPE (HCC): Primary | ICD-10-CM

## 2019-09-26 DIAGNOSIS — R07.9 CHEST PAIN, UNSPECIFIED TYPE: ICD-10-CM

## 2019-09-26 DIAGNOSIS — J18.9 PNEUMONIA DUE TO ORGANISM: ICD-10-CM

## 2019-09-26 PROBLEM — R77.8 ELEVATED TROPONIN: Status: ACTIVE | Noted: 2019-09-26

## 2019-09-26 LAB
A/G RATIO: 1.1 (ref 1.1–2.2)
ALBUMIN SERPL-MCNC: 3.7 G/DL (ref 3.4–5)
ALP BLD-CCNC: 88 U/L (ref 40–129)
ALT SERPL-CCNC: 53 U/L (ref 10–40)
AMORPHOUS: ABNORMAL /HPF
ANION GAP SERPL CALCULATED.3IONS-SCNC: 12 MMOL/L (ref 3–16)
AST SERPL-CCNC: 89 U/L (ref 15–37)
BACTERIA: ABNORMAL /HPF
BASOPHILS ABSOLUTE: 0.1 K/UL (ref 0–0.2)
BASOPHILS RELATIVE PERCENT: 0.8 %
BILIRUB SERPL-MCNC: 0.7 MG/DL (ref 0–1)
BILIRUBIN URINE: NEGATIVE
BLOOD, URINE: ABNORMAL
BUN BLDV-MCNC: 38 MG/DL (ref 7–20)
CALCIUM SERPL-MCNC: 11.3 MG/DL (ref 8.3–10.6)
CHLORIDE BLD-SCNC: 93 MMOL/L (ref 99–110)
CLARITY: CLEAR
CO2: 32 MMOL/L (ref 21–32)
COLOR: ABNORMAL
CREAT SERPL-MCNC: 1.6 MG/DL (ref 0.6–1.2)
EOSINOPHILS ABSOLUTE: 0 K/UL (ref 0–0.6)
EOSINOPHILS RELATIVE PERCENT: 0.1 %
EPITHELIAL CELLS, UA: ABNORMAL /HPF
GFR AFRICAN AMERICAN: 38
GFR NON-AFRICAN AMERICAN: 32
GLOBULIN: 3.5 G/DL
GLUCOSE BLD-MCNC: 133 MG/DL (ref 70–99)
GLUCOSE BLD-MCNC: 159 MG/DL (ref 70–99)
GLUCOSE URINE: NEGATIVE MG/DL
HCT VFR BLD CALC: 42 % (ref 36–48)
HEMOGLOBIN: 13.1 G/DL (ref 12–16)
KETONES, URINE: NEGATIVE MG/DL
LACTIC ACID, SEPSIS: 2.3 MMOL/L (ref 0.4–1.9)
LEUKOCYTE ESTERASE, URINE: NEGATIVE
LYMPHOCYTES ABSOLUTE: 1 K/UL (ref 1–5.1)
LYMPHOCYTES RELATIVE PERCENT: 12.4 %
MCH RBC QN AUTO: 24.9 PG (ref 26–34)
MCHC RBC AUTO-ENTMCNC: 31.2 G/DL (ref 31–36)
MCV RBC AUTO: 79.8 FL (ref 80–100)
MICROSCOPIC EXAMINATION: YES
MONOCYTES ABSOLUTE: 0.8 K/UL (ref 0–1.3)
MONOCYTES RELATIVE PERCENT: 9.8 %
NEUTROPHILS ABSOLUTE: 6.4 K/UL (ref 1.7–7.7)
NEUTROPHILS RELATIVE PERCENT: 76.9 %
NITRITE, URINE: NEGATIVE
PDW BLD-RTO: 25 % (ref 12.4–15.4)
PERFORMED ON: ABNORMAL
PH UA: 7 (ref 5–8)
PLATELET # BLD: 211 K/UL (ref 135–450)
PMV BLD AUTO: 8.3 FL (ref 5–10.5)
POTASSIUM SERPL-SCNC: 4.6 MMOL/L (ref 3.5–5.1)
PRO-BNP: 4909 PG/ML (ref 0–124)
PROTEIN UA: 30 MG/DL
RBC # BLD: 5.26 M/UL (ref 4–5.2)
RBC UA: ABNORMAL /HPF (ref 0–2)
SODIUM BLD-SCNC: 137 MMOL/L (ref 136–145)
SPECIFIC GRAVITY UA: 1.01 (ref 1–1.03)
TOTAL PROTEIN: 7.2 G/DL (ref 6.4–8.2)
TROPONIN: 0.12 NG/ML
URINE REFLEX TO CULTURE: ABNORMAL
URINE TYPE: ABNORMAL
UROBILINOGEN, URINE: 1 E.U./DL
WBC # BLD: 8.3 K/UL (ref 4–11)
WBC UA: ABNORMAL /HPF (ref 0–5)

## 2019-09-26 PROCEDURE — 2580000003 HC RX 258: Performed by: EMERGENCY MEDICINE

## 2019-09-26 PROCEDURE — 93005 ELECTROCARDIOGRAM TRACING: CPT | Performed by: EMERGENCY MEDICINE

## 2019-09-26 PROCEDURE — 2060000000 HC ICU INTERMEDIATE R&B

## 2019-09-26 PROCEDURE — 80053 COMPREHEN METABOLIC PANEL: CPT

## 2019-09-26 PROCEDURE — 84484 ASSAY OF TROPONIN QUANT: CPT

## 2019-09-26 PROCEDURE — 87040 BLOOD CULTURE FOR BACTERIA: CPT

## 2019-09-26 PROCEDURE — 99285 EMERGENCY DEPT VISIT HI MDM: CPT

## 2019-09-26 PROCEDURE — 6360000002 HC RX W HCPCS: Performed by: EMERGENCY MEDICINE

## 2019-09-26 PROCEDURE — 81001 URINALYSIS AUTO W/SCOPE: CPT

## 2019-09-26 PROCEDURE — 71045 X-RAY EXAM CHEST 1 VIEW: CPT

## 2019-09-26 PROCEDURE — 96365 THER/PROPH/DIAG IV INF INIT: CPT

## 2019-09-26 PROCEDURE — 71250 CT THORAX DX C-: CPT

## 2019-09-26 PROCEDURE — 94761 N-INVAS EAR/PLS OXIMETRY MLT: CPT

## 2019-09-26 PROCEDURE — 96375 TX/PRO/DX INJ NEW DRUG ADDON: CPT

## 2019-09-26 PROCEDURE — 85025 COMPLETE CBC W/AUTO DIFF WBC: CPT

## 2019-09-26 PROCEDURE — 2700000000 HC OXYGEN THERAPY PER DAY

## 2019-09-26 PROCEDURE — 83880 ASSAY OF NATRIURETIC PEPTIDE: CPT

## 2019-09-26 PROCEDURE — 83605 ASSAY OF LACTIC ACID: CPT

## 2019-09-26 RX ORDER — SODIUM CHLORIDE 0.9 % (FLUSH) 0.9 %
10 SYRINGE (ML) INJECTION EVERY 12 HOURS SCHEDULED
Status: DISCONTINUED | OUTPATIENT
Start: 2019-09-26 | End: 2019-09-30 | Stop reason: HOSPADM

## 2019-09-26 RX ORDER — DEXTROSE MONOHYDRATE 25 G/50ML
12.5 INJECTION, SOLUTION INTRAVENOUS PRN
Status: DISCONTINUED | OUTPATIENT
Start: 2019-09-26 | End: 2019-09-30 | Stop reason: HOSPADM

## 2019-09-26 RX ORDER — 0.9 % SODIUM CHLORIDE 0.9 %
500 INTRAVENOUS SOLUTION INTRAVENOUS ONCE
Status: COMPLETED | OUTPATIENT
Start: 2019-09-26 | End: 2019-09-27

## 2019-09-26 RX ORDER — FUROSEMIDE 10 MG/ML
40 INJECTION INTRAMUSCULAR; INTRAVENOUS ONCE
Status: COMPLETED | OUTPATIENT
Start: 2019-09-26 | End: 2019-09-26

## 2019-09-26 RX ORDER — NICOTINE POLACRILEX 4 MG
15 LOZENGE BUCCAL PRN
Status: DISCONTINUED | OUTPATIENT
Start: 2019-09-26 | End: 2019-09-30 | Stop reason: HOSPADM

## 2019-09-26 RX ORDER — SODIUM CHLORIDE 0.9 % (FLUSH) 0.9 %
10 SYRINGE (ML) INJECTION PRN
Status: DISCONTINUED | OUTPATIENT
Start: 2019-09-26 | End: 2019-09-30 | Stop reason: HOSPADM

## 2019-09-26 RX ORDER — DEXTROSE MONOHYDRATE 50 MG/ML
100 INJECTION, SOLUTION INTRAVENOUS PRN
Status: DISCONTINUED | OUTPATIENT
Start: 2019-09-26 | End: 2019-09-30 | Stop reason: HOSPADM

## 2019-09-26 RX ORDER — ACETAMINOPHEN 325 MG/1
650 TABLET ORAL EVERY 4 HOURS PRN
Status: DISCONTINUED | OUTPATIENT
Start: 2019-09-26 | End: 2019-09-30 | Stop reason: HOSPADM

## 2019-09-26 RX ORDER — ONDANSETRON 2 MG/ML
4 INJECTION INTRAMUSCULAR; INTRAVENOUS EVERY 6 HOURS PRN
Status: DISCONTINUED | OUTPATIENT
Start: 2019-09-26 | End: 2019-09-30 | Stop reason: HOSPADM

## 2019-09-26 RX ADMIN — VANCOMYCIN HYDROCHLORIDE 2000 MG: 1 INJECTION, POWDER, LYOPHILIZED, FOR SOLUTION INTRAVENOUS at 19:16

## 2019-09-26 RX ADMIN — FUROSEMIDE 40 MG: 10 INJECTION, SOLUTION INTRAMUSCULAR; INTRAVENOUS at 16:47

## 2019-09-26 RX ADMIN — PIPERACILLIN SODIUM,TAZOBACTAM SODIUM 3.38 G: 3; .375 INJECTION, POWDER, FOR SOLUTION INTRAVENOUS at 16:47

## 2019-09-26 RX ADMIN — SODIUM CHLORIDE 500 ML: 9 INJECTION, SOLUTION INTRAVENOUS at 19:16

## 2019-09-26 ASSESSMENT — ENCOUNTER SYMPTOMS
ABDOMINAL PAIN: 0
NAUSEA: 1
SORE THROAT: 0
BACK PAIN: 0
SHORTNESS OF BREATH: 1
VOMITING: 0

## 2019-09-26 ASSESSMENT — PAIN SCALES - GENERAL
PAINLEVEL_OUTOF10: 0
PAINLEVEL_OUTOF10: 0
PAINLEVEL_OUTOF10: 10

## 2019-09-26 ASSESSMENT — PAIN DESCRIPTION - PAIN TYPE: TYPE: ACUTE PAIN

## 2019-09-27 LAB
ANION GAP SERPL CALCULATED.3IONS-SCNC: 12 MMOL/L (ref 3–16)
BUN BLDV-MCNC: 35 MG/DL (ref 7–20)
CALCIUM SERPL-MCNC: 10.8 MG/DL (ref 8.3–10.6)
CHLORIDE BLD-SCNC: 92 MMOL/L (ref 99–110)
CO2: 32 MMOL/L (ref 21–32)
CREAT SERPL-MCNC: 1.4 MG/DL (ref 0.6–1.2)
EKG ATRIAL RATE: 98 BPM
EKG DIAGNOSIS: NORMAL
EKG P AXIS: -29 DEGREES
EKG P-R INTERVAL: 162 MS
EKG Q-T INTERVAL: 370 MS
EKG QRS DURATION: 104 MS
EKG QTC CALCULATION (BAZETT): 472 MS
EKG R AXIS: 75 DEGREES
EKG T AXIS: -18 DEGREES
EKG VENTRICULAR RATE: 98 BPM
GFR AFRICAN AMERICAN: 45
GFR NON-AFRICAN AMERICAN: 37
GLUCOSE BLD-MCNC: 111 MG/DL (ref 70–99)
GLUCOSE BLD-MCNC: 132 MG/DL (ref 70–99)
GLUCOSE BLD-MCNC: 139 MG/DL (ref 70–99)
GLUCOSE BLD-MCNC: 192 MG/DL (ref 70–99)
GLUCOSE BLD-MCNC: 196 MG/DL (ref 70–99)
LACTIC ACID, SEPSIS: 2.2 MMOL/L (ref 0.4–1.9)
LACTIC ACID: 1.7 MMOL/L (ref 0.4–2)
PERFORMED ON: ABNORMAL
POTASSIUM SERPL-SCNC: 4.4 MMOL/L (ref 3.5–5.1)
SODIUM BLD-SCNC: 136 MMOL/L (ref 136–145)
TROPONIN: 0.12 NG/ML
TROPONIN: 0.13 NG/ML

## 2019-09-27 PROCEDURE — 36415 COLL VENOUS BLD VENIPUNCTURE: CPT

## 2019-09-27 PROCEDURE — 6370000000 HC RX 637 (ALT 250 FOR IP): Performed by: INTERNAL MEDICINE

## 2019-09-27 PROCEDURE — 94150 VITAL CAPACITY TEST: CPT

## 2019-09-27 PROCEDURE — 80048 BASIC METABOLIC PNL TOTAL CA: CPT

## 2019-09-27 PROCEDURE — 6370000000 HC RX 637 (ALT 250 FOR IP): Performed by: NURSE PRACTITIONER

## 2019-09-27 PROCEDURE — 2580000003 HC RX 258: Performed by: INTERNAL MEDICINE

## 2019-09-27 PROCEDURE — 2060000000 HC ICU INTERMEDIATE R&B

## 2019-09-27 PROCEDURE — 2700000000 HC OXYGEN THERAPY PER DAY

## 2019-09-27 PROCEDURE — 94640 AIRWAY INHALATION TREATMENT: CPT

## 2019-09-27 PROCEDURE — 6360000002 HC RX W HCPCS: Performed by: INTERNAL MEDICINE

## 2019-09-27 PROCEDURE — 83605 ASSAY OF LACTIC ACID: CPT

## 2019-09-27 PROCEDURE — 93010 ELECTROCARDIOGRAM REPORT: CPT | Performed by: INTERNAL MEDICINE

## 2019-09-27 PROCEDURE — 93005 ELECTROCARDIOGRAM TRACING: CPT | Performed by: INTERNAL MEDICINE

## 2019-09-27 PROCEDURE — 94761 N-INVAS EAR/PLS OXIMETRY MLT: CPT

## 2019-09-27 PROCEDURE — 84484 ASSAY OF TROPONIN QUANT: CPT

## 2019-09-27 PROCEDURE — 1200000000 HC SEMI PRIVATE

## 2019-09-27 RX ORDER — BUSPIRONE HYDROCHLORIDE 5 MG/1
10 TABLET ORAL 3 TIMES DAILY
Status: DISCONTINUED | OUTPATIENT
Start: 2019-09-27 | End: 2019-09-30 | Stop reason: HOSPADM

## 2019-09-27 RX ORDER — OXYBUTYNIN CHLORIDE 5 MG/1
10 TABLET, EXTENDED RELEASE ORAL NIGHTLY
Status: DISCONTINUED | OUTPATIENT
Start: 2019-09-27 | End: 2019-09-30 | Stop reason: HOSPADM

## 2019-09-27 RX ORDER — GUAIFENESIN 600 MG/1
600 TABLET, EXTENDED RELEASE ORAL 2 TIMES DAILY PRN
Status: DISCONTINUED | OUTPATIENT
Start: 2019-09-27 | End: 2019-09-30 | Stop reason: HOSPADM

## 2019-09-27 RX ORDER — NITROGLYCERIN 0.4 MG/1
0.4 TABLET SUBLINGUAL EVERY 5 MIN PRN
Status: DISCONTINUED | OUTPATIENT
Start: 2019-09-27 | End: 2019-09-30 | Stop reason: HOSPADM

## 2019-09-27 RX ORDER — IPRATROPIUM BROMIDE AND ALBUTEROL SULFATE 2.5; .5 MG/3ML; MG/3ML
3 SOLUTION RESPIRATORY (INHALATION)
Status: DISCONTINUED | OUTPATIENT
Start: 2019-09-27 | End: 2019-09-30 | Stop reason: HOSPADM

## 2019-09-27 RX ORDER — SPIRONOLACTONE 25 MG/1
25 TABLET ORAL DAILY
Status: DISCONTINUED | OUTPATIENT
Start: 2019-09-27 | End: 2019-09-30 | Stop reason: HOSPADM

## 2019-09-27 RX ORDER — MONTELUKAST SODIUM 10 MG/1
10 TABLET ORAL NIGHTLY
Status: DISCONTINUED | OUTPATIENT
Start: 2019-09-27 | End: 2019-09-30 | Stop reason: HOSPADM

## 2019-09-27 RX ORDER — BENZONATATE 100 MG/1
100 CAPSULE ORAL 3 TIMES DAILY PRN
Status: DISCONTINUED | OUTPATIENT
Start: 2019-09-27 | End: 2019-09-30 | Stop reason: HOSPADM

## 2019-09-27 RX ORDER — LEVOTHYROXINE SODIUM 0.1 MG/1
100 TABLET ORAL DAILY
Status: DISCONTINUED | OUTPATIENT
Start: 2019-09-27 | End: 2019-09-30 | Stop reason: HOSPADM

## 2019-09-27 RX ORDER — ROSUVASTATIN CALCIUM 10 MG/1
40 TABLET, COATED ORAL EVERY EVENING
Status: DISCONTINUED | OUTPATIENT
Start: 2019-09-27 | End: 2019-09-30 | Stop reason: HOSPADM

## 2019-09-27 RX ORDER — TORSEMIDE 20 MG/1
60 TABLET ORAL DAILY
Status: DISCONTINUED | OUTPATIENT
Start: 2019-09-27 | End: 2019-09-30 | Stop reason: HOSPADM

## 2019-09-27 RX ORDER — ALBUTEROL SULFATE 90 UG/1
2 AEROSOL, METERED RESPIRATORY (INHALATION) EVERY 6 HOURS PRN
Status: DISCONTINUED | OUTPATIENT
Start: 2019-09-27 | End: 2019-09-30 | Stop reason: HOSPADM

## 2019-09-27 RX ADMIN — ACETAMINOPHEN 650 MG: 325 TABLET ORAL at 03:41

## 2019-09-27 RX ADMIN — Medication 10 ML: at 21:12

## 2019-09-27 RX ADMIN — OXYBUTYNIN CHLORIDE 10 MG: 5 TABLET, EXTENDED RELEASE ORAL at 21:12

## 2019-09-27 RX ADMIN — MAGNESIUM OXIDE TAB 400 MG (241.3 MG ELEMENTAL MG) 400 MG: 400 (241.3 MG) TAB at 12:56

## 2019-09-27 RX ADMIN — BUSPIRONE HYDROCHLORIDE 10 MG: 5 TABLET ORAL at 21:12

## 2019-09-27 RX ADMIN — MAGNESIUM OXIDE TAB 400 MG (241.3 MG ELEMENTAL MG) 400 MG: 400 (241.3 MG) TAB at 09:06

## 2019-09-27 RX ADMIN — INSULIN LISPRO 1 UNITS: 100 INJECTION, SOLUTION INTRAVENOUS; SUBCUTANEOUS at 12:57

## 2019-09-27 RX ADMIN — ROSUVASTATIN CALCIUM 40 MG: 10 TABLET, FILM COATED ORAL at 18:04

## 2019-09-27 RX ADMIN — NITROGLYCERIN 0.4 MG: 0.4 TABLET SUBLINGUAL at 15:25

## 2019-09-27 RX ADMIN — LEVOTHYROXINE SODIUM 100 MCG: 100 TABLET ORAL at 06:43

## 2019-09-27 RX ADMIN — Medication 10 ML: at 09:07

## 2019-09-27 RX ADMIN — MONTELUKAST SODIUM 10 MG: 10 TABLET ORAL at 21:12

## 2019-09-27 RX ADMIN — Medication 2 PUFF: at 19:09

## 2019-09-27 RX ADMIN — Medication 10 ML: at 00:15

## 2019-09-27 RX ADMIN — IPRATROPIUM BROMIDE AND ALBUTEROL SULFATE 3 ML: .5; 3 SOLUTION RESPIRATORY (INHALATION) at 15:51

## 2019-09-27 RX ADMIN — TORSEMIDE 60 MG: 20 TABLET ORAL at 09:06

## 2019-09-27 RX ADMIN — ENOXAPARIN SODIUM 30 MG: 30 INJECTION SUBCUTANEOUS at 09:10

## 2019-09-27 RX ADMIN — SPIRONOLACTONE 25 MG: 25 TABLET ORAL at 09:06

## 2019-09-27 RX ADMIN — INSULIN LISPRO 1 UNITS: 100 INJECTION, SOLUTION INTRAVENOUS; SUBCUTANEOUS at 21:13

## 2019-09-27 RX ADMIN — MAGNESIUM OXIDE TAB 400 MG (241.3 MG ELEMENTAL MG) 400 MG: 400 (241.3 MG) TAB at 18:04

## 2019-09-27 RX ADMIN — MAGNESIUM OXIDE TAB 400 MG (241.3 MG ELEMENTAL MG) 400 MG: 400 (241.3 MG) TAB at 21:12

## 2019-09-27 RX ADMIN — BUSPIRONE HYDROCHLORIDE 10 MG: 5 TABLET ORAL at 09:06

## 2019-09-27 RX ADMIN — IPRATROPIUM BROMIDE AND ALBUTEROL SULFATE 3 ML: .5; 3 SOLUTION RESPIRATORY (INHALATION) at 19:09

## 2019-09-27 RX ADMIN — BUSPIRONE HYDROCHLORIDE 10 MG: 5 TABLET ORAL at 14:58

## 2019-09-27 RX ADMIN — NITROGLYCERIN 0.4 MG: 0.4 TABLET SUBLINGUAL at 15:20

## 2019-09-27 RX ADMIN — INSULIN LISPRO 1 UNITS: 100 INJECTION, SOLUTION INTRAVENOUS; SUBCUTANEOUS at 00:13

## 2019-09-27 ASSESSMENT — PAIN DESCRIPTION - ONSET
ONSET: SUDDEN
ONSET: SUDDEN

## 2019-09-27 ASSESSMENT — PAIN DESCRIPTION - DESCRIPTORS
DESCRIPTORS: ACHING
DESCRIPTORS: ACHING

## 2019-09-27 ASSESSMENT — PAIN DESCRIPTION - LOCATION
LOCATION: CHEST
LOCATION: BACK
LOCATION: CHEST

## 2019-09-27 ASSESSMENT — PAIN DESCRIPTION - PAIN TYPE
TYPE: ACUTE PAIN

## 2019-09-27 ASSESSMENT — PAIN SCALES - GENERAL
PAINLEVEL_OUTOF10: 1
PAINLEVEL_OUTOF10: 4
PAINLEVEL_OUTOF10: 0
PAINLEVEL_OUTOF10: 1
PAINLEVEL_OUTOF10: 0
PAINLEVEL_OUTOF10: 10

## 2019-09-27 ASSESSMENT — PAIN DESCRIPTION - PROGRESSION: CLINICAL_PROGRESSION: RAPIDLY IMPROVING

## 2019-09-28 LAB
ANION GAP SERPL CALCULATED.3IONS-SCNC: 14 MMOL/L (ref 3–16)
BASOPHILS ABSOLUTE: 0.1 K/UL (ref 0–0.2)
BASOPHILS RELATIVE PERCENT: 0.9 %
BUN BLDV-MCNC: 40 MG/DL (ref 7–20)
CALCIUM SERPL-MCNC: 11.3 MG/DL (ref 8.3–10.6)
CHLORIDE BLD-SCNC: 92 MMOL/L (ref 99–110)
CO2: 33 MMOL/L (ref 21–32)
CREAT SERPL-MCNC: 1.4 MG/DL (ref 0.6–1.2)
EKG ATRIAL RATE: 90 BPM
EKG DIAGNOSIS: NORMAL
EKG P AXIS: 36 DEGREES
EKG P-R INTERVAL: 176 MS
EKG Q-T INTERVAL: 376 MS
EKG QRS DURATION: 102 MS
EKG QTC CALCULATION (BAZETT): 459 MS
EKG R AXIS: 80 DEGREES
EKG T AXIS: -16 DEGREES
EKG VENTRICULAR RATE: 90 BPM
EOSINOPHILS ABSOLUTE: 0.1 K/UL (ref 0–0.6)
EOSINOPHILS RELATIVE PERCENT: 0.6 %
GFR AFRICAN AMERICAN: 45
GFR NON-AFRICAN AMERICAN: 37
GLUCOSE BLD-MCNC: 129 MG/DL (ref 70–99)
GLUCOSE BLD-MCNC: 133 MG/DL (ref 70–99)
GLUCOSE BLD-MCNC: 155 MG/DL (ref 70–99)
GLUCOSE BLD-MCNC: 188 MG/DL (ref 70–99)
GLUCOSE BLD-MCNC: 193 MG/DL (ref 70–99)
HCT VFR BLD CALC: 42.3 % (ref 36–48)
HEMOGLOBIN: 13.2 G/DL (ref 12–16)
LYMPHOCYTES ABSOLUTE: 1.2 K/UL (ref 1–5.1)
LYMPHOCYTES RELATIVE PERCENT: 15 %
MAGNESIUM: 2.3 MG/DL (ref 1.8–2.4)
MCH RBC QN AUTO: 25.4 PG (ref 26–34)
MCHC RBC AUTO-ENTMCNC: 31.2 G/DL (ref 31–36)
MCV RBC AUTO: 81.2 FL (ref 80–100)
MONOCYTES ABSOLUTE: 0.7 K/UL (ref 0–1.3)
MONOCYTES RELATIVE PERCENT: 8.6 %
NEUTROPHILS ABSOLUTE: 6.1 K/UL (ref 1.7–7.7)
NEUTROPHILS RELATIVE PERCENT: 74.9 %
PDW BLD-RTO: 25.2 % (ref 12.4–15.4)
PERFORMED ON: ABNORMAL
PHOSPHORUS: 3.3 MG/DL (ref 2.5–4.9)
PLATELET # BLD: 173 K/UL (ref 135–450)
PMV BLD AUTO: 8.6 FL (ref 5–10.5)
POTASSIUM SERPL-SCNC: 4.3 MMOL/L (ref 3.5–5.1)
RBC # BLD: 5.21 M/UL (ref 4–5.2)
SODIUM BLD-SCNC: 139 MMOL/L (ref 136–145)
TROPONIN: 0.11 NG/ML
WBC # BLD: 8.1 K/UL (ref 4–11)

## 2019-09-28 PROCEDURE — 2700000000 HC OXYGEN THERAPY PER DAY

## 2019-09-28 PROCEDURE — 83735 ASSAY OF MAGNESIUM: CPT

## 2019-09-28 PROCEDURE — 94640 AIRWAY INHALATION TREATMENT: CPT

## 2019-09-28 PROCEDURE — 84484 ASSAY OF TROPONIN QUANT: CPT

## 2019-09-28 PROCEDURE — 94669 MECHANICAL CHEST WALL OSCILL: CPT

## 2019-09-28 PROCEDURE — 36415 COLL VENOUS BLD VENIPUNCTURE: CPT

## 2019-09-28 PROCEDURE — 84100 ASSAY OF PHOSPHORUS: CPT

## 2019-09-28 PROCEDURE — 85025 COMPLETE CBC W/AUTO DIFF WBC: CPT

## 2019-09-28 PROCEDURE — 80048 BASIC METABOLIC PNL TOTAL CA: CPT

## 2019-09-28 PROCEDURE — 6370000000 HC RX 637 (ALT 250 FOR IP): Performed by: INTERNAL MEDICINE

## 2019-09-28 PROCEDURE — 94761 N-INVAS EAR/PLS OXIMETRY MLT: CPT

## 2019-09-28 PROCEDURE — 6360000002 HC RX W HCPCS: Performed by: INTERNAL MEDICINE

## 2019-09-28 PROCEDURE — 6370000000 HC RX 637 (ALT 250 FOR IP): Performed by: NURSE PRACTITIONER

## 2019-09-28 PROCEDURE — 2580000003 HC RX 258: Performed by: INTERNAL MEDICINE

## 2019-09-28 PROCEDURE — 93010 ELECTROCARDIOGRAM REPORT: CPT | Performed by: INTERNAL MEDICINE

## 2019-09-28 PROCEDURE — 1200000000 HC SEMI PRIVATE

## 2019-09-28 RX ADMIN — ROSUVASTATIN CALCIUM 40 MG: 10 TABLET, FILM COATED ORAL at 17:22

## 2019-09-28 RX ADMIN — SPIRONOLACTONE 25 MG: 25 TABLET ORAL at 09:49

## 2019-09-28 RX ADMIN — IPRATROPIUM BROMIDE AND ALBUTEROL SULFATE 3 ML: .5; 3 SOLUTION RESPIRATORY (INHALATION) at 19:15

## 2019-09-28 RX ADMIN — LEVOTHYROXINE SODIUM 100 MCG: 100 TABLET ORAL at 06:05

## 2019-09-28 RX ADMIN — BUSPIRONE HYDROCHLORIDE 10 MG: 5 TABLET ORAL at 09:49

## 2019-09-28 RX ADMIN — MAGNESIUM OXIDE TAB 400 MG (241.3 MG ELEMENTAL MG) 400 MG: 400 (241.3 MG) TAB at 13:10

## 2019-09-28 RX ADMIN — Medication 2 PUFF: at 19:16

## 2019-09-28 RX ADMIN — IPRATROPIUM BROMIDE AND ALBUTEROL SULFATE 3 ML: .5; 3 SOLUTION RESPIRATORY (INHALATION) at 11:40

## 2019-09-28 RX ADMIN — Medication 10 ML: at 22:14

## 2019-09-28 RX ADMIN — INSULIN LISPRO 1 UNITS: 100 INJECTION, SOLUTION INTRAVENOUS; SUBCUTANEOUS at 17:22

## 2019-09-28 RX ADMIN — Medication 2 PUFF: at 07:46

## 2019-09-28 RX ADMIN — MAGNESIUM OXIDE TAB 400 MG (241.3 MG ELEMENTAL MG) 400 MG: 400 (241.3 MG) TAB at 21:29

## 2019-09-28 RX ADMIN — INSULIN LISPRO 1 UNITS: 100 INJECTION, SOLUTION INTRAVENOUS; SUBCUTANEOUS at 21:29

## 2019-09-28 RX ADMIN — BUSPIRONE HYDROCHLORIDE 10 MG: 5 TABLET ORAL at 15:54

## 2019-09-28 RX ADMIN — ENOXAPARIN SODIUM 30 MG: 30 INJECTION SUBCUTANEOUS at 09:49

## 2019-09-28 RX ADMIN — MAGNESIUM OXIDE TAB 400 MG (241.3 MG ELEMENTAL MG) 400 MG: 400 (241.3 MG) TAB at 17:22

## 2019-09-28 RX ADMIN — TORSEMIDE 60 MG: 20 TABLET ORAL at 09:49

## 2019-09-28 RX ADMIN — MAGNESIUM OXIDE TAB 400 MG (241.3 MG ELEMENTAL MG) 400 MG: 400 (241.3 MG) TAB at 09:49

## 2019-09-28 RX ADMIN — MONTELUKAST SODIUM 10 MG: 10 TABLET ORAL at 21:29

## 2019-09-28 RX ADMIN — BUSPIRONE HYDROCHLORIDE 10 MG: 5 TABLET ORAL at 21:29

## 2019-09-28 RX ADMIN — Medication 10 ML: at 09:49

## 2019-09-28 RX ADMIN — OXYBUTYNIN CHLORIDE 10 MG: 5 TABLET, EXTENDED RELEASE ORAL at 21:29

## 2019-09-28 RX ADMIN — IPRATROPIUM BROMIDE AND ALBUTEROL SULFATE 3 ML: .5; 3 SOLUTION RESPIRATORY (INHALATION) at 15:45

## 2019-09-28 RX ADMIN — IPRATROPIUM BROMIDE AND ALBUTEROL SULFATE 3 ML: .5; 3 SOLUTION RESPIRATORY (INHALATION) at 07:46

## 2019-09-28 ASSESSMENT — PAIN SCALES - GENERAL
PAINLEVEL_OUTOF10: 0

## 2019-09-29 LAB
ALBUMIN SERPL-MCNC: 3.4 G/DL (ref 3.4–5)
ANION GAP SERPL CALCULATED.3IONS-SCNC: 11 MMOL/L (ref 3–16)
BUN BLDV-MCNC: 42 MG/DL (ref 7–20)
CALCIUM SERPL-MCNC: 10.9 MG/DL (ref 8.3–10.6)
CHLORIDE BLD-SCNC: 92 MMOL/L (ref 99–110)
CO2: 35 MMOL/L (ref 21–32)
CREAT SERPL-MCNC: 1.3 MG/DL (ref 0.6–1.2)
GFR AFRICAN AMERICAN: 49
GFR NON-AFRICAN AMERICAN: 40
GLUCOSE BLD-MCNC: 116 MG/DL (ref 70–99)
GLUCOSE BLD-MCNC: 127 MG/DL (ref 70–99)
GLUCOSE BLD-MCNC: 143 MG/DL (ref 70–99)
GLUCOSE BLD-MCNC: 181 MG/DL (ref 70–99)
GLUCOSE BLD-MCNC: 181 MG/DL (ref 70–99)
HCT VFR BLD CALC: 39.6 % (ref 36–48)
HEMOGLOBIN: 12.5 G/DL (ref 12–16)
MCH RBC QN AUTO: 25.4 PG (ref 26–34)
MCHC RBC AUTO-ENTMCNC: 31.6 G/DL (ref 31–36)
MCV RBC AUTO: 80.5 FL (ref 80–100)
PDW BLD-RTO: 25 % (ref 12.4–15.4)
PERFORMED ON: ABNORMAL
PHOSPHORUS: 3.4 MG/DL (ref 2.5–4.9)
PLATELET # BLD: 130 K/UL (ref 135–450)
PMV BLD AUTO: 8.3 FL (ref 5–10.5)
POTASSIUM SERPL-SCNC: 3.9 MMOL/L (ref 3.5–5.1)
RBC # BLD: 4.91 M/UL (ref 4–5.2)
SODIUM BLD-SCNC: 138 MMOL/L (ref 136–145)
WBC # BLD: 7.1 K/UL (ref 4–11)

## 2019-09-29 PROCEDURE — 2580000003 HC RX 258: Performed by: INTERNAL MEDICINE

## 2019-09-29 PROCEDURE — 94761 N-INVAS EAR/PLS OXIMETRY MLT: CPT

## 2019-09-29 PROCEDURE — 85027 COMPLETE CBC AUTOMATED: CPT

## 2019-09-29 PROCEDURE — 94669 MECHANICAL CHEST WALL OSCILL: CPT

## 2019-09-29 PROCEDURE — 36415 COLL VENOUS BLD VENIPUNCTURE: CPT

## 2019-09-29 PROCEDURE — 6370000000 HC RX 637 (ALT 250 FOR IP): Performed by: INTERNAL MEDICINE

## 2019-09-29 PROCEDURE — 80069 RENAL FUNCTION PANEL: CPT

## 2019-09-29 PROCEDURE — 6370000000 HC RX 637 (ALT 250 FOR IP): Performed by: NURSE PRACTITIONER

## 2019-09-29 PROCEDURE — 94640 AIRWAY INHALATION TREATMENT: CPT

## 2019-09-29 PROCEDURE — 1200000000 HC SEMI PRIVATE

## 2019-09-29 PROCEDURE — 6360000002 HC RX W HCPCS: Performed by: INTERNAL MEDICINE

## 2019-09-29 PROCEDURE — 2700000000 HC OXYGEN THERAPY PER DAY

## 2019-09-29 RX ADMIN — INSULIN LISPRO 1 UNITS: 100 INJECTION, SOLUTION INTRAVENOUS; SUBCUTANEOUS at 22:42

## 2019-09-29 RX ADMIN — IPRATROPIUM BROMIDE AND ALBUTEROL SULFATE 3 ML: .5; 3 SOLUTION RESPIRATORY (INHALATION) at 16:17

## 2019-09-29 RX ADMIN — MAGNESIUM OXIDE TAB 400 MG (241.3 MG ELEMENTAL MG) 400 MG: 400 (241.3 MG) TAB at 22:41

## 2019-09-29 RX ADMIN — ROSUVASTATIN CALCIUM 40 MG: 10 TABLET, FILM COATED ORAL at 17:48

## 2019-09-29 RX ADMIN — ENOXAPARIN SODIUM 30 MG: 30 INJECTION SUBCUTANEOUS at 09:15

## 2019-09-29 RX ADMIN — BUSPIRONE HYDROCHLORIDE 10 MG: 5 TABLET ORAL at 09:15

## 2019-09-29 RX ADMIN — TORSEMIDE 60 MG: 20 TABLET ORAL at 09:14

## 2019-09-29 RX ADMIN — SPIRONOLACTONE 25 MG: 25 TABLET ORAL at 09:15

## 2019-09-29 RX ADMIN — Medication 10 ML: at 22:41

## 2019-09-29 RX ADMIN — MAGNESIUM OXIDE TAB 400 MG (241.3 MG ELEMENTAL MG) 400 MG: 400 (241.3 MG) TAB at 17:48

## 2019-09-29 RX ADMIN — INSULIN LISPRO 1 UNITS: 100 INJECTION, SOLUTION INTRAVENOUS; SUBCUTANEOUS at 13:20

## 2019-09-29 RX ADMIN — Medication 2 PUFF: at 07:52

## 2019-09-29 RX ADMIN — BUSPIRONE HYDROCHLORIDE 10 MG: 5 TABLET ORAL at 22:41

## 2019-09-29 RX ADMIN — IPRATROPIUM BROMIDE AND ALBUTEROL SULFATE 3 ML: .5; 3 SOLUTION RESPIRATORY (INHALATION) at 20:40

## 2019-09-29 RX ADMIN — MAGNESIUM OXIDE TAB 400 MG (241.3 MG ELEMENTAL MG) 400 MG: 400 (241.3 MG) TAB at 09:15

## 2019-09-29 RX ADMIN — Medication 2 PUFF: at 20:41

## 2019-09-29 RX ADMIN — MAGNESIUM OXIDE TAB 400 MG (241.3 MG ELEMENTAL MG) 400 MG: 400 (241.3 MG) TAB at 13:20

## 2019-09-29 RX ADMIN — IPRATROPIUM BROMIDE AND ALBUTEROL SULFATE 3 ML: .5; 3 SOLUTION RESPIRATORY (INHALATION) at 07:51

## 2019-09-29 RX ADMIN — OXYBUTYNIN CHLORIDE 10 MG: 5 TABLET, EXTENDED RELEASE ORAL at 22:41

## 2019-09-29 RX ADMIN — MONTELUKAST SODIUM 10 MG: 10 TABLET ORAL at 22:41

## 2019-09-29 RX ADMIN — IPRATROPIUM BROMIDE AND ALBUTEROL SULFATE 3 ML: .5; 3 SOLUTION RESPIRATORY (INHALATION) at 11:57

## 2019-09-29 RX ADMIN — BUSPIRONE HYDROCHLORIDE 10 MG: 5 TABLET ORAL at 13:19

## 2019-09-29 RX ADMIN — Medication 10 ML: at 09:15

## 2019-09-29 RX ADMIN — LEVOTHYROXINE SODIUM 100 MCG: 100 TABLET ORAL at 05:21

## 2019-09-29 ASSESSMENT — PAIN SCALES - GENERAL
PAINLEVEL_OUTOF10: 0

## 2019-09-30 VITALS
HEART RATE: 89 BPM | HEIGHT: 63 IN | DIASTOLIC BLOOD PRESSURE: 71 MMHG | SYSTOLIC BLOOD PRESSURE: 107 MMHG | OXYGEN SATURATION: 95 % | RESPIRATION RATE: 16 BRPM | BODY MASS INDEX: 50.82 KG/M2 | WEIGHT: 286.82 LBS | TEMPERATURE: 97.9 F

## 2019-09-30 LAB
ANION GAP SERPL CALCULATED.3IONS-SCNC: 9 MMOL/L (ref 3–16)
BUN BLDV-MCNC: 42 MG/DL (ref 7–20)
CALCIUM SERPL-MCNC: 10.9 MG/DL (ref 8.3–10.6)
CHLORIDE BLD-SCNC: 91 MMOL/L (ref 99–110)
CO2: 36 MMOL/L (ref 21–32)
CREAT SERPL-MCNC: 1.2 MG/DL (ref 0.6–1.2)
GFR AFRICAN AMERICAN: 53
GFR NON-AFRICAN AMERICAN: 44
GLUCOSE BLD-MCNC: 128 MG/DL (ref 70–99)
GLUCOSE BLD-MCNC: 139 MG/DL (ref 70–99)
GLUCOSE BLD-MCNC: 186 MG/DL (ref 70–99)
GLUCOSE BLD-MCNC: 220 MG/DL (ref 70–99)
PERFORMED ON: ABNORMAL
POTASSIUM SERPL-SCNC: 4 MMOL/L (ref 3.5–5.1)
SODIUM BLD-SCNC: 136 MMOL/L (ref 136–145)

## 2019-09-30 PROCEDURE — 94640 AIRWAY INHALATION TREATMENT: CPT

## 2019-09-30 PROCEDURE — 94669 MECHANICAL CHEST WALL OSCILL: CPT

## 2019-09-30 PROCEDURE — 2700000000 HC OXYGEN THERAPY PER DAY

## 2019-09-30 PROCEDURE — 36415 COLL VENOUS BLD VENIPUNCTURE: CPT

## 2019-09-30 PROCEDURE — 2500000003 HC RX 250 WO HCPCS: Performed by: NURSE PRACTITIONER

## 2019-09-30 PROCEDURE — 6370000000 HC RX 637 (ALT 250 FOR IP): Performed by: INTERNAL MEDICINE

## 2019-09-30 PROCEDURE — 6370000000 HC RX 637 (ALT 250 FOR IP): Performed by: NURSE PRACTITIONER

## 2019-09-30 PROCEDURE — 80048 BASIC METABOLIC PNL TOTAL CA: CPT

## 2019-09-30 PROCEDURE — 2580000003 HC RX 258: Performed by: INTERNAL MEDICINE

## 2019-09-30 PROCEDURE — 94761 N-INVAS EAR/PLS OXIMETRY MLT: CPT

## 2019-09-30 PROCEDURE — 6360000002 HC RX W HCPCS: Performed by: INTERNAL MEDICINE

## 2019-09-30 RX ADMIN — SPIRONOLACTONE 25 MG: 25 TABLET ORAL at 10:16

## 2019-09-30 RX ADMIN — MAGNESIUM OXIDE TAB 400 MG (241.3 MG ELEMENTAL MG) 400 MG: 400 (241.3 MG) TAB at 10:16

## 2019-09-30 RX ADMIN — MICONAZOLE NITRATE: 20 POWDER TOPICAL at 10:17

## 2019-09-30 RX ADMIN — MAGNESIUM OXIDE TAB 400 MG (241.3 MG ELEMENTAL MG) 400 MG: 400 (241.3 MG) TAB at 16:55

## 2019-09-30 RX ADMIN — TORSEMIDE 60 MG: 20 TABLET ORAL at 10:15

## 2019-09-30 RX ADMIN — IPRATROPIUM BROMIDE AND ALBUTEROL SULFATE 3 ML: .5; 3 SOLUTION RESPIRATORY (INHALATION) at 16:05

## 2019-09-30 RX ADMIN — MAGNESIUM OXIDE TAB 400 MG (241.3 MG ELEMENTAL MG) 400 MG: 400 (241.3 MG) TAB at 12:34

## 2019-09-30 RX ADMIN — IPRATROPIUM BROMIDE AND ALBUTEROL SULFATE 3 ML: .5; 3 SOLUTION RESPIRATORY (INHALATION) at 07:49

## 2019-09-30 RX ADMIN — ENOXAPARIN SODIUM 30 MG: 30 INJECTION SUBCUTANEOUS at 10:16

## 2019-09-30 RX ADMIN — INSULIN LISPRO 2 UNITS: 100 INJECTION, SOLUTION INTRAVENOUS; SUBCUTANEOUS at 12:31

## 2019-09-30 RX ADMIN — LEVOTHYROXINE SODIUM 100 MCG: 100 TABLET ORAL at 06:43

## 2019-09-30 RX ADMIN — BUSPIRONE HYDROCHLORIDE 10 MG: 5 TABLET ORAL at 14:47

## 2019-09-30 RX ADMIN — BUSPIRONE HYDROCHLORIDE 10 MG: 5 TABLET ORAL at 10:15

## 2019-09-30 RX ADMIN — Medication 2 PUFF: at 07:50

## 2019-09-30 RX ADMIN — Medication 10 ML: at 10:16

## 2019-09-30 RX ADMIN — IPRATROPIUM BROMIDE AND ALBUTEROL SULFATE 3 ML: .5; 3 SOLUTION RESPIRATORY (INHALATION) at 11:41

## 2019-09-30 RX ADMIN — INSULIN LISPRO 2 UNITS: 100 INJECTION, SOLUTION INTRAVENOUS; SUBCUTANEOUS at 16:56

## 2019-09-30 ASSESSMENT — PAIN SCALES - GENERAL
PAINLEVEL_OUTOF10: 0

## 2019-10-01 LAB
BLOOD CULTURE, ROUTINE: NORMAL
CULTURE, BLOOD 2: NORMAL

## 2019-10-02 ENCOUNTER — APPOINTMENT (OUTPATIENT)
Dept: GENERAL RADIOLOGY | Age: 74
DRG: 291 | End: 2019-10-02
Payer: MEDICARE

## 2019-10-02 ENCOUNTER — APPOINTMENT (OUTPATIENT)
Dept: CT IMAGING | Age: 74
DRG: 291 | End: 2019-10-02
Payer: MEDICARE

## 2019-10-02 ENCOUNTER — HOSPITAL ENCOUNTER (INPATIENT)
Age: 74
LOS: 4 days | Discharge: NURSING, REHAB OR LONG-TERM CARE | DRG: 291 | End: 2019-10-06
Attending: EMERGENCY MEDICINE | Admitting: INTERNAL MEDICINE
Payer: MEDICARE

## 2019-10-02 DIAGNOSIS — I50.9 ACUTE ON CHRONIC CONGESTIVE HEART FAILURE, UNSPECIFIED HEART FAILURE TYPE (HCC): ICD-10-CM

## 2019-10-02 DIAGNOSIS — R77.8 ELEVATED TROPONIN: Primary | ICD-10-CM

## 2019-10-02 LAB
A/G RATIO: 1.1 (ref 1.1–2.2)
ALBUMIN SERPL-MCNC: 3.7 G/DL (ref 3.4–5)
ALP BLD-CCNC: 91 U/L (ref 40–129)
ALT SERPL-CCNC: 31 U/L (ref 10–40)
ANION GAP SERPL CALCULATED.3IONS-SCNC: 13 MMOL/L (ref 3–16)
AST SERPL-CCNC: 34 U/L (ref 15–37)
BASOPHILS ABSOLUTE: 0.1 K/UL (ref 0–0.2)
BASOPHILS RELATIVE PERCENT: 0.8 %
BILIRUB SERPL-MCNC: 0.6 MG/DL (ref 0–1)
BUN BLDV-MCNC: 39 MG/DL (ref 7–20)
CALCIUM SERPL-MCNC: 10.8 MG/DL (ref 8.3–10.6)
CHLORIDE BLD-SCNC: 93 MMOL/L (ref 99–110)
CO2: 34 MMOL/L (ref 21–32)
CREAT SERPL-MCNC: 1.5 MG/DL (ref 0.6–1.2)
EOSINOPHILS ABSOLUTE: 0.1 K/UL (ref 0–0.6)
EOSINOPHILS RELATIVE PERCENT: 0.8 %
GFR AFRICAN AMERICAN: 41
GFR NON-AFRICAN AMERICAN: 34
GLOBULIN: 3.5 G/DL
GLUCOSE BLD-MCNC: 145 MG/DL (ref 70–99)
GLUCOSE BLD-MCNC: 178 MG/DL (ref 70–99)
HCT VFR BLD CALC: 39.6 % (ref 36–48)
HEMOGLOBIN: 12.6 G/DL (ref 12–16)
LIPASE: 26 U/L (ref 13–60)
LYMPHOCYTES ABSOLUTE: 1.1 K/UL (ref 1–5.1)
LYMPHOCYTES RELATIVE PERCENT: 11.6 %
MAGNESIUM: 2 MG/DL (ref 1.8–2.4)
MCH RBC QN AUTO: 25.8 PG (ref 26–34)
MCHC RBC AUTO-ENTMCNC: 32 G/DL (ref 31–36)
MCV RBC AUTO: 80.8 FL (ref 80–100)
MONOCYTES ABSOLUTE: 1 K/UL (ref 0–1.3)
MONOCYTES RELATIVE PERCENT: 10.5 %
NEUTROPHILS ABSOLUTE: 7.4 K/UL (ref 1.7–7.7)
NEUTROPHILS RELATIVE PERCENT: 76.3 %
PDW BLD-RTO: 25.4 % (ref 12.4–15.4)
PERFORMED ON: ABNORMAL
PLATELET # BLD: 169 K/UL (ref 135–450)
PMV BLD AUTO: 8.6 FL (ref 5–10.5)
POTASSIUM SERPL-SCNC: 4.9 MMOL/L (ref 3.5–5.1)
PRO-BNP: 4627 PG/ML (ref 0–124)
RBC # BLD: 4.89 M/UL (ref 4–5.2)
SODIUM BLD-SCNC: 140 MMOL/L (ref 136–145)
TOTAL PROTEIN: 7.2 G/DL (ref 6.4–8.2)
TROPONIN: 0.21 NG/ML
TROPONIN: 0.22 NG/ML
WBC # BLD: 9.7 K/UL (ref 4–11)

## 2019-10-02 PROCEDURE — 71046 X-RAY EXAM CHEST 2 VIEWS: CPT

## 2019-10-02 PROCEDURE — 74176 CT ABD & PELVIS W/O CONTRAST: CPT

## 2019-10-02 PROCEDURE — 93005 ELECTROCARDIOGRAM TRACING: CPT | Performed by: NURSE PRACTITIONER

## 2019-10-02 PROCEDURE — 96375 TX/PRO/DX INJ NEW DRUG ADDON: CPT

## 2019-10-02 PROCEDURE — 84484 ASSAY OF TROPONIN QUANT: CPT

## 2019-10-02 PROCEDURE — 99285 EMERGENCY DEPT VISIT HI MDM: CPT

## 2019-10-02 PROCEDURE — 6370000000 HC RX 637 (ALT 250 FOR IP): Performed by: INTERNAL MEDICINE

## 2019-10-02 PROCEDURE — 83880 ASSAY OF NATRIURETIC PEPTIDE: CPT

## 2019-10-02 PROCEDURE — 1200000000 HC SEMI PRIVATE

## 2019-10-02 PROCEDURE — 2580000003 HC RX 258: Performed by: INTERNAL MEDICINE

## 2019-10-02 PROCEDURE — 96374 THER/PROPH/DIAG INJ IV PUSH: CPT

## 2019-10-02 PROCEDURE — 80053 COMPREHEN METABOLIC PANEL: CPT

## 2019-10-02 PROCEDURE — 85025 COMPLETE CBC W/AUTO DIFF WBC: CPT

## 2019-10-02 PROCEDURE — 94150 VITAL CAPACITY TEST: CPT

## 2019-10-02 PROCEDURE — 94640 AIRWAY INHALATION TREATMENT: CPT

## 2019-10-02 PROCEDURE — 6370000000 HC RX 637 (ALT 250 FOR IP): Performed by: EMERGENCY MEDICINE

## 2019-10-02 PROCEDURE — 83690 ASSAY OF LIPASE: CPT

## 2019-10-02 PROCEDURE — 94761 N-INVAS EAR/PLS OXIMETRY MLT: CPT

## 2019-10-02 PROCEDURE — 36415 COLL VENOUS BLD VENIPUNCTURE: CPT

## 2019-10-02 PROCEDURE — 6360000002 HC RX W HCPCS: Performed by: EMERGENCY MEDICINE

## 2019-10-02 PROCEDURE — 83735 ASSAY OF MAGNESIUM: CPT

## 2019-10-02 PROCEDURE — 2700000000 HC OXYGEN THERAPY PER DAY

## 2019-10-02 PROCEDURE — 83036 HEMOGLOBIN GLYCOSYLATED A1C: CPT

## 2019-10-02 RX ORDER — LEVOFLOXACIN 5 MG/ML
500 INJECTION, SOLUTION INTRAVENOUS ONCE
Status: COMPLETED | OUTPATIENT
Start: 2019-10-02 | End: 2019-10-02

## 2019-10-02 RX ORDER — IPRATROPIUM BROMIDE AND ALBUTEROL SULFATE 2.5; .5 MG/3ML; MG/3ML
3 SOLUTION RESPIRATORY (INHALATION)
Status: DISCONTINUED | OUTPATIENT
Start: 2019-10-02 | End: 2019-10-03

## 2019-10-02 RX ORDER — POTASSIUM CHLORIDE 20 MEQ/1
40 TABLET, EXTENDED RELEASE ORAL DAILY
Status: DISCONTINUED | OUTPATIENT
Start: 2019-10-03 | End: 2019-10-06 | Stop reason: HOSPADM

## 2019-10-02 RX ORDER — MONTELUKAST SODIUM 10 MG/1
10 TABLET ORAL NIGHTLY
Status: DISCONTINUED | OUTPATIENT
Start: 2019-10-02 | End: 2019-10-06 | Stop reason: HOSPADM

## 2019-10-02 RX ORDER — OXYBUTYNIN CHLORIDE 5 MG/1
10 TABLET, EXTENDED RELEASE ORAL NIGHTLY
Status: DISCONTINUED | OUTPATIENT
Start: 2019-10-02 | End: 2019-10-06 | Stop reason: HOSPADM

## 2019-10-02 RX ORDER — ACETAMINOPHEN 325 MG/1
650 TABLET ORAL EVERY 4 HOURS PRN
Status: DISCONTINUED | OUTPATIENT
Start: 2019-10-02 | End: 2019-10-06 | Stop reason: HOSPADM

## 2019-10-02 RX ORDER — ONDANSETRON 2 MG/ML
4 INJECTION INTRAMUSCULAR; INTRAVENOUS ONCE
Status: COMPLETED | OUTPATIENT
Start: 2019-10-02 | End: 2019-10-02

## 2019-10-02 RX ORDER — SODIUM CHLORIDE 0.9 % (FLUSH) 0.9 %
10 SYRINGE (ML) INJECTION EVERY 12 HOURS SCHEDULED
Status: DISCONTINUED | OUTPATIENT
Start: 2019-10-02 | End: 2019-10-06 | Stop reason: HOSPADM

## 2019-10-02 RX ORDER — DEXTROSE MONOHYDRATE 25 G/50ML
12.5 INJECTION, SOLUTION INTRAVENOUS PRN
Status: DISCONTINUED | OUTPATIENT
Start: 2019-10-02 | End: 2019-10-06 | Stop reason: HOSPADM

## 2019-10-02 RX ORDER — DEXAMETHASONE SODIUM PHOSPHATE 4 MG/ML
8 INJECTION, SOLUTION INTRA-ARTICULAR; INTRALESIONAL; INTRAMUSCULAR; INTRAVENOUS; SOFT TISSUE ONCE
Status: COMPLETED | OUTPATIENT
Start: 2019-10-02 | End: 2019-10-02

## 2019-10-02 RX ORDER — TORSEMIDE 20 MG/1
60 TABLET ORAL DAILY
Status: DISCONTINUED | OUTPATIENT
Start: 2019-10-03 | End: 2019-10-03

## 2019-10-02 RX ORDER — LEVOTHYROXINE SODIUM 88 UG/1
88 TABLET ORAL DAILY
Status: DISCONTINUED | OUTPATIENT
Start: 2019-10-03 | End: 2019-10-06 | Stop reason: HOSPADM

## 2019-10-02 RX ORDER — IPRATROPIUM BROMIDE AND ALBUTEROL SULFATE 2.5; .5 MG/3ML; MG/3ML
3 SOLUTION RESPIRATORY (INHALATION) ONCE
Status: COMPLETED | OUTPATIENT
Start: 2019-10-02 | End: 2019-10-02

## 2019-10-02 RX ORDER — BUSPIRONE HYDROCHLORIDE 5 MG/1
10 TABLET ORAL 3 TIMES DAILY
Status: DISCONTINUED | OUTPATIENT
Start: 2019-10-02 | End: 2019-10-06 | Stop reason: HOSPADM

## 2019-10-02 RX ORDER — DEXTROSE MONOHYDRATE 50 MG/ML
100 INJECTION, SOLUTION INTRAVENOUS PRN
Status: DISCONTINUED | OUTPATIENT
Start: 2019-10-02 | End: 2019-10-06 | Stop reason: HOSPADM

## 2019-10-02 RX ORDER — NICOTINE POLACRILEX 4 MG
15 LOZENGE BUCCAL PRN
Status: DISCONTINUED | OUTPATIENT
Start: 2019-10-02 | End: 2019-10-06 | Stop reason: HOSPADM

## 2019-10-02 RX ORDER — SPIRONOLACTONE 25 MG/1
25 TABLET ORAL DAILY
Status: DISCONTINUED | OUTPATIENT
Start: 2019-10-03 | End: 2019-10-06 | Stop reason: HOSPADM

## 2019-10-02 RX ORDER — ONDANSETRON 2 MG/ML
4 INJECTION INTRAMUSCULAR; INTRAVENOUS EVERY 6 HOURS PRN
Status: DISCONTINUED | OUTPATIENT
Start: 2019-10-02 | End: 2019-10-06 | Stop reason: HOSPADM

## 2019-10-02 RX ORDER — SODIUM CHLORIDE 0.9 % (FLUSH) 0.9 %
10 SYRINGE (ML) INJECTION PRN
Status: DISCONTINUED | OUTPATIENT
Start: 2019-10-02 | End: 2019-10-06 | Stop reason: HOSPADM

## 2019-10-02 RX ORDER — FUROSEMIDE 10 MG/ML
40 INJECTION INTRAMUSCULAR; INTRAVENOUS ONCE
Status: COMPLETED | OUTPATIENT
Start: 2019-10-02 | End: 2019-10-02

## 2019-10-02 RX ORDER — ROSUVASTATIN CALCIUM 10 MG/1
40 TABLET, COATED ORAL NIGHTLY
Status: DISCONTINUED | OUTPATIENT
Start: 2019-10-02 | End: 2019-10-06 | Stop reason: HOSPADM

## 2019-10-02 RX ADMIN — OXYBUTYNIN CHLORIDE 10 MG: 5 TABLET, EXTENDED RELEASE ORAL at 22:15

## 2019-10-02 RX ADMIN — Medication 2 PUFF: at 19:29

## 2019-10-02 RX ADMIN — IPRATROPIUM BROMIDE AND ALBUTEROL SULFATE 3 AMPULE: .5; 3 SOLUTION RESPIRATORY (INHALATION) at 16:40

## 2019-10-02 RX ADMIN — INSULIN LISPRO 1 UNITS: 100 INJECTION, SOLUTION INTRAVENOUS; SUBCUTANEOUS at 22:15

## 2019-10-02 RX ADMIN — ONDANSETRON 4 MG: 2 INJECTION INTRAMUSCULAR; INTRAVENOUS at 16:33

## 2019-10-02 RX ADMIN — Medication 10 ML: at 22:16

## 2019-10-02 RX ADMIN — ROSUVASTATIN CALCIUM 40 MG: 10 TABLET, FILM COATED ORAL at 22:14

## 2019-10-02 RX ADMIN — BUSPIRONE HYDROCHLORIDE 10 MG: 5 TABLET ORAL at 22:14

## 2019-10-02 RX ADMIN — LEVOFLOXACIN 500 MG: 5 INJECTION, SOLUTION INTRAVENOUS at 17:54

## 2019-10-02 RX ADMIN — DEXAMETHASONE SODIUM PHOSPHATE 8 MG: 4 INJECTION, SOLUTION INTRAMUSCULAR; INTRAVENOUS at 16:33

## 2019-10-02 RX ADMIN — IPRATROPIUM BROMIDE AND ALBUTEROL SULFATE 3 ML: .5; 3 SOLUTION RESPIRATORY (INHALATION) at 19:29

## 2019-10-02 RX ADMIN — MONTELUKAST SODIUM 10 MG: 10 TABLET ORAL at 22:15

## 2019-10-02 RX ADMIN — FUROSEMIDE 40 MG: 10 INJECTION, SOLUTION INTRAMUSCULAR; INTRAVENOUS at 17:54

## 2019-10-02 ASSESSMENT — PAIN DESCRIPTION - LOCATION: LOCATION: ABDOMEN

## 2019-10-02 ASSESSMENT — PAIN SCALES - GENERAL
PAINLEVEL_OUTOF10: 0
PAINLEVEL_OUTOF10: 6

## 2019-10-03 LAB
ANION GAP SERPL CALCULATED.3IONS-SCNC: 10 MMOL/L (ref 3–16)
BUN BLDV-MCNC: 40 MG/DL (ref 7–20)
CALCIUM SERPL-MCNC: 10.8 MG/DL (ref 8.3–10.6)
CHLORIDE BLD-SCNC: 91 MMOL/L (ref 99–110)
CO2: 38 MMOL/L (ref 21–32)
CREAT SERPL-MCNC: 1.6 MG/DL (ref 0.6–1.2)
EKG ATRIAL RATE: 87 BPM
EKG DIAGNOSIS: NORMAL
EKG P AXIS: 40 DEGREES
EKG P-R INTERVAL: 172 MS
EKG Q-T INTERVAL: 390 MS
EKG QRS DURATION: 104 MS
EKG QTC CALCULATION (BAZETT): 469 MS
EKG R AXIS: 77 DEGREES
EKG T AXIS: -11 DEGREES
EKG VENTRICULAR RATE: 87 BPM
ESTIMATED AVERAGE GLUCOSE: 151.3 MG/DL
GFR AFRICAN AMERICAN: 38
GFR NON-AFRICAN AMERICAN: 32
GLUCOSE BLD-MCNC: 149 MG/DL (ref 70–99)
GLUCOSE BLD-MCNC: 151 MG/DL (ref 70–99)
GLUCOSE BLD-MCNC: 151 MG/DL (ref 70–99)
GLUCOSE BLD-MCNC: 172 MG/DL (ref 70–99)
GLUCOSE BLD-MCNC: 186 MG/DL (ref 70–99)
GLUCOSE BLD-MCNC: 260 MG/DL (ref 70–99)
HBA1C MFR BLD: 6.9 %
HCT VFR BLD CALC: 38.2 % (ref 36–48)
HEMOGLOBIN: 12.2 G/DL (ref 12–16)
MCH RBC QN AUTO: 25.7 PG (ref 26–34)
MCHC RBC AUTO-ENTMCNC: 32.1 G/DL (ref 31–36)
MCV RBC AUTO: 80.2 FL (ref 80–100)
PDW BLD-RTO: 25.5 % (ref 12.4–15.4)
PERFORMED ON: ABNORMAL
PLATELET # BLD: 170 K/UL (ref 135–450)
PMV BLD AUTO: 8.4 FL (ref 5–10.5)
POTASSIUM REFLEX MAGNESIUM: 4.8 MMOL/L (ref 3.5–5.1)
RBC # BLD: 4.76 M/UL (ref 4–5.2)
SODIUM BLD-SCNC: 139 MMOL/L (ref 136–145)
TROPONIN: 0.16 NG/ML
TROPONIN: 0.17 NG/ML
TROPONIN: 0.2 NG/ML
WBC # BLD: 6.9 K/UL (ref 4–11)

## 2019-10-03 PROCEDURE — 97530 THERAPEUTIC ACTIVITIES: CPT

## 2019-10-03 PROCEDURE — 84484 ASSAY OF TROPONIN QUANT: CPT

## 2019-10-03 PROCEDURE — 6370000000 HC RX 637 (ALT 250 FOR IP): Performed by: INTERNAL MEDICINE

## 2019-10-03 PROCEDURE — 2580000003 HC RX 258: Performed by: INTERNAL MEDICINE

## 2019-10-03 PROCEDURE — 93010 ELECTROCARDIOGRAM REPORT: CPT | Performed by: INTERNAL MEDICINE

## 2019-10-03 PROCEDURE — 6360000002 HC RX W HCPCS: Performed by: INTERNAL MEDICINE

## 2019-10-03 PROCEDURE — 99223 1ST HOSP IP/OBS HIGH 75: CPT | Performed by: INTERNAL MEDICINE

## 2019-10-03 PROCEDURE — 97535 SELF CARE MNGMENT TRAINING: CPT

## 2019-10-03 PROCEDURE — 94761 N-INVAS EAR/PLS OXIMETRY MLT: CPT

## 2019-10-03 PROCEDURE — 36415 COLL VENOUS BLD VENIPUNCTURE: CPT

## 2019-10-03 PROCEDURE — 1200000000 HC SEMI PRIVATE

## 2019-10-03 PROCEDURE — 80048 BASIC METABOLIC PNL TOTAL CA: CPT

## 2019-10-03 PROCEDURE — 97166 OT EVAL MOD COMPLEX 45 MIN: CPT

## 2019-10-03 PROCEDURE — 94640 AIRWAY INHALATION TREATMENT: CPT

## 2019-10-03 PROCEDURE — 97162 PT EVAL MOD COMPLEX 30 MIN: CPT

## 2019-10-03 PROCEDURE — 97116 GAIT TRAINING THERAPY: CPT

## 2019-10-03 PROCEDURE — 85027 COMPLETE CBC AUTOMATED: CPT

## 2019-10-03 RX ORDER — IPRATROPIUM BROMIDE AND ALBUTEROL SULFATE 2.5; .5 MG/3ML; MG/3ML
3 SOLUTION RESPIRATORY (INHALATION) 2 TIMES DAILY
Status: DISCONTINUED | OUTPATIENT
Start: 2019-10-04 | End: 2019-10-06 | Stop reason: HOSPADM

## 2019-10-03 RX ADMIN — LEVOTHYROXINE SODIUM 88 MCG: 0.09 TABLET ORAL at 06:09

## 2019-10-03 RX ADMIN — OXYBUTYNIN CHLORIDE 10 MG: 5 TABLET, EXTENDED RELEASE ORAL at 22:19

## 2019-10-03 RX ADMIN — MONTELUKAST SODIUM 10 MG: 10 TABLET ORAL at 22:19

## 2019-10-03 RX ADMIN — IPRATROPIUM BROMIDE AND ALBUTEROL SULFATE 3 ML: .5; 3 SOLUTION RESPIRATORY (INHALATION) at 19:00

## 2019-10-03 RX ADMIN — INSULIN LISPRO 1 UNITS: 100 INJECTION, SOLUTION INTRAVENOUS; SUBCUTANEOUS at 17:05

## 2019-10-03 RX ADMIN — FUROSEMIDE 10 MG/HR: 10 INJECTION, SOLUTION INTRAMUSCULAR; INTRAVENOUS at 15:27

## 2019-10-03 RX ADMIN — Medication 2 PUFF: at 19:00

## 2019-10-03 RX ADMIN — TORSEMIDE 60 MG: 20 TABLET ORAL at 09:16

## 2019-10-03 RX ADMIN — INSULIN LISPRO 1 UNITS: 100 INJECTION, SOLUTION INTRAVENOUS; SUBCUTANEOUS at 08:16

## 2019-10-03 RX ADMIN — BUSPIRONE HYDROCHLORIDE 10 MG: 5 TABLET ORAL at 13:37

## 2019-10-03 RX ADMIN — Medication 10 ML: at 09:18

## 2019-10-03 RX ADMIN — ACETAMINOPHEN 650 MG: 325 TABLET ORAL at 13:46

## 2019-10-03 RX ADMIN — BUSPIRONE HYDROCHLORIDE 10 MG: 5 TABLET ORAL at 09:17

## 2019-10-03 RX ADMIN — SPIRONOLACTONE 25 MG: 25 TABLET ORAL at 09:16

## 2019-10-03 RX ADMIN — IPRATROPIUM BROMIDE AND ALBUTEROL SULFATE 3 ML: .5; 3 SOLUTION RESPIRATORY (INHALATION) at 00:26

## 2019-10-03 RX ADMIN — IPRATROPIUM BROMIDE AND ALBUTEROL SULFATE 3 ML: .5; 3 SOLUTION RESPIRATORY (INHALATION) at 12:10

## 2019-10-03 RX ADMIN — Medication 2 PUFF: at 08:10

## 2019-10-03 RX ADMIN — BUSPIRONE HYDROCHLORIDE 10 MG: 5 TABLET ORAL at 22:19

## 2019-10-03 RX ADMIN — IPRATROPIUM BROMIDE AND ALBUTEROL SULFATE 3 ML: .5; 3 SOLUTION RESPIRATORY (INHALATION) at 16:00

## 2019-10-03 RX ADMIN — INSULIN LISPRO 3 UNITS: 100 INJECTION, SOLUTION INTRAVENOUS; SUBCUTANEOUS at 12:22

## 2019-10-03 RX ADMIN — POTASSIUM CHLORIDE 40 MEQ: 20 TABLET, EXTENDED RELEASE ORAL at 09:16

## 2019-10-03 RX ADMIN — ROSUVASTATIN CALCIUM 40 MG: 10 TABLET, FILM COATED ORAL at 22:19

## 2019-10-03 RX ADMIN — ENOXAPARIN SODIUM 40 MG: 40 INJECTION SUBCUTANEOUS at 09:17

## 2019-10-03 RX ADMIN — IPRATROPIUM BROMIDE AND ALBUTEROL SULFATE 3 ML: .5; 3 SOLUTION RESPIRATORY (INHALATION) at 08:10

## 2019-10-03 RX ADMIN — INSULIN LISPRO 1 UNITS: 100 INJECTION, SOLUTION INTRAVENOUS; SUBCUTANEOUS at 22:20

## 2019-10-03 ASSESSMENT — PAIN SCALES - GENERAL
PAINLEVEL_OUTOF10: 0
PAINLEVEL_OUTOF10: 10

## 2019-10-04 LAB
ANION GAP SERPL CALCULATED.3IONS-SCNC: 13 MMOL/L (ref 3–16)
BUN BLDV-MCNC: 44 MG/DL (ref 7–20)
CALCIUM SERPL-MCNC: 11.4 MG/DL (ref 8.3–10.6)
CHLORIDE BLD-SCNC: 87 MMOL/L (ref 99–110)
CO2: 33 MMOL/L (ref 21–32)
CREAT SERPL-MCNC: 1.6 MG/DL (ref 0.6–1.2)
GFR AFRICAN AMERICAN: 38
GFR NON-AFRICAN AMERICAN: 32
GLUCOSE BLD-MCNC: 121 MG/DL (ref 70–99)
GLUCOSE BLD-MCNC: 122 MG/DL (ref 70–99)
GLUCOSE BLD-MCNC: 129 MG/DL (ref 70–99)
GLUCOSE BLD-MCNC: 136 MG/DL (ref 70–99)
GLUCOSE BLD-MCNC: 139 MG/DL (ref 70–99)
GLUCOSE BLD-MCNC: 206 MG/DL (ref 70–99)
PERFORMED ON: ABNORMAL
POTASSIUM REFLEX MAGNESIUM: 5.4 MMOL/L (ref 3.5–5.1)
SODIUM BLD-SCNC: 133 MMOL/L (ref 136–145)
TROPONIN: 0.19 NG/ML
TROPONIN: 0.2 NG/ML
TROPONIN: 0.23 NG/ML

## 2019-10-04 PROCEDURE — 2580000003 HC RX 258: Performed by: INTERNAL MEDICINE

## 2019-10-04 PROCEDURE — 6360000002 HC RX W HCPCS: Performed by: INTERNAL MEDICINE

## 2019-10-04 PROCEDURE — 36415 COLL VENOUS BLD VENIPUNCTURE: CPT

## 2019-10-04 PROCEDURE — 6370000000 HC RX 637 (ALT 250 FOR IP): Performed by: INTERNAL MEDICINE

## 2019-10-04 PROCEDURE — 2700000000 HC OXYGEN THERAPY PER DAY

## 2019-10-04 PROCEDURE — 1200000000 HC SEMI PRIVATE

## 2019-10-04 PROCEDURE — 94640 AIRWAY INHALATION TREATMENT: CPT

## 2019-10-04 PROCEDURE — 99233 SBSQ HOSP IP/OBS HIGH 50: CPT | Performed by: INTERNAL MEDICINE

## 2019-10-04 PROCEDURE — 80048 BASIC METABOLIC PNL TOTAL CA: CPT

## 2019-10-04 PROCEDURE — 94761 N-INVAS EAR/PLS OXIMETRY MLT: CPT

## 2019-10-04 PROCEDURE — 93005 ELECTROCARDIOGRAM TRACING: CPT | Performed by: NURSE PRACTITIONER

## 2019-10-04 PROCEDURE — 84484 ASSAY OF TROPONIN QUANT: CPT

## 2019-10-04 RX ADMIN — Medication 2 PUFF: at 19:40

## 2019-10-04 RX ADMIN — SPIRONOLACTONE 25 MG: 25 TABLET ORAL at 11:02

## 2019-10-04 RX ADMIN — OXYBUTYNIN CHLORIDE 10 MG: 5 TABLET, EXTENDED RELEASE ORAL at 21:46

## 2019-10-04 RX ADMIN — INSULIN LISPRO 1 UNITS: 100 INJECTION, SOLUTION INTRAVENOUS; SUBCUTANEOUS at 21:47

## 2019-10-04 RX ADMIN — IPRATROPIUM BROMIDE AND ALBUTEROL SULFATE 3 ML: .5; 3 SOLUTION RESPIRATORY (INHALATION) at 19:40

## 2019-10-04 RX ADMIN — BUSPIRONE HYDROCHLORIDE 10 MG: 5 TABLET ORAL at 21:46

## 2019-10-04 RX ADMIN — FUROSEMIDE 10 MG/HR: 10 INJECTION, SOLUTION INTRAMUSCULAR; INTRAVENOUS at 21:49

## 2019-10-04 RX ADMIN — ROSUVASTATIN CALCIUM 40 MG: 10 TABLET, FILM COATED ORAL at 21:46

## 2019-10-04 RX ADMIN — LEVOTHYROXINE SODIUM 88 MCG: 0.09 TABLET ORAL at 06:16

## 2019-10-04 RX ADMIN — BUSPIRONE HYDROCHLORIDE 10 MG: 5 TABLET ORAL at 11:02

## 2019-10-04 RX ADMIN — FUROSEMIDE 10 MG/HR: 10 INJECTION, SOLUTION INTRAMUSCULAR; INTRAVENOUS at 00:56

## 2019-10-04 RX ADMIN — MONTELUKAST SODIUM 10 MG: 10 TABLET ORAL at 21:48

## 2019-10-04 RX ADMIN — BUSPIRONE HYDROCHLORIDE 10 MG: 5 TABLET ORAL at 15:23

## 2019-10-04 RX ADMIN — ENOXAPARIN SODIUM 40 MG: 40 INJECTION SUBCUTANEOUS at 11:02

## 2019-10-04 RX ADMIN — POTASSIUM CHLORIDE 40 MEQ: 20 TABLET, EXTENDED RELEASE ORAL at 11:02

## 2019-10-04 RX ADMIN — FUROSEMIDE 10 MG/HR: 10 INJECTION, SOLUTION INTRAMUSCULAR; INTRAVENOUS at 10:45

## 2019-10-04 RX ADMIN — ACETAMINOPHEN 650 MG: 325 TABLET ORAL at 00:41

## 2019-10-04 ASSESSMENT — PAIN SCALES - GENERAL
PAINLEVEL_OUTOF10: 0
PAINLEVEL_OUTOF10: 8
PAINLEVEL_OUTOF10: 0

## 2019-10-05 LAB
ANION GAP SERPL CALCULATED.3IONS-SCNC: 11 MMOL/L (ref 3–16)
BUN BLDV-MCNC: 45 MG/DL (ref 7–20)
CALCIUM SERPL-MCNC: 11.3 MG/DL (ref 8.3–10.6)
CHLORIDE BLD-SCNC: 89 MMOL/L (ref 99–110)
CO2: 39 MMOL/L (ref 21–32)
CREAT SERPL-MCNC: 1.7 MG/DL (ref 0.6–1.2)
EKG ATRIAL RATE: 84 BPM
EKG DIAGNOSIS: NORMAL
EKG P AXIS: 45 DEGREES
EKG P-R INTERVAL: 172 MS
EKG Q-T INTERVAL: 382 MS
EKG QRS DURATION: 106 MS
EKG QTC CALCULATION (BAZETT): 451 MS
EKG R AXIS: 72 DEGREES
EKG T AXIS: -18 DEGREES
EKG VENTRICULAR RATE: 84 BPM
GFR AFRICAN AMERICAN: 36
GFR NON-AFRICAN AMERICAN: 29
GLUCOSE BLD-MCNC: 133 MG/DL (ref 70–99)
GLUCOSE BLD-MCNC: 138 MG/DL (ref 70–99)
GLUCOSE BLD-MCNC: 150 MG/DL (ref 70–99)
GLUCOSE BLD-MCNC: 167 MG/DL (ref 70–99)
GLUCOSE BLD-MCNC: 241 MG/DL (ref 70–99)
HCT VFR BLD CALC: 40.2 % (ref 36–48)
HEMOGLOBIN: 12.8 G/DL (ref 12–16)
MAGNESIUM: 2 MG/DL (ref 1.8–2.4)
MCH RBC QN AUTO: 25.8 PG (ref 26–34)
MCHC RBC AUTO-ENTMCNC: 31.9 G/DL (ref 31–36)
MCV RBC AUTO: 80.9 FL (ref 80–100)
PDW BLD-RTO: 26.4 % (ref 12.4–15.4)
PERFORMED ON: ABNORMAL
PLATELET # BLD: 196 K/UL (ref 135–450)
PMV BLD AUTO: 8.4 FL (ref 5–10.5)
POTASSIUM REFLEX MAGNESIUM: 4.3 MMOL/L (ref 3.5–5.1)
RBC # BLD: 4.97 M/UL (ref 4–5.2)
SODIUM BLD-SCNC: 139 MMOL/L (ref 136–145)
TROPONIN: 0.25 NG/ML
TROPONIN: 0.27 NG/ML
WBC # BLD: 7.7 K/UL (ref 4–11)

## 2019-10-05 PROCEDURE — 94640 AIRWAY INHALATION TREATMENT: CPT

## 2019-10-05 PROCEDURE — 83735 ASSAY OF MAGNESIUM: CPT

## 2019-10-05 PROCEDURE — 85027 COMPLETE CBC AUTOMATED: CPT

## 2019-10-05 PROCEDURE — 99233 SBSQ HOSP IP/OBS HIGH 50: CPT | Performed by: NURSE PRACTITIONER

## 2019-10-05 PROCEDURE — 6360000002 HC RX W HCPCS: Performed by: INTERNAL MEDICINE

## 2019-10-05 PROCEDURE — 2700000000 HC OXYGEN THERAPY PER DAY

## 2019-10-05 PROCEDURE — 2580000003 HC RX 258: Performed by: INTERNAL MEDICINE

## 2019-10-05 PROCEDURE — 93010 ELECTROCARDIOGRAM REPORT: CPT | Performed by: INTERNAL MEDICINE

## 2019-10-05 PROCEDURE — 1200000000 HC SEMI PRIVATE

## 2019-10-05 PROCEDURE — 36415 COLL VENOUS BLD VENIPUNCTURE: CPT

## 2019-10-05 PROCEDURE — 6370000000 HC RX 637 (ALT 250 FOR IP): Performed by: INTERNAL MEDICINE

## 2019-10-05 PROCEDURE — 84484 ASSAY OF TROPONIN QUANT: CPT

## 2019-10-05 PROCEDURE — 80048 BASIC METABOLIC PNL TOTAL CA: CPT

## 2019-10-05 PROCEDURE — 94761 N-INVAS EAR/PLS OXIMETRY MLT: CPT

## 2019-10-05 RX ADMIN — Medication 2 PUFF: at 20:20

## 2019-10-05 RX ADMIN — ROSUVASTATIN CALCIUM 40 MG: 10 TABLET, FILM COATED ORAL at 20:51

## 2019-10-05 RX ADMIN — IPRATROPIUM BROMIDE AND ALBUTEROL SULFATE 3 ML: .5; 3 SOLUTION RESPIRATORY (INHALATION) at 20:20

## 2019-10-05 RX ADMIN — BUSPIRONE HYDROCHLORIDE 10 MG: 5 TABLET ORAL at 20:53

## 2019-10-05 RX ADMIN — MONTELUKAST SODIUM 10 MG: 10 TABLET ORAL at 20:53

## 2019-10-05 RX ADMIN — INSULIN LISPRO 1 UNITS: 100 INJECTION, SOLUTION INTRAVENOUS; SUBCUTANEOUS at 09:36

## 2019-10-05 RX ADMIN — INSULIN LISPRO 2 UNITS: 100 INJECTION, SOLUTION INTRAVENOUS; SUBCUTANEOUS at 20:54

## 2019-10-05 RX ADMIN — SPIRONOLACTONE 25 MG: 25 TABLET ORAL at 09:36

## 2019-10-05 RX ADMIN — FUROSEMIDE 10 MG/HR: 10 INJECTION, SOLUTION INTRAMUSCULAR; INTRAVENOUS at 09:30

## 2019-10-05 RX ADMIN — POTASSIUM CHLORIDE 40 MEQ: 20 TABLET, EXTENDED RELEASE ORAL at 09:36

## 2019-10-05 RX ADMIN — Medication 2 PUFF: at 08:02

## 2019-10-05 RX ADMIN — BUSPIRONE HYDROCHLORIDE 10 MG: 5 TABLET ORAL at 09:36

## 2019-10-05 RX ADMIN — OXYBUTYNIN CHLORIDE 10 MG: 5 TABLET, EXTENDED RELEASE ORAL at 20:53

## 2019-10-05 RX ADMIN — BUSPIRONE HYDROCHLORIDE 10 MG: 5 TABLET ORAL at 17:06

## 2019-10-05 RX ADMIN — IPRATROPIUM BROMIDE AND ALBUTEROL SULFATE 3 ML: .5; 3 SOLUTION RESPIRATORY (INHALATION) at 08:02

## 2019-10-05 RX ADMIN — ENOXAPARIN SODIUM 40 MG: 40 INJECTION SUBCUTANEOUS at 09:36

## 2019-10-05 RX ADMIN — Medication 10 ML: at 20:51

## 2019-10-05 RX ADMIN — LEVOTHYROXINE SODIUM 88 MCG: 0.09 TABLET ORAL at 05:59

## 2019-10-05 ASSESSMENT — PAIN SCALES - WONG BAKER
WONGBAKER_NUMERICALRESPONSE: 0

## 2019-10-05 ASSESSMENT — PAIN DESCRIPTION - PROGRESSION
CLINICAL_PROGRESSION: RAPIDLY IMPROVING

## 2019-10-05 ASSESSMENT — PAIN SCALES - GENERAL
PAINLEVEL_OUTOF10: 0
PAINLEVEL_OUTOF10: 0

## 2019-10-06 VITALS
DIASTOLIC BLOOD PRESSURE: 78 MMHG | SYSTOLIC BLOOD PRESSURE: 117 MMHG | RESPIRATION RATE: 16 BRPM | WEIGHT: 272.71 LBS | OXYGEN SATURATION: 96 % | HEART RATE: 82 BPM | BODY MASS INDEX: 48.32 KG/M2 | HEIGHT: 63 IN | TEMPERATURE: 97.8 F

## 2019-10-06 LAB
ANION GAP SERPL CALCULATED.3IONS-SCNC: 9 MMOL/L (ref 3–16)
BUN BLDV-MCNC: 46 MG/DL (ref 7–20)
CALCIUM SERPL-MCNC: 10.9 MG/DL (ref 8.3–10.6)
CHLORIDE BLD-SCNC: 90 MMOL/L (ref 99–110)
CO2: 40 MMOL/L (ref 21–32)
CREAT SERPL-MCNC: 1.6 MG/DL (ref 0.6–1.2)
GFR AFRICAN AMERICAN: 38
GFR NON-AFRICAN AMERICAN: 32
GLUCOSE BLD-MCNC: 130 MG/DL (ref 70–99)
GLUCOSE BLD-MCNC: 136 MG/DL (ref 70–99)
HCT VFR BLD CALC: 38.6 % (ref 36–48)
HEMOGLOBIN: 12.4 G/DL (ref 12–16)
MCH RBC QN AUTO: 26 PG (ref 26–34)
MCHC RBC AUTO-ENTMCNC: 32.1 G/DL (ref 31–36)
MCV RBC AUTO: 80.9 FL (ref 80–100)
PDW BLD-RTO: 26.1 % (ref 12.4–15.4)
PERFORMED ON: ABNORMAL
PLATELET # BLD: 177 K/UL (ref 135–450)
PMV BLD AUTO: 8.8 FL (ref 5–10.5)
POTASSIUM REFLEX MAGNESIUM: 4.3 MMOL/L (ref 3.5–5.1)
RBC # BLD: 4.76 M/UL (ref 4–5.2)
SODIUM BLD-SCNC: 139 MMOL/L (ref 136–145)
WBC # BLD: 7 K/UL (ref 4–11)

## 2019-10-06 PROCEDURE — 2700000000 HC OXYGEN THERAPY PER DAY

## 2019-10-06 PROCEDURE — 6370000000 HC RX 637 (ALT 250 FOR IP): Performed by: NURSE PRACTITIONER

## 2019-10-06 PROCEDURE — 36415 COLL VENOUS BLD VENIPUNCTURE: CPT

## 2019-10-06 PROCEDURE — 2580000003 HC RX 258: Performed by: INTERNAL MEDICINE

## 2019-10-06 PROCEDURE — 85027 COMPLETE CBC AUTOMATED: CPT

## 2019-10-06 PROCEDURE — 80048 BASIC METABOLIC PNL TOTAL CA: CPT

## 2019-10-06 PROCEDURE — 6370000000 HC RX 637 (ALT 250 FOR IP): Performed by: INTERNAL MEDICINE

## 2019-10-06 PROCEDURE — 94761 N-INVAS EAR/PLS OXIMETRY MLT: CPT

## 2019-10-06 PROCEDURE — 6360000002 HC RX W HCPCS: Performed by: INTERNAL MEDICINE

## 2019-10-06 PROCEDURE — 94640 AIRWAY INHALATION TREATMENT: CPT

## 2019-10-06 PROCEDURE — 99232 SBSQ HOSP IP/OBS MODERATE 35: CPT | Performed by: NURSE PRACTITIONER

## 2019-10-06 RX ORDER — TORSEMIDE 20 MG/1
60 TABLET ORAL DAILY
Status: DISCONTINUED | OUTPATIENT
Start: 2019-10-06 | End: 2019-10-06 | Stop reason: HOSPADM

## 2019-10-06 RX ADMIN — Medication 2 PUFF: at 08:01

## 2019-10-06 RX ADMIN — LEVOTHYROXINE SODIUM 88 MCG: 0.09 TABLET ORAL at 06:17

## 2019-10-06 RX ADMIN — TORSEMIDE 60 MG: 20 TABLET ORAL at 08:08

## 2019-10-06 RX ADMIN — ENOXAPARIN SODIUM 40 MG: 40 INJECTION SUBCUTANEOUS at 08:08

## 2019-10-06 RX ADMIN — IPRATROPIUM BROMIDE AND ALBUTEROL SULFATE 3 ML: .5; 3 SOLUTION RESPIRATORY (INHALATION) at 08:01

## 2019-10-06 RX ADMIN — POTASSIUM CHLORIDE 40 MEQ: 20 TABLET, EXTENDED RELEASE ORAL at 08:07

## 2019-10-06 RX ADMIN — Medication 10 ML: at 08:08

## 2019-10-06 RX ADMIN — BUSPIRONE HYDROCHLORIDE 10 MG: 5 TABLET ORAL at 08:08

## 2019-10-06 RX ADMIN — SPIRONOLACTONE 25 MG: 25 TABLET ORAL at 08:08

## 2019-10-06 ASSESSMENT — PAIN SCALES - WONG BAKER
WONGBAKER_NUMERICALRESPONSE: 0

## 2019-10-06 ASSESSMENT — PAIN DESCRIPTION - PROGRESSION
CLINICAL_PROGRESSION: RAPIDLY IMPROVING

## 2019-10-08 NOTE — PLAN OF CARE
Orders received and placed on provider's desk for review and signature     Dali MOSLEY  Station      Problem: Risk for Impaired Skin Integrity  Goal: Tissue integrity - skin and mucous membranes  Description  Structural intactness and normal physiological function of skin and  mucous membranes.   Outcome: Ongoing

## 2019-10-09 DIAGNOSIS — Z51.5 HOSPICE CARE: ICD-10-CM

## 2019-10-09 DIAGNOSIS — J96.11 CHRONIC RESPIRATORY FAILURE WITH HYPOXIA (HCC): Primary | ICD-10-CM

## 2019-10-09 DIAGNOSIS — I50.42 CHRONIC COMBINED SYSTOLIC AND DIASTOLIC CONGESTIVE HEART FAILURE (HCC): ICD-10-CM

## 2019-10-09 DIAGNOSIS — J44.9 CHRONIC OBSTRUCTIVE PULMONARY DISEASE, UNSPECIFIED COPD TYPE (HCC): ICD-10-CM

## 2019-10-09 RX ORDER — LORAZEPAM 1 MG/1
1 TABLET ORAL EVERY 4 HOURS PRN
Qty: 30 TABLET | Refills: 1 | Status: SHIPPED | OUTPATIENT
Start: 2019-10-09 | End: 2019-11-08

## 2019-10-09 RX ORDER — MORPHINE SULFATE 100 MG/5ML
5 SOLUTION ORAL EVERY 4 HOURS PRN
Qty: 30 ML | Refills: 0 | Status: SHIPPED | OUTPATIENT
Start: 2019-10-09 | End: 2019-11-08

## 2019-10-13 PROBLEM — N39.0 UTI (URINARY TRACT INFECTION): Status: RESOLVED | Noted: 2019-09-13 | Resolved: 2019-10-13

## 2019-11-01 PROBLEM — R77.8 ELEVATED TROPONIN: Status: RESOLVED | Noted: 2019-09-26 | Resolved: 2019-11-01

## 2019-11-22 NOTE — PROGRESS NOTES
Occupational Therapy  Facility/Department: [facility] B3 - MED SURG  Daily Treatment Note  NAME: Zaida Mcardle  : 1945  MRN: [id number]    Date of Service: 2019    Discharge Recommendations:  Subacute/Skilled Nursing Facility       Assessment   Performance deficits / Impairments: Decreased functional mobility ; Decreased balance;Decreased coordination;Decreased ADL status; Decreased high-level IADLs  Assessment: Pt limited by pain. Pt required mod A for rolling to place bed pan. Cont with POC. Patient Education: role of OT, ADLs  REQUIRES OT FOLLOW UP: Yes  Activity Tolerance  Activity Tolerance: Patient limited by pain  Safety Devices  Type of devices: Gait belt;Call light within reach; Bed alarm in place; Left in bed         Patient Diagnosis(es): The primary encounter diagnosis was COPD exacerbation ([facility] [address] Diagnoses of Acute pain of right shoulder and Cellulitis of lower extremity, unspecified laterality were also pertinent to this visit. has a past medical history of JAQUELIN (acute kidney injury) ([facility] [address] Arthritis, Asthma, COPD (chronic obstructive pulmonary disease) ([facility] [address] Diabetes mellitus ([facility] [address] Hyperlipidemia, Hypertension, Other disorders of kidney and ureter in diseases classified elsewhere, Pneumonia due to infectious organism, and Thyroid disease. has a past surgical history that includes Cholecystectomy; xr knee inc tunnel bilat (Bilateral, ); Rotator cuff repair (Left, ); and Thyroid lobectomy.     Restrictions  Restrictions/Precautions  Restrictions/Precautions: General Precautions, Fall Risk  Position Activity Restriction  Other position/activity restrictions: purewick  Subjective   General  Chart Reviewed: Yes  Patient assessed for rehabilitation services?: Yes  Family / Caregiver Present: No  Diagnosis: cellulitis  Subjective  Subjective: \" I dont know if my foot can hold me\"  General Comment  Comments: Per RN ok for treatment  Pain Assessment  Pain Assessment: Faces  Murray-Santiago Pain Rating: Hurts even more  Pain Type: Chronic pain  Pain Location: Back  Non-Pharmaceutical Pain Intervention(s): Therapeutic presence; Emotional support  Vital Signs  Patient Currently in Pain: Yes   Orientation  Orientation  Overall Orientation Status: Within Functional Limits  Objective    ADL  Toileting: Maximum assistance(rolling for bed pan)           Bed mobility  Rolling to Left: Moderate assistance(rolling for bed pan)  Scooting: Supervision(in bed)                          Cognition  Overall Cognitive Status: [facility]        Plan   Plan  Times per week: 3-5x/wk  Current Treatment Recommendations: Strengthening, Endurance Training, Patient/Caregiver Education & Training, Self-Care / ADL, Equipment Evaluation, Education, & procurement, Safety Education & Training, Functional Mobility Training    AM-PAC Score        AM-[facility] Inpatient Daily Activity Raw Score: 16  AM-PAC Inpatient ADL T-Scale Score : 35.96  ADL Inpatient CMS 0-100% Score: 53.32  ADL Inpatient CMS G-Code Modifier : CK    Goals  Short term goals  Time Frame for Short term goals: within one week (7/24)  Short term goal 1: Pt will complete toilet transfer with supervision by 4/21  Short term goal 2: Pt will complete standing ADL task with supervision  Short term goal 3: Pt will complete BUE AROM ther ex without cues  Patient Goals   Patient goals : \"feel better\"       Therapy Time   Individual Concurrent Group Co-treatment   Time In 1612         Time Out 1625         Minutes 13         Timed Code Treatment Minutes: 13 Minutes       Aggie Contreras OTR/L stretcher

## 2020-11-03 PROBLEM — J18.9 PNEUMONIA: Status: RESOLVED | Noted: 2019-09-26 | Resolved: 2020-11-03

## 2020-11-03 PROBLEM — J18.9 PNEUMONIA DUE TO INFECTIOUS ORGANISM: Status: RESOLVED | Noted: 2018-12-22 | Resolved: 2020-11-03

## 2022-07-18 NOTE — CARE COORDINATION
Placed call to Pham with [facility] to check status on if they will be able to accept patient. She states they can accept as long as patient will not be discharged on [employer]. Updated her patient will likely be discharged with no antibiotics. She states she will look in chart again and also check her Medicare days and call back with answer. set-up required

## 2022-09-14 NOTE — PROGRESS NOTES
Physical Therapy  Attempted to see patient for therapy. Unable to see patient at this time. Pt currently on the bedpan. Will follow up as schedule permits.   Carlos Waters,   [facility] Continue Regimen: I recommended a broad spectrum sunscreen with a SPF of 30 or higher. I explained that SPF 30 sunscreens block approximately 97 percent of the sun's harmful rays. Sunscreens should be applied at least 15 minutes prior to expected sun exposure and then every 2 hours after that as long as sun exposure continues. If swimming or exercising sunscreen should be reapplied every 45 minutes to an hour after getting wet or sweating. One ounce, or the equivalent of a shot glass full of sunscreen, is adequate to protect the skin not covered by a bathing suit. I also recommended a lip balm with a sunscreen as well. Sun protective clothing can be used in lieu of sunscreen but must be worn the entire time you are exposed to the sun's rays Detail Level: Zone

## 2023-04-11 NOTE — ED NOTES
1707 - PS to hospitalists  Re: CHF exac, JAQUELIN, mild trop leak  1726 - Dr Ryann Rios called back     Maria R Nguyen  09/13/19 1727 Injectable 4 Price/Unit (In Dollars- Use Only Numbers And Decimals): 0.00 Implant 10 Units: 0 Apply Facility Fee: No Number Of Months: 1 Include Sales Tax On Surgeon's Fees: Yes Detail Level: Zone